# Patient Record
Sex: MALE | Race: WHITE | NOT HISPANIC OR LATINO | Employment: OTHER | URBAN - METROPOLITAN AREA
[De-identification: names, ages, dates, MRNs, and addresses within clinical notes are randomized per-mention and may not be internally consistent; named-entity substitution may affect disease eponyms.]

---

## 2017-08-01 ENCOUNTER — HOSPITAL ENCOUNTER (INPATIENT)
Facility: HOSPITAL | Age: 62
LOS: 5 days | Discharge: HOME | End: 2017-08-06
Attending: EMERGENCY MEDICINE | Admitting: HOSPITALIST

## 2017-08-01 ENCOUNTER — APPOINTMENT (OUTPATIENT)
Dept: GENERAL RADIOLOGY | Facility: HOSPITAL | Age: 62
End: 2017-08-01

## 2017-08-01 DIAGNOSIS — R53.1 GENERALIZED WEAKNESS: ICD-10-CM

## 2017-08-01 DIAGNOSIS — Z74.09 IMPAIRED FUNCTIONAL MOBILITY, BALANCE, GAIT, AND ENDURANCE: ICD-10-CM

## 2017-08-01 DIAGNOSIS — F10.20 ALCOHOLISM (HCC): ICD-10-CM

## 2017-08-01 DIAGNOSIS — R29.6 MULTIPLE FALLS: ICD-10-CM

## 2017-08-01 DIAGNOSIS — Z74.09 IMPAIRED MOBILITY AND ADLS: ICD-10-CM

## 2017-08-01 DIAGNOSIS — E87.1 ACUTE HYPONATREMIA: Primary | ICD-10-CM

## 2017-08-01 DIAGNOSIS — R41.0 CONFUSION: ICD-10-CM

## 2017-08-01 DIAGNOSIS — Z78.9 IMPAIRED MOBILITY AND ADLS: ICD-10-CM

## 2017-08-01 LAB
ALBUMIN SERPL-MCNC: 3.6 G/DL (ref 3.2–4.8)
ALBUMIN/GLOB SERPL: 0.9 G/DL (ref 1.5–2.5)
ALP SERPL-CCNC: 182 U/L (ref 25–100)
ALT SERPL W P-5'-P-CCNC: 94 U/L (ref 7–40)
AMPHET+METHAMPHET UR QL: NEGATIVE
AMPHETAMINES UR QL: NEGATIVE
ANION GAP SERPL CALCULATED.3IONS-SCNC: 7 MMOL/L (ref 3–11)
AST SERPL-CCNC: 134 U/L (ref 0–33)
BARBITURATES UR QL SCN: NEGATIVE
BASOPHILS # BLD AUTO: 0.02 10*3/MM3 (ref 0–0.2)
BASOPHILS NFR BLD AUTO: 0.2 % (ref 0–1)
BENZODIAZ UR QL SCN: NEGATIVE
BILIRUB SERPL-MCNC: 1.5 MG/DL (ref 0.3–1.2)
BILIRUB UR QL STRIP: NEGATIVE
BUN BLD-MCNC: <5 MG/DL (ref 9–23)
BUN/CREAT SERPL: ABNORMAL (ref 7–25)
BUPRENORPHINE SERPL-MCNC: NEGATIVE NG/ML
CALCIUM SPEC-SCNC: 9.6 MG/DL (ref 8.7–10.4)
CANNABINOIDS SERPL QL: NEGATIVE
CHLORIDE SERPL-SCNC: 92 MMOL/L (ref 99–109)
CLARITY UR: CLEAR
CO2 SERPL-SCNC: 23 MMOL/L (ref 20–31)
COCAINE UR QL: NEGATIVE
COLOR UR: ABNORMAL
CREAT BLD-MCNC: 0.6 MG/DL (ref 0.6–1.3)
DEPRECATED RDW RBC AUTO: 51 FL (ref 37–54)
EOSINOPHIL # BLD AUTO: 0.25 10*3/MM3 (ref 0–0.3)
EOSINOPHIL NFR BLD AUTO: 2.6 % (ref 0–3)
ERYTHROCYTE [DISTWIDTH] IN BLOOD BY AUTOMATED COUNT: 14.1 % (ref 11.3–14.5)
ETHANOL BLD-MCNC: <10 MG/DL (ref 0–10)
GFR SERPL CREATININE-BSD FRML MDRD: 136 ML/MIN/1.73
GLOBULIN UR ELPH-MCNC: 3.9 GM/DL
GLUCOSE BLD-MCNC: 126 MG/DL (ref 70–100)
GLUCOSE UR STRIP-MCNC: NEGATIVE MG/DL
HCT VFR BLD AUTO: 42 % (ref 38.9–50.9)
HGB BLD-MCNC: 15 G/DL (ref 13.1–17.5)
HGB UR QL STRIP.AUTO: NEGATIVE
HOLD SPECIMEN: NORMAL
HOLD SPECIMEN: NORMAL
IMM GRANULOCYTES # BLD: 0.01 10*3/MM3 (ref 0–0.03)
IMM GRANULOCYTES NFR BLD: 0.1 % (ref 0–0.6)
KETONES UR QL STRIP: NEGATIVE
LEUKOCYTE ESTERASE UR QL STRIP.AUTO: NEGATIVE
LYMPHOCYTES # BLD AUTO: 1.53 10*3/MM3 (ref 0.6–4.8)
LYMPHOCYTES NFR BLD AUTO: 16.2 % (ref 24–44)
MAGNESIUM SERPL-MCNC: 1.8 MG/DL (ref 1.3–2.7)
MCH RBC QN AUTO: 35.5 PG (ref 27–31)
MCHC RBC AUTO-ENTMCNC: 35.7 G/DL (ref 32–36)
MCV RBC AUTO: 99.5 FL (ref 80–99)
METHADONE UR QL SCN: NEGATIVE
MONOCYTES # BLD AUTO: 1.35 10*3/MM3 (ref 0–1)
MONOCYTES NFR BLD AUTO: 14.3 % (ref 0–12)
NEUTROPHILS # BLD AUTO: 6.3 10*3/MM3 (ref 1.5–8.3)
NEUTROPHILS NFR BLD AUTO: 66.6 % (ref 41–71)
NITRITE UR QL STRIP: NEGATIVE
OPIATES UR QL: NEGATIVE
OXYCODONE UR QL SCN: NEGATIVE
PCP UR QL SCN: NEGATIVE
PH UR STRIP.AUTO: 6.5 [PH] (ref 5–8)
PLATELET # BLD AUTO: 176 10*3/MM3 (ref 150–450)
PMV BLD AUTO: 9.7 FL (ref 6–12)
POTASSIUM BLD-SCNC: 4.7 MMOL/L (ref 3.5–5.5)
PROPOXYPH UR QL: NEGATIVE
PROT SERPL-MCNC: 7.5 G/DL (ref 5.7–8.2)
PROT UR QL STRIP: NEGATIVE
RBC # BLD AUTO: 4.22 10*6/MM3 (ref 4.2–5.76)
SODIUM BLD-SCNC: 122 MMOL/L (ref 132–146)
SP GR UR STRIP: 1.01 (ref 1–1.03)
TRICYCLICS UR QL SCN: NEGATIVE
UROBILINOGEN UR QL STRIP: ABNORMAL
VIT B12 BLD-MCNC: 652 PG/ML (ref 211–911)
WBC NRBC COR # BLD: 9.46 10*3/MM3 (ref 3.5–10.8)
WHOLE BLOOD HOLD SPECIMEN: NORMAL
WHOLE BLOOD HOLD SPECIMEN: NORMAL

## 2017-08-01 PROCEDURE — 80307 DRUG TEST PRSMV CHEM ANLYZR: CPT | Performed by: EMERGENCY MEDICINE

## 2017-08-01 PROCEDURE — 80053 COMPREHEN METABOLIC PANEL: CPT | Performed by: EMERGENCY MEDICINE

## 2017-08-01 PROCEDURE — 83935 ASSAY OF URINE OSMOLALITY: CPT | Performed by: PHYSICIAN ASSISTANT

## 2017-08-01 PROCEDURE — 82607 VITAMIN B-12: CPT | Performed by: EMERGENCY MEDICINE

## 2017-08-01 PROCEDURE — 83735 ASSAY OF MAGNESIUM: CPT | Performed by: EMERGENCY MEDICINE

## 2017-08-01 PROCEDURE — 84300 ASSAY OF URINE SODIUM: CPT | Performed by: PHYSICIAN ASSISTANT

## 2017-08-01 PROCEDURE — 80306 DRUG TEST PRSMV INSTRMNT: CPT | Performed by: EMERGENCY MEDICINE

## 2017-08-01 PROCEDURE — 71010 HC CHEST PA OR AP: CPT

## 2017-08-01 PROCEDURE — 93005 ELECTROCARDIOGRAM TRACING: CPT | Performed by: EMERGENCY MEDICINE

## 2017-08-01 PROCEDURE — 85025 COMPLETE CBC W/AUTO DIFF WBC: CPT | Performed by: EMERGENCY MEDICINE

## 2017-08-01 PROCEDURE — 81003 URINALYSIS AUTO W/O SCOPE: CPT | Performed by: EMERGENCY MEDICINE

## 2017-08-01 PROCEDURE — 99285 EMERGENCY DEPT VISIT HI MDM: CPT

## 2017-08-01 RX ORDER — PRIMIDONE 50 MG/1
50 TABLET ORAL NIGHTLY
COMMUNITY
End: 2018-07-11 | Stop reason: HOSPADM

## 2017-08-01 RX ORDER — SODIUM CHLORIDE 0.9 % (FLUSH) 0.9 %
10 SYRINGE (ML) INJECTION AS NEEDED
Status: DISCONTINUED | OUTPATIENT
Start: 2017-08-01 | End: 2017-08-06 | Stop reason: HOSPADM

## 2017-08-01 RX ORDER — PROPRANOLOL HYDROCHLORIDE 160 MG/1
CAPSULE, EXTENDED RELEASE ORAL
COMMUNITY
End: 2017-08-06 | Stop reason: HOSPADM

## 2017-08-02 ENCOUNTER — APPOINTMENT (OUTPATIENT)
Dept: CT IMAGING | Facility: HOSPITAL | Age: 62
End: 2017-08-02

## 2017-08-02 PROBLEM — E87.1 HYPONATREMIA: Status: ACTIVE | Noted: 2017-08-02

## 2017-08-02 PROBLEM — R73.9 HYPERGLYCEMIA: Status: ACTIVE | Noted: 2017-08-02

## 2017-08-02 PROBLEM — J44.9 COPD (CHRONIC OBSTRUCTIVE PULMONARY DISEASE) (HCC): Status: ACTIVE | Noted: 2017-08-02

## 2017-08-02 PROBLEM — E44.0 MODERATE MALNUTRITION: Status: ACTIVE | Noted: 2017-08-02

## 2017-08-02 PROBLEM — E87.1 ACUTE HYPONATREMIA: Status: ACTIVE | Noted: 2017-08-02

## 2017-08-02 PROBLEM — R74.8 ELEVATED LIVER ENZYMES: Status: ACTIVE | Noted: 2017-08-02

## 2017-08-02 PROBLEM — F10.10 ALCOHOL ABUSE: Status: ACTIVE | Noted: 2017-08-02

## 2017-08-02 PROBLEM — I10 HYPERTENSION: Status: ACTIVE | Noted: 2017-08-02

## 2017-08-02 PROBLEM — Z72.0 TOBACCO ABUSE: Status: ACTIVE | Noted: 2017-08-02

## 2017-08-02 LAB
ALBUMIN SERPL-MCNC: 3 G/DL (ref 3.2–4.8)
ALBUMIN/GLOB SERPL: 1 G/DL (ref 1.5–2.5)
ALP SERPL-CCNC: 148 U/L (ref 25–100)
ALT SERPL W P-5'-P-CCNC: 70 U/L (ref 7–40)
AMMONIA BLD-SCNC: 19 UMOL/L (ref 19–60)
ANION GAP SERPL CALCULATED.3IONS-SCNC: 5 MMOL/L (ref 3–11)
AST SERPL-CCNC: 92 U/L (ref 0–33)
BILIRUB SERPL-MCNC: 1.3 MG/DL (ref 0.3–1.2)
BUN BLD-MCNC: <5 MG/DL (ref 9–23)
BUN/CREAT SERPL: ABNORMAL (ref 7–25)
CALCIUM SPEC-SCNC: 9.6 MG/DL (ref 8.7–10.4)
CHLORIDE SERPL-SCNC: 98 MMOL/L (ref 99–109)
CO2 SERPL-SCNC: 28 MMOL/L (ref 20–31)
CREAT BLD-MCNC: 0.7 MG/DL (ref 0.6–1.3)
CREAT UR-MCNC: 83.3 MG/DL
DEPRECATED RDW RBC AUTO: 53.1 FL (ref 37–54)
ERYTHROCYTE [DISTWIDTH] IN BLOOD BY AUTOMATED COUNT: 14.3 % (ref 11.3–14.5)
FOLATE SERPL-MCNC: 10.46 NG/ML (ref 3.2–20)
GFR SERPL CREATININE-BSD FRML MDRD: 114 ML/MIN/1.73
GLOBULIN UR ELPH-MCNC: 3.1 GM/DL
GLUCOSE BLD-MCNC: 99 MG/DL (ref 70–100)
HAV IGM SERPL QL IA: NORMAL
HBA1C MFR BLD: 4.8 % (ref 4.8–5.6)
HBV CORE IGM SERPL QL IA: NORMAL
HBV SURFACE AG SERPL QL IA: NORMAL
HCT VFR BLD AUTO: 41.4 % (ref 38.9–50.9)
HCV AB SER DONR QL: NORMAL
HGB BLD-MCNC: 14.2 G/DL (ref 13.1–17.5)
INR PPP: 0.98
MAGNESIUM SERPL-MCNC: 1.9 MG/DL (ref 1.3–2.7)
MCH RBC QN AUTO: 34.8 PG (ref 27–31)
MCHC RBC AUTO-ENTMCNC: 34.3 G/DL (ref 32–36)
MCV RBC AUTO: 101.5 FL (ref 80–99)
OSMOLALITY SERPL: 267 MOSM/KG (ref 275–295)
OSMOLALITY UR: 39 MOSM/KG (ref 300–1100)
PLATELET # BLD AUTO: 178 10*3/MM3 (ref 150–450)
PMV BLD AUTO: 10.6 FL (ref 6–12)
POTASSIUM BLD-SCNC: 4.3 MMOL/L (ref 3.5–5.5)
PREALB SERPL-MCNC: 8.7 MG/DL (ref 10–40)
PROT SERPL-MCNC: 6.1 G/DL (ref 5.7–8.2)
PROTHROMBIN TIME: 10.7 SECONDS (ref 9.6–11.5)
RBC # BLD AUTO: 4.08 10*6/MM3 (ref 4.2–5.76)
SODIUM BLD-SCNC: 131 MMOL/L (ref 132–146)
SODIUM UR-SCNC: <10 MMOL/L (ref 30–90)
T4 FREE SERPL-MCNC: 0.92 NG/DL (ref 0.89–1.76)
TSH SERPL DL<=0.05 MIU/L-ACNC: 8.01 MIU/ML (ref 0.35–5.35)
WBC NRBC COR # BLD: 8.6 10*3/MM3 (ref 3.5–10.8)

## 2017-08-02 PROCEDURE — 83036 HEMOGLOBIN GLYCOSYLATED A1C: CPT | Performed by: PHYSICIAN ASSISTANT

## 2017-08-02 PROCEDURE — 84439 ASSAY OF FREE THYROXINE: CPT | Performed by: HOSPITALIST

## 2017-08-02 PROCEDURE — 83930 ASSAY OF BLOOD OSMOLALITY: CPT | Performed by: PHYSICIAN ASSISTANT

## 2017-08-02 PROCEDURE — 97165 OT EVAL LOW COMPLEX 30 MIN: CPT

## 2017-08-02 PROCEDURE — 83735 ASSAY OF MAGNESIUM: CPT | Performed by: FAMILY MEDICINE

## 2017-08-02 PROCEDURE — 82570 ASSAY OF URINE CREATININE: CPT | Performed by: HOSPITALIST

## 2017-08-02 PROCEDURE — 84443 ASSAY THYROID STIM HORMONE: CPT | Performed by: PHYSICIAN ASSISTANT

## 2017-08-02 PROCEDURE — 25010000002 THIAMINE PER 100 MG: Performed by: FAMILY MEDICINE

## 2017-08-02 PROCEDURE — 97161 PT EVAL LOW COMPLEX 20 MIN: CPT

## 2017-08-02 PROCEDURE — 71260 CT THORAX DX C+: CPT

## 2017-08-02 PROCEDURE — 85027 COMPLETE CBC AUTOMATED: CPT | Performed by: PHYSICIAN ASSISTANT

## 2017-08-02 PROCEDURE — 82140 ASSAY OF AMMONIA: CPT | Performed by: PHYSICIAN ASSISTANT

## 2017-08-02 PROCEDURE — 82746 ASSAY OF FOLIC ACID SERUM: CPT | Performed by: PHYSICIAN ASSISTANT

## 2017-08-02 PROCEDURE — 0 IOPAMIDOL 61 % SOLUTION: Performed by: HOSPITALIST

## 2017-08-02 PROCEDURE — 80053 COMPREHEN METABOLIC PANEL: CPT | Performed by: PHYSICIAN ASSISTANT

## 2017-08-02 PROCEDURE — 85610 PROTHROMBIN TIME: CPT | Performed by: PHYSICIAN ASSISTANT

## 2017-08-02 PROCEDURE — 99223 1ST HOSP IP/OBS HIGH 75: CPT | Performed by: FAMILY MEDICINE

## 2017-08-02 PROCEDURE — 84134 ASSAY OF PREALBUMIN: CPT | Performed by: PHYSICIAN ASSISTANT

## 2017-08-02 PROCEDURE — 97116 GAIT TRAINING THERAPY: CPT

## 2017-08-02 PROCEDURE — 25010000002 MAGNESIUM SULFATE PER 500 MG OF MAGNESIUM: Performed by: FAMILY MEDICINE

## 2017-08-02 PROCEDURE — 80074 ACUTE HEPATITIS PANEL: CPT | Performed by: PHYSICIAN ASSISTANT

## 2017-08-02 RX ORDER — PRIMIDONE 50 MG/1
50 TABLET ORAL NIGHTLY
Status: DISCONTINUED | OUTPATIENT
Start: 2017-08-02 | End: 2017-08-06 | Stop reason: HOSPADM

## 2017-08-02 RX ORDER — LORAZEPAM 2 MG/ML
1 INJECTION INTRAMUSCULAR
Status: DISCONTINUED | OUTPATIENT
Start: 2017-08-02 | End: 2017-08-02

## 2017-08-02 RX ORDER — LORAZEPAM 1 MG/1
1 TABLET ORAL
Status: DISCONTINUED | OUTPATIENT
Start: 2017-08-02 | End: 2017-08-02

## 2017-08-02 RX ORDER — NICOTINE 21 MG/24HR
1 PATCH, TRANSDERMAL 24 HOURS TRANSDERMAL DAILY PRN
Status: DISCONTINUED | OUTPATIENT
Start: 2017-08-02 | End: 2017-08-03

## 2017-08-02 RX ORDER — LORAZEPAM 2 MG/ML
4 INJECTION INTRAMUSCULAR
Status: DISCONTINUED | OUTPATIENT
Start: 2017-08-02 | End: 2017-08-02

## 2017-08-02 RX ORDER — LORAZEPAM 2 MG/ML
2 INJECTION INTRAMUSCULAR
Status: DISCONTINUED | OUTPATIENT
Start: 2017-08-02 | End: 2017-08-02

## 2017-08-02 RX ORDER — PROPRANOLOL HYDROCHLORIDE 80 MG/1
160 CAPSULE, EXTENDED RELEASE ORAL
Status: DISCONTINUED | OUTPATIENT
Start: 2017-08-02 | End: 2017-08-03

## 2017-08-02 RX ORDER — SODIUM CHLORIDE 0.9 % (FLUSH) 0.9 %
1-10 SYRINGE (ML) INJECTION AS NEEDED
Status: DISCONTINUED | OUTPATIENT
Start: 2017-08-02 | End: 2017-08-06 | Stop reason: HOSPADM

## 2017-08-02 RX ORDER — ONDANSETRON 2 MG/ML
4 INJECTION INTRAMUSCULAR; INTRAVENOUS EVERY 6 HOURS PRN
Status: DISCONTINUED | OUTPATIENT
Start: 2017-08-02 | End: 2017-08-06 | Stop reason: HOSPADM

## 2017-08-02 RX ORDER — ONDANSETRON 4 MG/1
4 TABLET, FILM COATED ORAL EVERY 6 HOURS PRN
Status: DISCONTINUED | OUTPATIENT
Start: 2017-08-02 | End: 2017-08-06 | Stop reason: HOSPADM

## 2017-08-02 RX ORDER — LORAZEPAM 1 MG/1
4 TABLET ORAL
Status: DISCONTINUED | OUTPATIENT
Start: 2017-08-02 | End: 2017-08-02

## 2017-08-02 RX ORDER — LORAZEPAM 2 MG/ML
0.5 INJECTION INTRAMUSCULAR
Status: DISCONTINUED | OUTPATIENT
Start: 2017-08-02 | End: 2017-08-06 | Stop reason: HOSPADM

## 2017-08-02 RX ORDER — SODIUM CHLORIDE 9 MG/ML
100 INJECTION, SOLUTION INTRAVENOUS CONTINUOUS
Status: DISCONTINUED | OUTPATIENT
Start: 2017-08-02 | End: 2017-08-02

## 2017-08-02 RX ORDER — LORAZEPAM 1 MG/1
2 TABLET ORAL
Status: DISCONTINUED | OUTPATIENT
Start: 2017-08-02 | End: 2017-08-02

## 2017-08-02 RX ORDER — CALCIUM CARBONATE 200(500)MG
2 TABLET,CHEWABLE ORAL 2 TIMES DAILY PRN
Status: DISCONTINUED | OUTPATIENT
Start: 2017-08-02 | End: 2017-08-06 | Stop reason: HOSPADM

## 2017-08-02 RX ADMIN — PRIMIDONE 50 MG: 50 TABLET ORAL at 20:45

## 2017-08-02 RX ADMIN — PROPRANOLOL HYDROCHLORIDE 160 MG: 80 CAPSULE, EXTENDED RELEASE ORAL at 09:48

## 2017-08-02 RX ADMIN — IOPAMIDOL 75 ML: 612 INJECTION, SOLUTION INTRAVENOUS at 09:25

## 2017-08-02 RX ADMIN — PRIMIDONE 50 MG: 50 TABLET ORAL at 02:25

## 2017-08-02 RX ADMIN — SODIUM CHLORIDE 100 ML/HR: 9 INJECTION, SOLUTION INTRAVENOUS at 02:24

## 2017-08-02 RX ADMIN — THIAMINE HYDROCHLORIDE 100 ML/HR: 100 INJECTION, SOLUTION INTRAMUSCULAR; INTRAVENOUS at 04:17

## 2017-08-02 RX ADMIN — NICOTINE 1 PATCH: 21 PATCH, EXTENDED RELEASE TRANSDERMAL at 22:10

## 2017-08-02 NOTE — H&P
"    [facility] Medicine Services  HISTORY AND PHYSICAL    Primary Care Physician: No Known Provider    Subjective     Chief Complaint:  Frequent falls    History of Present Illness:   This is a 62 year old male with a past medical history significant for chronic alcoholism, COPD not on supplemental oxygen, and tobacco abuse who presents with new onset confusion/disorientation and frequent falls. Patient states he has been feeling dizzy and light headed with some noted difficulty with ambulation and ADLs. States his lower extremities just \"give out\" without notice. Patient admits to chronic ongoing alcohol abuse and states he \"doesn't eat much\". No complaints of syncope, chest pain, cough, congestion, N/V/D, dysuria, or abdominal pain. Patient was admitted for similar symptoms in 2015, requiring eventual transfer to ICU. He was found to be hyponatremic and malnourished with acute alcoholic hepatitis. 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 Records reviewed indicate there was also consideration that his SIADH could be associated with hypoalbuminemia and third spacing from his liver disease. [facility] was consulted and sodium eventually normalized with Samsca. Patient was also administered nutritional supplements and eventually regained his strength to walk independently. He was further evaluated at the time for possible occult malignancy with CT abdomen and pelvis which was negative. Was instructed to follow up with nephrology as outpatient for hyponatremia and PCP concerning macrocytic anemia and elevated TSH. Now presenting with similar symptoms for further evaluation and treatment.     Emergency Department Evaluation: vitals stable. Blood glucose increased to 1236. Hyponatremic at 122.0. AST/ALT increased at 134 and 94 respectively. bilirubun increased to 1.5. Chemistry and hematology otherwise favorable. CXR negative, EKG " negative.      Review of Systems   Constitutional: Negative for chills and fever.   HENT: Negative for congestion and sore throat.    Eyes: Negative for photophobia and visual disturbance.   Respiratory: Negative for cough and shortness of breath.    Cardiovascular: Negative for chest pain and leg swelling.   Gastrointestinal: Negative for abdominal pain, diarrhea, nausea and vomiting.   Endocrine: Negative for cold intolerance and heat intolerance.   Genitourinary: Negative for dysuria and flank pain.   Musculoskeletal: Negative for arthralgias and joint swelling.   Skin: Negative for pallor and rash.   Allergic/Immunologic: Negative for immunocompromised state.   Neurological: Positive for dizziness, weakness and headaches. Negative for syncope.   Hematological: Negative for adenopathy.   Psychiatric/Behavioral: Positive for confusion. Negative for agitation.      Otherwise complete ROS performed and negative except as mentioned in the HPI.    Past Medical History:   Diagnosis Date   • Alcohol abuse    • Essential tremor    • Hypertension    • Occasional tremors        History reviewed. No pertinent surgical history.    Family History   Problem Relation Age of Onset   • Cancer Mother    • Alcohol abuse Father        Social History     Social History   • Marital status:      Spouse name: N/A   • Number of children: N/A   • Years of education: N/A     Occupational History   • Not on file.     Social History Main Topics   • Smoking status: Current Every Day Smoker   • Smokeless tobacco: Not on file   • Alcohol use 7.2 oz/week     12 Cans of beer per week   • Drug use: No   • Sexual activity: Defer     Other Topics Concern   • Not on file     Social History Narrative       Medications:  Prescriptions Prior to Admission   Medication Sig Dispense Refill Last Dose   • primidone (MYSOLINE) 50 MG tablet Take 50 mg by mouth Every Night.   Unknown at Unknown time   • propranolol LA (INDERAL LA) 160 MG 24 hr capsule  "Take  by mouth Daily.   8/1/2017 at Unknown time       Allergies:  No Known Allergies      Objective     Physical Exam:  Vital Signs: /93 (BP Location: Left arm, Patient Position: Lying)  Pulse 57  Temp 97.1 °F (36.2 °C) (Oral)   Resp 18  Ht 73\" (185.4 cm)  Wt 150 lb 4 oz (68.2 kg)  SpO2 96%  BMI 19.82 kg/m2  Physical Exam  Temp:  [97.1 °F (36.2 °C)-98.5 °F (36.9 °C)] 97.1 °F (36.2 °C)  Heart Rate:  [57-67] 57  Resp:  [16-18] 18  BP: (117-160)/(77-94) 136/93  Constitutional: no acute distress, awake, alert, chachectic  Eyes: PERRLA, sclerae anicteric, no conjunctival injection  Neck: supple, no thyromegaly, trachea midline  Respiratory: Clear to auscultation bilaterally, nonlabored respirations, breath sounds diminished  Cardiovascular: RRR, no murmurs, rubs, or gallops, palpable pedal pulses bilaterally  Gastrointestinal: Positive bowel sounds, soft, nontender, nondistended  Musculoskeletal: No bilateral ankle edema, no clubbing or cyanosis to bilateral lower extremities  Psychiatric: oriented x 3, appropriate affect, cooperative  Neurologic: Strength symmetric in all extremities, Cranial Nerves grossly intact to confrontation         Results Reviewed:    Results from last 7 days  Lab Units 08/01/17  2136   WBC 10*3/mm3 9.46   HEMOGLOBIN g/dL 15.0   PLATELETS 10*3/mm3 176       Results from last 7 days  Lab Units 08/01/17  2028   SODIUM mmol/L 122*   POTASSIUM mmol/L 4.7   CO2 mmol/L 23.0   CREATININE mg/dL 0.60   GLUCOSE mg/dL 126*   CALCIUM mg/dL 9.6         I have personally reviewed and interpreted available lab data, radiology studies and ECG obtained at time of admission.     Assessment / Plan     Problem List:   Hospital Problem List     * (Principal)Acute hyponatremia    Alcohol abuse    Hypertension    Elevated liver enzymes    COPD (chronic obstructive pulmonary disease)    Hyperglycemia    Tobacco abuse    Moderate malnutrition          Plan:  1. Acute hyponatremia:  -Secondary to chronic " alcoholism. Previously diagnosed with SIADH and followed by [facility].  - consider nephrology consult in am after repeat labs  - previously checked Ct abdomen for occult malignancy, was negative. In the setting of chronic tobacco abuse will check CT chest. CXR negative.  - check serum osmolality, urine osmolality, and urine sodium  - serum albumin favorable at 3.6  - check TSH  - will get saline 100 cc/hr with banana bag, WILL HOLD ADDITIONAL FLUIDS WHILE BANANA BAG IS BEING INFUSED  - Check am labs  - CT OF CHEST TO EVALUATE FOR UNDERLYING NEOPLASM.       2. Alcohol abuse, chronic:  - will initiate alcohol withdrawal protocol. (Monitor closely.  - CHECK b12, folate  - fall precautions with PT/OT  - consult to CASE MANAGEMENT   - ENCOURAGED PT TO SEEK OUT PT TREATMENT INCLUDING BUT NOT LIMITED TO AA MEETINGS. DUE TO SUCH AN EXTENSIVE H/O ETOH ABUSE, HE WILL LIKELY NEED ASSISTANCE AND SUPPORT TO HELP ACHIEVE ABSTINENCE     3. Elevated liver enzymes:  - history of acute alcoholic hepatitis.  - check acute hepatitis panel  - ammonia, PT/INR, in the setting of confusion  - avoid additional hepatotoxic agents    4. COPD:  - stable. Not on supplemental oxygen  - pulmonary toilet as needed with duo nebs, mucolytics, antitussives    5. Hyperglycemia:  - related to alcoholism, but will check A1c    6. Moderate malnutrition:  - History of protein malnutrition.   - check pre-albumin  - inpatient consult to Nutrition    Patient stable for admission to TELEMETRY. Labs and vitals routine per nursing.      DVT prophylaxis: SCD/TEDS  Code Status: full  Admission Status: Patient will be admitted to INPATIENT status due to the need for care which can only be reasonably provided in an hospital setting such as aggressive/expedited ancillary services and/or consultation services and the necessity for IV medications, close physician monitoring and/or the possible need for procedures.  In such, I feel patient’s  risk for adverse outcomes and need for care warrant INPATIENT evaluation and predict the patient’s care encounter to likely last beyond 2 midnights.       Luis Roland PA-C 08/02/17 1:37 AM      Brief Attending Note       I have seen and examined the patient, performing an independent face-to-face diagnostic evaluation with plan of care reviewed and developed with the advanced practice clinician (APC).      Brief Summary Statement/HPI:     Israel Bojorquez is a 62 y.o. male with extensive h/o etoh abuse. Works a a . He has 2 son's and 6 GC. States work has been much more stressful. Denies sx of depression, but state he does not want to eat and drinks 6-12 beers a day.   He works from home and reports worsening weakness and fatigue. He has has some confusion but denies any hallucinations. He was brought to ed by family when he was found to be at his home with 200 empty beer cans on the kitchen counter and had not been eating. In ED his sodium was noted to be 122. He was admitted for iv hydration and further work-up of hyponatremia.       The patient's allergies, problem list, current medications, family history, past medical history, past surgical history and social history have been reviewed.     Attending Physical Exam:  Vitals reviewed-See APC noted  Gen-no acute distress, THIN   HEENT-atraumatic, normocephalic, PERRLA, EOMI, moist mucous membranes present  CV-RRR, S1 S2 normal, No Murmur, No Gallop  Resp-CTAB, no wheezes or rales; normal respiratory effort  Abd-soft, NT, ND, +BS; no rebound or guarding  Ext-no edema or clubbing, pedal pulses intact  Skin-warm, dry, no rashes or lesions noted  Neuro-A&Ox3, equal strength in upper and lower extremities; no focal deficits, CN II-XII grossly intact. TREMOR NOTED. SOME AFFECT OF COORDINATION. NEGATIVE FOR ASTERIXIS.   Psych-FLAT AFFECT      Brief Assessment/Plan :  See above for further detailed assessment and plan developed with APC which I have  reviewed and/or edited.       Mary Carmen Nice DO  08/02/17  7:44 AM

## 2017-08-02 NOTE — PLAN OF CARE
Problem: Patient Care Overview (Adult)  Goal: Plan of Care Review  Outcome: Ongoing (interventions implemented as appropriate)    08/02/17 1432   Coping/Psychosocial Response Interventions   Plan Of Care Reviewed With patient   Outcome Evaluation   Outcome Summary/Follow up Plan Pt presents with deficits in balance, decreased ADL performance, decreased occupational endurance; ambulated to BR with CGA/RW, voided bladder in standing with supervision, ambulated in hallwat CGA/RW. will benefit from skilled OT services to address deficits, facilitate increased fxl I. Recommend home with  services.         Problem: Inpatient Occupational Therapy  Goal: Transfer Training Goal 1 LTG- OT  Outcome: Ongoing (interventions implemented as appropriate)    08/02/17 1432   Transfer Training OT LTG   Transfer Training OT LTG, Date Established 08/02/17   Transfer Training OT LTG, Time to Achieve 1 wk   Transfer Training OT LTG, Activity Type sit to stand/stand to sit;toilet   Transfer Training OT LTG, [place] Level conditional independence   Transfer Training OT LTG, Assist Device walker, rolling   Transfer Training OT LTG, Outcome goal ongoing       Goal: Patient Education Goal LTG- OT  Outcome: Ongoing (interventions implemented as appropriate)  Goal: Toileting Goal LTG- OT  Outcome: Ongoing (interventions implemented as appropriate)    08/02/17 1432   Toileting OT LTG   Toileting Goal OT LTG, Date Established 08/02/17   Toileting Goal OT LTG, Time to Achieve 1 wk   Toileting Goal OT LTG, [place] Level conditional independence   Toileting Goal OT LTG, Outcome goal ongoing       Goal: LB Dressing Goal LTG- OT  Outcome: Ongoing (interventions implemented as appropriate)    08/02/17 1432   LB Dressing OT LTG   LB Dressing Goal OT LTG, Date Established 08/02/17   LB Dressing Goal OT LTG, Time to Achieve 1 wk   LB Dressing Goal OT LTG, [place] Level conditional independence   LB Dressing Goal OT LTG, Outcome goal  ongoing

## 2017-08-02 NOTE — PLAN OF CARE
Problem: Confusion, Acute (Adult)  Goal: Identify Related Risk Factors and Signs and Symptoms  Outcome: Ongoing (interventions implemented as appropriate)    08/02/17 0419   Confusion, Acute   Related Risk Factors (Acute Confusion) (alcoholism/disoriented/hyponatremia)   Signs and Symptoms (Acute Confusion) acute onset of symptoms       Goal: Cognitive/Functional Impairments Minimized  Outcome: Ongoing (interventions implemented as appropriate)    08/02/17 0419   Confusion, Acute (Adult)   Cognitive/Functional Impairments Minimized making progress toward outcome       Goal: Safety  Outcome: Ongoing (interventions implemented as appropriate)    08/02/17 0419   Confusion, Acute (Adult)   Safety making progress toward outcome         Problem: Fall Risk (Adult)  Goal: Identify Related Risk Factors and Signs and Symptoms  Outcome: Outcome(s) achieved Date Met:  08/02/17 08/02/17 0419   Fall Risk   Fall Risk: Related Risk Factors history of falls   Fall Risk: Signs and Symptoms presence of risk factors       Goal: Absence of Falls  Outcome: Ongoing (interventions implemented as appropriate)    Problem: Acute Alcohol Withdrawal Syndrome, Risk For/Actual (Adult)  Goal: Signs and Symptoms of Listed Potential Problems Will be Absent or Manageable (Acute Alcohol Withdrawal Syndrome, Risk For/Actual)  Outcome: Ongoing (interventions implemented as appropriate)    08/02/17 0419   Acute Alcohol Withdrawal Syndrome, Risk For/Actual   Problems Assessed (Alcohol Withdrawal Syndrome) all   Problems Present (Alcohol Withdrawal Syndrome) none

## 2017-08-02 NOTE — PROGRESS NOTES
"IM update note:    Pt admitted after midnight by Dr. Nice and her H&P has been reviewed. Pt seen and examined, additional history obtained. Briefly pt is a 61 yo M with history of tobacco & ETOH abuse, who was previously admitted here with SIADH and followed by [facility]. He was discharged on Samscar, but is no longer on it now. Cause of SIADH was unclear. No malignancy or head trauma. He had been on psych med before, but no longer on them. He does report significant ETOH use and poor PO intake. He complains of being profoundly weak and has fallen \"a few times\" recently without significant injuries. Daughter works here as a nurse and comfirms the history. PE was unremarkable. Will cont on Rally pack and monitor for ETOH WD and treat as indicated. TSH up, so will check T4. PT/OT/CM to eval for DC planning, may need rehab. Na has improved. Nutritionist to see. Will re-evaluate in am.    Note: At least 10-15 min spent on counseling re: ETOH/Tobacco abuse.  "

## 2017-08-02 NOTE — PLAN OF CARE
Problem: Patient Care Overview (Adult)  Goal: Plan of Care Review  Outcome: Ongoing (interventions implemented as appropriate)    08/02/17 1518   Coping/Psychosocial Response Interventions   Plan Of Care Reviewed With patient   Outcome Evaluation   Outcome Summary/Follow up Plan Pt ambulated 350 feet with CGA and RW. Practiced ambulation without use of RW, experienced x1 LOB and recovered by stepping strategy and wall support. Recommend d/c home with HHPT.    Patient Care Overview   Progress progress toward functional goals as expected         Problem: Inpatient Physical Therapy  Goal: Bed Mobility Goal LTG- PT  Outcome: Ongoing (interventions implemented as appropriate)    08/02/17 1518   Bed Mobility PT LTG   Bed Mobility PT LTG, Date Established 08/02/17   Bed Mobility PT LTG, Time to Achieve 5 days   Bed Mobility PT LTG, Activity Type supine to sit/sit to supine   Bed Mobility PT LTG, [place] Level conditional independence       Goal: Transfer Training Goal 1 LTG- PT  Outcome: Ongoing (interventions implemented as appropriate)    08/02/17 1518   Transfer Training PT LTG   Transfer Training PT LTG, Date Established 08/02/17   Transfer Training PT LTG, Time to Achieve 5 days   Transfer Training PT LTG, Activity Type sit to stand/stand to sit   Transfer Training PT LTG, [place] Level conditional independence   Transfer Training PT LTG, Assist Device walker, rolling       Goal: Gait Training Goal LTG- PT  Outcome: Ongoing (interventions implemented as appropriate)    08/02/17 1518   Gait Training PT LTG   Gait Training Goal PT LTG, Date Established 08/02/17   Gait Training Goal PT LTG, Time to Achieve 5 days   Gait Training Goal PT LTG, [place] Level conditional independence   Gait Training Goal PT LTG, Assist Device walker, rolling   Gait Training Goal PT LTG, Distance to Achieve 750 feet       Goal: Stair Training Goal LTG- PT  Outcome: Ongoing (interventions implemented as appropriate)    08/02/17  1518   Stair Training PT LTG   Stair Training Goal PT LTG, Date Established 08/02/17   Stair Training Goal PT LTG, Time to Achieve 5 days   Stair Training Goal PT LTG, [place] Level conditional independence   Stair Training Goal PT LTG, Assist Device 1 handrail   Stair Training Goal PT LTG, Distance to Achieve 7

## 2017-08-02 NOTE — THERAPY EVALUATION
Acute Care - Physical Therapy Initial Evaluation  [facility]     Patient Name: Israel Bojorquez  : 1955  MRN: [id number]  Today's Date: 2017   Onset of Illness/Injury or Date of Surgery Date: 17  Date of Referral to PT: 17  Referring Physician: DIXIE Roland      Admit Date: 2017     Visit Dx:    ICD-10-CM ICD-9-CM   1. Acute hyponatremia E87.1 276.1   2. Confusion R41.0 298.9   3. Multiple falls R29.6 V15.88   4. Generalized weakness R53.1 780.79   5. Alcoholism F10.20 303.90   6. Impaired mobility and ADLs Z74.09 799.89   7. Impaired functional mobility, balance, gait, and endurance Z74.09 V49.89     Patient Active Problem List   Diagnosis   • Tobacco abuse   • Hypertension   • Alcohol abuse   • Elevated liver enzymes   • COPD (chronic obstructive pulmonary disease)   • Hyperglycemia   • Moderate malnutrition   • Acute hyponatremia     Past Medical History:   Diagnosis Date   • Alcohol abuse    • Essential tremor    • History of SIADH    • Hypertension    • Occasional tremors      History reviewed. No pertinent surgical history.       PT ASSESSMENT (last 72 hours)      PT Evaluation       17 1422 17 1310    Rehab Evaluation    Document Type  evaluation  -LM    Subjective Information  agree to therapy;no complaints  -LM    Patient Effort, Rehab Treatment  good  -LM    Symptoms Noted During/After Treatment  none  -LM    General Information    Patient Profile Review  yes  -LM    Onset of Illness/Injury or Date of Surgery Date  17  -LM    Referring Physician  DIXIE Roland  -LM    General Observations  Pt supine with IV.  -LM    Pertinent History Of Current Problem  Pt presents to ED with confusion, frequent falls, gait problems secondary to weakness and dizziness.  -LM    Precautions/Limitations  fall precautions  -LM    Prior Level of Function  independent:;all household mobility;community mobility;gait;transfer;bed mobility;using stairs  -LM    Equipment Currently Used at  Home none  -EE none  -LM    Plans/Goals Discussed With  patient;agreed upon  -LM    Risks Reviewed  patient:;LOB;nausea/vomiting;dizziness;increased discomfort  -LM    Benefits Reviewed  patient:;improve function;increase independence;increase strength;increase balance;decrease pain  -LM    Barriers to Rehab  none identified  -LM    Living Environment    Lives With alone  -EE alone  -LM    Living Arrangements apartment  -EE house  -LM    Home Accessibility  stairs within home;stairs to enter home;tub/shower is not walk in;bed and bath are not on the first floor  -LM    Number of Stairs to Enter Home  4  -LM    Number of Stairs Within Home  7  -LM    Stair Railings at Home  inside, present on left side  -LM    Transportation Available car;family or friend will provide  -EE     Living Environment Comment  Pt lives in split-level home, no one available to assist. Pt works from home.  -LM    Clinical Impression    Date of Referral to PT  08/01/17  -LM    PT Diagnosis  Pt with weakness and general deconditioning due to chronic alcohol consumption, h/o frequent falls and dizziness.  -LM    Prognosis  good  -LM    Patient/Family Goals Statement  return home  -LM    Criteria for Skilled Therapeutic Interventions Met  yes  -LM    Rehab Potential  good, to achieve stated therapy goals  -LM    Pain Assessment    Pain Assessment  No/denies pain  -LM    Cognitive Assessment/Intervention    Current Cognitive/Communication Assessment  functional  -LM    Orientation Status  oriented x 4  -LM    Follows Commands/Answers Questions  100% of the time;able to follow single-step instructions;needs cueing  -LM    Personal Safety  mild impairment;decreased awareness, need for assist;decreased awareness, need for safety  -LM    ROM (Range of Motion)    General ROM  no range of motion deficits identified  -LM    MMT (Manual Muscle Testing)    General MMT Assessment  lower extremity strength deficits identified  -LM    General MMT Assessment  Detail  BLE grossly 4/5  -LM    Bed Mobility, Assessment/Treatment    Bed Mob, Supine to Sit, [place]  independent  -LM    Bed Mob, Sit to Supine, [place]  not tested   Pt left UIC  -LM    Transfer Assessment/Treatment    Transfers, Sit-Stand [place]  stand by assist;verbal cues required  -LM    Transfers, Stand-Sit [place]  stand by assist;verbal cues required  -LM    Transfers, Sit-Stand-Sit, Assist Device  rolling walker  -LM    Transfer, Safety Issues  sequencing ability decreased;step length decreased;weight-shifting ability decreased  -LM    Transfer, Impairments  impaired balance;strength decreased  -LM    Transfer, Comment  Verbal cues to push from bed when standing, reach for chair armrests when sitting. Pt experienced mild unsteadiness during static standing at side of bed in which the RW was then used for support.  -LM    Gait Assessment/Treatment    Gait, [place] Level  contact guard assist;verbal cues required  -LM    Gait, Assistive Device  rolling walker  -LM    Gait, Distance (Feet)  350  -LM    Gait, Gait Pattern Analysis  swing-through gait  -LM    Gait, Gait Deviations  bilateral:;sherif decreased;decreased heel strike;forward flexed posture;step length decreased;toe-to-floor clearance decreased;weight-shifting ability decreased  -LM    Gait, Safety Issues  sequencing ability decreased;step length decreased;weight-shifting ability decreased  -LM    Gait, Impairments  strength decreased;impaired balance  -LM    Gait, Comment  Pt ambulated 350 feet with step-through gait pattern. Pt utilized RW during majority of gait. Verbal cues for upright posture, remain within middle of RW, and heel strike. Practiced ambulation without RW and pt was able to maintain balance without UE support, however experienced mild unsteadiness and one LOB in which pt recovered with use of stepping strategy and use of wall support in room. Pt reported increased stability with use of RW.   -LM     Sensory Assessment/Intervention    Sensory Impairment  --   Reports n/t in BLEs after sitting for long periods of time  -LM    Positioning and Restraints    Pre-Treatment Position  in bed  -LM    Post Treatment Position  chair  -LM    In Chair  reclined;sitting;call light within reach;encouraged to call for assist;exit alarm on  -LM      08/02/17 1300 08/02/17 0908    Rehab Evaluation    Document Type evaluation  -TA     Subjective Information agree to therapy;no complaints  -TA     Evaluation Not Performed  patient unavailable for evaluation   out of room - CT; will check back as time permits  -LM    Patient Effort, Rehab Treatment good  -TA     Symptoms Noted During/After Treatment none  -TA     General Information    Patient Profile Review yes  -TA     Onset of Illness/Injury or Date of Surgery Date 08/01/17  -TA     Referring Physician STEFFEN Roland PA-C  -TA     General Observations Pt supine, RA, IV intact LUE  -TA     Pertinent History Of Current Problem To ED with confusion, frequent falls; PMHx of ETOH, COPD, being followed for hyponatremia.  -TA     Precautions/Limitations fall precautions  -TA     Prior Level of Function independent:;all household mobility;community mobility;gait;transfer;bed mobility;ADL's;cooking;using stairs;work  -TA     Equipment Currently Used at Home none  -TA     Plans/Goals Discussed With patient;agreed upon  -TA     Risks Reviewed patient:;LOB;dizziness;increased discomfort;lines disloged  -TA     Benefits Reviewed patient:;improve function;increase independence;increase strength;increase balance;increase knowledge  -TA     Barriers to Rehab none identified  -TA     Living Environment    Lives With alone  -TA     Living Arrangements house  -TA     Home Accessibility stairs within home  -TA     Number of Stairs Within Home 7  -TA     Stair Railings at Home inside, present on left side  -TA     Living Environment Comment Pt works from home  -TA     Vital Signs    Pre Systolic BP Rehab  --   Nurse cleared pt for tx, VSS  -TA     Pre Patient Position Supine  -TA     Intra Patient Position Standing  -TA     Post Patient Position Sitting  -TA     Pain Assessment    Pain Assessment No/denies pain  -TA     Vision Assessment/Intervention    Visual Impairment WFL  -TA     Cognitive Assessment/Intervention    Current Cognitive/Communication Assessment functional  -TA     Orientation Status oriented x 4  -TA     Follows Commands/Answers Questions 100% of the time;able to follow single-step instructions;needs cueing  -TA     Personal Safety mild impairment;decreased awareness, need for assist;decreased awareness, need for safety  -TA     Personal Safety Interventions fall prevention program maintained;gait belt;nonskid shoes/slippers when out of bed;supervised activity  -TA     ROM (Range of Motion)    General ROM no range of motion deficits identified  -TA     General ROM Detail BUE WFL  -TA     MMT (Manual Muscle Testing)    General MMT Assessment no strength deficits identified  -TA     General MMT Assessment Detail BUE 4+/5  -TA     Bed Mobility, Assessment/Treatment    Bed Mob, Supine to Sit, [place] independent  -TA     Bed Mob, Sit to Supine, [place] not tested  -TA     Bed Mobility, Impairments impaired balance  -TA     Transfer Assessment/Treatment    Transfers, Sit-Stand [place] stand by assist;verbal cues required  -TA     Transfers, Stand-Sit [place] stand by assist;verbal cues required  -TA     Transfers, Sit-Stand-Sit, Assist Device rolling walker  -TA     Transfer, Safety Issues weight-shifting ability decreased  -TA     Transfer, Impairments impaired balance  -TA     Transfer, Comment decreased safety awareness  -TA     Motor Skills/Interventions    Additional Documentation Balance Skills Training (Group)  -TA     Balance Skills Training    Training Strategies (Balance Skills) LBD  -TA     Sitting-Level of Assistance Distant supervision  -TA     Sitting-Balance Support  Feet supported;Feet unsupported  -TA     Sitting-Balance Activities Lateral lean;Forward lean;Reaching for objects;Reaching across midline  -TA     Standing-Level of Assistance Close supervision  -TA     Static Standing Balance Support assistive device  -TA     Standing-Balance Activities Weight Shift A-P;Weight Shift R-L  -TA     Sensory Assessment/Intervention    Light Touch LUE;RUE  -TA     LUE Light Touch WNL  -TA     RUE Light Touch WNL  -TA     Positioning and Restraints    Pre-Treatment Position in bed  -TA     Post Treatment Position chair  -TA     In Chair reclined;call light within reach;encouraged to call for assist;exit alarm on;legs elevated  -TA       08/02/17 0000 08/01/17 7209    General Information    Equipment Currently Used at Home  none  -LMA    Living Environment    Lives With alone  -LMA     Living Arrangements house  -LMA     Home Accessibility stairs within home  -LMA     Number of Stairs Within Home 2  -LMA     Type of Financial/Environmental Concern none  -LMA       User Key  (r) = Recorded By, (t) = Taken By, (c) = Cosigned By    Initials Name Provider Type    TA Freddy Thomas OT Occupational Therapist    FELIX Fernandez, MSW     LMA Djea Atkins, RN Registered Nurse    RAINER Mcdaniel, PT Physical Therapist          Physical Therapy Education     Title: PT OT SLP Therapies (Active)     Topic: Physical Therapy (Active)     Point: Mobility training (Active)    Learning Progress Summary    Learner Readiness Method Response Comment Documented by Status   Patient Acceptance E,D NR Reviewed benefits of activity, gait mechanics, importance of calling for assistance before getting out of bed/chair, walking in [place] with nursing.  08/02/17 1517 Active               Point: Body mechanics (Active)    Learning Progress Summary    Learner Readiness Method Response Comment Documented by Status   Patient Acceptance E,D NR Reviewed benefits of activity, gait mechanics,  importance of calling for assistance before getting out of bed/chair, walking in [place] with nursing.  08/02/17 1517 Active               Point: Precautions (Active)    Learning Progress Summary    Learner Readiness Method Response Comment Documented by Status   Patient Acceptance E,D NR Reviewed benefits of activity, gait mechanics, importance of calling for assistance before getting out of bed/chair, walking in [place] with nursing.  08/02/17 1517 Active                      User Key     Initials Effective Dates Name Provider Type Discipline     06/09/17 -  Joselyn Mcdaniel, PT Physical Therapist PT                PT Recommendation and Plan  Anticipated Equipment Needs At Discharge: front wheeled walker (possible cane pending mobility progress)  Anticipated Discharge Disposition: home, home with home health  Demonstrates Need for Referral to Another Service: home health care  Planned Therapy Interventions: balance training, bed mobility training, gait training, home exercise program, patient/family education, strengthening, transfer training  PT Frequency: daily  Plan of Care Review  Plan Of Care Reviewed With: patient  Progress: progress toward functional goals as expected  Outcome Summary/Follow up Plan: Pt ambulated 350 feet with CGA and RW. Practiced ambulation without use of RW, experienced x1 LOB and recovered by stepping strategy and wall support. Recommend d/c home with HHPT.           IP PT Goals       08/02/17 1518          Bed Mobility PT LTG    Bed Mobility PT LTG, Date Established 08/02/17  -LM      Bed Mobility PT LTG, Time to Achieve 5 days  -LM      Bed Mobility PT LTG, Activity Type supine to sit/sit to supine  -LM      Bed Mobility PT LTG, [place] Level conditional independence  -LM      Transfer Training PT LTG    Transfer Training PT LTG, Date Established 08/02/17  -LM      Transfer Training PT LTG, Time to Achieve 5 days  -LM      Transfer Training PT LTG, Activity Type sit to stand/stand  to sit  -LM      Transfer Training PT LTG, [place] Level conditional independence  -LM      Transfer Training PT LTG, Assist Device walker, rolling  -LM      Gait Training PT LTG    Gait Training Goal PT LTG, Date Established 08/02/17  -LM      Gait Training Goal PT LTG, Time to Achieve 5 days  -LM      Gait Training Goal PT LTG, [place] Level conditional independence  -LM      Gait Training Goal PT LTG, Assist Device walker, rolling  -LM      Gait Training Goal PT LTG, Distance to Achieve 750 feet  -LM      Stair Training PT LTG    Stair Training Goal PT LTG, Date Established 08/02/17  -LM      Stair Training Goal PT LTG, Time to Achieve 5 days  -LM      Stair Training Goal PT LTG, [place] Level conditional independence  -LM      Stair Training Goal PT LTG, Assist Device 1 handrail  -LM      Stair Training Goal PT LTG, Distance to Achieve 7  -LM        User Key  (r) = Recorded By, (t) = Taken By, (c) = Cosigned By    Initials Name Provider Type    RAINER Mcdaniel, PT Physical Therapist                Outcome Measures       08/02/17 1310 08/02/17 1300       How much help from another person do you currently need...    Turning from your back to your side while in flat bed without using bedrails? 4  -LM      Moving from lying on back to sitting on the side of a flat bed without bedrails? 4  -LM      Moving to and from a bed to a chair (including a wheelchair)? 3  -LM      Standing up from a chair using your arms (e.g., wheelchair, bedside chair)? 3  -LM      Climbing 3-5 steps with a railing? 2  -LM      To walk in hospital room? 3  -LM      AM-PAC 6 Clicks Score 19  -LM      How much help from another is currently needed...    Putting on and taking off regular lower body clothing?  3  -TA     Bathing (including washing, rinsing, and drying)  3  -TA     Toileting (which includes using toilet bed pan or urinal)  3  -TA     Putting on and taking off regular upper body clothing  4  -TA     Taking care  of personal grooming (such as brushing teeth)  3  -TA     Eating meals  4  -TA     Score  20  -TA     Functional Assessment    Outcome Measure Options AM-PAC 6 Clicks Basic Mobility (PT)  -LM AM-PAC 6 Clicks Daily Activity (OT)  -TA       User Key  (r) = Recorded By, (t) = Taken By, (c) = Cosigned By    Initials Name Provider Type    TA Freddy Thomas, OT Occupational Therapist    LM Joselyn Mcdaniel PT Physical Therapist           Time Calculation:         PT Charges       08/02/17 1524          Time Calculation    Start Time 1310  -LM      PT Received On 08/02/17  -LM      PT Goal Re-Cert Due Date 08/12/17  -LM      Time Calculation- PT    Total Timed Code Minutes- PT 10 minute(s)  -LM        User Key  (r) = Recorded By, (t) = Taken By, (c) = Cosigned By    Initials Name Provider Type    RAINER Mcdaniel PT Physical Therapist          Therapy Charges for Today     Code Description Service Date Service Provider Modifiers Qty    00148834227 HC PT EVAL LOW COMPLEXITY 4 8/2/2017 Joselyn Mcdaniel, PT GP 1    93656156876 HC GAIT TRAINING EA 15 MIN 8/2/2017 Joselyn Mcdaniel, PT GP 1          PT G-Codes  Outcome Measure Options: AM-PAC 6 Clicks Basic Mobility (PT)      Joselyn Mcdaniel PT  8/2/2017

## 2017-08-02 NOTE — PROGRESS NOTES
Discharge Planning Assessment  [facility]     Patient Name: Israel Bojorquez  MRN: [id number]  Today's Date: 8/2/2017    Admit Date: 8/1/2017          Discharge Needs Assessment       08/02/17 1424    Living Environment    Lives With alone    Living Arrangements apartment    Transportation Available car;family or friend will provide    Living Environment    Provides Primary Care For no one    Quality Of Family Relationships unable to assess    Able to Return to Prior Living Arrangements yes    Living Arrangement Comments Pt. plans to return home to his apartment in [facility] at discharge.    Discharge Needs Assessment    Concerns To Be Addressed substance/tobacco abuse/use concerns    Concerns Comments Alcohol abuse concerns    Readmission Within The Last 30 Days no previous admission in last 30 days    Equipment Currently Used at Home none    Equipment Needed After Discharge none    Current Discharge Risk substance abuse    Discharge Disposition home or self-care    Discharge Contact Information if Applicable [phone]    Discharge Planning Comments Pt. plans to return home at discharge.  Pt. stated he did not use home health services and does not have any DME prior to this admisison. Pt. stated he will have transportation home.             Discharge Plan       08/02/17 1428    Case Management/Social Work Plan    Plan Initial discharge planning    Patient/Family In Agreement With Plan yes    Additional Comments NIA met with pt. at bedside.  Pt. and SW discussed pt.'s alcohol abuse.  Pt. stated that on average he drinks 6-8 beers daily, but some days will drink 12-14 beers.  Pt. has not received prior treatment.  He is agreeable to SW bringing him resource information for him to review.  Pt. did not committ to going to alcohol rehabilitation programming, but at this time will review related material and resource/treatment options.  NIA will meet with pt. again to further discuss discharge planning.         Discharge Placement     No information found        Expected Discharge Date and Time     Expected Discharge Date Expected Discharge Time    Aug 5, 2017               Demographic Summary       08/02/17 1412    Referral Information    Admission Type inpatient    Referral Source admission list    Reason For Consult substance use concerns    Referral Information Comments Alcohol abuse concerns    Primary Care Physician Information    Name Pt. said he has seen someone, but was uncertain of a name.    Phone --            Functional Status       08/02/17 1422    Employment/Financial    Employment/Finance Comments Pt. has insurance and precription coverage through [employer].            Psychosocial     None            Abuse/Neglect     None            Legal     None            Substance Abuse     None            Patient Forms     None          BLANCA Brasher

## 2017-08-02 NOTE — PAYOR COMM NOTE
"Carmine Bojorquez (62 y.o. Male)     Date of Birth Social Security Number Address Home Phone MRN    [id number]  [id number] [facility] [id number] [phone] [id number]    Restoration Marital Status          None        Admission Date Admission Type Admitting Provider Attending Provider Department, Room/Bed    8/1/17 Emergency Silvio Echevarria MD [place], Phonekeo, MD [facility] 5G, S556/1    Discharge Date Discharge Disposition Discharge Destination                      Attending Provider: Silvio Echevarria MD     Allergies:  No Known Allergies    Isolation:  None   Infection:  None   Code Status:  FULL    Ht:  73\" (185.4 cm)   Wt:  150 lb 4 oz (68.2 kg)    Admission Cmt:  None   Principal Problem:  Acute hyponatremia [E87.1]                 Active Insurance as of 8/1/2017     Primary Coverage     Payor Plan Insurance Group Employer/Plan Group    [facility] [id number]     Payor Plan Address Payor Plan Phone Number Effective From Effective To    [id number]  1/1/2017     [place] [address]       Subscriber Name Subscriber Birth Date Member ID       CARMINE BOJORQUEZ 1955 [id number]                 Emergency Contacts      (Rel.) Home Phone Work Phone Mobile Phone    Stefan Bojorquez (Son) [phone] -- --               History & Physical      Mary Carmen Ncie DO at 8/2/2017  1:36 AM              [facility] Medicine Services  HISTORY AND PHYSICAL    Primary Care Physician: No Known Provider    Subjective     Chief Complaint:  Frequent falls    History of Present Illness:   This is a 62 year old male with a past medical history significant for chronic alcoholism, COPD not on supplemental oxygen, and tobacco abuse who presents with new onset confusion/disorientation and frequent falls. Patient states he has been feeling dizzy and light headed with some noted difficulty with ambulation and ADLs. States his lower " "extremities just \"give out\" without notice. Patient admits to chronic ongoing alcohol abuse and states he \"doesn't eat much\". No complaints of syncope, chest pain, cough, congestion, N/V/D, dysuria, or abdominal pain. Patient was admitted for similar symptoms in 2015, requiring eventual transfer to ICU. He was found to be hyponatremic and malnourished with acute alcoholic hepatitis. 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 Records reviewed indicate there was also consideration that his SIADH could be associated with hypoalbuminemia and third spacing from his liver disease. [facility] was consulted and sodium eventually normalized with Samsca. Patient was also administered nutritional supplements and eventually regained his strength to walk independently. He was further evaluated at the time for possible occult malignancy with CT abdomen and pelvis which was negative. Was instructed to follow up with nephrology as outpatient for hyponatremia and PCP concerning macrocytic anemia and elevated TSH. Now presenting with similar symptoms for further evaluation and treatment.     Emergency Department Evaluation: vitals stable. Blood glucose increased to 1236. Hyponatremic at 122.0. AST/ALT increased at 134 and 94 respectively. bilirubun increased to 1.5. Chemistry and hematology otherwise favorable. CXR negative, EKG negative.      Review of Systems   Constitutional: Negative for chills and fever.   HENT: Negative for congestion and sore throat.    Eyes: Negative for photophobia and visual disturbance.   Respiratory: Negative for cough and shortness of breath.    Cardiovascular: Negative for chest pain and leg swelling.   Gastrointestinal: Negative for abdominal pain, diarrhea, nausea and vomiting.   Endocrine: Negative for cold intolerance and heat intolerance.   Genitourinary: Negative for dysuria and flank pain.   Musculoskeletal: Negative for arthralgias and " "joint swelling.   Skin: Negative for pallor and rash.   Allergic/Immunologic: Negative for immunocompromised state.   Neurological: Positive for dizziness, weakness and headaches. Negative for syncope.   Hematological: Negative for adenopathy.   Psychiatric/Behavioral: Positive for confusion. Negative for agitation.      Otherwise complete ROS performed and negative except as mentioned in the HPI.    Past Medical History:   Diagnosis Date   • Alcohol abuse    • Essential tremor    • Hypertension    • Occasional tremors        History reviewed. No pertinent surgical history.    Family History   Problem Relation Age of Onset   • Cancer Mother    • Alcohol abuse Father        Social History     Social History   • Marital status:      Spouse name: N/A   • Number of children: N/A   • Years of education: N/A     Occupational History   • Not on file.     Social History Main Topics   • Smoking status: Current Every Day Smoker   • Smokeless tobacco: Not on file   • Alcohol use 7.2 oz/week     12 Cans of beer per week   • Drug use: No   • Sexual activity: Defer     Other Topics Concern   • Not on file     Social History Narrative       Medications:  Prescriptions Prior to Admission   Medication Sig Dispense Refill Last Dose   • primidone (MYSOLINE) 50 MG tablet Take 50 mg by mouth Every Night.   Unknown at Unknown time   • propranolol LA (INDERAL LA) 160 MG 24 hr capsule Take  by mouth Daily.   8/1/2017 at Unknown time       Allergies:  No Known Allergies      Objective     Physical Exam:  Vital Signs: /93 (BP Location: Left arm, Patient Position: Lying)  Pulse 57  Temp 97.1 °F (36.2 °C) (Oral)   Resp 18  Ht 73\" (185.4 cm)  Wt 150 lb 4 oz (68.2 kg)  SpO2 96%  BMI 19.82 kg/m2  Physical Exam  Temp:  [97.1 °F (36.2 °C)-98.5 °F (36.9 °C)] 97.1 °F (36.2 °C)  Heart Rate:  [57-67] 57  Resp:  [16-18] 18  BP: (117-160)/(77-94) 136/93  Constitutional: no acute distress, awake, alert, chachectic  Eyes: PERRLA, " sclerae anicteric, no conjunctival injection  Neck: supple, no thyromegaly, trachea midline  Respiratory: Clear to auscultation bilaterally, nonlabored respirations, breath sounds diminished  Cardiovascular: RRR, no murmurs, rubs, or gallops, palpable pedal pulses bilaterally  Gastrointestinal: Positive bowel sounds, soft, nontender, nondistended  Musculoskeletal: No bilateral ankle edema, no clubbing or cyanosis to bilateral lower extremities  Psychiatric: oriented x 3, appropriate affect, cooperative  Neurologic: Strength symmetric in all extremities, Cranial Nerves grossly intact to confrontation         Results Reviewed:    Results from last 7 days  Lab Units 08/01/17  2136   WBC 10*3/mm3 9.46   HEMOGLOBIN g/dL 15.0   PLATELETS 10*3/mm3 176       Results from last 7 days  Lab Units 08/01/17  2028   SODIUM mmol/L 122*   POTASSIUM mmol/L 4.7   CO2 mmol/L 23.0   CREATININE mg/dL 0.60   GLUCOSE mg/dL 126*   CALCIUM mg/dL 9.6         I have personally reviewed and interpreted available lab data, radiology studies and ECG obtained at time of admission.     Assessment / Plan     Problem List:   Hospital Problem List     * (Principal)Acute hyponatremia    Alcohol abuse    Hypertension    Elevated liver enzymes    COPD (chronic obstructive pulmonary disease)    Hyperglycemia    Tobacco abuse    Moderate malnutrition          Plan:  1. Acute hyponatremia:  -Secondary to chronic alcoholism. Previously diagnosed with SIADH and followed by [facility].  - consider nephrology consult in am after repeat labs  - previously checked Ct abdomen for occult malignancy, was negative. In the setting of chronic tobacco abuse will check CT chest. CXR negative.  - check serum osmolality, urine osmolality, and urine sodium  - serum albumin favorable at 3.6  - check TSH  - will get saline 100 cc/hr with banana bag, WILL HOLD ADDITIONAL FLUIDS WHILE BANANA BAG IS BEING INFUSED  - Check am labs  - CT OF CHEST TO  EVALUATE FOR UNDERLYING NEOPLASM.       2. Alcohol abuse, chronic:  - will initiate alcohol withdrawal protocol. (Monitor closely.  - CHECK b12, folate  - fall precautions with PT/OT  - consult to CASE MANAGEMENT   - ENCOURAGED PT TO SEEK OUT PT TREATMENT INCLUDING BUT NOT LIMITED TO AA MEETINGS. DUE TO SUCH AN EXTENSIVE H/O ETOH ABUSE, HE WILL LIKELY NEED ASSISTANCE AND SUPPORT TO HELP ACHIEVE ABSTINENCE     3. Elevated liver enzymes:  - history of acute alcoholic hepatitis.  - check acute hepatitis panel  - ammonia, PT/INR, in the setting of confusion  - avoid additional hepatotoxic agents    4. COPD:  - stable. Not on supplemental oxygen  - pulmonary toilet as needed with duo nebs, mucolytics, antitussives    5. Hyperglycemia:  - related to alcoholism, but will check A1c    6. Moderate malnutrition:  - History of protein malnutrition.   - check pre-albumin  - inpatient consult to Nutrition    Patient stable for admission to TELEMETRY. Labs and vitals routine per nursing.      DVT prophylaxis: SCD/TEDS  Code Status: full  Admission Status: Patient will be admitted to INPATIENT status due to the need for care which can only be reasonably provided in an hospital setting such as aggressive/expedited ancillary services and/or consultation services and the necessity for IV medications, close physician monitoring and/or the possible need for procedures.  In such, I feel patient’s risk for adverse outcomes and need for care warrant INPATIENT evaluation and predict the patient’s care encounter to likely last beyond 2 midnights.       Luis Roland PA-C 08/02/17 1:37 AM      Brief Attending Note       I have seen and examined the patient, performing an independent face-to-face diagnostic evaluation with plan of care reviewed and developed with the advanced practice clinician (APC).      Brief Summary Statement/HPI:     Israel Bojorquez is a 62 y.o. male with extensive h/o etoh abuse. Works a a . He has 2  son's and 6 GC. States work has been much more stressful. Denies sx of depression, but state he does not want to eat and drinks 6-12 beers a day.   He works from home and reports worsening weakness and fatigue. He has has some confusion but denies any hallucinations. He was brought to ed by family when he was found to be at his home with 200 empty beer cans on the kitchen counter and had not been eating. In ED his sodium was noted to be 122. He was admitted for iv hydration and further work-up of hyponatremia.       The patient's allergies, problem list, current medications, family history, past medical history, past surgical history and social history have been reviewed.     Attending Physical Exam:  Vitals reviewed-See APC noted  Gen-no acute distress, THIN   HEENT-atraumatic, normocephalic, PERRLA, EOMI, moist mucous membranes present  CV-RRR, S1 S2 normal, No Murmur, No Gallop  Resp-CTAB, no wheezes or rales; normal respiratory effort  Abd-soft, NT, ND, +BS; no rebound or guarding  Ext-no edema or clubbing, pedal pulses intact  Skin-warm, dry, no rashes or lesions noted  Neuro-A&Ox3, equal strength in upper and lower extremities; no focal deficits, CN II-XII grossly intact. TREMOR NOTED. SOME AFFECT OF COORDINATION. NEGATIVE FOR ASTERIXIS.   Psych-FLAT AFFECT      Brief Assessment/Plan :  See above for further detailed assessment and plan developed with APC which I have reviewed and/or edited.       Mary Carmen Nice DO  08/02/17  7:44 AM              Electronically signed by Mary Carmen Nice DO at 8/2/2017  8:52 AM           Emergency Department Notes      William Noel at 8/1/2017  8:35 PM          XR Chest 1 View [[id number]] (Order [id number])     XR BEDSIDE     William Noel  08/01/17 2039       Electronically signed by William Noel at 8/1/2017  8:39 PM      Saad Wilson MD at 8/1/2017  8:38 PM          Subjective   HPI Comments: Israel Bojorquez is a 62 y.o.male who presents to the ED with complications  secondary to chronic alcohol abuse. He reports that he has been feeling intermittently dizzy and lightheaded. He has also experienced some confusion and difficulty ambulating secondary to weakness that is most prominent in his lower extremities. His son reports that he has fallen several times at home, however, he denies any recent fall prior to arrival. In the ED, Mr. Bojorquez admits to chronic alcohol consumption and poor diet. He denies numbess, tingling, or any other acute complaints at this time.    Patient is a 62 y.o. male presenting with general illness.   History provided by:  Patient and relative  Illness   Location:  General  Quality:  Complications secondary to alcohol abuse  Severity:  Moderate  Timing:  Constant  Progression:  Unchanged  Chronicity:  Chronic  Context:  Israel Bojorquez is a 62 y.o.male who presents to the ED with complications secondary to chronic alcohol abuse. He reports that he has been feeling intermittently dizzy and lightheaded. He has also experienced some confusion and difficulty ambulating secondary to weakness that is most prominent in his lower extremities. His son reports that he has fallen several times at home, however, he denies any recent fall prior to arrival.  Relieved by:  Nothing  Worsened by:  Alcohol consumption  Ineffective treatments:  None tried      Review of Systems   Musculoskeletal: Positive for gait problem (secondary to weakness, dizziness).   Neurological: Positive for dizziness, weakness (legs bilaterally) and light-headedness. Negative for numbness.   Psychiatric/Behavioral: Positive for confusion (intermittent).   All other systems reviewed and are negative.      Past Medical History:   Diagnosis Date   • Alcohol abuse    • Hypertension    • Occasional tremors        No Known Allergies    History reviewed. No pertinent surgical history.    History reviewed. No pertinent family history.    Social History     Social History   • Marital status:       Spouse name: N/A   • Number of children: N/A   • Years of education: N/A     Social History Main Topics   • Smoking status: Current Every Day Smoker   • Smokeless tobacco: None   • Alcohol use 7.2 oz/week     12 Cans of beer per week   • Drug use: No   • Sexual activity: Defer     Other Topics Concern   • None     Social History Narrative         Objective   Physical Exam   Constitutional: He is oriented to person, place, and time. He appears well-developed and well-nourished. No distress.   HENT:   Head: Normocephalic and atraumatic.   Nose: Nose normal.   Mouth/Throat: Oropharynx is clear and moist.   Eyes: Conjunctivae are normal. No scleral icterus.   Neck: Normal range of motion. Neck supple.   Cardiovascular: Normal rate, regular rhythm and normal heart sounds.  Exam reveals no gallop and no friction rub.    No murmur heard.  Pulmonary/Chest: Effort normal and breath sounds normal. No respiratory distress. He has no wheezes. He has no rales.   Abdominal: Soft. Bowel sounds are normal. He exhibits no distension and no mass. There is no tenderness. There is no rebound and no guarding.   Musculoskeletal: Normal range of motion. He exhibits no edema.   Neurological: He is alert and oriented to person, place, and time.   Skin: Skin is warm and dry. No erythema.   Psychiatric: He has a normal mood and affect. His behavior is normal.   Nursing note and vitals reviewed.      Procedures        ED Course  ED Course   EKG SB.  Reviewed old records.  Pt admitted about 2 yrs ago for sodium of 113.  Not that bad this time but he is getting there.  Pt's son said there were hundreds of empty beer cans in pt's house.    Patient stable on serial rechecks.  Discussed exam findings, test results so far and concerns in detail at the bedside.  Discussed need for admission for further evaluation and treatment.  I am worried about alcohol withdrawal setting in as well as hyponatremia.                  MDM  Number of Diagnoses or  Management Options  Acute hyponatremia:   Alcoholism:   Confusion:   Generalized weakness:   Multiple falls:      Amount and/or Complexity of Data Reviewed  Clinical lab tests: ordered and reviewed  Decide to obtain previous medical records or to obtain history from someone other than the patient: yes  Obtain history from someone other than the patient: yes  Discuss the patient with other providers: yes  Independent visualization of images, tracings, or specimens: yes        Final diagnoses:   Acute hyponatremia   Confusion   Multiple falls   Generalized weakness   Alcoholism       Documentation assistance provided by marva Phillips.  Information recorded by the scribe was done at my direction and has been verified and validated by me.     Candy Phillips  08/01/17 2059       Saad Wilson MD  08/02/17 0108       Electronically signed by Saad Wilson MD at 8/2/2017  1:08 AM        Vital Signs (last 24 hours)       08/01 0700  -  08/02 0659 08/02 0700  -  08/02 1423   Most Recent    Temp (°F) 97.1 -  98.5    97.4 -  97.5     97.5 (36.4)    Heart Rate 57 -  70    65 -  101     67    Resp 16 -  18      18     18    /80 -  160/89    108/77 -  115/79     115/79    SpO2 (%) 96 -  100      99     99          Hospital Medications (all)       Dose Frequency Start End    calcium carbonate (TUMS) chewable tablet 500 mg (200 mg elemental) 2 tablet 2 Times Daily PRN 8/2/2017     Sig - Route: Chew 1,000 mg 2 (Two) Times a Day As Needed for Heartburn. - Oral    iopamidol (ISOVUE-300) 61 % injection 100 mL 100 mL Once in Imaging 8/2/2017 8/2/2017    Sig - Route: Infuse 100 mL into a venous catheter Once. - Intravenous    LORazepam (ATIVAN) injection 0.5 mg 0.5 mg Every 2 Hours PRN 8/2/2017 8/12/2017    Sig - Route: Infuse 0.25 mL into a venous catheter Every 2 (Two) Hours As Needed for Anxiety. - Intravenous    melatonin sublingual tablet 5 mg 5 mg Nightly PRN 8/2/2017     Sig - Route: Place 1 tablet  "under the tongue At Night As Needed (sleep). - Sublingual    multiple vitamin (M.V.I. Adult) 10 mL, thiamine (B-1) 100 mg, folic acid 1 mg, magnesium sulfate 1 g in sodium chloride 0.9 % 1,000 mL infusion 100 mL/hr Daily 8/3/2017 8/5/2017    Sig - Route: Infuse 100 mL/hr into a venous catheter Daily. - Intravenous    nicotine (NICODERM CQ) 21 MG/24HR patch 1 patch 1 patch Daily PRN 8/2/2017     Sig - Route: Place 1 patch on the skin Daily As Needed (nicotine withdraw). - Transdermal    ondansetron (ZOFRAN) injection 4 mg 4 mg Every 6 Hours PRN 8/2/2017     Sig - Route: Infuse 2 mL into a venous catheter Every 6 (Six) Hours As Needed for Nausea or Vomiting. - Intravenous    Linked Group 1:  \"Or\" Linked Group Details        ondansetron (ZOFRAN) tablet 4 mg 4 mg Every 6 Hours PRN 8/2/2017     Sig - Route: Take 1 tablet by mouth Every 6 (Six) Hours As Needed for Nausea or Vomiting. - Oral    Linked Group 1:  \"Or\" Linked Group Details        primidone (MYSOLINE) tablet 50 mg 50 mg Nightly 8/2/2017     Sig - Route: Take 1 tablet by mouth Every Night. - Oral    propranolol LA (INDERAL LA) 24 hr capsule 160 mg 160 mg Every 24 Hours Scheduled 8/2/2017     Sig - Route: Take 2 capsules by mouth Daily. - Oral    sodium chloride 0.9 % flush 1-10 mL 1-10 mL As Needed 8/2/2017     Sig - Route: Infuse 1-10 mL into a venous catheter As Needed for Line Care. - Intravenous    sodium chloride 0.9 % flush 10 mL 10 mL As Needed 8/1/2017     Sig - Route: Infuse 10 mL into a venous catheter As Needed for Line Care. - Intravenous    Cosign for Ordering: Accepted by Saad Wilson MD on 8/2/2017  1:39 AM    LORazepam (ATIVAN) injection 1 mg (Discontinued) 1 mg Every 2 Hours PRN 8/2/2017 8/2/2017    Sig - Route: Infuse 0.5 mL into a venous catheter Every 2 (Two) Hours As Needed for Withdrawal (for CIWA-Ar 8-10). - Intravenous    Linked Group 2:  \"Or\" Linked Group Details        LORazepam (ATIVAN) injection 2 mg (Discontinued) 2 mg Every " "1 Hour PRN 8/2/2017 8/2/2017    Sig - Route: Infuse 1 mL into a venous catheter Every 1 (One) Hour As Needed for Withdrawal (for CIWA-Ar 11-15). - Intravenous    Linked Group 2:  \"Or\" Linked Group Details        LORazepam (ATIVAN) injection 2 mg (Discontinued) 2 mg Every 15 Minutes PRN 8/2/2017 8/2/2017    Sig - Route: Infuse 1 mL into a venous catheter Every 15 (Fifteen) Minutes As Needed for Withdrawal (for CIWA-Ar greater than 15). - Intravenous    Linked Group 2:  \"Or\" Linked Group Details        LORazepam (ATIVAN) injection 2 mg (Discontinued) 2 mg Every 15 Minutes PRN 8/2/2017 8/2/2017    Sig - Route: Inject 1 mL into the shoulder, thigh, or buttocks Every 15 (Fifteen) Minutes As Needed for Withdrawal (for CIWA-Ar greater than 15). - Intramuscular    Linked Group 2:  \"Or\" Linked Group Details        LORazepam (ATIVAN) injection 4 mg (Discontinued) 4 mg Every 1 Hour PRN 8/2/2017 8/2/2017    Sig - Route: Infuse 2 mL into a venous catheter Every 1 (One) Hour As Needed for Withdrawal (for CIWA-Ar greater than 15). - Intravenous    Linked Group 2:  \"Or\" Linked Group Details        LORazepam (ATIVAN) tablet 1 mg (Discontinued) 1 mg Every 2 Hours PRN 8/2/2017 8/2/2017    Sig - Route: Take 1 tablet by mouth Every 2 (Two) Hours As Needed for Withdrawal (for CIWA-Ar 8-10). - Oral    Linked Group 2:  \"Or\" Linked Group Details        LORazepam (ATIVAN) tablet 2 mg (Discontinued) 2 mg Every 1 Hour PRN 8/2/2017 8/2/2017    Sig - Route: Take 2 tablets by mouth Every 1 (One) Hour As Needed for Withdrawal (for CIWA-Ar 11-15). - Oral    Linked Group 2:  \"Or\" Linked Group Details        LORazepam (ATIVAN) tablet 4 mg (Discontinued) 4 mg Every 1 Hour PRN 8/2/2017 8/2/2017    Sig - Route: Take 4 tablets by mouth Every 1 (One) Hour As Needed for Withdrawal (for CIWA-Ar greater than 15). - Oral    Linked Group 2:  \"Or\" Linked Group Details        multiple vitamin (M.V.I. Adult) 10 mL, thiamine (B-1) 100 mg, folic acid 1 mg, " magnesium sulfate 2 g in sodium chloride 0.9 % 1,000 mL infusion (Discontinued) 100 mL/hr Daily 8/2/2017 8/2/2017    Sig - Route: Infuse 100 mL/hr into a venous catheter Daily. - Intravenous    sodium chloride 0.9 % infusion (Discontinued) 100 mL/hr Continuous 8/2/2017 8/2/2017    Sig - Route: Infuse 100 mL/hr into a venous catheter Continuous. - Intravenous            Lab Results (last 24 hours)     Procedure Component Value Units Date/Time    CBC & Differential [[id number]] Collected:  08/01/17 2136    Specimen:  Blood Updated:  08/01/17 2145    Narrative:       The following orders were created for panel order CBC & Differential.  Procedure                               Abnormality         Status                     ---------                               -----------         ------                     CBC Auto Differential[[id number]]        Abnormal            Final result                 Please view results for these tests on the individual orders.    CBC Auto Differential [[id number]]  (Abnormal) Collected:  08/01/17 2136    Specimen:  Blood Updated:  08/01/17 2145     WBC 9.46 10*3/mm3      RBC 4.22 10*6/mm3      Hemoglobin 15.0 g/dL      Hematocrit 42.0 %      MCV 99.5 (H) fL      MCH 35.5 (H) pg      MCHC 35.7 g/dL      RDW 14.1 %      RDW-SD 51.0 fl      MPV 9.7 fL      Platelets 176 10*3/mm3      Neutrophil % 66.6 %      Lymphocyte % 16.2 (L) %      Monocyte % 14.3 (H) %      Eosinophil % 2.6 %      Basophil % 0.2 %      Immature Grans % 0.1 %      Neutrophils, Absolute 6.30 10*3/mm3      Lymphocytes, Absolute 1.53 10*3/mm3      Monocytes, Absolute 1.35 (H) 10*3/mm3      Eosinophils, Absolute 0.25 10*3/mm3      Basophils, Absolute 0.02 10*3/mm3      Immature Grans, Absolute 0.01 10*3/mm3     Vitamin B12 [[id number]]  (Normal) Collected:  08/01/17 2028    Specimen:  Blood Updated:  08/01/17 2148     Vitamin B-12 652 pg/mL     Magnesium [[id number]]  (Normal) Collected:  08/01/17 2028    Specimen:  Blood  Updated:  08/01/17 2148     Magnesium 1.8 mg/dL     Urinalysis With / Culture If Indicated [[id number]]  (Abnormal) Collected:  08/01/17 2123    Specimen:  Urine from Urine, Clean Catch Updated:  08/01/17 2149     Color, UA Dark Yellow (A)     Appearance, UA Clear     pH, UA 6.5     Specific Gravity, UA 1.012     Glucose, UA Negative     Ketones, UA Negative     Bilirubin, UA Negative     Blood, UA Negative     Protein, UA Negative     Leuk Esterase, UA Negative     Nitrite, UA Negative     Urobilinogen, UA 1.0 E.U./dL    Narrative:       Urine microscopic not indicated.    Comprehensive Metabolic Panel [[id number]]  (Abnormal) Collected:  08/01/17 2028    Specimen:  Blood Updated:  08/01/17 2152     Glucose 126 (H) mg/dL      BUN <5 (L) mg/dL      Creatinine 0.60 mg/dL      Sodium 122 (L) mmol/L      Potassium 4.7 mmol/L      Chloride 92 (L) mmol/L      CO2 23.0 mmol/L      Calcium 9.6 mg/dL      Total Protein 7.5 g/dL      Albumin 3.60 g/dL      ALT (SGPT) 94 (H) U/L      AST (SGOT) 134 (H) U/L      Alkaline Phosphatase 182 (H) U/L      Total Bilirubin 1.5 (H) mg/dL      eGFR Non African Amer 136 mL/min/1.73      Globulin 3.9 gm/dL      A/G Ratio 0.9 (L) g/dL      BUN/Creatinine Ratio --      Unable to calculate Bun/Crea Ratio.        Anion Gap 7.0 mmol/L     Narrative:       National [employer] Guidelines    Stage     Description        GFR  1         Normal or High     90+  2         Mild decrease      60-89  3         Moderate decrease  30-59  4         Severe decrease    15-29  5         Kidney failure     <15    Ethanol [[id number]]  (Normal) Collected:  08/01/17 2028    Specimen:  Blood Updated:  08/01/17 2152     Ethanol <10 mg/dL     Light Blue Top [[id number]] Collected:  08/01/17 2028    Specimen:  Blood Updated:  08/01/17 2201     Extra Tube hold for add-on      Auto resulted       Green Top (Gel) [[id number]] Collected:  08/01/17 2028    Specimen:  Blood Updated:  08/01/17 2201     Extra Tube Hold  for add-ons.      Auto resulted.       Lavender Top [[id number]] Collected:  08/01/17 2028    Specimen:  Blood Updated:  08/01/17 2201     Extra Tube hold for add-on      Auto resulted       Gold Top - SST [[id number]] Collected:  08/01/17 2028    Specimen:  Blood Updated:  08/01/17 2201     Extra Tube Hold for add-ons.      Auto resulted.       Urine Drug Screen [[id number]]  (Normal) Collected:  08/01/17 2123    Specimen:  Urine from Urine, Clean Catch Updated:  08/01/17 2207     THC, Screen, Urine Negative     Phencyclidine (PCP), Urine Negative     Cocaine Screen, Urine Negative     Methamphetamine, Urine Negative     Opiate Screen Negative     Amphetamine Screen, Urine Negative     Benzodiazepine Screen, Urine Negative     Tricyclic Antidepressants Screen Negative     Methadone Screen, Urine Negative     Barbiturates Screen, Urine Negative     Oxycodone Screen, Urine Negative     Propoxyphene Screen Negative     Buprenorphine, Screen, Urine Negative    Narrative:       Cutoff For Drugs Screened:    Amphetamines               500 ng/ml  Barbiturates               200 ng/ml  Benzodiazepines            150 ng/ml  Cocaine                    150 ng/ml  Methadone                  200 ng/ml  Opiates                    100 ng/ml  Phencyclidine               25 ng/ml  THC                            50 ng/ml  Methamphetamine            500 ng/ml  Tricyclic Antidepressants  300 ng/ml  Oxycodone                  100 ng/ml  Propoxyphene               300 ng/ml  Buprenorphine               10 ng/ml    The normal value for all drugs tested is negative. This report includes unconfirmed screening results, with the cutoff values listed, to be used for medical treatment purposes only.  Unconfirmed results must not be used for non-medical purposes such as employment or legal testing.  Clinical consideration should be applied to any drug of abuse test, particularly when unconfirmed results are used.      [facility] [[id number]]  Collected:  08/01/17 2028    Specimen:  Blood Updated:  08/01/17 2330    Narrative:       The following orders were created for panel order [place] [facility].  Procedure                               Abnormality         Status                     ---------                               -----------         ------                     Light Blue Top[[id number]]                                   Final result               Green Top (Gel)[[id number]]                                  Final result               Lavender Top[[id number]]                                     Final result               Gold Top - SST[[id number]]                                   Final result               Green Top (No Gel)[[id number]]                                                            Please view results for these tests on the individual orders.    Ammonia [[id number]]  (Normal) Collected:  08/02/17 0113    Specimen:  Blood Updated:  08/02/17 0137     Ammonia 19 umol/L     Protime-INR [[id number]] Collected:  08/02/17 0113    Specimen:  Blood Updated:  08/02/17 0140     Protime 10.7 Seconds      INR 0.98    Narrative:       Therapeutic Ranges for INR: 2.0-3.0 (PT 20-30)                              2.5-3.5 (PT 25-34)    Prealbumin [[id number]]  (Abnormal) Collected:  08/02/17 0140    Specimen:  Blood Updated:  08/02/17 0253     Prealbumin 8.7 (L) mg/dL     Folate [[id number]]  (Normal) Collected:  08/02/17 0140    Specimen:  Blood Updated:  08/02/17 0253     Folate 10.46 ng/mL     Narrative:         Folate Reference Ranges:    Deficient:            Less than 1.2 ng/mL  Indeterminant:        1.2-3.1 ng/mL  Normal:               3.2-20.0 ng/mL    Magnesium [[id number]]  (Normal) Collected:  08/02/17 0246    Specimen:  Blood Updated:  08/02/17 0322     Magnesium 1.9 mg/dL     Osmolality, Serum [[id number]]  (Abnormal) Collected:  08/02/17 0140    Specimen:  Blood Updated:  08/02/17 0326     Osmolality 267 (L) mOsm/kg     Osmolality, Urine [[id number]]   (Abnormal) Collected:  08/01/17 2123    Specimen:  Urine from Urine, Clean Catch Updated:  08/02/17 0326     Osmolality, Urine 39 (L) mOsm/kg     Hepatitis Panel, Acute [[id number]]  (Normal) Collected:  08/02/17 0140    Specimen:  Blood Updated:  08/02/17 0332     Hepatitis B Surface Ag Non-Reactive     Hep A IgM Non-Reactive     Hep B C IgM Non-Reactive     Hepatitis C Ab Non-Reactive    Sodium, Urine, Random [[id number]]  (Abnormal) Collected:  08/01/17 2123    Specimen:  Urine from Urine, Clean Catch Updated:  08/02/17 0433     Sodium, Urine <10 (L) mmol/L     Hemoglobin A1c [[id number]]  (Normal) Collected:  08/02/17 0246    Specimen:  Blood Updated:  08/02/17 0448     Hemoglobin A1C 4.80 %     Narrative:       The American Diabetes Association recommends maintenance of Hemoglobin A1C at 7.0% or lower. Goals for Hemoglobin A1C reduction may need to be modified if hypoglycemia is a problem.    CBC (No Diff) [[id number]]  (Abnormal) Collected:  08/02/17 0616    Specimen:  Blood Updated:  08/02/17 0702     WBC 8.60 10*3/mm3      RBC 4.08 (L) 10*6/mm3      Hemoglobin 14.2 g/dL      Hematocrit 41.4 %      .5 (H) fL      MCH 34.8 (H) pg      MCHC 34.3 g/dL      RDW 14.3 %      RDW-SD 53.1 fl      MPV 10.6 fL      Platelets 178 10*3/mm3     TSH [[id number]]  (Abnormal) Collected:  08/02/17 0616    Specimen:  Blood Updated:  08/02/17 0739     TSH 8.013 (H) mIU/mL     Narrative:       Due to abnormal TSH results, suggest ordering Free T4.    Comprehensive Metabolic Panel [[id number]]  (Abnormal) Collected:  08/02/17 0616    Specimen:  Blood Updated:  08/02/17 0747     Glucose 99 mg/dL      BUN <5 (L) mg/dL      Creatinine 0.70 mg/dL      Sodium 131 (L) mmol/L      Potassium 4.3 mmol/L      Chloride 98 (L) mmol/L      CO2 28.0 mmol/L      Calcium 9.6 mg/dL      Total Protein 6.1 g/dL      Albumin 3.00 (L) g/dL      ALT (SGPT) 70 (H) U/L      AST (SGOT) 92 (H) U/L      Alkaline Phosphatase 148 (H) U/L      Total  Bilirubin 1.3 (H) mg/dL      eGFR Non African Amer 114 mL/min/1.73      Globulin 3.1 gm/dL      A/G Ratio 1.0 (L) g/dL      BUN/Creatinine Ratio --      Unable to calculate Bun/Crea Ratio.        Anion Gap 5.0 mmol/L     Narrative:       National Kidney Foundation Guidelines    Stage     Description        GFR  1         Normal or High     90+  2         Mild decrease      60-89  3         Moderate decrease  30-59  4         Severe decrease    15-29  5         Kidney failure     <15    T4, Free [[id number]]  (Normal) Collected:  08/02/17 0616    Specimen:  Blood Updated:  08/02/17 1422     Free T4 0.92 ng/dL         Imaging Results (last 24 hours)     Procedure Component Value Units Date/Time    XR Chest 1 View [[id number]]  (Abnormal) Collected:  08/01/17 2020     Updated:  08/01/17 2119    Narrative:       EXAM:    XR Chest, 1 View    CLINICAL HISTORY:    62 years old, male; Signs and symptoms; Wheezing and other: Weak/dizzy/ams;   Additional info: Weak/dizzy/ams triage protocol    TECHNIQUE:    Frontal view of the chest.    COMPARISON:    [facility] - XR CHEST PA AND LATERAL 11/25/2015 9:52:40 PM    FINDINGS:    Lungs:  Hyperlucent, hyperinflated lungs.  No consolidation.    Pleural space:  Unremarkable.  No pneumothorax.    Heart:  Unremarkable.  No cardiomegaly.    Mediastinum:  Unremarkable.    Bones/joints:  Unremarkable.      Impression:       1.  No acute abnormality.  2.  COPD.    THIS DOCUMENT HAS BEEN ELECTRONICALLY SIGNED BY KENYA ANDERSON MD    CT Chest With Contrast [[id number]] Updated:  08/02/17 0929           Physician Progress Notes (last 24 hours) (Notes from 8/1/2017  2:23 PM through 8/2/2017  2:23 PM)      Silvio Echevarria MD at 8/2/2017  1:44 PM  Version 1 of 1         IM update note:    Pt admitted after midnight by Dr. Nice and her H&P has been reviewed. Pt seen and examined, additional history obtained. Briefly pt is a 61 yo M with history of tobacco & ETOH abuse, who was previously admitted here  "with SIADH and followed by [facility]. He was discharged on Samscar, but is no longer on it now. Cause of SIADH was unclear. No malignancy or head trauma. He had been on psych med before, but no longer on them. He does report significant ETOH use and poor PO intake. He complains of being profoundly weak and has fallen \"a few times\" recently without significant injuries. Daughter works here as a nurse and comfirms the history. PE was unremarkable. Will cont on Rally pack and monitor for ETOH WD and treat as indicated. TSH up, so will check T4. PT/OT/CM to eval for DC planning, may need rehab. Na has improved. Nutritionist to see. Will re-evaluate in am.     Electronically signed by Silvio Echevarria MD at 8/2/2017  1:54 PM        Consult Notes (last 24 hours) (Notes from 8/1/2017  2:23 PM through 8/2/2017  2:23 PM)     No notes of this type exist for this encounter.        "

## 2017-08-02 NOTE — ED PROVIDER NOTES
Subjective   HPI Comments: Israel Bojorquez is a 62 y.o.male who presents to the ED with complications secondary to chronic alcohol abuse. He reports that he has been feeling intermittently dizzy and lightheaded. He has also experienced some confusion and difficulty ambulating secondary to weakness that is most prominent in his lower extremities. His son reports that he has fallen several times at home, however, he denies any recent fall prior to arrival. In the ED, Mr. Bojorquez admits to chronic alcohol consumption and poor diet. He denies numbess, tingling, or any other acute complaints at this time.    Patient is a 62 y.o. male presenting with general illness.   History provided by:  Patient and relative  Illness   Location:  General  Quality:  Complications secondary to alcohol abuse  Severity:  Moderate  Timing:  Constant  Progression:  Unchanged  Chronicity:  Chronic  Context:  Israel Bojorquez is a 62 y.o.male who presents to the ED with complications secondary to chronic alcohol abuse. He reports that he has been feeling intermittently dizzy and lightheaded. He has also experienced some confusion and difficulty ambulating secondary to weakness that is most prominent in his lower extremities. His son reports that he has fallen several times at home, however, he denies any recent fall prior to arrival.  Relieved by:  Nothing  Worsened by:  Alcohol consumption  Ineffective treatments:  None tried      Review of Systems   Musculoskeletal: Positive for gait problem (secondary to weakness, dizziness).   Neurological: Positive for dizziness, weakness (legs bilaterally) and light-headedness. Negative for numbness.   Psychiatric/Behavioral: Positive for confusion (intermittent).   All other systems reviewed and are negative.      Past Medical History:   Diagnosis Date   • Alcohol abuse    • Hypertension    • Occasional tremors        No Known Allergies    History reviewed. No pertinent surgical history.    History reviewed.  No pertinent family history.    Social History     Social History   • Marital status:      Spouse name: N/A   • Number of children: N/A   • Years of education: N/A     Social History Main Topics   • Smoking status: Current Every Day Smoker   • Smokeless tobacco: None   • Alcohol use 7.2 oz/week     12 Cans of beer per week   • Drug use: No   • Sexual activity: Defer     Other Topics Concern   • None     Social History Narrative         Objective   Physical Exam   Constitutional: He is oriented to person, place, and time. He appears well-developed and well-nourished. No distress.   HENT:   Head: Normocephalic and atraumatic.   Nose: Nose normal.   Mouth/Throat: Oropharynx is clear and moist.   Eyes: Conjunctivae are normal. No scleral icterus.   Neck: Normal range of motion. Neck supple.   Cardiovascular: Normal rate, regular rhythm and normal heart sounds.  Exam reveals no gallop and no friction rub.    No murmur heard.  Pulmonary/Chest: Effort normal and breath sounds normal. No respiratory distress. He has no wheezes. He has no rales.   Abdominal: Soft. Bowel sounds are normal. He exhibits no distension and no mass. There is no tenderness. There is no rebound and no guarding.   Musculoskeletal: Normal range of motion. He exhibits no edema.   Neurological: He is alert and oriented to person, place, and time.   Skin: Skin is warm and dry. No erythema.   Psychiatric: He has a normal mood and affect. His behavior is normal.   Nursing note and vitals reviewed.      Procedures         ED Course  ED Course   EKG SB.  Reviewed old records.  Pt admitted about 2 yrs ago for sodium of 113.  Not that bad this time but he is getting there.  Pt's son said there were hundreds of empty beer cans in pt's house.    Patient stable on serial rechecks.  Discussed exam findings, test results so far and concerns in detail at the bedside.  Discussed need for admission for further evaluation and treatment.  I am worried about  alcohol withdrawal setting in as well as hyponatremia.                  MDM  Number of Diagnoses or Management Options  Acute hyponatremia:   Alcoholism:   Confusion:   Generalized weakness:   Multiple falls:      Amount and/or Complexity of Data Reviewed  Clinical lab tests: ordered and reviewed  Decide to obtain previous medical records or to obtain history from someone other than the patient: yes  Obtain history from someone other than the patient: yes  Discuss the patient with other providers: yes  Independent visualization of images, tracings, or specimens: yes        Final diagnoses:   Acute hyponatremia   Confusion   Multiple falls   Generalized weakness   Alcoholism       Documentation assistance provided by marva Phillips.  Information recorded by the scrdeborae was done at my direction and has been verified and validated by me.     Candy Phillips  08/01/17 9500       Saad Wilson MD  08/02/17 6490

## 2017-08-02 NOTE — CONSULTS
"Adult Nutrition  Assessment/PES    Patient Name:  Israel Bojorquez  YOB: 1955  MRN: [id number]  Admit Date:  8/1/2017    Assessment Date:  8/2/2017        Reason for Assessment       08/02/17 1516    Reason for Assessment    Reason For Assessment/Visit physician consult   MSA    Time Spent (min) 15    Metabolic/ICU Hyponatremia    Pulmonary/Critical Care COPD    Substance Use ETOH    Other diagnosis Moderate malnutrition                Anthropometrics       08/02/17 1517    Anthropometrics    Height 185.4 cm (73\")    Weight 68.2 kg (150 lb 4 oz)    Ideal Body Weight (IBW)    Ideal Body Weight (IBW), Male (kg) 84.86    % Ideal Body Weight 80.48    Usual Body Weight (UBW)    Usual Body Weight 71.4 kg (157 lb 8 oz)    % Usual Body Weight 95.4    Weight Loss 3.402 kg (7 lb 8 oz)    % Weight Loss  5 %    Body Mass Index (BMI)    BMI (kg/m2) 19.86            Labs/Tests/Procedures/Meds       08/02/17 1519    Labs/Tests/Procedures/Meds    Labs/Tests Review Reviewed                Nutrition Prescription Ordered       08/02/17 1519    Nutrition Prescription PO    Current PO Diet Regular            Evaluation of Received Nutrient/Fluid Intake       08/02/17 1519    PO Evaluation    Number of Days PO Intake Evaluated Insufficient Data              Problem/Interventions:        Problem 1       08/02/17 1519    Nutrition Diagnoses Problem 1    Problem 1 No Nutrition Diagnosis at this Time                    Intervention Goal       08/02/17 1519    Intervention Goal    General Nutrition support treatment    PO Establish PO;Tolerate PO            Nutrition Intervention       08/02/17 1519    Nutrition Intervention    RD/Tech Action Recommend/ordered;Follow Tx progress   Pt. was going to get a bath. Talked with him briefly via curtain. RD continue to follow.    Recommended/Ordered Supplement            Nutrition Prescription       08/02/17 1521    Nutrition Prescription PO    PO Prescription Begin/change supplement    " Supplement Boost Plus   chocolate only    Supplement Frequency 3 times a day    New PO Prescription Ordered? Yes            Education/Evaluation       08/02/17 1523    Monitor/Evaluation    Monitor Per protocol            Electronically signed by:  Day Spivey RD  08/02/17 3:24 PM

## 2017-08-02 NOTE — THERAPY EVALUATION
Acute Care - Occupational Therapy Initial Evaluation  [facility]     Patient Name: Israel Bojorquez  : 1955  MRN: [id number]  Today's Date: 2017  Onset of Illness/Injury or Date of Surgery Date: 17  Date of Referral to OT: 17  Referring Physician: STEFFEN Roland PA-C    Admit Date: 2017       ICD-10-CM ICD-9-CM   1. Acute hyponatremia E87.1 276.1   2. Confusion R41.0 298.9   3. Multiple falls R29.6 V15.88   4. Generalized weakness R53.1 780.79   5. Alcoholism F10.20 303.90   6. Impaired mobility and ADLs Z74.09 799.89     Patient Active Problem List   Diagnosis   • Tobacco abuse   • Hypertension   • Alcohol abuse   • Elevated liver enzymes   • COPD (chronic obstructive pulmonary disease)   • Hyperglycemia   • Moderate malnutrition   • Acute hyponatremia     Past Medical History:   Diagnosis Date   • Alcohol abuse    • Essential tremor    • History of SIADH    • Hypertension    • Occasional tremors      History reviewed. No pertinent surgical history.       OT ASSESSMENT FLOWSHEET (last 72 hours)      OT Evaluation       17 1422 17 1300 17 0908 17 0000 17 2357    Rehab Evaluation    Document Type  evaluation  -TA       Subjective Information  agree to therapy;no complaints  -TA       Evaluation Not Performed   patient unavailable for evaluation   out of room - CT; will check back as time permits  -LM      Patient Effort, Rehab Treatment  good  -TA       Symptoms Noted During/After Treatment  none  -TA       General Information    Patient Profile Review  yes  -TA       Onset of Illness/Injury or Date of Surgery Date  17  -TA       Referring Physician  STEFFEN Roland PA-C  -TA       General Observations  Pt supine, RA, IV intact LUE  -TA       Pertinent History Of Current Problem  To ED with confusion, frequent falls; PMHx of ETOH, COPD, being followed for hyponatremia.  -TA       Precautions/Limitations  fall precautions  -TA       Prior Level of Function   independent:;all household mobility;community mobility;gait;transfer;bed mobility;ADL's;cooking;using stairs;work  -TA       Equipment Currently Used at Home none  -EE none  -TA   none  -LMA    Plans/Goals Discussed With  patient;agreed upon  -TA       Risks Reviewed  patient:;LOB;dizziness;increased discomfort;lines disloged  -TA       Benefits Reviewed  patient:;improve function;increase independence;increase strength;increase balance;increase knowledge  -TA       Barriers to Rehab  none identified  -TA       Living Environment    Lives With alone  -EE alone  -TA  alone  -LMA     Living Arrangements apartment  -EE house  -TA  house  -LMA     Home Accessibility  stairs within home  -TA  stairs within home  -LMA     Number of Stairs Within Home  7  -TA  2  -LMA     Stair Railings at Home  inside, present on left side  -TA       Type of Financial/Environmental Concern    none  -LMA     Transportation Available car;family or friend will provide  -EE        Living Environment Comment  Pt works from home  -TA       Clinical Impression    Date of Referral to OT  08/01/17  -TA       OT Diagnosis  Impaired mobility and ADLs  -TA       Impairments Found (describe specific impairments)  aerobic capacity/endurance;muscle performance  -TA       Patient/Family Goals Statement  Return home  -TA       Criteria for Skilled Therapeutic Interventions Met  yes;treatment indicated  -TA       Rehab Potential  good, to achieve stated therapy goals  -TA       Therapy Frequency  daily   Per priority policy  -TA       Anticipated Equipment Needs At Discharge  front wheeled walker  -TA       Anticipated Discharge Disposition  home;home with home health  -TA       Functional Level Prior    Ambulation    0-->independent  -LMA     Transferring    0-->independent  -LMA     Toileting    0-->independent  -LMA     Bathing    0-->independent  -LMA     Dressing    0-->independent  -LMA     Eating    0-->independent  -LMA     Communication     0-->understands/communicates without difficulty  -LMA     Swallowing    0-->swallows foods/liquids without difficulty  -LMA     Prior Functional Level Comment    none  -LMA     Vital Signs    Pre Systolic BP Rehab  --   Nurse cleared pt for tx, VSS  -TA       Pre Patient Position  Supine  -TA       Intra Patient Position  Standing  -TA       Post Patient Position  Sitting  -TA       Pain Assessment    Pain Assessment  No/denies pain  -TA       Vision Assessment/Intervention    Visual Impairment  WFL  -TA       Cognitive Assessment/Intervention    Current Cognitive/Communication Assessment  functional  -TA       Orientation Status  oriented x 4  -TA       Follows Commands/Answers Questions  100% of the time;able to follow single-step instructions;needs cueing  -TA       Personal Safety  mild impairment;decreased awareness, need for assist;decreased awareness, need for safety  -TA       Personal Safety Interventions  fall prevention program maintained;gait belt;nonskid shoes/slippers when out of bed;supervised activity  -TA       ROM (Range of Motion)    General ROM  no range of motion deficits identified  -TA       General ROM Detail  BUE WFL  -TA       MMT (Manual Muscle Testing)    General MMT Assessment  no strength deficits identified  -TA       General MMT Assessment Detail  BUE 4+/5  -TA       Bed Mobility, Assessment/Treatment    Bed Mob, Supine to Sit, [place]  independent  -TA       Bed Mob, Sit to Supine, [place]  not tested  -TA       Bed Mobility, Impairments  impaired balance  -TA       Transfer Assessment/Treatment    Transfers, Sit-Stand [place]  stand by assist;verbal cues required  -TA       Transfers, Stand-Sit [place]  stand by assist;verbal cues required  -TA       Transfers, Sit-Stand-Sit, Assist Device  rolling walker  -TA       Transfer, Safety Issues  weight-shifting ability decreased  -TA       Transfer, Impairments  impaired balance  -TA       Transfer, Comment  decreased  safety awareness  -TA       Functional Mobility    Functional Mobility- Ind. Level  contact guard assist;verbal cues required  -TA       Functional Mobility- Device  rolling walker  -TA       Functional Mobility-Distance (Feet)  --   In hallway, see PT note for distance  -TA       Functional Mobility- Safety Issues  balance decreased during turns;weight-shifting ability decreased  -TA       Lower Body Dressing Assessment/Training    LB Dressing Assess/Train, Clothing Type  donning:;pants  -TA       LB Dressing Assess/Train, Position  sitting;standing  -TA       LB Dressing Assess/Train, [place]  supervision required;verbal cues required  -TA       LB Dressing Assess/Train, Impairments  impaired balance  -TA       LB Dressing Assess/Train, Comment  VCs for safety  -TA       Toileting Assessment/Training    Toileting Assess/Train, Position  standing  -TA       Toileting Assess/Train, Indepen Level  supervision required  -TA       Toileting Assess/Train, Impairments  impaired balance  -TA       Toileting Assess/Train, Comment  Pt held grab bar during bladder void in standing  -TA       Grooming Assessment/Training    Grooming Assess/Train, Position  standing  -TA       Grooming Assess/Train, Indepen Level  supervision required  -TA       Grooming Assess/Train, Comment  Pt used hand  in standing  -TA       Motor Skills/Interventions    Additional Documentation  Balance Skills Training (Group)  -TA       Balance Skills Training    Training Strategies (Balance Skills)  LBD  -TA       Sitting-Level of Assistance  Distant supervision  -TA       Sitting-Balance Support  Feet supported;Feet unsupported  -TA       Sitting-Balance Activities  Lateral lean;Forward lean;Reaching for objects;Reaching across midline  -TA       Standing-Level of Assistance  Close supervision  -TA       Static Standing Balance Support  assistive device  -TA       Standing-Balance Activities  Weight Shift A-P;Weight Shift R-L  -TA        Sensory Assessment/Intervention    Light Touch  LUE;RUE  -TA       LUE Light Touch  WNL  -TA       RUE Light Touch  WNL  -TA       General Therapy Interventions    Planned Therapy Interventions  activity intolerance;ADL retraining;balance training;home exercise program;strengthening;transfer training  -TA       Positioning and Restraints    Pre-Treatment Position  in bed  -TA       Post Treatment Position  chair  -TA       In Chair  reclined;call light within reach;encouraged to call for assist;exit alarm on;legs elevated  -TA         User Key  (r) = Recorded By, (t) = Taken By, (c) = Cosigned By    Initials Name Effective Dates    TA Freddy Thomas, OT 03/14/16 -     EE Davina Fernandez, MSW 03/14/16 -     LMA Deja Atkins, RN 05/12/17 -     LM Joselyn Mcdaniel, PT 06/09/17 -            Occupational Therapy Education     Title: PT OT SLP Therapies (Active)     Topic: Occupational Therapy (Active)     Point: ADL training (Done)    Description: Instruct learner(s) on proper safety adaptation and remediation techniques during self care or transfers.   Instruct in proper use of assistive devices.    Learning Progress Summary    Learner Readiness Method Response Comment Documented by Status   Patient Acceptance SUMA LUTZ DU,NR Safety with LBD, threading pants in sitting, pulling pants past heels prior to stand to pull up over hips; reinforced need for call for assist with OOB activities. TA 08/02/17 1430 Done               Point: Precautions (Done)    Description: Instruct learner(s) on prescribed precautions during self-care and functional transfers.    Learning Progress Summary    Learner Readiness Method Response Comment Documented by Status   Patient Acceptance SUMA LUTZ DU,NR Safety with LBD, threading pants in sitting, pulling pants past heels prior to stand to pull up over hips; reinforced need for call for assist with OOB activities. TA 08/02/17 1430 Done                      User Key     Initials Effective  Dates Name Provider Type Discipline    TA 03/14/16 -  Freddy Thomas, OT Occupational Therapist OT                  OT Recommendation and Plan  Anticipated Equipment Needs At Discharge: front wheeled walker  Anticipated Discharge Disposition: home, home with home health  Planned Therapy Interventions: activity intolerance, ADL retraining, balance training, home exercise program, strengthening, transfer training  Therapy Frequency: daily (Per priority policy)  Plan of Care Review  Plan Of Care Reviewed With: patient  Outcome Summary/Follow up Plan: Pt presents with deficits in balance, decreased ADL performance, decreased occupational endurance; ambulated to BR with CGA/RW, voided bladder in standing with supervision, ambulated in hallwat CGA/RW. will benefit from skilled OT services to address deficits, facilitate increased fxl I. Recommend home with  services.          OT Goals       08/02/17 1432          Transfer Training OT LTG    Transfer Training OT LTG, Date Established 08/02/17  -TA      Transfer Training OT LTG, Time to Achieve 1 wk  -TA      Transfer Training OT LTG, Activity Type sit to stand/stand to sit;toilet  -TA      Transfer Training OT LTG, [place] Level conditional independence  -TA      Transfer Training OT LTG, Assist Device walker, rolling  -TA      Transfer Training OT LTG, Outcome goal ongoing  -TA      Patient Education OT LTG    Patient Education OT LTG, Date Established 08/02/17  -TA      Patient Education OT LTG, Time to Achieve 1 wk  -TA      Patient Education OT LTG, Education Type HEP;home safety  -TA      Patient Education OT LTG, Education Understanding verbalizes understanding  -TA      Patient Education OT LTG Outcome goal ongoing  -TA      Toileting OT LTG    Toileting Goal OT LTG, Date Established 08/02/17  -TA      Toileting Goal OT LTG, Time to Achieve 1 wk  -TA      Toileting Goal OT LTG, [place] Level conditional independence  -TA      Toileting Goal OT LTG,  Outcome goal ongoing  -TA      LB Dressing OT LTG    LB Dressing Goal OT LTG, Date Established 08/02/17  -TA      LB Dressing Goal OT LTG, Time to Achieve 1 wk  -TA      LB Dressing Goal OT LTG, [place] Level conditional independence  -TA      LB Dressing Goal OT LTG, Outcome goal ongoing  -TA        User Key  (r) = Recorded By, (t) = Taken By, (c) = Cosigned By    Initials Name Provider Type    BEN Thomas OT Occupational Therapist                Outcome Measures       08/02/17 1300          How much help from another is currently needed...    Putting on and taking off regular lower body clothing? 3  -TA      Bathing (including washing, rinsing, and drying) 3  -TA      Toileting (which includes using toilet bed pan or urinal) 3  -TA      Putting on and taking off regular upper body clothing 4  -TA      Taking care of personal grooming (such as brushing teeth) 3  -TA      Eating meals 4  -TA      Score 20  -TA      Functional Assessment    Outcome Measure Options AM-PAC 6 Clicks Daily Activity (OT)  -TA        User Key  (r) = Recorded By, (t) = Taken By, (c) = Cosigned By    Initials Name Provider Type    BEN Thomas OT Occupational Therapist          Time Calculation:   OT Start Time: 1300 (ttc 0 minutes)    Therapy Charges for Today     Code Description Service Date Service Provider Modifiers Qty    [id number]  OT EVAL LOW COMPLEXITY 4 8/2/2017 Freddy Thomas OT GO 1               Freddy Thomas OT  8/2/2017

## 2017-08-02 NOTE — PLAN OF CARE
Problem: Confusion, Acute (Adult)  Goal: Identify Related Risk Factors and Signs and Symptoms  Outcome: Ongoing (interventions implemented as appropriate)    08/02/17 1609   Confusion, Acute   Related Risk Factors (Acute Confusion) malnutrition;metabolic abnormalities   Signs and Symptoms (Acute Confusion) disorientation;acute onset of symptoms       Goal: Cognitive/Functional Impairments Minimized  Outcome: Ongoing (interventions implemented as appropriate)    08/02/17 1609   Confusion, Acute (Adult)   Cognitive/Functional Impairments Minimized making progress toward outcome       Goal: Safety  Outcome: Ongoing (interventions implemented as appropriate)    08/02/17 1609   Confusion, Acute (Adult)   Safety making progress toward outcome         Problem: Fall Risk (Adult)  Goal: Absence of Falls  Outcome: Ongoing (interventions implemented as appropriate)    08/02/17 1609   Fall Risk (Adult)   Absence of Falls making progress toward outcome         Problem: Acute Alcohol Withdrawal Syndrome, Risk For/Actual (Adult)  Goal: Signs and Symptoms of Listed Potential Problems Will be Absent or Manageable (Acute Alcohol Withdrawal Syndrome, Risk For/Actual)  Outcome: Ongoing (interventions implemented as appropriate)    08/02/17 1609   Acute Alcohol Withdrawal Syndrome, Risk For/Actual   Problems Assessed (Alcohol Withdrawal Syndrome) all   Problems Present (Alcohol Withdrawal Syndrome) none         Problem: Patient Care Overview (Adult)  Goal: Plan of Care Review  Outcome: Ongoing (interventions implemented as appropriate)    08/02/17 1609   Coping/Psychosocial Response Interventions   Plan Of Care Reviewed With patient   Outcome Evaluation   Outcome Summary/Follow up Plan VSS. No c/o pain. CIWA score < 3. Ambulated with physical therapy. Needs reminding to use urinal. Seizure and fall precautions. 2 bed alarms   Patient Care Overview   Progress progress toward functional goals is gradual       Goal: Adult Individualization  and Mutuality  Outcome: Ongoing (interventions implemented as appropriate)    08/02/17 1609   Individualization   Patient Specific Preferences lights off   Patient Specific Goals get home   Patient Specific Interventions seizure precautions, padded rails       Goal: Discharge Needs Assessment  Outcome: Ongoing (interventions implemented as appropriate)    08/02/17 1422   Discharge Needs Assessment   Concerns To Be Addressed substance/tobacco abuse/use concerns   Concerns Comments Alcohol abuse concerns   Readmission Within The Last 30 Days no previous admission in last 30 days   Equipment Needed After Discharge none   Current Discharge Risk substance abuse   Discharge Disposition home or self-care   Discharge Planning Comments Pt. plans to return home at discharge. Pt. stated he did not use home health services and does not have any DME prior to this admisison. Pt. stated he will have transportation home.    Living Environment   Transportation Available car;family or friend will provide   Self-Care   Equipment Currently Used at Home none

## 2017-08-03 PROBLEM — R79.89 ELEVATED TSH: Status: ACTIVE | Noted: 2017-08-03

## 2017-08-03 PROBLEM — D64.9 ANEMIA: Status: ACTIVE | Noted: 2017-08-03

## 2017-08-03 LAB
ABO GROUP BLD: NORMAL
ABO GROUP BLD: NORMAL
ANION GAP SERPL CALCULATED.3IONS-SCNC: -1 MMOL/L (ref 3–11)
ANION GAP SERPL CALCULATED.3IONS-SCNC: 4 MMOL/L (ref 3–11)
BASOPHILS # BLD AUTO: 0.02 10*3/MM3 (ref 0–0.2)
BASOPHILS NFR BLD AUTO: 0.3 % (ref 0–1)
BLD GP AB SCN SERPL QL: NEGATIVE
BUN BLD-MCNC: 5 MG/DL (ref 9–23)
BUN BLD-MCNC: 6 MG/DL (ref 9–23)
BUN/CREAT SERPL: 12 (ref 7–25)
BUN/CREAT SERPL: 8.3 (ref 7–25)
CALCIUM SPEC-SCNC: 8 MG/DL (ref 8.7–10.4)
CALCIUM SPEC-SCNC: 8.1 MG/DL (ref 8.7–10.4)
CHLORIDE SERPL-SCNC: 102 MMOL/L (ref 99–109)
CHLORIDE SERPL-SCNC: 102 MMOL/L (ref 99–109)
CO2 SERPL-SCNC: 26 MMOL/L (ref 20–31)
CO2 SERPL-SCNC: 29 MMOL/L (ref 20–31)
CREAT BLD-MCNC: 0.5 MG/DL (ref 0.6–1.3)
CREAT BLD-MCNC: 0.6 MG/DL (ref 0.6–1.3)
DEPRECATED RDW RBC AUTO: 54.2 FL (ref 37–54)
EOSINOPHIL # BLD AUTO: 0.22 10*3/MM3 (ref 0–0.3)
EOSINOPHIL NFR BLD AUTO: 3.1 % (ref 0–3)
ERYTHROCYTE [DISTWIDTH] IN BLOOD BY AUTOMATED COUNT: 14.3 % (ref 11.3–14.5)
GFR SERPL CREATININE-BSD FRML MDRD: 136 ML/MIN/1.73
GFR SERPL CREATININE-BSD FRML MDRD: >150 ML/MIN/1.73
GLUCOSE BLD-MCNC: 101 MG/DL (ref 70–100)
GLUCOSE BLD-MCNC: 86 MG/DL (ref 70–100)
HCT VFR BLD AUTO: 34.1 % (ref 38.9–50.9)
HEMOCCULT STL QL: NEGATIVE
HGB BLD-MCNC: 11.7 G/DL (ref 13.1–17.5)
IMM GRANULOCYTES # BLD: 0.01 10*3/MM3 (ref 0–0.03)
IMM GRANULOCYTES NFR BLD: 0.1 % (ref 0–0.6)
LYMPHOCYTES # BLD AUTO: 1.65 10*3/MM3 (ref 0.6–4.8)
LYMPHOCYTES NFR BLD AUTO: 23.3 % (ref 24–44)
MCH RBC QN AUTO: 35 PG (ref 27–31)
MCHC RBC AUTO-ENTMCNC: 34.3 G/DL (ref 32–36)
MCV RBC AUTO: 102.1 FL (ref 80–99)
MONOCYTES # BLD AUTO: 1.15 10*3/MM3 (ref 0–1)
MONOCYTES NFR BLD AUTO: 16.2 % (ref 0–12)
NEUTROPHILS # BLD AUTO: 4.03 10*3/MM3 (ref 1.5–8.3)
NEUTROPHILS NFR BLD AUTO: 57 % (ref 41–71)
PLATELET # BLD AUTO: 141 10*3/MM3 (ref 150–450)
PMV BLD AUTO: 9.7 FL (ref 6–12)
POTASSIUM BLD-SCNC: 3.3 MMOL/L (ref 3.5–5.5)
POTASSIUM BLD-SCNC: 3.8 MMOL/L (ref 3.5–5.5)
RBC # BLD AUTO: 3.34 10*6/MM3 (ref 4.2–5.76)
RH BLD: POSITIVE
RH BLD: POSITIVE
SODIUM BLD-SCNC: 130 MMOL/L (ref 132–146)
SODIUM BLD-SCNC: 132 MMOL/L (ref 132–146)
WBC NRBC COR # BLD: 7.08 10*3/MM3 (ref 3.5–10.8)

## 2017-08-03 PROCEDURE — 86901 BLOOD TYPING SEROLOGIC RH(D): CPT

## 2017-08-03 PROCEDURE — 25010000002 THIAMINE PER 100 MG: Performed by: HOSPITALIST

## 2017-08-03 PROCEDURE — 86900 BLOOD TYPING SEROLOGIC ABO: CPT

## 2017-08-03 PROCEDURE — 86900 BLOOD TYPING SEROLOGIC ABO: CPT | Performed by: HOSPITALIST

## 2017-08-03 PROCEDURE — 80048 BASIC METABOLIC PNL TOTAL CA: CPT | Performed by: HOSPITALIST

## 2017-08-03 PROCEDURE — 82272 OCCULT BLD FECES 1-3 TESTS: CPT | Performed by: HOSPITALIST

## 2017-08-03 PROCEDURE — 25010000002 HEPARIN (PORCINE) PER 1000 UNITS: Performed by: HOSPITALIST

## 2017-08-03 PROCEDURE — 86850 RBC ANTIBODY SCREEN: CPT | Performed by: HOSPITALIST

## 2017-08-03 PROCEDURE — 99232 SBSQ HOSP IP/OBS MODERATE 35: CPT | Performed by: HOSPITALIST

## 2017-08-03 PROCEDURE — 25010000002 MAGNESIUM SULFATE PER 500 MG OF MAGNESIUM: Performed by: HOSPITALIST

## 2017-08-03 PROCEDURE — 86901 BLOOD TYPING SEROLOGIC RH(D): CPT | Performed by: HOSPITALIST

## 2017-08-03 PROCEDURE — 85025 COMPLETE CBC W/AUTO DIFF WBC: CPT | Performed by: HOSPITALIST

## 2017-08-03 RX ORDER — POTASSIUM CHLORIDE 1.5 G/1.77G
40 POWDER, FOR SOLUTION ORAL AS NEEDED
Status: DISCONTINUED | OUTPATIENT
Start: 2017-08-03 | End: 2017-08-06 | Stop reason: HOSPADM

## 2017-08-03 RX ORDER — HEPARIN SODIUM 5000 [USP'U]/ML
5000 INJECTION, SOLUTION INTRAVENOUS; SUBCUTANEOUS EVERY 8 HOURS SCHEDULED
Status: DISCONTINUED | OUTPATIENT
Start: 2017-08-03 | End: 2017-08-06 | Stop reason: HOSPADM

## 2017-08-03 RX ORDER — LEVOTHYROXINE SODIUM 0.05 MG/1
50 TABLET ORAL
Status: DISCONTINUED | OUTPATIENT
Start: 2017-08-04 | End: 2017-08-06 | Stop reason: HOSPADM

## 2017-08-03 RX ORDER — NICOTINE 21 MG/24HR
1 PATCH, TRANSDERMAL 24 HOURS TRANSDERMAL EVERY 24 HOURS
Status: DISCONTINUED | OUTPATIENT
Start: 2017-08-03 | End: 2017-08-06 | Stop reason: HOSPADM

## 2017-08-03 RX ORDER — SODIUM CHLORIDE 450 MG/100ML
250 INJECTION, SOLUTION INTRAVENOUS ONCE
Status: COMPLETED | OUTPATIENT
Start: 2017-08-03 | End: 2017-08-03

## 2017-08-03 RX ORDER — PANTOPRAZOLE SODIUM 40 MG/1
40 TABLET, DELAYED RELEASE ORAL
Status: DISCONTINUED | OUTPATIENT
Start: 2017-08-04 | End: 2017-08-06 | Stop reason: HOSPADM

## 2017-08-03 RX ORDER — SODIUM CHLORIDE 450 MG/100ML
500 INJECTION, SOLUTION INTRAVENOUS ONCE
Status: COMPLETED | OUTPATIENT
Start: 2017-08-03 | End: 2017-08-03

## 2017-08-03 RX ORDER — POTASSIUM CHLORIDE 750 MG/1
40 CAPSULE, EXTENDED RELEASE ORAL AS NEEDED
Status: DISCONTINUED | OUTPATIENT
Start: 2017-08-03 | End: 2017-08-06 | Stop reason: HOSPADM

## 2017-08-03 RX ADMIN — HEPARIN SODIUM 5000 UNITS: 5000 INJECTION, SOLUTION INTRAVENOUS; SUBCUTANEOUS at 20:33

## 2017-08-03 RX ADMIN — POTASSIUM CHLORIDE 40 MEQ: 750 CAPSULE, EXTENDED RELEASE ORAL at 08:10

## 2017-08-03 RX ADMIN — HEPARIN SODIUM 5000 UNITS: 5000 INJECTION, SOLUTION INTRAVENOUS; SUBCUTANEOUS at 14:41

## 2017-08-03 RX ADMIN — THIAMINE HYDROCHLORIDE 100 ML/HR: 100 INJECTION, SOLUTION INTRAMUSCULAR; INTRAVENOUS at 08:10

## 2017-08-03 RX ADMIN — SODIUM CHLORIDE 250 ML: 4.5 INJECTION, SOLUTION INTRAVENOUS at 00:31

## 2017-08-03 RX ADMIN — NICOTINE 1 PATCH: 21 PATCH, EXTENDED RELEASE TRANSDERMAL at 20:33

## 2017-08-03 RX ADMIN — HEPARIN SODIUM 5000 UNITS: 5000 INJECTION, SOLUTION INTRAVENOUS; SUBCUTANEOUS at 22:00

## 2017-08-03 RX ADMIN — PRIMIDONE 50 MG: 50 TABLET ORAL at 20:33

## 2017-08-03 RX ADMIN — SODIUM CHLORIDE 500 ML: 4.5 INJECTION, SOLUTION INTRAVENOUS at 04:39

## 2017-08-03 NOTE — PROGRESS NOTES
Continued Stay Note   [facility]     Patient Name: Israel Bojorquez  MRN: [id number]  Today's Date: 8/3/2017    Admit Date: 8/1/2017          Discharge Plan       08/03/17 1358    Case Management/Social Work Plan    Plan Update    Additional Comments NIA provided pt with ETOH substance abuse resources. Pt stated if he has any questions he will let SW know.              Discharge Codes     None        Expected Discharge Date and Time     Expected Discharge Date Expected Discharge Time    Aug 5, 2017             BLANCA Martinez

## 2017-08-03 NOTE — PROGRESS NOTES
Adult Nutrition  Assessment/PES    Patient Name:  Israel Bojorquez  YOB: 1955  MRN: [id number]  Admit Date:  8/1/2017    Assessment Date:  8/3/2017        Reason for Assessment       08/03/17 0842    Reason for Assessment    Reason For Assessment/Visit follow up protocol    Time Spent (min) 15    Diagnosis --   per notes this admission              Nutrition/Diet History       08/03/17 0842    Nutrition/Diet History    Typical Food/Fluid Intake Pt. stated he lives by himself and sometimes cooks at home or eats out. Could not determine from pt. how well he ate at home. Pt. stated his weight flucuates but not sure of actual weight changes as does not have a scale at home.                    Nutrition Prescription Ordered       08/03/17 0844    Nutrition Prescription PO    Current PO Diet Regular    Supplement Boost Plus    Supplement Frequency 3 times a day            Evaluation of Received Nutrient/Fluid Intake       08/03/17 0844    PO Evaluation    Number of Days PO Intake Evaluated Insufficient Data              Problem/Interventions:        Problem 1       08/03/17 0844    Nutrition Diagnoses Problem 1    Problem 1 No Nutrition Diagnosis at this Time                    Intervention Goal       08/03/17 0844    Intervention Goal    General Nutrition support treatment    PO Tolerate PO;Establish PO            Nutrition Intervention       08/03/17 0844    Nutrition Intervention    RD/Tech Action Follow Tx progress;Supplement provided   Pt. does not meet criteria for MSA related to wt. hx reported per pt. Advised him catering assoc. will assist pt. with menu selections.              Education/Evaluation       08/03/17 0846    Education    Education --   MNT discussed with pt.    Monitor/Evaluation    Monitor Per protocol            Electronically signed by:  Day Spivey RD  08/03/17 8:47 AM

## 2017-08-03 NOTE — PLAN OF CARE
Problem: Confusion, Acute (Adult)  Goal: Identify Related Risk Factors and Signs and Symptoms  Outcome: Ongoing (interventions implemented as appropriate)    08/02/17 1609   Confusion, Acute   Related Risk Factors (Acute Confusion) malnutrition;metabolic abnormalities   Signs and Symptoms (Acute Confusion) disorientation;acute onset of symptoms       Goal: Cognitive/Functional Impairments Minimized  Outcome: Ongoing (interventions implemented as appropriate)    08/03/17 0427   Confusion, Acute (Adult)   Cognitive/Functional Impairments Minimized making progress toward outcome       Goal: Safety  Outcome: Ongoing (interventions implemented as appropriate)    08/03/17 0427   Confusion, Acute (Adult)   Safety making progress toward outcome         Problem: Fall Risk (Adult)  Goal: Absence of Falls  Outcome: Ongoing (interventions implemented as appropriate)    08/03/17 0427   Fall Risk (Adult)   Absence of Falls making progress toward outcome         Problem: Acute Alcohol Withdrawal Syndrome, Risk For/Actual (Adult)  Goal: Signs and Symptoms of Listed Potential Problems Will be Absent or Manageable (Acute Alcohol Withdrawal Syndrome, Risk For/Actual)  Outcome: Ongoing (interventions implemented as appropriate)    08/02/17 1609   Acute Alcohol Withdrawal Syndrome, Risk For/Actual   Problems Assessed (Alcohol Withdrawal Syndrome) all   Problems Present (Alcohol Withdrawal Syndrome) none         Problem: Patient Care Overview (Adult)  Goal: Plan of Care Review  Outcome: Ongoing (interventions implemented as appropriate)    08/02/17 1609 08/02/17 2045   Coping/Psychosocial Response Interventions   Plan Of Care Reviewed With --  patient   Outcome Evaluation   Outcome Summary/Follow up Plan VSS. No c/o pain. CIWA score < 3. Ambulated with physical therapy. Needs reminding to use urinal. Seizure and fall precautions. 2 bed alarms --    Patient Care Overview   Progress progress toward functional goals is gradual --         Goal: Adult Individualization and Mutuality  Outcome: Ongoing (interventions implemented as appropriate)    08/02/17 1609   Individualization   Patient Specific Preferences lights off   Patient Specific Goals get home   Patient Specific Interventions seizure precautions, padded rails       Goal: Discharge Needs Assessment  Outcome: Ongoing (interventions implemented as appropriate)    08/02/17 1422   Discharge Needs Assessment   Concerns To Be Addressed substance/tobacco abuse/use concerns   Concerns Comments Alcohol abuse concerns   Readmission Within The Last 30 Days no previous admission in last 30 days   Equipment Needed After Discharge none   Current Discharge Risk substance abuse   Discharge Disposition home or self-care   Discharge Planning Comments Pt. plans to return home at discharge. Pt. stated he did not use home health services and does not have any DME prior to this admisison. Pt. stated he will have transportation home.    Living Environment   Transportation Available car;family or friend will provide   Self-Care   Equipment Currently Used at Home none

## 2017-08-03 NOTE — PROGRESS NOTES
[facility] Medicine Services  INPATIENT PROGRESS NOTE    Date of Admission: 8/1/2017  Length of Stay: 2  Primary Care Physician: No Known Provider    Subjective-- HPI/Events overnight/ROS/CC- Hospital follow visit.  Detailed ROS not detailed as performed below      Pt had an episode of hypotension after dose of Propranolol  (home med) was given, required IVF resuscitation. No other acute events. BP still on low end, but improved.    Objective      Temp:  [96.3 °F (35.7 °C)-98.3 °F (36.8 °C)] 96.3 °F (35.7 °C)  Heart Rate:  [60-78] 78  Resp:  [16-18] 18  BP: ()/(47-76) 101/67    Physical Exam:  Physical Exam    General Assessment: No acute cardiopulmonary distress.     CVS: RRR, S1S2 normal, no murmurs    Resp: CTAB, no adventitious sound    Abd: soft, NT, ND, normal BS, no guarding or peritoneal signs    Ext: No edema, both calves are symmetric and NTTP    Neuro: Nonfocal, + chronic essential tremors (stable)    Skin: W/D/I. No rash.    Psych: Affect is appropriate      Results Review:    I have reviewed the labs, radiology results and diagnostic studies.      Results from last 7 days  Lab Units 08/03/17  0204   WBC 10*3/mm3 7.08   HEMOGLOBIN g/dL 11.7*   PLATELETS 10*3/mm3 141*       Results from last 7 days  Lab Units 08/03/17  0623   SODIUM mmol/L 130*   POTASSIUM mmol/L 3.3*   CO2 mmol/L 29.0   CREATININE mg/dL 0.60   GLUCOSE mg/dL 86       Culture Data:  Radiology Data:     I have reviewed the medications.        Assessment/Plan     Assessment/Problem List  63 yo M with history of tobacco & ETOH abuse, who was remotely here with SIADH and followed by [facility], now presented with severe weakness and frequent falls recently.    Principal Problem:    Acute hyponatremia  Active Problems:    Tobacco abuse    Hypertension    Alcohol abuse    Elevated liver enzymes    COPD (chronic obstructive pulmonary disease)    Hyperglycemia    Moderate malnutrition    Elevated TSH, T4 borderline  "low    Plan  - Medication dosage clarified with patient again. He stated that he's been on Inderal LA for \"a long time\" foe essential tremors. He reported that he had no problems with it before.   - Cont to hold BB for now and may consider restarting at 80mg daily if BP improved and monitor to make sure BP does not drop significantly again.  - Ongoing PT/OT  - Monitor for ETOH WD symptoms and use Ativan PRN  - replete electrolytes per protocol  - TSH elevate and T4 at the low end of normal, I will start treatment and pt will need repeat TSH in about 6 weeks with PCP  - H/H dropped significantly, poss hemodilution. Will initiate basic anemia blood work, may need GI consult   - GI/DVT ppx    Dispo: Planning for rehab at SNF once stable. CM following.      Silvio Echevarria MD   08/03/17   2:14 PM    Please note that portions of this note may have been completed with a voice recognition program. Efforts were made to edit the dictations, but occasionally words are mistranscribed.      "

## 2017-08-03 NOTE — PLAN OF CARE
Problem: Confusion, Acute (Adult)  Goal: Identify Related Risk Factors and Signs and Symptoms  Outcome: Ongoing (interventions implemented as appropriate)    08/02/17 1609   Confusion, Acute   Related Risk Factors (Acute Confusion) malnutrition;metabolic abnormalities   Signs and Symptoms (Acute Confusion) disorientation;acute onset of symptoms       Goal: Cognitive/Functional Impairments Minimized  Outcome: Ongoing (interventions implemented as appropriate)    08/03/17 1839   Confusion, Acute (Adult)   Cognitive/Functional Impairments Minimized making progress toward outcome       Goal: Safety  Outcome: Ongoing (interventions implemented as appropriate)    08/03/17 1839   Confusion, Acute (Adult)   Safety making progress toward outcome         Problem: Fall Risk (Adult)  Goal: Absence of Falls  Outcome: Ongoing (interventions implemented as appropriate)    08/03/17 1839   Fall Risk (Adult)   Absence of Falls making progress toward outcome         Problem: Acute Alcohol Withdrawal Syndrome, Risk For/Actual (Adult)  Goal: Signs and Symptoms of Listed Potential Problems Will be Absent or Manageable (Acute Alcohol Withdrawal Syndrome, Risk For/Actual)  Outcome: Ongoing (interventions implemented as appropriate)    08/03/17 1839   Acute Alcohol Withdrawal Syndrome, Risk For/Actual   Problems Assessed (Alcohol Withdrawal Syndrome) all   Problems Present (Alcohol Withdrawal Syndrome) none         Problem: Patient Care Overview (Adult)  Goal: Plan of Care Review  Outcome: Ongoing (interventions implemented as appropriate)    08/02/17 1609 08/03/17 0810 08/03/17 1839   Coping/Psychosocial Response Interventions   Plan Of Care Reviewed With --  patient --    Outcome Evaluation   Outcome Summary/Follow up Plan --  --  VSS. C/o of back aches, Kpad ordered. CIWA < 2. Ambulated in hallway with family member. Seizure and fall precautions. Occult stool was sent to lab, negative. 2 bed alarms   Patient Care Overview   Progress  progress toward functional goals is gradual --  --        Goal: Adult Individualization and Mutuality  Outcome: Ongoing (interventions implemented as appropriate)    08/02/17 1609 08/03/17 1839   Individualization   Patient Specific Preferences lights off --    Patient Specific Goals get home --    Patient Specific Interventions --  seizure precautions, padded rails, kpad for back aches       Goal: Discharge Needs Assessment  Outcome: Ongoing (interventions implemented as appropriate)    08/02/17 1422   Discharge Needs Assessment   Concerns To Be Addressed substance/tobacco abuse/use concerns   Concerns Comments Alcohol abuse concerns   Readmission Within The Last 30 Days no previous admission in last 30 days   Equipment Needed After Discharge none   Current Discharge Risk substance abuse   Discharge Disposition home or self-care   Discharge Planning Comments Pt. plans to return home at discharge. Pt. stated he did not use home health services and does not have any DME prior to this admisison. Pt. stated he will have transportation home.    Living Environment   Transportation Available car;family or friend will provide   Self-Care   Equipment Currently Used at Home none

## 2017-08-04 LAB
BASOPHILS # BLD AUTO: 0.02 10*3/MM3 (ref 0–0.2)
BASOPHILS NFR BLD AUTO: 0.3 % (ref 0–1)
DEPRECATED RDW RBC AUTO: 57.5 FL (ref 37–54)
EOSINOPHIL # BLD AUTO: 0.31 10*3/MM3 (ref 0–0.3)
EOSINOPHIL NFR BLD AUTO: 4.4 % (ref 0–3)
ERYTHROCYTE [DISTWIDTH] IN BLOOD BY AUTOMATED COUNT: 14.7 % (ref 11.3–14.5)
FERRITIN SERPL-MCNC: 175 NG/ML (ref 22–322)
FOLATE SERPL-MCNC: 11.71 NG/ML (ref 3.2–20)
HCT VFR BLD AUTO: 35.3 % (ref 38.9–50.9)
HGB BLD-MCNC: 11.8 G/DL (ref 13.1–17.5)
IMM GRANULOCYTES # BLD: 0.02 10*3/MM3 (ref 0–0.03)
IMM GRANULOCYTES NFR BLD: 0.3 % (ref 0–0.6)
IRON 24H UR-MRATE: 31 MCG/DL (ref 50–175)
IRON SATN MFR SERPL: 14 % (ref 20–50)
LYMPHOCYTES # BLD AUTO: 1.78 10*3/MM3 (ref 0.6–4.8)
LYMPHOCYTES NFR BLD AUTO: 25.1 % (ref 24–44)
MCH RBC QN AUTO: 35.8 PG (ref 27–31)
MCHC RBC AUTO-ENTMCNC: 33.4 G/DL (ref 32–36)
MCV RBC AUTO: 107 FL (ref 80–99)
MONOCYTES # BLD AUTO: 1 10*3/MM3 (ref 0–1)
MONOCYTES NFR BLD AUTO: 14.1 % (ref 0–12)
NEUTROPHILS # BLD AUTO: 3.96 10*3/MM3 (ref 1.5–8.3)
NEUTROPHILS NFR BLD AUTO: 55.8 % (ref 41–71)
PLATELET # BLD AUTO: 142 10*3/MM3 (ref 150–450)
PMV BLD AUTO: 10 FL (ref 6–12)
RBC # BLD AUTO: 3.3 10*6/MM3 (ref 4.2–5.76)
RETICS/RBC NFR AUTO: 2.74 % (ref 0.5–1.5)
TIBC SERPL-MCNC: 220 MCG/DL (ref 250–450)
VIT B12 BLD-MCNC: 416 PG/ML (ref 211–911)
WBC NRBC COR # BLD: 7.09 10*3/MM3 (ref 3.5–10.8)

## 2017-08-04 PROCEDURE — 83550 IRON BINDING TEST: CPT | Performed by: HOSPITALIST

## 2017-08-04 PROCEDURE — 83540 ASSAY OF IRON: CPT | Performed by: HOSPITALIST

## 2017-08-04 PROCEDURE — 97116 GAIT TRAINING THERAPY: CPT

## 2017-08-04 PROCEDURE — 82746 ASSAY OF FOLIC ACID SERUM: CPT | Performed by: HOSPITALIST

## 2017-08-04 PROCEDURE — 25010000002 MAGNESIUM SULFATE PER 500 MG OF MAGNESIUM: Performed by: HOSPITALIST

## 2017-08-04 PROCEDURE — 82728 ASSAY OF FERRITIN: CPT | Performed by: HOSPITALIST

## 2017-08-04 PROCEDURE — 85045 AUTOMATED RETICULOCYTE COUNT: CPT | Performed by: HOSPITALIST

## 2017-08-04 PROCEDURE — 25010000002 HEPARIN (PORCINE) PER 1000 UNITS: Performed by: HOSPITALIST

## 2017-08-04 PROCEDURE — 82607 VITAMIN B-12: CPT | Performed by: HOSPITALIST

## 2017-08-04 PROCEDURE — 85025 COMPLETE CBC W/AUTO DIFF WBC: CPT | Performed by: HOSPITALIST

## 2017-08-04 PROCEDURE — 99232 SBSQ HOSP IP/OBS MODERATE 35: CPT | Performed by: HOSPITALIST

## 2017-08-04 PROCEDURE — 97110 THERAPEUTIC EXERCISES: CPT

## 2017-08-04 PROCEDURE — 25010000002 THIAMINE PER 100 MG: Performed by: HOSPITALIST

## 2017-08-04 RX ORDER — FOLIC ACID 1 MG/1
1 TABLET ORAL DAILY
Status: DISCONTINUED | OUTPATIENT
Start: 2017-08-06 | End: 2017-08-06 | Stop reason: HOSPADM

## 2017-08-04 RX ORDER — THIAMINE MONONITRATE (VIT B1) 100 MG
100 TABLET ORAL DAILY
Status: DISCONTINUED | OUTPATIENT
Start: 2017-08-06 | End: 2017-08-06 | Stop reason: HOSPADM

## 2017-08-04 RX ORDER — PROPRANOLOL HYDROCHLORIDE 80 MG/1
80 CAPSULE, EXTENDED RELEASE ORAL DAILY
Status: DISCONTINUED | OUTPATIENT
Start: 2017-08-04 | End: 2017-08-06 | Stop reason: HOSPADM

## 2017-08-04 RX ADMIN — NICOTINE 1 PATCH: 21 PATCH, EXTENDED RELEASE TRANSDERMAL at 19:04

## 2017-08-04 RX ADMIN — THIAMINE HYDROCHLORIDE 100 ML/HR: 100 INJECTION, SOLUTION INTRAMUSCULAR; INTRAVENOUS at 10:01

## 2017-08-04 RX ADMIN — LEVOTHYROXINE SODIUM 50 MCG: 50 TABLET ORAL at 06:03

## 2017-08-04 RX ADMIN — Medication 5 MG: at 21:56

## 2017-08-04 RX ADMIN — HEPARIN SODIUM 5000 UNITS: 5000 INJECTION, SOLUTION INTRAVENOUS; SUBCUTANEOUS at 06:03

## 2017-08-04 RX ADMIN — HEPARIN SODIUM 5000 UNITS: 5000 INJECTION, SOLUTION INTRAVENOUS; SUBCUTANEOUS at 12:40

## 2017-08-04 RX ADMIN — HEPARIN SODIUM 5000 UNITS: 5000 INJECTION, SOLUTION INTRAVENOUS; SUBCUTANEOUS at 21:56

## 2017-08-04 RX ADMIN — PRIMIDONE 50 MG: 50 TABLET ORAL at 21:56

## 2017-08-04 RX ADMIN — PANTOPRAZOLE SODIUM 40 MG: 40 TABLET, DELAYED RELEASE ORAL at 06:03

## 2017-08-04 RX ADMIN — PROPRANOLOL HYDROCHLORIDE 80 MG: 80 CAPSULE, EXTENDED RELEASE ORAL at 12:40

## 2017-08-04 NOTE — THERAPY TREATMENT NOTE
Acute Care - Physical Therapy Treatment Note  [facility]     Patient Name: Israel Bojorquez  : 1955  MRN: [id number]  Today's Date: 2017  Onset of Illness/Injury or Date of Surgery Date: 17  Date of Referral to PT: 17  Referring Physician: DIXIE Roland    Admit Date: 2017    Visit Dx:    ICD-10-CM ICD-9-CM   1. Acute hyponatremia E87.1 276.1   2. Confusion R41.0 298.9   3. Multiple falls R29.6 V15.88   4. Generalized weakness R53.1 780.79   5. Alcoholism F10.20 303.90   6. Impaired mobility and ADLs Z74.09 799.89   7. Impaired functional mobility, balance, gait, and endurance Z74.09 V49.89     Patient Active Problem List   Diagnosis   • Tobacco abuse   • Hypertension   • Alcohol abuse   • Elevated liver enzymes   • COPD (chronic obstructive pulmonary disease)   • Hyperglycemia   • Moderate malnutrition   • Acute hyponatremia   • Elevated TSH, T4 borderline low   • Anemia               Adult Rehabilitation Note       17 1123          Rehab Assessment/Intervention    Discipline physical therapist  -LM      Document Type therapy note (daily note)  -LM      Subjective Information agree to therapy;no complaints  -LM      Patient Effort, Rehab Treatment good  -LM      Symptoms Noted During/After Treatment none  -LM      Precautions/Limitations fall precautions  -LM      Recorded by [LM] Joselyn Mcdaniel, PT      Pain Assessment    Pain Assessment No/denies pain  -LM      Recorded by [LM] Joselyn Mcdaniel PT      Cognitive Assessment/Intervention    Current Cognitive/Communication Assessment functional  -LM      Orientation Status oriented x 4  -LM      Follows Commands/Answers Questions 100% of the time;able to follow single-step instructions;needs cueing  -LM      Personal Safety mild impairment;decreased awareness, need for safety  -LM      Recorded by [LM] Joselyn Mcdaniel PT      Bed Mobility, Assessment/Treatment    Bed Mob, Supine to Sit, [place] independent  -LM      Bed Mob, Sit  to Supine, [place] independent  -LM      Recorded by [LM] Joselyn Mcdaniel, PT      Transfer Assessment/Treatment    Transfers, Sit-Stand [place] stand by assist;verbal cues required  -LM      Transfers, Stand-Sit [place] stand by assist;verbal cues required  -LM      Transfers, Sit-Stand-Sit, Assist Device rolling walker  -LM      Transfer, Safety Issues sequencing ability decreased;step length decreased  -LM      Transfer, Impairments impaired balance;strength decreased  -LM      Transfer, Comment Verbal cues to push from bed when standing, reach for bed when sitting.   -LM      Recorded by [LM] Joselyn Mcdaniel, PT      Gait Assessment/Treatment    Gait, [place] Level contact guard assist;verbal cues required  -LM      Gait, Assistive Device rolling walker  -LM      Gait, Distance (Feet) 750  -LM      Gait, Gait Pattern Analysis swing-through gait  -LM      Gait, Gait Deviations bilateral:;sherif decreased;forward flexed posture;decreased heel strike;step length decreased  -LM      Gait, Safety Issues sequencing ability decreased;step length decreased;weight-shifting ability decreased  -LM      Gait, Impairments strength decreased;impaired balance  -LM      Gait, Comment Pt ambulated with step-through gait pattern at decreased pace. Verbal cues for heel strike, upright posture, increase in step length.  -LM      Recorded by [LM] Joselyn Mcdaniel, PT      Stairs Assessment/Treatment    Number of Stairs 4  -LM      Stairs, Handrail Location left side (ascending)  -LM      Stairs, [place] Level contact guard assist;verbal cues required  -LM      Stairs, Technique Used step over step (descending);step over step (ascending)  -LM      Stairs, Safety Issues balance decreased during turns;sequencing ability decreased;weight-shifting ability decreased  -LM      Stairs, Impairments strength decreased;impaired balance  -LM      Stairs, Comment Verbal cues for sequencing.  -LM      Recorded by [LM]  Joselyn Mcdaniel PT      Therapy Exercises    Bilateral Lower Extremities AROM:;10 reps;standing;heel raises;hip abduction/adduction;hip flexion;supine;heel slides;SLR;glut sets  -LM      Recorded by [LM] Joselyn Mcdaniel PT      Positioning and Restraints    Pre-Treatment Position in bed  -LM      Post Treatment Position bed  -LM      In Bed notified nsg;supine;call light within reach;encouraged to call for assist;exit alarm on;with family/caregiver  -LM      Recorded by [LM] Joselyn Mcdaniel PT        User Key  (r) = Recorded By, (t) = Taken By, (c) = Cosigned By    Initials Name Effective Dates    LM Joselyn Mcdaniel, PT 06/09/17 -                 IP PT Goals       08/04/17 1154 08/02/17 1518       Bed Mobility PT LTG    Bed Mobility PT LTG, Date Established  08/02/17  -LM     Bed Mobility PT LTG, Time to Achieve  5 days  -LM     Bed Mobility PT LTG, Activity Type  supine to sit/sit to supine  -LM     Bed Mobility PT LTG, [place] Level  conditional independence  -LM     Bed Mobility PT LTG, Date Goal Reviewed 08/04/17  -LM      Bed Mobility PT LTG, Outcome goal met  -LM      Transfer Training PT LTG    Transfer Training PT LTG, Date Established  08/02/17  -LM     Transfer Training PT LTG, Time to Achieve  5 days  -LM     Transfer Training PT LTG, Activity Type  sit to stand/stand to sit  -LM     Transfer Training PT LTG, [place] Level  conditional independence  -LM     Transfer Training PT LTG, Assist Device  walker, rolling  -LM     Transfer Training PT  LTG, Date Goal Reviewed 08/04/17  -LM      Transfer Training PT LTG, Outcome goal ongoing  -LM      Gait Training PT LTG    Gait Training Goal PT LTG, Date Established  08/02/17  -LM     Gait Training Goal PT LTG, Time to Achieve  5 days  -LM     Gait Training Goal PT LTG, [place] Level  conditional independence  -LM     Gait Training Goal PT LTG, Assist Device  walker, rolling  -LM     Gait Training Goal PT LTG, Distance to Achieve  750 feet  -LM      Gait Training Goal PT LTG, Date Goal Reviewed 08/04/17  -LM      Gait Training Goal PT LTG, Outcome goal ongoing  -LM      Stair Training PT LTG    Stair Training Goal PT LTG, Date Established  08/02/17  -LM     Stair Training Goal PT LTG, Time to Achieve  5 days  -LM     Stair Training Goal PT LTG, [place] Level  conditional independence  -LM     Stair Training Goal PT LTG, Assist Device  1 handrail  -LM     Stair Training Goal PT LTG, Distance to Achieve  7  -LM     Stair Training Goal PT LTG, Date Goal Reviewed 08/04/17  -LM      Stair Training Goal PT LTG, Outcome goal ongoing  -LM        User Key  (r) = Recorded By, (t) = Taken By, (c) = Cosigned By    Initials Name Provider Type    RAINER Mcdaniel PT Physical Therapist          Physical Therapy Education     Title: PT OT SLP Therapies (Active)     Topic: Physical Therapy (Done)     Point: Mobility training (Done)    Learning Progress Summary    Learner Readiness Method Response Comment Documented by Status   Patient Acceptance SUMA LUTZ NR Reviewed HEP, benefits of activity, gait mechanics, stair training.  08/04/17 1154 Done    Acceptance SUMA LUTZ Reviewed benefits of activity, gait mechanics, importance of calling for assistance before getting out of bed/chair, walking in [place] with nursing.  08/02/17 1517 Active   Family Acceptance SUMA LUTZ NR Reviewed HEP, benefits of activity, gait mechanics, stair training.  08/04/17 1154 Done               Point: Home exercise program (Done)    Learning Progress Summary    Learner Readiness Method Response Comment Documented by Status   Patient Acceptance SUMA LUTZ NR Reviewed HEP, benefits of activity, gait mechanics, stair training.  08/04/17 1154 Done   Family Acceptance SUMA LUTZ NR Reviewed HEP, benefits of activity, gait mechanics, stair training.  08/04/17 1154 Done               Point: Body mechanics (Done)    Learning Progress Summary    Learner Readiness Method Response Comment Documented by Status    Patient Acceptance SUMA LUTZ NR Reviewed HEP, benefits of activity, gait mechanics, stair training.  08/04/17 1154 Done    Acceptance SUMA LUTZ Reviewed benefits of activity, gait mechanics, importance of calling for assistance before getting out of bed/chair, walking in [place] with nursing.  08/02/17 1517 Active   Family Acceptance SUMA LUTZ NR Reviewed HEP, benefits of activity, gait mechanics, stair training.  08/04/17 1154 Done               Point: Precautions (Done)    Learning Progress Summary    Learner Readiness Method Response Comment Documented by Status   Patient Acceptance SUMA LUTZ NR Reviewed HEP, benefits of activity, gait mechanics, stair training.  08/04/17 1154 Done    Acceptance SUMA LUTZ Reviewed benefits of activity, gait mechanics, importance of calling for assistance before getting out of bed/chair, walking in [place] with nursing.  08/02/17 1517 Active   Family Acceptance SUMA LUTZ NR Reviewed HEP, benefits of activity, gait mechanics, stair training.  08/04/17 1154 Done                      User Key     Initials Effective Dates Name Provider Type Discipline     06/09/17 -  Joselyn Mcdaniel, PT Physical Therapist PT                    PT Recommendation and Plan  Anticipated Equipment Needs At Discharge: front wheeled walker (possible cane pending mobility progress)  Anticipated Discharge Disposition: home, home with home health  Demonstrates Need for Referral to Another Service: home health care  Planned Therapy Interventions: balance training, bed mobility training, gait training, home exercise program, patient/family education, strengthening, transfer training  PT Frequency: daily  Plan of Care Review  Plan Of Care Reviewed With: patient  Progress: progress toward functional goals as expected  Outcome Summary/Follow up Plan: Pt ambulated 750 feet with CGA and RW, limited by impaired balance. Stair training x4 steps. Will progress with mobility as able.           Outcome Measures       08/04/17 1123  08/02/17 1310 08/02/17 1300    How much help from another person do you currently need...    Turning from your back to your side while in flat bed without using bedrails? 4  -LM 4  -LM     Moving from lying on back to sitting on the side of a flat bed without bedrails? 4  -LM 4  -LM     Moving to and from a bed to a chair (including a wheelchair)? 3  -LM 3  -LM     Standing up from a chair using your arms (e.g., wheelchair, bedside chair)? 3  -LM 3  -LM     Climbing 3-5 steps with a railing? 3  -LM 2  -LM     To walk in hospital room? 3  -LM 3  -LM     AM-PAC 6 Clicks Score 20  -LM 19  -LM     How much help from another is currently needed...    Putting on and taking off regular lower body clothing?   3  -TA    Bathing (including washing, rinsing, and drying)   3  -TA    Toileting (which includes using toilet bed pan or urinal)   3  -TA    Putting on and taking off regular upper body clothing   4  -TA    Taking care of personal grooming (such as brushing teeth)   3  -TA    Eating meals   4  -TA    Score   20  -TA    Functional Assessment    Outcome Measure Options AM-PAC 6 Clicks Basic Mobility (PT)  -LM AM-PAC 6 Clicks Basic Mobility (PT)  -LM AM-PAC 6 Clicks Daily Activity (OT)  -TA      User Key  (r) = Recorded By, (t) = Taken By, (c) = Cosigned By    Initials Name Provider Type    BEN Thomas OT Occupational Therapist    RAINER Mcdaniel PT Physical Therapist           Time Calculation:         PT Charges       08/04/17 1156          Time Calculation    Start Time 1123  -LM      PT Received On 08/04/17  -LM      PT Goal Re-Cert Due Date 08/12/17  -LM      Time Calculation- PT    Total Timed Code Minutes- PT 24 minute(s)  -LM        User Key  (r) = Recorded By, (t) = Taken By, (c) = Cosigned By    Initials Name Provider Type    RAINER Mcdaniel PT Physical Therapist          Therapy Charges for Today     Code Description Service Date Service Provider Modifiers Qty    72156634515 HC GAIT TRAINING  EA 15 MIN 8/4/2017 Joselyn Mcdaniel, PT GP 1    76733958646 HC PT THER PROC EA 15 MIN 8/4/2017 Joselyn Mcdaniel, PT GP 1          PT G-Codes  Outcome Measure Options: AM-PAC 6 Clicks Basic Mobility (PT)    Joselyn Mcdaniel, PT  8/4/2017

## 2017-08-04 NOTE — PROGRESS NOTES
[facility] Medicine Services  INPATIENT PROGRESS NOTE    Date of Admission: 8/1/2017  Length of Stay: 3  Primary Care Physician: No Known Provider    Subjective-- HPI/Events overnight/ROS/CC- Hospital follow visit.  Detailed ROS not detailed as performed below    Slept poorly. Some loose stool. Some memory lapses. Otherwise, some low back pain from his hospital bed. All other ROS negative.    Objective      Temp:  [96.3 °F (35.7 °C)-98.7 °F (37.1 °C)] 97 °F (36.1 °C)  Heart Rate:  [70-98] 98  Resp:  [18-20] 18  BP: ()/(65-87) 125/87      Physical Exam    General Assessment: No acute cardiopulmonary distress.     CVS: RRR, S1S2 normal, no murmurs    Resp: CTAB, no adventitious sound    Abd: soft, NT, ND, normal BS, no guarding or peritoneal signs    Ext: No edema, both calves are symmetric and NTTP    Neuro: Nonfocal, + chronic essential tremors (stable)    Skin: W/D/I. No rash.    Psych: Affect is appropriate      Results Review:    I have reviewed the labs, radiology results and diagnostic studies.      Results from last 7 days  Lab Units 08/04/17  0450   WBC 10*3/mm3 7.09   HEMOGLOBIN g/dL 11.8*   PLATELETS 10*3/mm3 142*       Results from last 7 days  Lab Units 08/03/17  0623   SODIUM mmol/L 130*   POTASSIUM mmol/L 3.3*   CO2 mmol/L 29.0   CREATININE mg/dL 0.60   GLUCOSE mg/dL 86       Culture Data:  Radiology Data:     I have reviewed the medications.        Assessment/Plan     Assessment/Problem List  61 yo M with history of tobacco & ETOH abuse, who was remotely here with SIADH and followed by [facility], now presented with severe weakness and frequent falls recently.    Principal Problem:    Acute hyponatremia  Active Problems:    Tobacco abuse    Hypertension    Alcohol abuse    Elevated liver enzymes    COPD (chronic obstructive pulmonary disease)    Hyperglycemia    Moderate malnutrition    Elevated TSH, T4 borderline low    Anemia    Plan  - Resumed Inderal  - Monitor sodium  -  needs outpatient anemia follow up  - Lung nodule on CT, small, not amenable to biospy, needs surveillance imaging in 6 months    Dispo: Planning for rehab at SNF once stable. CM following.      Festus Quinones MD   08/04/17   10:19 AM    Please note that portions of this note may have been completed with a voice recognition program. Efforts were made to edit the dictations, but occasionally words are mistranscribed.

## 2017-08-04 NOTE — PLAN OF CARE
Problem: Patient Care Overview (Adult)  Goal: Plan of Care Review  Outcome: Ongoing (interventions implemented as appropriate)    08/04/17 1154   Coping/Psychosocial Response Interventions   Plan Of Care Reviewed With patient   Outcome Evaluation   Outcome Summary/Follow up Plan Pt ambulated 750 feet with CGA and RW, limited by impaired balance. Stair training x4 steps. Will progress with mobility as able.    Patient Care Overview   Progress progress toward functional goals as expected         Problem: Inpatient Physical Therapy  Goal: Bed Mobility Goal LTG- PT  Outcome: Outcome(s) achieved Date Met:  08/04/17 08/02/17 1518 08/04/17 1154   Bed Mobility PT LTG   Bed Mobility PT LTG, Date Established 08/02/17 --    Bed Mobility PT LTG, Time to Achieve 5 days --    Bed Mobility PT LTG, Activity Type supine to sit/sit to supine --    Bed Mobility PT LTG, [place] Level conditional independence --    Bed Mobility PT LTG, Date Goal Reviewed --  08/04/17   Bed Mobility PT LTG, Outcome --  goal met       Goal: Transfer Training Goal 1 LTG- PT  Outcome: Ongoing (interventions implemented as appropriate)    08/02/17 1518 08/04/17 1154   Transfer Training PT LTG   Transfer Training PT LTG, Date Established 08/02/17 --    Transfer Training PT LTG, Time to Achieve 5 days --    Transfer Training PT LTG, Activity Type sit to stand/stand to sit --    Transfer Training PT LTG, [place] Level conditional independence --    Transfer Training PT LTG, Assist Device walker, rolling --    Transfer Training PT LTG, Date Goal Reviewed --  08/04/17   Transfer Training PT LTG, Outcome --  goal ongoing       Goal: Gait Training Goal LTG- PT  Outcome: Ongoing (interventions implemented as appropriate)    08/02/17 1518 08/04/17 1154   Gait Training PT LTG   Gait Training Goal PT LTG, Date Established 08/02/17 --    Gait Training Goal PT LTG, Time to Achieve 5 days --    Gait Training Goal PT LTG, [place] Level conditional  independence --    Gait Training Goal PT LTG, Assist Device walker, rolling --    Gait Training Goal PT LTG, Distance to Achieve 750 feet --    Gait Training Goal PT LTG, Date Goal Reviewed --  08/04/17   Gait Training Goal PT LTG, Outcome --  goal ongoing       Goal: Stair Training Goal LTG- PT  Outcome: Ongoing (interventions implemented as appropriate)    08/02/17 1518 08/04/17 1154   Stair Training PT LTG   Stair Training Goal PT LTG, Date Established 08/02/17 --    Stair Training Goal PT LTG, Time to Achieve 5 days --    Stair Training Goal PT LTG, [place] Level conditional independence --    Stair Training Goal PT LTG, Assist Device 1 handrail --    Stair Training Goal PT LTG, Distance to Achieve 7 --    Stair Training Goal PT LTG, Date Goal Reviewed --  08/04/17   Stair Training Goal PT LTG, Outcome --  goal ongoing

## 2017-08-04 NOTE — PLAN OF CARE
Problem: Confusion, Acute (Adult)  Goal: Identify Related Risk Factors and Signs and Symptoms  Outcome: Ongoing (interventions implemented as appropriate)    08/02/17 1609   Confusion, Acute   Related Risk Factors (Acute Confusion) malnutrition;metabolic abnormalities   Signs and Symptoms (Acute Confusion) disorientation;acute onset of symptoms       Goal: Cognitive/Functional Impairments Minimized  Outcome: Ongoing (interventions implemented as appropriate)    08/03/17 1839   Confusion, Acute (Adult)   Cognitive/Functional Impairments Minimized making progress toward outcome       Goal: Safety  Outcome: Ongoing (interventions implemented as appropriate)    08/03/17 1839   Confusion, Acute (Adult)   Safety making progress toward outcome         Problem: Fall Risk (Adult)  Goal: Absence of Falls  Outcome: Ongoing (interventions implemented as appropriate)    08/04/17 0358   Fall Risk (Adult)   Absence of Falls making progress toward outcome         Problem: Acute Alcohol Withdrawal Syndrome, Risk For/Actual (Adult)  Goal: Signs and Symptoms of Listed Potential Problems Will be Absent or Manageable (Acute Alcohol Withdrawal Syndrome, Risk For/Actual)  Outcome: Ongoing (interventions implemented as appropriate)    08/03/17 1839   Acute Alcohol Withdrawal Syndrome, Risk For/Actual   Problems Assessed (Alcohol Withdrawal Syndrome) all   Problems Present (Alcohol Withdrawal Syndrome) none         Problem: Patient Care Overview (Adult)  Goal: Plan of Care Review  Outcome: Ongoing (interventions implemented as appropriate)    08/02/17 1609 08/03/17 1839 08/03/17 2033   Coping/Psychosocial Response Interventions   Plan Of Care Reviewed With --  --  patient   Outcome Evaluation   Outcome Summary/Follow up Plan --  VSS. C/o of back aches, Kpad ordered. CIWA < 2. Ambulated in hallway with family member. Seizure and fall precautions. Occult stool was sent to lab, negative. 2 bed alarms --    Patient Care Overview   Progress  progress toward functional goals is gradual --  --        Goal: Adult Individualization and Mutuality  Outcome: Ongoing (interventions implemented as appropriate)    08/02/17 1609 08/03/17 1839   Individualization   Patient Specific Preferences lights off --    Patient Specific Goals get home --    Patient Specific Interventions --  seizure precautions, padded rails, kpad for back aches       Goal: Discharge Needs Assessment  Outcome: Ongoing (interventions implemented as appropriate)    08/02/17 1422   Discharge Needs Assessment   Concerns To Be Addressed substance/tobacco abuse/use concerns   Concerns Comments Alcohol abuse concerns   Readmission Within The Last 30 Days no previous admission in last 30 days   Equipment Needed After Discharge none   Current Discharge Risk substance abuse   Discharge Disposition home or self-care   Discharge Planning Comments Pt. plans to return home at discharge. Pt. stated he did not use home health services and does not have any DME prior to this admisison. Pt. stated he will have transportation home.    Living Environment   Transportation Available car;family or friend will provide   Self-Care   Equipment Currently Used at Home none

## 2017-08-04 NOTE — PROGRESS NOTES
Continued Stay Note  [facility]     Patient Name: Israel Bojorquez  MRN: [id number]  Today's Date: 8/4/2017    Admit Date: 8/1/2017          Discharge Plan       08/04/17 1552    Case Management/Social Work Plan    Plan Update    Patient/Family In Agreement With Plan yes    Additional Comments Pt will be going home with either his son or daughter that lives in [place] or [place] and does not want any equipment as he stated he has the financial means to pay for this out of pocket and would rather not go through his insurance.  Please provide pt with a prescription for outpatient PT/OT as he does not want HH but would rather go outpatient for PT/OT upon d/c.       08/04/17 1704    Case Management/Social Work Plan    Plan Home with [facility] at d/c.    Patient/Family In Agreement With Plan yes    Additional Comments SW met with pt at bedside to discuss d/c planning.  SW explained pt has made great progress with functional mobility and is now able to ambulate 750 feet and the liklihood of a SNF or rehab accepting him is slim due to his progress.  Pt stated he has thought about it and wants to go home anyway because he knows he has made progress.  SW discussed HH agencies and pt stated he would like to go home with [facility] for PT/OT upon d/c from the hospital.  Pt also stated he is looking into getting into alcohol rehab but is unsure if this is something he wants to do right now. SW will continue to follow for any other d/c needs.              Discharge Codes     None        Expected Discharge Date and Time     Expected Discharge Date Expected Discharge Time    Aug 5, 2017             BLANCA Martinez

## 2017-08-04 NOTE — PROGRESS NOTES
Continued Stay Note  [facility]     Patient Name: Israel Bojorquez  MRN: [id number]  Today's Date: 8/4/2017    Admit Date: 8/1/2017          Discharge Plan       08/04/17 7235    Case Management/Social Work Plan    Plan Home     Patient/Family In Agreement With Plan yes    Additional Comments SW met with pt at bedside to discuss d/c planning.  SW explained pt has made great progress with functional mobility and is now able to ambulate 750 feet and the liklihood of a SNF or rehab accepting him is slim due to his progress.  Pt stated he has thought about it and wants to go home anyway because he knows he has made progress.   Pt also stated he is looking into getting into alcohol rehab but is unsure if this is something he wants to do right now. Pt does not want any equipment and he does not want HH because he does not want to be homebound. SW will continue to follow for any other d/c needs.              Discharge Codes     None        Expected Discharge Date and Time     Expected Discharge Date Expected Discharge Time    Aug 5, 2017             BLANCA Martinez

## 2017-08-05 LAB
ANION GAP SERPL CALCULATED.3IONS-SCNC: 3 MMOL/L (ref 3–11)
BUN BLD-MCNC: 5 MG/DL (ref 9–23)
BUN/CREAT SERPL: 12.5 (ref 7–25)
CALCIUM SPEC-SCNC: 8.3 MG/DL (ref 8.7–10.4)
CHLORIDE SERPL-SCNC: 101 MMOL/L (ref 99–109)
CO2 SERPL-SCNC: 29 MMOL/L (ref 20–31)
CREAT BLD-MCNC: 0.4 MG/DL (ref 0.6–1.3)
GFR SERPL CREATININE-BSD FRML MDRD: >150 ML/MIN/1.73
GLUCOSE BLD-MCNC: 94 MG/DL (ref 70–100)
POTASSIUM BLD-SCNC: 3.9 MMOL/L (ref 3.5–5.5)
SODIUM BLD-SCNC: 133 MMOL/L (ref 132–146)

## 2017-08-05 PROCEDURE — 25010000002 MAGNESIUM SULFATE PER 500 MG OF MAGNESIUM: Performed by: HOSPITALIST

## 2017-08-05 PROCEDURE — 25010000002 THIAMINE PER 100 MG: Performed by: HOSPITALIST

## 2017-08-05 PROCEDURE — 80048 BASIC METABOLIC PNL TOTAL CA: CPT | Performed by: HOSPITALIST

## 2017-08-05 PROCEDURE — 25010000002 HEPARIN (PORCINE) PER 1000 UNITS: Performed by: HOSPITALIST

## 2017-08-05 PROCEDURE — 99232 SBSQ HOSP IP/OBS MODERATE 35: CPT | Performed by: HOSPITALIST

## 2017-08-05 RX ORDER — ACETAMINOPHEN 325 MG/1
650 TABLET ORAL EVERY 6 HOURS PRN
Status: DISCONTINUED | OUTPATIENT
Start: 2017-08-05 | End: 2017-08-06 | Stop reason: HOSPADM

## 2017-08-05 RX ADMIN — HEPARIN SODIUM 5000 UNITS: 5000 INJECTION, SOLUTION INTRAVENOUS; SUBCUTANEOUS at 14:00

## 2017-08-05 RX ADMIN — NICOTINE 1 PATCH: 21 PATCH, EXTENDED RELEASE TRANSDERMAL at 19:34

## 2017-08-05 RX ADMIN — PRIMIDONE 50 MG: 50 TABLET ORAL at 20:41

## 2017-08-05 RX ADMIN — HEPARIN SODIUM 5000 UNITS: 5000 INJECTION, SOLUTION INTRAVENOUS; SUBCUTANEOUS at 05:10

## 2017-08-05 RX ADMIN — LEVOTHYROXINE SODIUM 50 MCG: 50 TABLET ORAL at 05:11

## 2017-08-05 RX ADMIN — Medication 5 MG: at 20:41

## 2017-08-05 RX ADMIN — THIAMINE HYDROCHLORIDE 100 ML/HR: 100 INJECTION, SOLUTION INTRAMUSCULAR; INTRAVENOUS at 08:29

## 2017-08-05 RX ADMIN — HEPARIN SODIUM 5000 UNITS: 5000 INJECTION, SOLUTION INTRAVENOUS; SUBCUTANEOUS at 20:41

## 2017-08-05 RX ADMIN — PANTOPRAZOLE SODIUM 40 MG: 40 TABLET, DELAYED RELEASE ORAL at 05:11

## 2017-08-05 RX ADMIN — ACETAMINOPHEN 650 MG: 325 TABLET, FILM COATED ORAL at 08:29

## 2017-08-05 RX ADMIN — ACETAMINOPHEN 650 MG: 325 TABLET, FILM COATED ORAL at 20:41

## 2017-08-05 NOTE — PROGRESS NOTES
[facility] Medicine Services  INPATIENT PROGRESS NOTE    Date of Admission: 8/1/2017  Length of Stay: 4  Primary Care Physician: No Known Provider    Subjective-- HPI/Events overnight/ROS/CC- Hospital follow visit.  Detailed ROS not detailed as performed below    Slept better. Some back pain. Still weak. Apprehensive about returning home. No f/c. No n/v.    Objective      Temp:  [97.7 °F (36.5 °C)-97.8 °F (36.6 °C)] 97.7 °F (36.5 °C)  Heart Rate:  [64-94] 77  Resp:  [16-18] 16  BP: (102-114)/(70-77) 103/77      Physical Exam    General Assessment: No acute cardiopulmonary distress.     CVS: RRR, S1S2 normal, no murmurs    Resp: CTAB, no adventitious sound    Abd: soft, NT, ND, normal BS, no guarding or peritoneal signs    Ext: No edema, both calves are symmetric and NTTP    Neuro: Nonfocal, + chronic essential tremors (stable)    Skin: W/D/I. No rash.    Psych: Affect is appropriate  No change from 8/4      Results Review:    I have reviewed the labs, radiology results and diagnostic studies.      Results from last 7 days  Lab Units 08/04/17  0450   WBC 10*3/mm3 7.09   HEMOGLOBIN g/dL 11.8*   PLATELETS 10*3/mm3 142*       Results from last 7 days  Lab Units 08/05/17  0617   SODIUM mmol/L 133   POTASSIUM mmol/L 3.9   CO2 mmol/L 29.0   CREATININE mg/dL 0.40*   GLUCOSE mg/dL 94       Culture Data:  Radiology Data:     I have reviewed the medications.        Assessment/Plan     Assessment/Problem List  61 yo M with history of tobacco & ETOH abuse, who was remotely here with [facility] and followed by [facility], now presented with severe weakness and frequent falls recently.    Principal Problem:    Acute hyponatremia  Active Problems:    Tobacco abuse    Hypertension    Alcohol abuse    Elevated liver enzymes    COPD (chronic obstructive pulmonary disease)    Hyperglycemia    Moderate malnutrition    Elevated TSH, T4 borderline low    Anemia    Plan  - Resumed Inderal, seems to be tolerating well  -  Monitor sodium, resolved, stop IVF  - needs outpatient anemia follow up  - Lung nodule on CT, small, not amenable to biospy, needs surveillance imaging in 6 months, d/w patient and daughter    Dispo: Home with outpatient PT/OT, and will need alcohol abuse rehab      Festus Quinones MD   08/05/17   9:27 AM    Please note that portions of this note may have been completed with a voice recognition program. Efforts were made to edit the dictations, but occasionally words are mistranscribed.

## 2017-08-05 NOTE — PLAN OF CARE
Problem: Fall Risk (Adult)  Goal: Absence of Falls  Outcome: Ongoing (interventions implemented as appropriate)    08/05/17 1447   Fall Risk (Adult)   Absence of Falls making progress toward outcome         Problem: Acute Alcohol Withdrawal Syndrome, Risk For/Actual (Adult)  Goal: Signs and Symptoms of Listed Potential Problems Will be Absent or Manageable (Acute Alcohol Withdrawal Syndrome, Risk For/Actual)  Outcome: Ongoing (interventions implemented as appropriate)

## 2017-08-06 VITALS
HEART RATE: 100 BPM | DIASTOLIC BLOOD PRESSURE: 88 MMHG | HEIGHT: 73 IN | OXYGEN SATURATION: 98 % | SYSTOLIC BLOOD PRESSURE: 122 MMHG | RESPIRATION RATE: 18 BRPM | TEMPERATURE: 97.4 F | BODY MASS INDEX: 19.91 KG/M2 | WEIGHT: 150.25 LBS

## 2017-08-06 LAB
ANION GAP SERPL CALCULATED.3IONS-SCNC: 1 MMOL/L (ref 3–11)
BUN BLD-MCNC: 7 MG/DL (ref 9–23)
BUN/CREAT SERPL: 14 (ref 7–25)
CALCIUM SPEC-SCNC: 8.3 MG/DL (ref 8.7–10.4)
CHLORIDE SERPL-SCNC: 102 MMOL/L (ref 99–109)
CO2 SERPL-SCNC: 29 MMOL/L (ref 20–31)
CREAT BLD-MCNC: 0.5 MG/DL (ref 0.6–1.3)
GFR SERPL CREATININE-BSD FRML MDRD: >150 ML/MIN/1.73
GLUCOSE BLD-MCNC: 93 MG/DL (ref 70–100)
POTASSIUM BLD-SCNC: 3.6 MMOL/L (ref 3.5–5.5)
SODIUM BLD-SCNC: 132 MMOL/L (ref 132–146)

## 2017-08-06 PROCEDURE — 25010000002 HEPARIN (PORCINE) PER 1000 UNITS: Performed by: HOSPITALIST

## 2017-08-06 PROCEDURE — 99239 HOSP IP/OBS DSCHRG MGMT >30: CPT | Performed by: HOSPITALIST

## 2017-08-06 PROCEDURE — 80048 BASIC METABOLIC PNL TOTAL CA: CPT | Performed by: HOSPITALIST

## 2017-08-06 RX ORDER — LEVOTHYROXINE SODIUM 0.05 MG/1
50 TABLET ORAL DAILY
Qty: 30 TABLET | Refills: 2 | Status: SHIPPED | OUTPATIENT
Start: 2017-08-06 | End: 2017-08-14

## 2017-08-06 RX ORDER — FOLIC ACID 1 MG/1
1 TABLET ORAL DAILY
Qty: 30 TABLET | Refills: 2 | Status: SHIPPED | OUTPATIENT
Start: 2017-08-06 | End: 2017-08-14

## 2017-08-06 RX ORDER — LANOLIN ALCOHOL/MO/W.PET/CERES
100 CREAM (GRAM) TOPICAL DAILY
Qty: 30 TABLET | Refills: 2 | Status: SHIPPED | OUTPATIENT
Start: 2017-08-06 | End: 2018-02-13

## 2017-08-06 RX ORDER — PANTOPRAZOLE SODIUM 40 MG/1
40 TABLET, DELAYED RELEASE ORAL DAILY
Qty: 30 TABLET | Refills: 2 | Status: SHIPPED | OUTPATIENT
Start: 2017-08-06 | End: 2018-02-13

## 2017-08-06 RX ORDER — PROPRANOLOL HYDROCHLORIDE 80 MG/1
80 CAPSULE, EXTENDED RELEASE ORAL DAILY
Qty: 30 CAPSULE | Refills: 2 | Status: SHIPPED | OUTPATIENT
Start: 2017-08-06 | End: 2018-07-11 | Stop reason: HOSPADM

## 2017-08-06 RX ADMIN — PROPRANOLOL HYDROCHLORIDE 80 MG: 80 CAPSULE, EXTENDED RELEASE ORAL at 09:10

## 2017-08-06 RX ADMIN — LEVOTHYROXINE SODIUM 50 MCG: 50 TABLET ORAL at 05:28

## 2017-08-06 RX ADMIN — FOLIC ACID 1 MG: 1 TABLET ORAL at 09:10

## 2017-08-06 RX ADMIN — Medication 100 MG: at 09:10

## 2017-08-06 RX ADMIN — HEPARIN SODIUM 5000 UNITS: 5000 INJECTION, SOLUTION INTRAVENOUS; SUBCUTANEOUS at 05:28

## 2017-08-06 RX ADMIN — PANTOPRAZOLE SODIUM 40 MG: 40 TABLET, DELAYED RELEASE ORAL at 05:28

## 2017-08-06 RX ADMIN — POTASSIUM CHLORIDE 40 MEQ: 750 CAPSULE, EXTENDED RELEASE ORAL at 09:10

## 2017-08-06 NOTE — PLAN OF CARE
Problem: Confusion, Acute (Adult)  Goal: Identify Related Risk Factors and Signs and Symptoms  Outcome: Ongoing (interventions implemented as appropriate)    08/06/17 0947   Confusion, Acute   Related Risk Factors (Acute Confusion) substance use/abuse   Signs and Symptoms (Acute Confusion) understanding disturbed;memory disturbed       Goal: Cognitive/Functional Impairments Minimized  Outcome: Ongoing (interventions implemented as appropriate)  Goal: Safety  Outcome: Ongoing (interventions implemented as appropriate)

## 2017-08-06 NOTE — DISCHARGE SUMMARY
[facility] Medicine Services  DISCHARGE SUMMARY       Date of Admission: 8/1/2017  Date of Discharge:  8/6/2017  Primary Care Physician: No Known Provider  Consulting Physician(s)          None           Discharge Diagnoses:  Active Hospital Problems (** Indicates Principal Problem)    Diagnosis Date Noted   • **Acute hyponatremia [E87.1] 08/02/2017   • Elevated TSH, T4 borderline low [R94.6] 08/03/2017   • Anemia [D64.9] 08/03/2017   • Tobacco abuse [Z72.0] 08/02/2017   • Hypertension [I10] 08/02/2017   • Alcohol abuse [F10.10] 08/02/2017   • Elevated liver enzymes [R74.8] 08/02/2017   • COPD (chronic obstructive pulmonary disease) [J44.9] 08/02/2017   • Hyperglycemia [R73.9] 08/02/2017   • Moderate malnutrition [E44.0] 08/02/2017      Resolved Hospital Problems    Diagnosis Date Noted Date Resolved   No resolved problems to display.       Presenting Problem/History of Present Illness  Acute hyponatremia [E87.1]     Chief Complaint on Day of Discharge:     Memory loss    History of Present Illness on Day of Discharge:     Aggravated by his short term memory loss. Still with some constipation. Tolerating po. No dyspnea. No new neurologic complaints otherwise. No n/v. Tolerating po.    Hospital Course  Patient is a 62 y.o. male presented with:    Hyponatremia, resolved  Alcohol abuse  COPD  Hypothyroidism, new, treating  Anemia  Mild cognitive impairment  Hypertension  --stable  RUL lung nodule requiring surveillance imaging in 6 months    The patient presented with weakness and falls and was found to have significant hyponatremia. It correct with intravenous fluids. It is stable now.    He has hypertension and takes Inderal. He is tolerating a reduced dose.    He was found to have a RUL lung nodule. It was small and not amenable to CT guided biopsy. It needs surveillance imaging in 6 months.    He has alcohol abuse and will seek outpatient treatment with his family's assistance.    He was  found to have mild hypothyroidism and started on Synthroid.    He will receive supplemental thiamine and folate given his history of alcohol abuse.         Procedures Performed         Consults:   Consults     No orders found from 7/3/2017 to 8/2/2017.          Pertinent Test Results:      Results        Procedure Component Value Units Date/Time       Basic Metabolic Panel [[id number]] (Abnormal) Collected: 08/06/17 0545       Lab Status: Final result Specimen: Blood Updated: 08/06/17 0636        Glucose 93 mg/dL         BUN 7 (L) mg/dL         Creatinine 0.50 (L) mg/dL         Sodium 132 mmol/L         Potassium 3.6 mmol/L         Chloride 102 mmol/L         CO2 29.0 mmol/L         Calcium 8.3 (L) mg/dL         eGFR Non African Amer >150 mL/min/1.73         BUN/Creatinine Ratio 14.0        Anion Gap 1.0 (L) mmol/L        Narrative:         National Kidney Foundation Guidelines    Stage     Description        GFR  1         Normal or High     90+  2         Mild decrease      60-89  3         Moderate decrease  30-59  4         Severe decrease    15-29  5         Kidney failure     <15       Basic Metabolic Panel [[id number]] (Abnormal) Collected: 08/05/17 0617       Lab Status: Final result Specimen: Blood Updated: 08/05/17 0740        Glucose 94 mg/dL         BUN 5 (L) mg/dL         Creatinine 0.40 (L) mg/dL         Sodium 133 mmol/L         Potassium 3.9 mmol/L         Chloride 101 mmol/L         CO2 29.0 mmol/L         Calcium 8.3 (L) mg/dL         eGFR Non African Amer >150 mL/min/1.73         BUN/Creatinine Ratio 12.5        Anion Gap 3.0 mmol/L        Narrative:         National Kidney Foundation Guidelines    Stage     Description        GFR  1         Normal or High     90+  2         Mild decrease      60-89  3         Moderate decrease  30-59  4         Severe decrease    15-29  5         Kidney failure     <15       Vitamin B12 [[id number]] (Normal) Collected: 08/04/17 0450       Lab Status: Final result  Specimen: Blood Updated: 08/04/17 0804        Vitamin B-12 416 pg/mL        Folate [[id number]] (Normal) Collected: 08/04/17 0450       Lab Status: Final result Specimen: Blood Updated: 08/04/17 0804        Folate 11.71 ng/mL        Narrative:           Folate Reference Ranges:    Deficient:            Less than 1.2 ng/mL  Indeterminant:        1.2-3.1 ng/mL  Normal:               3.2-20.0 ng/mL       Ferritin [[id number]] (Normal) Collected: 08/04/17 0450       Lab Status: Final result Specimen: Blood Updated: 08/04/17 0804        Ferritin 175.00 ng/mL        Iron Profile [[id number]] (Abnormal) Collected: 08/04/17 0450       Lab Status: Final result Specimen: Blood Updated: 08/04/17 0751        Iron 31 (L) mcg/dL         TIBC 220 (L) mcg/dL         Iron Saturation 14 (L) %        Reticulocytes [[id number]] (Abnormal) Collected: 08/04/17 0450       Lab Status: Final result Specimen: Blood Updated: 08/04/17 0518        Reticulocyte % 2.74 (H) %        CBC Auto Differential [[id number]] (Abnormal) Collected: 08/04/17 0450       Lab Status: Final result Specimen: Blood Updated: 08/04/17 0548        WBC 7.09 10*3/mm3         RBC 3.30 (L) 10*6/mm3         Hemoglobin 11.8 (L) g/dL         Hematocrit 35.3 (L) %         .0 (H) fL         MCH 35.8 (H) pg         MCHC 33.4 g/dL         RDW 14.7 (H) %         RDW-SD 57.5 (H) fl         MPV 10.0 fL         Platelets 142 (L) 10*3/mm3         Neutrophil % 55.8 %         Lymphocyte % 25.1 %         Monocyte % 14.1 (H) %         Eosinophil % 4.4 (H) %         Basophil % 0.3 %         Immature Grans % 0.3 %         Neutrophils, Absolute 3.96 10*3/mm3         Lymphocytes, Absolute 1.78 10*3/mm3         Monocytes, Absolute 1.00 10*3/mm3         Eosinophils, Absolute 0.31 (H) 10*3/mm3         Basophils, Absolute 0.02 10*3/mm3         Immature Grans, Absolute 0.02 10*3/mm3        Occult Blood X 1, Stool [[id number]] (Normal) Collected: 08/03/17 1633       Lab Status: Final result  Specimen: Stool from Per Rectum Updated: 08/03/17 1703        Fecal Occult Blood Negative       ABO RH Specimen Verification [[id number]] Collected: 08/03/17 1624       Lab Status: Final result Specimen: Blood Updated: 08/03/17 1652        ABO Type A        RH type Positive       Type & Screen [[id number]] Collected: 08/03/17 1430       Lab Status: Edited Result - FINAL Specimen: Blood Updated: 08/03/17 1537        ABO Type A        RH type Positive        Antibody Screen Negative       Basic Metabolic Panel [[id number]] (Abnormal) Collected: 08/03/17 0623       Lab Status: Final result Specimen: Blood Updated: 08/03/17 0723        Glucose 86 mg/dL         BUN 5 (L) mg/dL         Creatinine 0.60 mg/dL         Sodium 130 (L) mmol/L         Potassium 3.3 (L) mmol/L         Chloride 102 mmol/L         CO2 29.0 mmol/L         Calcium 8.0 (L) mg/dL         eGFR Non African Amer 136 mL/min/1.73         BUN/Creatinine Ratio 8.3        Anion Gap -1.0 (L) mmol/L        Narrative:         National Kidney Foundation Guidelines    Stage     Description        GFR  1         Normal or High     90+  2         Mild decrease      60-89  3         Moderate decrease  30-59  4         Severe decrease    15-29  5         Kidney failure     <15       CBC Auto Differential [[id number]] (Abnormal) Collected: 08/03/17 0204       Lab Status: Final result Specimen: Blood Updated: 08/03/17 0222        WBC 7.08 10*3/mm3         RBC 3.34 (L) 10*6/mm3         Hemoglobin 11.7 (L) g/dL         Hematocrit 34.1 (L) %         .1 (H) fL         MCH 35.0 (H) pg         MCHC 34.3 g/dL         RDW 14.3 %         RDW-SD 54.2 (H) fl         MPV 9.7 fL         Platelets 141 (L) 10*3/mm3         Neutrophil % 57.0 %         Lymphocyte % 23.3 (L) %         Monocyte % 16.2 (H) %         Eosinophil % 3.1 (H) %         Basophil % 0.3 %         Immature Grans % 0.1 %         Neutrophils, Absolute 4.03 10*3/mm3         Lymphocytes, Absolute 1.65 10*3/mm3          Monocytes, Absolute 1.15 (H) 10*3/mm3         Eosinophils, Absolute 0.22 10*3/mm3         Basophils, Absolute 0.02 10*3/mm3         Immature Grans, Absolute 0.01 10*3/mm3        Basic Metabolic Panel [[id number]] (Abnormal) Collected: 08/03/17 0204       Lab Status: Final result Specimen: Blood Updated: 08/03/17 0232        Glucose 101 (H) mg/dL         BUN 6 (L) mg/dL         Creatinine 0.50 (L) mg/dL         Sodium 132 mmol/L         Potassium 3.8 mmol/L         Chloride 102 mmol/L         CO2 26.0 mmol/L         Calcium 8.1 (L) mg/dL         eGFR Non African Amer >150 mL/min/1.73         BUN/Creatinine Ratio 12.0        Anion Gap 4.0 mmol/L        Narrative:         National Kidney Foundation Guidelines    Stage     Description        GFR  1         Normal or High     90+  2         Mild decrease      60-89  3         Moderate decrease  30-59  4         Severe decrease    15-29  5         Kidney failure     <15       Creatinine, Urine, Random [[id number]] Collected: 08/02/17 1748       Lab Status: Final result Specimen: Urine from Urine, Clean Catch Updated: 08/02/17 1818        Creatinine, Urine 83.3 mg/dL        Comprehensive Metabolic Panel [[id number]] (Abnormal) Collected: 08/02/17 0616       Lab Status: Final result Specimen: Blood Updated: 08/02/17 0747        Glucose 99 mg/dL         BUN <5 (L) mg/dL         Creatinine 0.70 mg/dL         Sodium 131 (L) mmol/L         Potassium 4.3 mmol/L         Chloride 98 (L) mmol/L         CO2 28.0 mmol/L         Calcium 9.6 mg/dL         Total Protein 6.1 g/dL         Albumin 3.00 (L) g/dL         ALT (SGPT) 70 (H) U/L         AST (SGOT) 92 (H) U/L         Alkaline Phosphatase 148 (H) U/L         Total Bilirubin 1.3 (H) mg/dL         eGFR Non African Amer 114 mL/min/1.73         Globulin 3.1 gm/dL         A/G Ratio 1.0 (L) g/dL         BUN/Creatinine Ratio --        Anion Gap 5.0 mmol/L        Narrative:         National Kidney Foundation Guidelines    Stage      Description        GFR  1         Normal or High     90+  2         Mild decrease      60-89  3         Moderate decrease  30-59  4         Severe decrease    15-29  5         Kidney failure     <15       CBC (No Diff) [[id number]] (Abnormal) Collected: 08/02/17 0616       Lab Status: Final result Specimen: Blood Updated: 08/02/17 0702        WBC 8.60 10*3/mm3         RBC 4.08 (L) 10*6/mm3         Hemoglobin 14.2 g/dL         Hematocrit 41.4 %         .5 (H) fL         MCH 34.8 (H) pg         MCHC 34.3 g/dL         RDW 14.3 %         RDW-SD 53.1 fl         MPV 10.6 fL         Platelets 178 10*3/mm3        TSH [[id number]] (Abnormal) Collected: 08/02/17 0616       Lab Status: Final result Specimen: Blood Updated: 08/02/17 0739        TSH 8.013 (H) mIU/mL        Narrative:         Due to abnormal TSH results, suggest ordering Free T4.       T4, Free [[id number]] (Normal) Collected: 08/02/17 0616       Lab Status: Final result Specimen: Blood Updated: 08/02/17 1422        Free T4 0.92 ng/dL        Magnesium [[id number]] (Normal) Collected: 08/02/17 0246       Lab Status: Final result Specimen: Blood Updated: 08/02/17 0322        Magnesium 1.9 mg/dL        Hemoglobin A1c [[id number]] (Normal) Collected: 08/02/17 0246       Lab Status: Final result Specimen: Blood Updated: 08/02/17 0448        Hemoglobin A1C 4.80 %        Narrative:         The American Diabetes Association recommends maintenance of Hemoglobin A1C at 7.0% or lower. Goals for Hemoglobin A1C reduction may need to be modified if hypoglycemia is a problem.       Osmolality, Serum [[id number]] (Abnormal) Collected: 08/02/17 0140       Lab Status: Final result Specimen: Blood Updated: 08/02/17 0326        Osmolality 267 (L) mOsm/kg        Hepatitis Panel, Acute [[id number]] (Normal) Collected: 08/02/17 0140       Lab Status: Final result Specimen: Blood Updated: 08/02/17 0332        Hepatitis B Surface Ag Non-Reactive        Hep A IgM Non-Reactive        Hep B  C IgM Non-Reactive        Hepatitis C Ab Non-Reactive       Prealbumin [[id number]] (Abnormal) Collected: 08/02/17 0140       Lab Status: Final result Specimen: Blood Updated: 08/02/17 0253        Prealbumin 8.7 (L) mg/dL        Folate [[id number]] (Normal) Collected: 08/02/17 0140       Lab Status: Final result Specimen: Blood Updated: 08/02/17 0253        Folate 10.46 ng/mL        Narrative:           Folate Reference Ranges:    Deficient:            Less than 1.2 ng/mL  Indeterminant:        1.2-3.1 ng/mL  Normal:               3.2-20.0 ng/mL       Ammonia [[id number]] (Normal) Collected: 08/02/17 0113       Lab Status: Final result Specimen: Blood Updated: 08/02/17 0137        Ammonia 19 umol/L        Protime-INR [[id number]] Collected: 08/02/17 0113       Lab Status: Final result Specimen: Blood Updated: 08/02/17 0140        Protime 10.7 Seconds         INR 0.98       Narrative:         Therapeutic Ranges for INR: 2.0-3.0 (PT 20-30)                              2.5-3.5 (PT 25-34)       CBC Auto Differential [[id number]] (Abnormal) Collected: 08/01/17 2136       Lab Status: Final result Specimen: Blood Updated: 08/01/17 2145        WBC 9.46 10*3/mm3         RBC 4.22 10*6/mm3         Hemoglobin 15.0 g/dL         Hematocrit 42.0 %         MCV 99.5 (H) fL         MCH 35.5 (H) pg         MCHC 35.7 g/dL         RDW 14.1 %         RDW-SD 51.0 fl         MPV 9.7 fL         Platelets 176 10*3/mm3         Neutrophil % 66.6 %         Lymphocyte % 16.2 (L) %         Monocyte % 14.3 (H) %         Eosinophil % 2.6 %         Basophil % 0.2 %         Immature Grans % 0.1 %         Neutrophils, Absolute 6.30 10*3/mm3         Lymphocytes, Absolute 1.53 10*3/mm3         Monocytes, Absolute 1.35 (H) 10*3/mm3         Eosinophils, Absolute 0.25 10*3/mm3         Basophils, Absolute 0.02 10*3/mm3         Immature Grans, Absolute 0.01 10*3/mm3        Urinalysis With / Culture If Indicated [[id number]] (Abnormal) Collected: 08/01/17 6623        Lab Status: Final result Specimen: Urine from Urine, Clean Catch Updated: 08/01/17 2149        Color, UA Dark Yellow (A)        Appearance, UA Clear        pH, UA 6.5        Specific [place], UA 1.012        Glucose, UA Negative        Ketones, UA Negative        Bilirubin, UA Negative        Blood, UA Negative        Protein, UA Negative        Leuk Esterase, UA Negative        Nitrite, UA Negative        Urobilinogen, UA 1.0 E.U./dL       Narrative:         Urine microscopic not indicated.       Urine Drug Screen [[id number]] (Normal) Collected: 08/01/17 2123       Lab Status: Final result Specimen: Urine from Urine, Clean Catch Updated: 08/01/17 2207        THC, Screen, Urine Negative        Phencyclidine (PCP), Urine Negative        Cocaine Screen, Urine Negative        Methamphetamine, Urine Negative        Opiate Screen Negative        Amphetamine Screen, Urine Negative        Benzodiazepine Screen, Urine Negative        Tricyclic Antidepressants Screen Negative        Methadone Screen, Urine Negative        Barbiturates Screen, Urine Negative        Oxycodone Screen, Urine Negative        Propoxyphene Screen Negative        Buprenorphine, Screen, Urine Negative       Narrative:         Cutoff For Drugs Screened:    Amphetamines               500 ng/ml  Barbiturates               200 ng/ml  Benzodiazepines            150 ng/ml  Cocaine                    150 ng/ml  Methadone                  200 ng/ml  Opiates                    100 ng/ml  Phencyclidine               25 ng/ml  THC                         50 ng/ml  Methamphetamine            500 ng/ml  Tricyclic Antidepressants  300 ng/ml  Oxycodone                  100 ng/ml  Propoxyphene               300 ng/ml  Buprenorphine               10 ng/ml    The normal value for all drugs tested is negative. This report includes unconfirmed screening results, with the cutoff values listed, to be used for medical treatment purposes only.  Unconfirmed results must  not be used for non-medical purposes such as employment or legal testing.  Clinical consideration should be applied to any drug of abuse test, particularly when unconfirmed results are used.         Osmolality, Urine [[id number]] (Abnormal) Collected: 08/01/17 2123       Lab Status: Final result Specimen: Urine from Urine, Clean Catch Updated: 08/02/17 0326        Osmolality, Urine 39 (L) mOsm/kg        Sodium, Urine, Random [[id number]] (Abnormal) Collected: 08/01/17 2123       Lab Status: Final result Specimen: Urine from Urine, Clean Catch Updated: 08/02/17 0433        Sodium, Urine <10 (L) mmol/L        [facility] Draw [[id number]] Collected: 08/01/17 2028       Lab Status: Final result Specimen: Blood Updated: 08/01/17 2330       Narrative:         The following orders were created for panel order [facility] Draw.  Procedure                               Abnormality         Status                     ---------                               -----------         ------                     Light Blue Top[[id number]]                                   Final result               Green Top (Gel)[[id number]]                                  Final result               Lavender Top[[id number]]                                     Final result               Gold Top - SST[[id number]]                                   Final result               Green Top (No Gel)[[id number]]                                                            Please view results for these tests on the individual orders.       Comprehensive Metabolic Panel [[id number]] (Abnormal) Collected: 08/01/17 2028       Lab Status: Final result Specimen: Blood Updated: 08/01/17 2152        Glucose 126 (H) mg/dL         BUN <5 (L) mg/dL         Creatinine 0.60 mg/dL         Sodium 122 (L) mmol/L         Potassium 4.7 mmol/L         Chloride 92 (L) mmol/L         CO2 23.0 mmol/L         Calcium 9.6 mg/dL         Total Protein 7.5 g/dL         Albumin 3.60 g/dL         ALT (SGPT)  94 (H) U/L         AST (SGOT) 134 (H) U/L         Alkaline Phosphatase 182 (H) U/L         Total Bilirubin 1.5 (H) mg/dL         eGFR Non African Amer 136 mL/min/1.73         Globulin 3.9 gm/dL         A/G Ratio 0.9 (L) g/dL         BUN/Creatinine Ratio --        Anion Gap 7.0 mmol/L        Narrative:          Radiology Results (last 21 days)        Review All     Procedure Component Value Units Date/Time       CT Chest With Contrast [[id number]] Not Reviewed       Order Status: Completed Collected: 08/02/17 2008        Updated: 08/03/17 1402       Narrative:         EXAMINATION: CT CHEST W/CONTRAST - 08/02/2017     INDICATION:  E87.6-Ycqr-cvtpawxcex and hyponatremia;  R41.0-Disorientation, unspecified; R29.6-Repeated falls; R53.1-Weakness;  F10.20-Alcohol dependence, uncomplicated.     TECHNIQUE: CT scan of chest was performed with intravenous contrast The  radiation dose reduction device was turned on for each scan per the  ALARA (As Low as Reasonably Achievable) protocol.     COMPARISON: 8/01/2017, routine normal chest x-ray.     FINDINGS: There is no axillary lymphadenopathy. There is no mediastinal  mass. There is no hilar lymphadenopathy. There is no pericardial or  pleural effusion. Images of the upper abdomen demonstrate diffuse fatty  infiltration of the liver. Images displayed at lung window settings  demonstrate a single 8.5 mm nodule abutting the pericardium in the  inferomedial aspect of the right upper lobe. Otherwise there are chronic  pulmonary changes. There is no consolidation, other mass, or effusion.          Impression:         There is a single 8.5 mm nodule in the right upper lobe  medially abutting the pericardium. This has fairly smooth margins  without spiculation. This would not be amenable to biopsy due to  location. There is no prior exam for comparison. A six-month follow-up  examination is recommended.     DICTATED:     08/02/2017  EDITED:         08/02/2017     This report was  "finalized on 8/3/2017 2:00 PM by Dr. Freddy Fraser MD.          XR Chest 1 View [[id number]] (Abnormal) Not Reviewed       Order Status: Completed Collected: 08/01/17 2020        Updated: 08/01/17 2119       Narrative:         EXAM:    XR Chest, 1 View    CLINICAL HISTORY:    62 years old, male; Signs and symptoms; Wheezing and other: Weak/dizzy/ams;   Additional info: Weak/dizzy/ams triage protocol    TECHNIQUE:    Frontal view of the chest.    COMPARISON:    CR - XR CHEST PA AND LATERAL 11/25/2015 9:52:40 PM    FINDINGS:    Lungs:  Hyperlucent, hyperinflated lungs.  No consolidation.    Pleural space:  Unremarkable.  No pneumothorax.    Heart:  Unremarkable.  No cardiomegaly.    Mediastinum:  Unremarkable.    Bones/joints:  Unremarkable.         Impression:            Condition on Discharge:  Stable/improved    Physical Exam on Discharge:/82 (BP Location: Right arm, Patient Position: Sitting)  Pulse 78  Temp 97.4 °F (36.3 °C) (Oral)   Resp 18  Ht 73\" (185.4 cm)  Wt 150 lb 4 oz (68.2 kg)  SpO2 98%  BMI 19.82 kg/m2  Physical Exam  NAD  Op clear, MMM  Neck supple  RRR  CTAB  +BS, ND, NT  MILLER  No edema  Anxious    Discharge Disposition  Home or Self Care    Discharge Medications   Israel Bojorquez   Home Medication Instructions UMBERTO:[id number]    Printed on:08/06/17 0813   Medication Information                      folic acid (FOLVITE) 1 MG tablet  Take 1 tablet by mouth Daily.             levothyroxine (SYNTHROID, LEVOTHROID) 50 MCG tablet  Take 1 tablet by mouth Daily.             pantoprazole (PROTONIX) 40 MG EC tablet  Take 1 tablet by mouth Daily.             primidone (MYSOLINE) 50 MG tablet  Take 50 mg by mouth Every Night.             propranolol LA (INDERAL LA) 80 MG 24 hr capsule  Take 1 capsule by mouth Daily.             thiamine (VITAMIN B1) 100 MG tablet  Take 1 tablet by mouth Daily.                 Discharge Diet:   Diet Instructions     Advance Diet As Tolerated                     Discharge " Care Plan / Instructions:    Activity at Discharge:   Activity Instructions     Activity as Tolerated                     Follow-up Appointments  No future appointments.  Additional Instructions for the Follow-ups that You Need to Schedule     Discharge Follow-up with PCP    As directed    Follow Up Details:  Alireza Carvajal 1 week                 Test Results Pending at Discharge       Festus Quinones MD 08/06/17 8:13 AM    Time: 40 minutes    Please note that portions of this note may have been completed with a voice recognition program. Efforts were made to edit the dictations, but occasionally words are mistranscribed.

## 2017-08-10 NOTE — THERAPY DISCHARGE NOTE
Acute Care - Physical Therapy Discharge Summary  [facility]       Patient Name: Israel Bojorquez  : 1955  MRN: [id number]    Today's Date: 8/10/2017  Onset of Illness/Injury or Date of Surgery Date: 17    Date of Referral to PT: 17  Referring Physician: DIXIE Roland      Admit Date: 2017      PT Recommendation and Plan    Visit Dx:    ICD-10-CM ICD-9-CM   1. Acute hyponatremia E87.1 276.1   2. Confusion R41.0 298.9   3. Multiple falls R29.6 V15.88   4. Generalized weakness R53.1 780.79   5. Alcoholism F10.20 303.90   6. Impaired mobility and ADLs Z74.09 799.89   7. Impaired functional mobility, balance, gait, and endurance Z74.09 V49.89                       IP PT Goals       17 1154 17 1518       Bed Mobility PT LTG    Bed Mobility PT LTG, Date Established  17  -LM     Bed Mobility PT LTG, Time to Achieve  5 days  -LM     Bed Mobility PT LTG, Activity Type  supine to sit/sit to supine  -LM     Bed Mobility PT LTG, [place] Level  conditional independence  -LM     Bed Mobility PT LTG, Date Goal Reviewed 17  -LM      Bed Mobility PT LTG, Outcome goal met  -LM      Transfer Training PT LTG    Transfer Training PT LTG, Date Established  17  -LM     Transfer Training PT LTG, Time to Achieve  5 days  -LM     Transfer Training PT LTG, Activity Type  sit to stand/stand to sit  -LM     Transfer Training PT LTG, [place] Level  conditional independence  -LM     Transfer Training PT LTG, Assist Device  walker, rolling  -LM     Transfer Training PT  LTG, Date Goal Reviewed 17  -LM      Transfer Training PT LTG, Outcome goal ongoing  -LM      Gait Training PT LTG    Gait Training Goal PT LTG, Date Established  17  -LM     Gait Training Goal PT LTG, Time to Achieve  5 days  -LM     Gait Training Goal PT LTG, [place] Level  conditional independence  -LM     Gait Training Goal PT LTG, Assist Device  walker, rolling  -LM     Gait Training Goal PT LTG,  Distance to Achieve  750 feet  -LM     Gait Training Goal PT LTG, Date Goal Reviewed 08/04/17  -LM      Gait Training Goal PT LTG, Outcome goal ongoing  -LM      Stair Training PT LTG    Stair Training Goal PT LTG, Date Established  08/02/17  -LM     Stair Training Goal PT LTG, Time to Achieve  5 days  -LM     Stair Training Goal PT LTG, [place] Level  conditional independence  -LM     Stair Training Goal PT LTG, Assist Device  1 handrail  -LM     Stair Training Goal PT LTG, Distance to Achieve  7  -LM     Stair Training Goal PT LTG, Date Goal Reviewed 08/04/17  -LM      Stair Training Goal PT LTG, Outcome goal ongoing  -LM        User Key  (r) = Recorded By, (t) = Taken By, (c) = Cosigned By    Initials Name Provider Type    RAINER Mcdaniel, PT Physical Therapist            Goals Status: Treatment plan discontinued secondary to discharge from acute facility.    PT Discharge Summary  Reason for Discharge: Discharge from facility  Outcomes Achieved: Refer to plan of care for updates on goals achieved  Discharge Destination: Home with home health      Lydia Singh, PT   8/10/2017

## 2017-08-14 ENCOUNTER — OFFICE VISIT (OUTPATIENT)
Dept: FAMILY MEDICINE CLINIC | Facility: CLINIC | Age: 62
End: 2017-08-14

## 2017-08-14 ENCOUNTER — LAB (OUTPATIENT)
Dept: LAB | Facility: HOSPITAL | Age: 62
End: 2017-08-14

## 2017-08-14 VITALS
HEIGHT: 73 IN | DIASTOLIC BLOOD PRESSURE: 80 MMHG | WEIGHT: 149 LBS | SYSTOLIC BLOOD PRESSURE: 132 MMHG | BODY MASS INDEX: 19.75 KG/M2 | OXYGEN SATURATION: 99 % | HEART RATE: 86 BPM

## 2017-08-14 DIAGNOSIS — E87.1 HYPONATREMIA: ICD-10-CM

## 2017-08-14 DIAGNOSIS — K70.10 ACUTE ALCOHOLIC HEPATITIS: ICD-10-CM

## 2017-08-14 DIAGNOSIS — E03.9 HYPOTHYROIDISM, UNSPECIFIED TYPE: ICD-10-CM

## 2017-08-14 DIAGNOSIS — F10.10 ALCOHOL ABUSE: Primary | ICD-10-CM

## 2017-08-14 DIAGNOSIS — D50.9 IRON DEFICIENCY ANEMIA, UNSPECIFIED IRON DEFICIENCY ANEMIA TYPE: ICD-10-CM

## 2017-08-14 DIAGNOSIS — F10.10 ALCOHOL ABUSE: ICD-10-CM

## 2017-08-14 LAB
DEPRECATED RDW RBC AUTO: 58.8 FL (ref 37–54)
ERYTHROCYTE [DISTWIDTH] IN BLOOD BY AUTOMATED COUNT: 15 % (ref 11.3–14.5)
HCT VFR BLD AUTO: 43.2 % (ref 38.9–50.9)
HGB BLD-MCNC: 14.6 G/DL (ref 13.1–17.5)
MCH RBC QN AUTO: 35.9 PG (ref 27–31)
MCHC RBC AUTO-ENTMCNC: 33.8 G/DL (ref 32–36)
MCV RBC AUTO: 106.1 FL (ref 80–99)
PLATELET # BLD AUTO: 330 10*3/MM3 (ref 150–450)
PMV BLD AUTO: 9.6 FL (ref 6–12)
RBC # BLD AUTO: 4.07 10*6/MM3 (ref 4.2–5.76)
WBC NRBC COR # BLD: 8.89 10*3/MM3 (ref 3.5–10.8)

## 2017-08-14 PROCEDURE — 36415 COLL VENOUS BLD VENIPUNCTURE: CPT

## 2017-08-14 PROCEDURE — 99214 OFFICE O/P EST MOD 30 MIN: CPT | Performed by: INTERNAL MEDICINE

## 2017-08-14 PROCEDURE — 85027 COMPLETE CBC AUTOMATED: CPT | Performed by: INTERNAL MEDICINE

## 2017-08-14 RX ORDER — FOLIC ACID 1 MG/1
1 TABLET ORAL DAILY
COMMUNITY
End: 2018-02-13

## 2017-08-14 RX ORDER — LEVOTHYROXINE SODIUM 0.05 MG/1
50 TABLET ORAL DAILY
COMMUNITY
End: 2018-02-13

## 2017-08-14 NOTE — PROGRESS NOTES
Chief Complaint   Patient presents with   • [facility]     WAS ADMITTED LAST WEEK FOR WEAKNESS      Subjective   Israel Bojorquez is a 62 y.o. male    History of Present Illness     The patient was admitted for 3 days to the hospital.  He had hyponatremia and was at the point of requiring some support for alcohol withdrawal.  He drinks 12 or more beers per day and since she's been asked he has continued to drink at that rate.  He understands that he is now alcoholic and that he is beginning to suffer some of the most severe consequences of alcoholism.  His sodium was low during this last visit and he had an iron deficiency anemia.  He has had prior abdominal ultrasound showing ascites however his CAT scan on this visit did not suggest cirrhosis or ascites.  He has been given an appointment with a psychiatrist and is intent on joining .    The following portions of the patient's history were reviewed and updated as appropriate: allergies, current medications, past social history and problem list    No Known Allergies   Past Medical History:   Diagnosis Date   • Alcohol abuse    • Acosta's esophagus    • COPD (chronic obstructive pulmonary disease)    • Depression    • Essential tremor    • Essential tremor    • Hiatal hernia    • History of SIADH    • Hyperlipidemia    • Hypertension    • Insomnia    • Occasional tremors    • Tendinitis       No past surgical history on file.   Social History     Social History   • Marital status:      Spouse name: N/A   • Number of children: N/A   • Years of education: N/A     Occupational History   • Not on file.     Social History Main Topics   • Smoking status: Current Every Day Smoker     Packs/day: 1.50     Years: 40.00   • Smokeless tobacco: Never Used   • Alcohol use 30.0 oz/week     50 Cans of beer per week   • Drug use: Yes     Special: Amphetamines, Barbiturates, LSD, Marijuana      Comment: remote h/o drug use    • Sexual activity: Defer     Other  Topics Concern   • Not on file     Social History Narrative      Current Outpatient Prescriptions on File Prior to Visit   Medication Sig Dispense Refill   • primidone (MYSOLINE) 50 MG tablet Take 50 mg by mouth Every Night.     • propranolol LA (INDERAL LA) 80 MG 24 hr capsule Take 1 capsule by mouth Daily. 30 capsule 2   • thiamine (VITAMIN B1) 100 MG tablet Take 1 tablet by mouth Daily. 30 tablet 2   • [DISCONTINUED] levothyroxine (SYNTHROID, LEVOTHROID) 50 MCG tablet Take 1 tablet by mouth Daily. 30 tablet 2   • pantoprazole (PROTONIX) 40 MG EC tablet Take 1 tablet by mouth Daily. 30 tablet 2   • [DISCONTINUED] folic acid (FOLVITE) 1 MG tablet Take 1 tablet by mouth Daily. 30 tablet 2     No current facility-administered medications on file prior to visit.         Review of Systems   Constitutional: Negative for appetite change and fatigue.   HENT: Negative for ear pain and sore throat.    Eyes: Negative for itching and visual disturbance.   Respiratory: Negative for cough and shortness of breath.    Cardiovascular: Negative for chest pain and palpitations.   Gastrointestinal: Negative for abdominal pain and nausea.   Endocrine: Negative for cold intolerance and heat intolerance.   Genitourinary: Negative for dysuria and hematuria.   Musculoskeletal: Negative for arthralgias and back pain.   Skin: Negative for rash and wound.   Allergic/Immunologic: Negative for environmental allergies and food allergies.   Neurological: Negative for dizziness and headaches.   Hematological: Negative for adenopathy. Does not bruise/bleed easily.   Psychiatric/Behavioral: Negative for sleep disturbance. The patient is not nervous/anxious.        Objective     Vitals:    08/14/17 1050   BP: 132/80   Pulse: 86   SpO2: 99%       Physical Exam   Constitutional: He appears well-developed and well-nourished. No distress.   HENT:   Head: Normocephalic and atraumatic.   Eyes: Conjunctivae are normal.   Neck: No JVD present.    Cardiovascular: Normal rate, regular rhythm, normal heart sounds and intact distal pulses.    No murmur heard.  Pulses:       Dorsalis pedis pulses are 2+ on the right side, and 2+ on the left side.        Posterior tibial pulses are 2+ on the right side, and 2+ on the left side.   Pulmonary/Chest: Effort normal and breath sounds normal. No respiratory distress.   Abdominal: Soft. He exhibits no distension. There is no tenderness.   There is no fluid wave or abdominal distention to suggest ascites   Musculoskeletal: He exhibits no edema.   Neurological: Coordination normal.   Skin: Skin is warm and dry. No rash noted. He is not diaphoretic. No erythema. No pallor.   Psychiatric: He has a normal mood and affect.   Nursing note and vitals reviewed.      Assessment/Plan   Problem List Items Addressed This Visit        Hematopoietic and Hemostatic    Anemia    Relevant Medications    folic acid (FOLVITE) 1 MG tablet    Other Relevant Orders    CBC No Differential (Completed)       Other    Alcohol abuse - Primary    Relevant Orders    Comprehensive metabolic panel      Other Visit Diagnoses     Acute alcoholic hepatitis        Relevant Orders    Comprehensive metabolic panel    Hyponatremia        Relevant Orders    Comprehensive metabolic panel    Hypothyroidism, unspecified type            For now the patient requires follow-up with regard to his abnormal labs.  He has been cautioned in terms of his continued drinking and leave his treatment to the psychiatrist.  I have suggested that he begin tapering down on the number of beers drinks every night as a way of tapering off the alcohol itself.  He is taking involuntarily of absence from work.  I will see him back in one month to see if he is following through on any recommendations.  Her ears lab work is pending.

## 2017-08-17 ENCOUNTER — TRANSCRIBE ORDERS (OUTPATIENT)
Dept: LAB | Facility: HOSPITAL | Age: 62
End: 2017-08-17

## 2017-08-17 ENCOUNTER — LAB (OUTPATIENT)
Dept: LAB | Facility: HOSPITAL | Age: 62
End: 2017-08-17

## 2017-08-17 DIAGNOSIS — Z00.00 ROUTINE GENERAL MEDICAL EXAMINATION AT A HEALTH CARE FACILITY: ICD-10-CM

## 2017-08-17 DIAGNOSIS — Z00.00 ROUTINE GENERAL MEDICAL EXAMINATION AT A HEALTH CARE FACILITY: Primary | ICD-10-CM

## 2017-08-17 LAB
ALBUMIN SERPL-MCNC: 4.1 G/DL (ref 3.2–4.8)
ALBUMIN/GLOB SERPL: 1.1 G/DL (ref 1.5–2.5)
ALP SERPL-CCNC: 111 U/L (ref 25–100)
ALT SERPL W P-5'-P-CCNC: 35 U/L (ref 7–40)
ANION GAP SERPL CALCULATED.3IONS-SCNC: 5 MMOL/L (ref 3–11)
AST SERPL-CCNC: 55 U/L (ref 0–33)
BILIRUB SERPL-MCNC: 0.5 MG/DL (ref 0.3–1.2)
BUN BLD-MCNC: <5 MG/DL (ref 9–23)
BUN/CREAT SERPL: ABNORMAL (ref 7–25)
CALCIUM SPEC-SCNC: 9.8 MG/DL (ref 8.7–10.4)
CHLORIDE SERPL-SCNC: 105 MMOL/L (ref 99–109)
CO2 SERPL-SCNC: 26 MMOL/L (ref 20–31)
CREAT BLD-MCNC: 0.8 MG/DL (ref 0.6–1.3)
GFR SERPL CREATININE-BSD FRML MDRD: 98 ML/MIN/1.73
GLOBULIN UR ELPH-MCNC: 3.9 GM/DL
GLUCOSE BLD-MCNC: 103 MG/DL (ref 70–100)
POTASSIUM BLD-SCNC: 4.6 MMOL/L (ref 3.5–5.5)
PROT SERPL-MCNC: 8 G/DL (ref 5.7–8.2)
SODIUM BLD-SCNC: 136 MMOL/L (ref 132–146)

## 2017-08-17 PROCEDURE — 80053 COMPREHEN METABOLIC PANEL: CPT | Performed by: INTERNAL MEDICINE

## 2017-08-17 PROCEDURE — 36415 COLL VENOUS BLD VENIPUNCTURE: CPT

## 2018-02-13 ENCOUNTER — HOSPITAL ENCOUNTER (INPATIENT)
Facility: HOSPITAL | Age: 63
LOS: 3 days | Discharge: NURSING, REHAB OR LONG-TERM CARE | End: 2018-02-16
Attending: EMERGENCY MEDICINE | Admitting: INTERNAL MEDICINE

## 2018-02-13 ENCOUNTER — APPOINTMENT (OUTPATIENT)
Dept: GENERAL RADIOLOGY | Facility: HOSPITAL | Age: 63
End: 2018-02-13

## 2018-02-13 DIAGNOSIS — R19.7 DIARRHEA, UNSPECIFIED TYPE: ICD-10-CM

## 2018-02-13 DIAGNOSIS — Z74.09 IMPAIRED FUNCTIONAL MOBILITY, BALANCE, GAIT, AND ENDURANCE: ICD-10-CM

## 2018-02-13 DIAGNOSIS — Z78.9 IMPAIRED MOBILITY AND ADLS: ICD-10-CM

## 2018-02-13 DIAGNOSIS — E87.1 HYPONATREMIA: Primary | ICD-10-CM

## 2018-02-13 DIAGNOSIS — F10.20 ALCOHOLISM (HCC): ICD-10-CM

## 2018-02-13 DIAGNOSIS — R53.1 WEAKNESS: ICD-10-CM

## 2018-02-13 DIAGNOSIS — Z74.09 IMPAIRED MOBILITY AND ADLS: ICD-10-CM
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
## 2018-02-13 LAB
ALBUMIN SERPL-MCNC: 3.8 G/DL (ref 3.2–4.8)
ALBUMIN/GLOB SERPL: 1 G/DL (ref 1.5–2.5)
ALP SERPL-CCNC: 260 U/L (ref 25–100)
ALT SERPL W P-5'-P-CCNC: 109 U/L (ref 7–40)
ANION GAP SERPL CALCULATED.3IONS-SCNC: 11 MMOL/L (ref 3–11)
AST SERPL-CCNC: 168 U/L (ref 0–33)
BASOPHILS # BLD AUTO: 0.03 10*3/MM3 (ref 0–0.2)
BASOPHILS NFR BLD AUTO: 0.3 % (ref 0–1)
BILIRUB SERPL-MCNC: 1.1 MG/DL (ref 0.3–1.2)
BILIRUB UR QL STRIP: NEGATIVE
BNP SERPL-MCNC: 143 PG/ML (ref 0–100)
BUN BLD-MCNC: <5 MG/DL (ref 9–23)
BUN/CREAT SERPL: ABNORMAL (ref 7–25)
C DIFF TOX GENS STL QL NAA+PROBE: NOT DETECTED
CALCIUM SPEC-SCNC: 8.9 MG/DL (ref 8.7–10.4)
CHLORIDE SERPL-SCNC: 89 MMOL/L (ref 99–109)
CK SERPL-CCNC: 15 U/L (ref 26–174)
CLARITY UR: CLEAR
CO2 SERPL-SCNC: 22 MMOL/L (ref 20–31)
COLOR UR: YELLOW
CREAT BLD-MCNC: 0.7 MG/DL (ref 0.6–1.3)
DEPRECATED RDW RBC AUTO: 47.9 FL (ref 37–54)
EOSINOPHIL # BLD AUTO: 0.36 10*3/MM3 (ref 0–0.3)
EOSINOPHIL NFR BLD AUTO: 3.6 % (ref 0–3)
ERYTHROCYTE [DISTWIDTH] IN BLOOD BY AUTOMATED COUNT: 13.1 % (ref 11.3–14.5)
ETHANOL BLD-MCNC: 73 MG/DL (ref 0–10)
GFR SERPL CREATININE-BSD FRML MDRD: 114 ML/MIN/1.73
GLOBULIN UR ELPH-MCNC: 3.7 GM/DL
GLUCOSE BLD-MCNC: 105 MG/DL (ref 70–100)
GLUCOSE UR STRIP-MCNC: NEGATIVE MG/DL
HCT VFR BLD AUTO: 39.4 % (ref 38.9–50.9)
HGB BLD-MCNC: 13.7 G/DL (ref 13.1–17.5)
HGB UR QL STRIP.AUTO: NEGATIVE
HOLD SPECIMEN: NORMAL
HOLD SPECIMEN: NORMAL
IMM GRANULOCYTES # BLD: 0.02 10*3/MM3 (ref 0–0.03)
IMM GRANULOCYTES NFR BLD: 0.2 % (ref 0–0.6)
KETONES UR QL STRIP: NEGATIVE
LEUKOCYTE ESTERASE UR QL STRIP.AUTO: NEGATIVE
LYMPHOCYTES # BLD AUTO: 1.54 10*3/MM3 (ref 0.6–4.8)
LYMPHOCYTES NFR BLD AUTO: 15.4 % (ref 24–44)
MAGNESIUM SERPL-MCNC: 1.8 MG/DL (ref 1.3–2.7)
MCH RBC QN AUTO: 35.1 PG (ref 27–31)
MCHC RBC AUTO-ENTMCNC: 34.8 G/DL (ref 32–36)
MCV RBC AUTO: 101 FL (ref 80–99)
MONOCYTES # BLD AUTO: 1.17 10*3/MM3 (ref 0–1)
MONOCYTES NFR BLD AUTO: 11.7 % (ref 0–12)
NEUTROPHILS # BLD AUTO: 6.85 10*3/MM3 (ref 1.5–8.3)
NEUTROPHILS NFR BLD AUTO: 68.8 % (ref 41–71)
NITRITE UR QL STRIP: NEGATIVE
OSMOLALITY SERPL: 277 MOSM/KG (ref 275–295)
OSMOLALITY UR: 155 MOSM/KG (ref 300–1100)
PH UR STRIP.AUTO: 6 [PH] (ref 5–8)
PLATELET # BLD AUTO: 225 10*3/MM3 (ref 150–450)
PMV BLD AUTO: 9.3 FL (ref 6–12)
POTASSIUM BLD-SCNC: 3.5 MMOL/L (ref 3.5–5.5)
PROT SERPL-MCNC: 7.5 G/DL (ref 5.7–8.2)
PROT UR QL STRIP: NEGATIVE
RBC # BLD AUTO: 3.9 10*6/MM3 (ref 4.2–5.76)
SODIUM BLD-SCNC: 122 MMOL/L (ref 132–146)
SODIUM UR-SCNC: <10 MMOL/L (ref 30–90)
SP GR UR STRIP: 1.01 (ref 1–1.03)
TROPONIN I SERPL-MCNC: 0 NG/ML (ref 0–0.07)
UROBILINOGEN UR QL STRIP: NORMAL
WBC NRBC COR # BLD: 9.97 10*3/MM3 (ref 3.5–10.8)
WBC STL QL MICRO: NORMAL
WHOLE BLOOD HOLD SPECIMEN: NORMAL
WHOLE BLOOD HOLD SPECIMEN: NORMAL

## 2018-02-13 PROCEDURE — 81003 URINALYSIS AUTO W/O SCOPE: CPT | Performed by: EMERGENCY MEDICINE

## 2018-02-13 PROCEDURE — 25010000002 THIAMINE PER 100 MG: Performed by: INTERNAL MEDICINE

## 2018-02-13 PROCEDURE — 83935 ASSAY OF URINE OSMOLALITY: CPT | Performed by: INTERNAL MEDICINE

## 2018-02-13 PROCEDURE — 80053 COMPREHEN METABOLIC PANEL: CPT | Performed by: EMERGENCY MEDICINE

## 2018-02-13 PROCEDURE — 93005 ELECTROCARDIOGRAM TRACING: CPT

## 2018-02-13 PROCEDURE — 87177 OVA AND PARASITES SMEARS: CPT | Performed by: NURSE PRACTITIONER

## 2018-02-13 PROCEDURE — 36415 COLL VENOUS BLD VENIPUNCTURE: CPT

## 2018-02-13 PROCEDURE — 87046 STOOL CULTR AEROBIC BACT EA: CPT | Performed by: NURSE PRACTITIONER

## 2018-02-13 PROCEDURE — 83735 ASSAY OF MAGNESIUM: CPT | Performed by: EMERGENCY MEDICINE

## 2018-02-13 PROCEDURE — 80307 DRUG TEST PRSMV CHEM ANLYZR: CPT | Performed by: NURSE PRACTITIONER

## 2018-02-13 PROCEDURE — 87209 SMEAR COMPLEX STAIN: CPT | Performed by: NURSE PRACTITIONER

## 2018-02-13 PROCEDURE — 82550 ASSAY OF CK (CPK): CPT | Performed by: NURSE PRACTITIONER

## 2018-02-13 PROCEDURE — 80048 BASIC METABOLIC PNL TOTAL CA: CPT | Performed by: INTERNAL MEDICINE

## 2018-02-13 PROCEDURE — 87045 FECES CULTURE AEROBIC BACT: CPT | Performed by: NURSE PRACTITIONER

## 2018-02-13 PROCEDURE — 99284 EMERGENCY DEPT VISIT MOD MDM: CPT

## 2018-02-13 PROCEDURE — 85610 PROTHROMBIN TIME: CPT | Performed by: NURSE PRACTITIONER

## 2018-02-13 PROCEDURE — 84484 ASSAY OF TROPONIN QUANT: CPT

## 2018-02-13 PROCEDURE — 83880 ASSAY OF NATRIURETIC PEPTIDE: CPT | Performed by: INTERNAL MEDICINE

## 2018-02-13 PROCEDURE — 87205 SMEAR GRAM STAIN: CPT | Performed by: NURSE PRACTITIONER

## 2018-02-13 PROCEDURE — 99223 1ST HOSP IP/OBS HIGH 75: CPT | Performed by: INTERNAL MEDICINE

## 2018-02-13 PROCEDURE — 71045 X-RAY EXAM CHEST 1 VIEW: CPT

## 2018-02-13 PROCEDURE — 84300 ASSAY OF URINE SODIUM: CPT | Performed by: INTERNAL MEDICINE

## 2018-02-13 PROCEDURE — 85025 COMPLETE CBC W/AUTO DIFF WBC: CPT | Performed by: EMERGENCY MEDICINE

## 2018-02-13 PROCEDURE — 83930 ASSAY OF BLOOD OSMOLALITY: CPT | Performed by: INTERNAL MEDICINE

## 2018-02-13 PROCEDURE — 87493 C DIFF AMPLIFIED PROBE: CPT | Performed by: NURSE PRACTITIONER

## 2018-02-13 RX ORDER — POTASSIUM CHLORIDE 1.5 G/1.77G
40 POWDER, FOR SOLUTION ORAL AS NEEDED
Status: DISCONTINUED | OUTPATIENT
Start: 2018-02-13 | End: 2018-02-16 | Stop reason: HOSPADM

## 2018-02-13 RX ORDER — THIAMINE MONONITRATE (VIT B1) 100 MG
100 TABLET ORAL DAILY
Status: DISCONTINUED | OUTPATIENT
Start: 2018-02-14 | End: 2018-02-16 | Stop reason: HOSPADM

## 2018-02-13 RX ORDER — PROPRANOLOL HYDROCHLORIDE 80 MG/1
80 CAPSULE, EXTENDED RELEASE ORAL DAILY
Status: DISCONTINUED | OUTPATIENT
Start: 2018-02-14 | End: 2018-02-16 | Stop reason: HOSPADM

## 2018-02-13 RX ORDER — PHENOL 1.4 %
1 AEROSOL, SPRAY (ML) MUCOUS MEMBRANE DAILY
COMMUNITY

## 2018-02-13 RX ORDER — MULTIPLE VITAMINS W/ MINERALS TAB 9MG-400MCG
1 TAB ORAL DAILY
Status: DISCONTINUED | OUTPATIENT
Start: 2018-02-13 | End: 2018-02-16 | Stop reason: HOSPADM

## 2018-02-13 RX ORDER — LORAZEPAM 2 MG/ML
2 INJECTION INTRAMUSCULAR
Status: DISCONTINUED | OUTPATIENT
Start: 2018-02-13 | End: 2018-02-16 | Stop reason: HOSPADM

## 2018-02-13 RX ORDER — ONDANSETRON 2 MG/ML
4 INJECTION INTRAMUSCULAR; INTRAVENOUS EVERY 6 HOURS PRN
Status: DISCONTINUED | OUTPATIENT
Start: 2018-02-13 | End: 2018-02-16 | Stop reason: HOSPADM

## 2018-02-13 RX ORDER — MAGNESIUM SULFATE HEPTAHYDRATE 40 MG/ML
2 INJECTION, SOLUTION INTRAVENOUS AS NEEDED
Status: DISCONTINUED | OUTPATIENT
Start: 2018-02-13 | End: 2018-02-16 | Stop reason: HOSPADM

## 2018-02-13 RX ORDER — ACETAMINOPHEN 325 MG/1
650 TABLET ORAL EVERY 6 HOURS PRN
Status: DISCONTINUED | OUTPATIENT
Start: 2018-02-13 | End: 2018-02-16 | Stop reason: HOSPADM

## 2018-02-13 RX ORDER — POTASSIUM CHLORIDE 750 MG/1
40 CAPSULE, EXTENDED RELEASE ORAL AS NEEDED
Status: DISCONTINUED | OUTPATIENT
Start: 2018-02-13 | End: 2018-02-16 | Stop reason: HOSPADM

## 2018-02-13 RX ORDER — SODIUM CHLORIDE 0.9 % (FLUSH) 0.9 %
10 SYRINGE (ML) INJECTION AS NEEDED
Status: DISCONTINUED | OUTPATIENT
Start: 2018-02-13 | End: 2018-02-16 | Stop reason: HOSPADM

## 2018-02-13 RX ORDER — NICOTINE 21 MG/24HR
1 PATCH, TRANSDERMAL 24 HOURS TRANSDERMAL
Status: DISCONTINUED | OUTPATIENT
Start: 2018-02-13 | End: 2018-02-16 | Stop reason: HOSPADM

## 2018-02-13 RX ORDER — LORAZEPAM 2 MG/ML
1 INJECTION INTRAMUSCULAR
Status: DISCONTINUED | OUTPATIENT
Start: 2018-02-13 | End: 2018-02-16 | Stop reason: HOSPADM

## 2018-02-13 RX ORDER — LORAZEPAM 1 MG/1
1 TABLET ORAL
Status: DISCONTINUED | OUTPATIENT
Start: 2018-02-13 | End: 2018-02-16 | Stop reason: HOSPADM

## 2018-02-13 RX ORDER — MAGNESIUM SULFATE HEPTAHYDRATE 40 MG/ML
4 INJECTION, SOLUTION INTRAVENOUS AS NEEDED
Status: DISCONTINUED | OUTPATIENT
Start: 2018-02-13 | End: 2018-02-16 | Stop reason: HOSPADM

## 2018-02-13 RX ORDER — LORAZEPAM 1 MG/1
2 TABLET ORAL
Status: DISCONTINUED | OUTPATIENT
Start: 2018-02-13 | End: 2018-02-16 | Stop reason: HOSPADM

## 2018-02-13 RX ORDER — SODIUM CHLORIDE 0.9 % (FLUSH) 0.9 %
1-10 SYRINGE (ML) INJECTION AS NEEDED
Status: DISCONTINUED | OUTPATIENT
Start: 2018-02-13 | End: 2018-02-16 | Stop reason: HOSPADM

## 2018-02-13 RX ADMIN — POTASSIUM CHLORIDE 40 MEQ: 750 CAPSULE, EXTENDED RELEASE ORAL at 20:15

## 2018-02-13 RX ADMIN — NICOTINE 1 PATCH: 21 PATCH, EXTENDED RELEASE TRANSDERMAL at 20:15

## 2018-02-13 RX ADMIN — SODIUM CHLORIDE 1000 ML: 9 INJECTION, SOLUTION INTRAVENOUS at 17:49

## 2018-02-13 RX ADMIN — MULTIPLE VITAMINS W/ MINERALS TAB 1 TABLET: TAB ORAL at 20:15

## 2018-02-13 RX ADMIN — MAGNESIUM SULFATE HEPTAHYDRATE 4 G: 40 INJECTION, SOLUTION INTRAVENOUS at 20:15

## 2018-02-13 RX ADMIN — LORAZEPAM 1 MG: 1 TABLET ORAL at 23:29

## 2018-02-13 RX ADMIN — POTASSIUM CHLORIDE 40 MEQ: 750 CAPSULE, EXTENDED RELEASE ORAL at 23:34

## 2018-02-13 RX ADMIN — THIAMINE HYDROCHLORIDE 100 MG: 100 INJECTION, SOLUTION INTRAMUSCULAR; INTRAVENOUS at 23:23

## 2018-02-13 NOTE — ED PROVIDER NOTES
Subjective   HPI Comments: HX OF ETOH ABUSE    ADMITTED TO [facility] BEFORE FOR SODIUM  . THIS IS SIMILAR.    LAST DRINK THIS AM.    ALSO BEEN HAVING DIARRHEA.     VERY WEAK. ESSENTIALLY STAYS IN BED. UNABLE TO GET AROUND THE HOUSE WITH FREQUENT DIARRHEA.     SITTING ON A CHUX DURING THE EXAM.        Patient is a 62 y.o. male presenting with weakness.   Weakness - Generalized   Severity:  Moderate  Onset quality:  Gradual  Timing:  Constant  Progression:  Worsening  Chronicity:  New  Context: alcohol use    Relieved by:  Nothing  Worsened by:  Standing  Ineffective treatments:  None tried  Associated symptoms: difficulty walking and dizziness    Associated symptoms: no abdominal pain and no fever        Review of Systems   Constitutional: Negative for fever.   Gastrointestinal: Negative for abdominal pain.   Neurological: Positive for dizziness.   All other systems reviewed and are negative.      Past Medical History:   Diagnosis Date   • Alcohol abuse    • Acosta's esophagus    • COPD (chronic obstructive pulmonary disease)    • Depression    • Disease of thyroid gland    • Essential tremor    • Essential tremor    • Hiatal hernia    • History of SIADH    • Hyperlipidemia    • Hypertension    • Insomnia    • Occasional tremors    • Tendinitis        No Known Allergies    History reviewed. No pertinent surgical history.    Family History   Problem Relation Age of Onset   • Cancer Mother    • Alcohol abuse Father        Social History     Social History   • Marital status:      Spouse name: N/A   • Number of children: N/A   • Years of education: N/A     Social History Main Topics   • Smoking status: Current Every Day Smoker     Packs/day: 1.50     Years: 40.00   • Smokeless tobacco: Never Used   • Alcohol use 30.0 oz/week     50 Cans of beer per week      Comment: 10-12 BEERS   • Drug use: Yes     Special: Amphetamines, Barbiturates, LSD, Marijuana      Comment: remote h/o drug use    • Sexual activity:  Defer     Other Topics Concern   • None     Social History Narrative    LIVES AT HOME ALONE            Objective   Physical Exam   Constitutional: He is oriented to person, place, and time. He appears well-developed and well-nourished.   HENT:   Head: Normocephalic and atraumatic.   Right Ear: External ear normal.   Left Ear: External ear normal.   Nose: Nose normal.   Mouth/Throat: Oropharynx is clear and moist.   Eyes: Conjunctivae and EOM are normal. Pupils are equal, round, and reactive to light.   Neck: Normal range of motion. Neck supple.   Cardiovascular: Normal rate, regular rhythm, normal heart sounds and intact distal pulses.    Pulmonary/Chest: Effort normal and breath sounds normal.   Abdominal: Soft. Bowel sounds are normal.   Musculoskeletal: Normal range of motion.   Neurological: He is alert and oriented to person, place, and time.   Skin: Skin is warm and dry.   Psychiatric: He has a normal mood and affect. His behavior is normal. Judgment normal.       Procedures         ED Course  ED Course   Comment By Time   I SPOKE WITH DR. CAMARGO AND HE WILL ADMIT. IGNACIO Roy 02/13 1702                  [facility]    Final diagnoses:   Hyponatremia   Diarrhea, unspecified type   Weakness   Alcoholism            IGNACIO Roy  02/13/18 6280

## 2018-02-13 NOTE — H&P
[facility] Medicine Services  HISTORY AND PHYSICAL    Patient Name: Israel Bojorquez  : 1955  MRN: [id number]  Primary Care Physician: No Known Provider    Subjective   Subjective     Chief Complaint:  Weakness, diarrhea, general decline     HPI:  Israel Bojorquez is a 62 y.o. male with PMH Of alcohol abuse, tobacco abuse, COPD, essential tremor, SIADH, HTN, HLD and wilson's esophagus. Patient was brought to  [facility] ED by his family for increasing weakness, diarrhea and general decline.  Patient not a very good  Historian. Per daughter and patient he has had diarrhea for months. He has poor intake but over last month it has worsened and complains of nausea. He has had increased weakness with frequent falls at home. He has some short term memory loss but not acute confusion noted. Short term memory loss has been present since last year.  He was discharged from [facility] in august  For similar issues and refused rehab and did not go to any AA meeting or outpatient rehab. Has not had any routine follow up since he was discharged. Was started on synthroid last admit and has not been taking medicine at home.  Patient states he drinks 10-12 beers a day and had two this am before coming to ED.     In ED: mag 1.8, , CXR neg, ethanol 73, , . Patient will be admitted to hospital medicine for further treatment.     Review of Systems   Constitutional: Positive for activity change and appetite change. Negative for fever.   Respiratory: Negative for shortness of breath and wheezing.    Cardiovascular: Negative for chest pain and palpitations.   Gastrointestinal: Positive for diarrhea and nausea. Negative for abdominal pain and vomiting.   Genitourinary: Negative for dysuria.   Neurological: Positive for weakness.   Psychiatric/Behavioral: Positive for confusion.        Per family short term memory issues not a new problem        Otherwise 10-system ROS reviewed and is negative except as  mentioned in the HPI.    Personal History     Past Medical History:   Diagnosis Date   • Alcohol abuse    • Acosta's esophagus    • COPD (chronic obstructive pulmonary disease)    • Depression    • Essential tremor    • Essential tremor    • Hiatal hernia    • History of SIADH    • Hyperlipidemia    • Hypertension    • Insomnia    • Occasional tremors    • Tendinitis        History reviewed. No pertinent surgical history.    Family History: family history includes Alcohol abuse in his father; Cancer in his mother.     Social History:  reports that he has been smoking.  He has a 60.00 pack-year smoking history. He has never used smokeless tobacco. He reports that he drinks about 30.0 oz of alcohol per week  He reports that he uses illicit drugs, including Amphetamines, Barbiturates, LSD, and Marijuana.  Social History     Social History Narrative   • No narrative on file       Medications:    (Not in a hospital admission)    No Known Allergies    Objective   Objective     Vital Signs:   Temp:  [97.5 °F (36.4 °C)] 97.5 °F (36.4 °C)  Heart Rate:  [] 98  Resp:  [18] 18  BP: (123-140)/() 130/102        Physical Exam   Constitutional: No acute distress, awake, alert, frail chronically ill male   Eyes: PERRLA, sclerae anicteric, no conjunctival injection  HENT: NCAT, mucous membranes moist  Neck: Supple, trachea midline  Respiratory: Clear to auscultation bilaterally, nonlabored respirations   Cardiovascular: RRR, no murmurs, rubs, or gallops, palpable pedal pulses bilaterally  Gastrointestinal: Positive bowel sounds, soft, nontender, nondistended  Musculoskeletal: No bilateral ankle edema, no clubbing or cyanosis to extremities  Psychiatric: Appropriate affect, cooperative  Neurologic: Oriented x 3, generalized weakness  in all extremities, Cranial Nerves grossly intact to confrontation, speech clear  Skin: No rashes, pale     Results Reviewed:  I have personally reviewed current lab, radiology, and data and  agree.      Results from last 7 days  Lab Units 02/13/18  1425   WBC 10*3/mm3 9.97   HEMOGLOBIN g/dL 13.7   HEMATOCRIT % 39.4   PLATELETS 10*3/mm3 225       Results from last 7 days  Lab Units 02/13/18  1425   SODIUM mmol/L 122*   POTASSIUM mmol/L 3.5   CHLORIDE mmol/L 89*   CO2 mmol/L 22.0   BUN mg/dL <5*   CREATININE mg/dL 0.70   GLUCOSE mg/dL 105*   CALCIUM mg/dL 8.9   ALT (SGPT) U/L 109*   AST (SGOT) U/L 168*     Estimated Creatinine Clearance: 103.9 mL/min (by C-G formula based on Cr of 0.7).  Brief Urine Lab Results  (Last result in the past 365 days)      Color   Clarity   Blood   Leuk Est   Nitrite   Protein   CREAT   Urine HCG        08/02/17 1748             83.3           No results found for: BNP  No results found for: PHART  Imaging Results (last 24 hours)     Procedure Component Value Units Date/Time    XR Chest 1 View [[id number]] Collected:  02/13/18 1632     Updated:  02/13/18 1638    Narrative:       EXAMINATION: XR CHEST 1 VW- 02/13/2018     INDICATION: Weak/Dizzy/AMS triage protocol      COMPARISON: 08/01/2017     FINDINGS: There is a normal cardiac silhouette. There is no pulmonary  inflammatory process, mass or effusion.           Impression:       No active disease.     D:  02/13/2018  E:  02/13/2018     This report was finalized on 2/13/2018 4:36 PM by Dr. Freddy Fraser MD.                Assessment/Plan   Assessment / Plan     Hospital Problem List     Alcohol abuse    Elevated liver enzymes    Acute hyponatremia    Diarrhea    Weakness            Assessment & Plan:   Acute hyponatremia:  -Secondary to chronic alcoholism. Previously diagnosed with SIADH. In 2015 admitted to ICU for hyponatremia and nephrology assoc Followed and sodium normalized with samsca. Last admit not on samsca per family non compliance with medication   - serum albumin favorable at 3.8  -- rally pack 3 days then transition to oral vitamin replacement   - Check am labs     Alcohol abuse, chronic:  - will initiate alcohol  withdrawal protocol. (Monitor closely.  - CHECK b12, folate  - fall precautions with PT/OT  - consult to CASE MANAGEMENT   - social work consult for treatment programs  -- replace electrolytes as needed   -- seizure precautions      Elevated liver enzymes:  - history of acute alcoholic hepatitis.  - acute hepatitis panel in 2017 were neg   - avoid additional hepatotoxic agents  -- check PT/inr     Diarrhea  -- related to alcohol use and poor intake   -- will check stool for cdiff, leuks, o+p  -- per family has been having diarrhea since august     Moderate malnutrition:  - History of protein malnutrition.   - inpatient consult to Nutrition  -- weakness related to poor nutrition     Weakness/Falls  -- PT/OT consult  -- fall precautions.     Hypothyroidism  -- discharged in 8/2017 on synthroid  -- pt has not been taking synthroid at home   -- check tsh in am     Essential tremor  -- cont home med propranolol   -- he rarely takes primidone       DVT prophylaxis: nivia avery     CODE STATUS:  Prior    Admission Status:  I believe this patient meets INPATIENT status due to the need for care which can only be reasonably provided in an hospital setting such as aggressive/expedited ancillary services and/or consultation services, the necessity for IV medications, close physician monitoring and/or the possible need for procedures.  In such, I feel patient’s risk for adverse outcomes and need for care warrant INPATIENT evaluation and predict the patient’s care encounter to likely last beyond 2 midnights.      Electronically signed by IGNACIO Avila, 02/13/18, 5:24 PM.        Brief Attending Admission Attestation     I have seen and examined the patient, performing an independent face-to-face diagnostic evaluation with plan of care reviewed and developed with the advanced practice clinician (APC).      Brief Summary Statement/HPI:   Israel Bojorquez is a 62 y.o. male with history of ongoing alcohol abuse, tobacco abuse  and hyponatremia presents with progressive weakness, weight loss and a serum sodium of 122. Patient says he drinks 10-12 beers per day, and had not been getting out of bed much lately. Frequent falls when he is ambulatory. Loose stool noted for several months.      Attending Physical Exam:  Constitutional -no acute distress, non toxic, in bed, thin gentleman  HEENT-NCAT, mucous membranes moist  CV-RRR, S1 S2 normal, no m/r/g  Resp-CTAB, no wheezes, rhonchi or rales  Abd-soft, non-tender, non-distended, normo active bowel sounds  Ext-No lower extremity cyanosis, clubbing or edema bilaterally  Neuro-alert and oriented, speech clear, moves all extremities, no tremor noted   Psych-normal affect   Skin- No rash on exposed UE or LE bilaterally        Brief Assessment/Plan :    EtOH abuse  - thiamine, CIWA withdrawal protocol  - last drink this morning  - patient somewhat equivocal regarding whether or not he wishes rehab  - social work consult    Hyponatremia  - suspect beer potomania, although patient felt to have SIADH back in 2015  - check serum OSM, urine OSM, urine electrolytes, bnp  - follow serial BMPs (received liter of NS in ED)    Possible malnutrition  - dietitian consult    Elevated LFTs    Diarrhea  - stool studies    Hypothyroid  - check TSH    Generalized weakness  - PT/OT and fall precautions.            See above for further detailed assessment and plan developed with APC which I have reviewed and/or edited.      Electronically signed by Lance Dillard MD, 02/13/18, 10:00 PM.

## 2018-02-13 NOTE — PLAN OF CARE
Problem: Acute Alcohol Withdrawal Syndrome, Risk For/Actual (Adult)  Goal: Signs and Symptoms of Listed Potential Problems Will be Absent or Manageable (Acute Alcohol Withdrawal Syndrome, Risk For/Actual)  Outcome: Ongoing (interventions implemented as appropriate)      Problem: Fall Risk (Adult)  Goal: Identify Related Risk Factors and Signs and Symptoms  Outcome: Ongoing (interventions implemented as appropriate)    Goal: Absence of Falls  Outcome: Ongoing (interventions implemented as appropriate)      Problem: Patient Care Overview (Adult)  Goal: Plan of Care Review  Outcome: Ongoing (interventions implemented as appropriate)   02/13/18 7229   Coping/Psychosocial Response Interventions   Plan Of Care Reviewed With patient   Patient Care Overview   Progress no change   Outcome Evaluation   Outcome Summary/Follow up Plan pt arrived to floor with no comoplaints. Sinus tach on the monitor with all VSS. Will continue to monitor.

## 2018-02-14 LAB
ALBUMIN SERPL-MCNC: 3 G/DL (ref 3.2–4.8)
ALBUMIN/GLOB SERPL: 1.2 G/DL (ref 1.5–2.5)
ALP SERPL-CCNC: 216 U/L (ref 25–100)
ALT SERPL W P-5'-P-CCNC: 82 U/L (ref 7–40)
ANION GAP SERPL CALCULATED.3IONS-SCNC: 4 MMOL/L (ref 3–11)
ANION GAP SERPL CALCULATED.3IONS-SCNC: 6 MMOL/L (ref 3–11)
ANION GAP SERPL CALCULATED.3IONS-SCNC: 6 MMOL/L (ref 3–11)
AST SERPL-CCNC: 135 U/L (ref 0–33)
BASOPHILS # BLD AUTO: 0.03 10*3/MM3 (ref 0–0.2)
BASOPHILS NFR BLD AUTO: 0.3 % (ref 0–1)
BILIRUB SERPL-MCNC: 1.2 MG/DL (ref 0.3–1.2)
BUN BLD-MCNC: 5 MG/DL (ref 9–23)
BUN BLD-MCNC: 5 MG/DL (ref 9–23)
BUN BLD-MCNC: <5 MG/DL (ref 9–23)
BUN/CREAT SERPL: 10 (ref 7–25)
BUN/CREAT SERPL: 8.3 (ref 7–25)
BUN/CREAT SERPL: ABNORMAL (ref 7–25)
CALCIUM SPEC-SCNC: 7.9 MG/DL (ref 8.7–10.4)
CALCIUM SPEC-SCNC: 8.4 MG/DL (ref 8.7–10.4)
CALCIUM SPEC-SCNC: 8.5 MG/DL (ref 8.7–10.4)
CHLORIDE SERPL-SCNC: 100 MMOL/L (ref 99–109)
CHLORIDE SERPL-SCNC: 97 MMOL/L (ref 99–109)
CHLORIDE SERPL-SCNC: 99 MMOL/L (ref 99–109)
CO2 SERPL-SCNC: 23 MMOL/L (ref 20–31)
CO2 SERPL-SCNC: 25 MMOL/L (ref 20–31)
CO2 SERPL-SCNC: 27 MMOL/L (ref 20–31)
CREAT BLD-MCNC: 0.5 MG/DL (ref 0.6–1.3)
CREAT BLD-MCNC: 0.5 MG/DL (ref 0.6–1.3)
CREAT BLD-MCNC: 0.6 MG/DL (ref 0.6–1.3)
DEPRECATED RDW RBC AUTO: 50 FL (ref 37–54)
EOSINOPHIL # BLD AUTO: 0.22 10*3/MM3 (ref 0–0.3)
EOSINOPHIL NFR BLD AUTO: 2.5 % (ref 0–3)
ERYTHROCYTE [DISTWIDTH] IN BLOOD BY AUTOMATED COUNT: 13.5 % (ref 11.3–14.5)
FOLATE SERPL-MCNC: 7.63 NG/ML (ref 3.2–20)
GFR SERPL CREATININE-BSD FRML MDRD: 136 ML/MIN/1.73
GFR SERPL CREATININE-BSD FRML MDRD: >150 ML/MIN/1.73
GFR SERPL CREATININE-BSD FRML MDRD: >150 ML/MIN/1.73
GLOBULIN UR ELPH-MCNC: 2.6 GM/DL
GLUCOSE BLD-MCNC: 114 MG/DL (ref 70–100)
GLUCOSE BLD-MCNC: 81 MG/DL (ref 70–100)
GLUCOSE BLD-MCNC: 97 MG/DL (ref 70–100)
HCT VFR BLD AUTO: 34 % (ref 38.9–50.9)
HGB BLD-MCNC: 11.9 G/DL (ref 13.1–17.5)
IMM GRANULOCYTES # BLD: 0.03 10*3/MM3 (ref 0–0.03)
IMM GRANULOCYTES NFR BLD: 0.3 % (ref 0–0.6)
INR PPP: 0.95 (ref 0.91–1.09)
LYMPHOCYTES # BLD AUTO: 1.18 10*3/MM3 (ref 0.6–4.8)
LYMPHOCYTES NFR BLD AUTO: 13.3 % (ref 24–44)
MAGNESIUM SERPL-MCNC: 2.5 MG/DL (ref 1.3–2.7)
MCH RBC QN AUTO: 35.6 PG (ref 27–31)
MCHC RBC AUTO-ENTMCNC: 35 G/DL (ref 32–36)
MCV RBC AUTO: 101.8 FL (ref 80–99)
MONOCYTES # BLD AUTO: 1.09 10*3/MM3 (ref 0–1)
MONOCYTES NFR BLD AUTO: 12.3 % (ref 0–12)
NEUTROPHILS # BLD AUTO: 6.3 10*3/MM3 (ref 1.5–8.3)
NEUTROPHILS NFR BLD AUTO: 71.3 % (ref 41–71)
PLATELET # BLD AUTO: 193 10*3/MM3 (ref 150–450)
PMV BLD AUTO: 10.3 FL (ref 6–12)
POTASSIUM BLD-SCNC: 4.3 MMOL/L (ref 3.5–5.5)
POTASSIUM BLD-SCNC: 4.8 MMOL/L (ref 3.5–5.5)
POTASSIUM BLD-SCNC: 5.1 MMOL/L (ref 3.5–5.5)
PROT SERPL-MCNC: 5.6 G/DL (ref 5.7–8.2)
PROTHROMBIN TIME: 10 SECONDS (ref 9.6–11.5)
RBC # BLD AUTO: 3.34 10*6/MM3 (ref 4.2–5.76)
SODIUM BLD-SCNC: 128 MMOL/L (ref 132–146)
SODIUM BLD-SCNC: 129 MMOL/L (ref 132–146)
SODIUM BLD-SCNC: 130 MMOL/L (ref 132–146)
TSH SERPL DL<=0.05 MIU/L-ACNC: 6.88 MIU/ML (ref 0.35–5.35)
VIT B12 BLD-MCNC: 963 PG/ML (ref 211–911)
WBC NRBC COR # BLD: 8.85 10*3/MM3 (ref 3.5–10.8)

## 2018-02-14 PROCEDURE — 80053 COMPREHEN METABOLIC PANEL: CPT | Performed by: NURSE PRACTITIONER

## 2018-02-14 PROCEDURE — 84443 ASSAY THYROID STIM HORMONE: CPT | Performed by: NURSE PRACTITIONER

## 2018-02-14 PROCEDURE — 93010 ELECTROCARDIOGRAM REPORT: CPT | Performed by: INTERNAL MEDICINE

## 2018-02-14 PROCEDURE — 93005 ELECTROCARDIOGRAM TRACING: CPT | Performed by: INTERNAL MEDICINE

## 2018-02-14 PROCEDURE — 85025 COMPLETE CBC W/AUTO DIFF WBC: CPT | Performed by: NURSE PRACTITIONER

## 2018-02-14 PROCEDURE — 82746 ASSAY OF FOLIC ACID SERUM: CPT | Performed by: NURSE PRACTITIONER

## 2018-02-14 PROCEDURE — 92610 EVALUATE SWALLOWING FUNCTION: CPT

## 2018-02-14 PROCEDURE — 83735 ASSAY OF MAGNESIUM: CPT | Performed by: INTERNAL MEDICINE

## 2018-02-14 PROCEDURE — 97161 PT EVAL LOW COMPLEX 20 MIN: CPT

## 2018-02-14 PROCEDURE — 92523 SPEECH SOUND LANG COMPREHEN: CPT

## 2018-02-14 PROCEDURE — 82607 VITAMIN B-12: CPT | Performed by: NURSE PRACTITIONER

## 2018-02-14 PROCEDURE — 80048 BASIC METABOLIC PNL TOTAL CA: CPT | Performed by: INTERNAL MEDICINE

## 2018-02-14 PROCEDURE — 99233 SBSQ HOSP IP/OBS HIGH 50: CPT | Performed by: INTERNAL MEDICINE

## 2018-02-14 RX ORDER — PANTOPRAZOLE SODIUM 40 MG/1
40 TABLET, DELAYED RELEASE ORAL DAILY
COMMUNITY

## 2018-02-14 RX ORDER — THIAMINE MONONITRATE (VIT B1) 100 MG
100 TABLET ORAL DAILY
COMMUNITY
End: 2019-11-18

## 2018-02-14 RX ORDER — FOLIC ACID 1 MG/1
1 TABLET ORAL DAILY
COMMUNITY
End: 2019-11-18

## 2018-02-14 RX ORDER — LEVOTHYROXINE SODIUM 0.05 MG/1
50 TABLET ORAL DAILY
COMMUNITY
End: 2018-07-11 | Stop reason: HOSPADM

## 2018-02-14 RX ADMIN — MULTIPLE VITAMINS W/ MINERALS TAB 1 TABLET: TAB ORAL at 08:02

## 2018-02-14 RX ADMIN — Medication 100 MG: at 08:02

## 2018-02-14 RX ADMIN — PROPRANOLOL HYDROCHLORIDE 80 MG: 80 CAPSULE, EXTENDED RELEASE ORAL at 08:02

## 2018-02-14 RX ADMIN — LORAZEPAM 1 MG: 1 TABLET ORAL at 22:37

## 2018-02-14 RX ADMIN — NICOTINE 1 PATCH: 21 PATCH, EXTENDED RELEASE TRANSDERMAL at 08:02

## 2018-02-14 NOTE — PROGRESS NOTES
Malnutrition Severity Assessment    Patient Name:  Israel Bojorquez  YOB: 1955  MRN: [id number]  Admit Date:  2/13/2018    Patient meets criteria for : Severe malnutrition    Comments:  MD, PT MEETS CRITERIA FOR SEVERE, CHRONIC MALNUTRITION. PLEASE ATTEST & INCLUDE IN DX AS APPROPRIATE    Malnutrition Type: Chronic Illness Malnutrition     Malnutrition Type (last 8 hours)      Malnutrition Severity Assessment       02/14/18 1401    Malnutrition Severity Assessment    Malnutrition Type Chronic Illness Malnutrition      02/14/18 1401    Physical Signs of Malnutrition (Chronic)    Muscle Wasting --   MODERATE    Fat Loss --   MODERATE      02/14/18 1401    Weight Status (Chronic)    Weight Loss Severe (>10% / 6 mo)      02/14/18 1401    Energy Intake Status (Chronic)    Energy Intake Severe (< or equal to 50% / > or equal to 1 mo)      02/14/18 1401    Criteria Met (Must meet criteria for severity in at least 2 of these categories: M Wasting, Fat Loss, Fluid, Secondary Signs, Wt. Status, Intake)    Patient meets criteria for  Severe malnutrition          Electronically signed by:  Geri Joseph RD  02/14/18 2:19 PM

## 2018-02-14 NOTE — PLAN OF CARE
Problem: Acute Alcohol Withdrawal Syndrome, Risk For/Actual (Adult)  Intervention: Support/Optimize Psychosocial Response to Withdrawal Process   02/14/18 1748   Coping Strategies   Supportive Measures active listening utilized   Coping/Psychosocial Interventions   Environmental Support calm environment promoted   Pain/Comfort/Sleep Interventions   Sleep/Rest Enhancement relaxation techniques promoted;regular sleep/rest pattern promoted     Intervention: Minimize/Manage Withdrawal Symptoms   02/14/18 1600 02/14/18 1748   Safety Interventions   Safety/Security Measures --  bed alarm set   Environmental Safety Modification assistive device/personal items within reach --      Intervention: Monitor/Manage Fluid Electrolyte Balance   02/14/18 1748   Positioning   Positioning semi-Fowlers       Goal: Signs and Symptoms of Listed Potential Problems Will be Absent or Manageable (Acute Alcohol Withdrawal Syndrome, Risk For/Actual)  Outcome: Ongoing (interventions implemented as appropriate)   02/14/18 1748   Acute Alcohol Withdrawal Syndrome, Risk For/Actual   Problems Assessed (Alcohol Withdrawal Syndrome) all   Problems Present (Alcohol Withdrawal Syndrome) situational response       Problem: Fall Risk (Adult)  Intervention: Monitor/Assist with Self Care   02/14/18 1748   Activity   Activity Type activity adjusted per tolerance   Activity Level of Assistance assistance, 1 person     Intervention: Reduce Risk/Promote Restraint Free Environment   02/14/18 1600 02/14/18 1748   Safety Interventions   Safety/Security Measures --  bed alarm set   Environmental Safety Modification assistive device/personal items within reach --    Restraint Interventions   Safety Promotion/Fall Prevention safety round/check completed;fall prevention program maintained;nonskid shoes/slippers when out of bed;toileting scheduled --      Intervention: Review Medications/Identify Contributors to Fall Risk   02/14/18 1748   Safety Interventions    Medication Review/Management medications reviewed       Goal: Identify Related Risk Factors and Signs and Symptoms   02/13/18 1850 02/14/18 1748   Fall Risk   Fall Risk: Related Risk Factors fatigue/slow reaction;environment unfamiliar --    Fall Risk: Signs and Symptoms --  presence of risk factors     Goal: Absence of Falls  Outcome: Ongoing (interventions implemented as appropriate)   02/14/18 1748   Fall Risk (Adult)   Absence of Falls making progress toward outcome       Problem: Patient Care Overview (Adult)  Goal: Plan of Care Review   02/14/18 1748   Coping/Psychosocial Response Interventions   Plan Of Care Reviewed With patient   Patient Care Overview   Progress progress toward functional goals as expected     Goal: Adult Individualization and Mutuality  Outcome: Ongoing (interventions implemented as appropriate)    Goal: Discharge Needs Assessment  Outcome: Ongoing (interventions implemented as appropriate)   02/14/18 1748   Discharge Needs Assessment   Concerns To Be Addressed no discharge needs identified

## 2018-02-14 NOTE — PLAN OF CARE
Problem: Patient Care Overview (Adult)  Goal: Plan of Care Review  Outcome: Ongoing (interventions implemented as appropriate)   02/14/18 1433   Coping/Psychosocial Response Interventions   Plan Of Care Reviewed With patient   Patient Care Overview   Progress (initial evaluation)   Outcome Evaluation   Outcome Summary/Follow up Plan Clinical dysphagia evaluation completed this pm: Pt observed w/ lunch tray at bedside. No overt s/sxs aspiration observed w/ any trial (solid, thins, puree). No speech services warranted for dysphagia at this time. Cognitive-communication evaluation completed this pm: Pt presents w/ cognitive and speech/language skills that are WFL. Pt 100% intelligible and oriented x4. No speech services warranted at this time.

## 2018-02-14 NOTE — PLAN OF CARE
Problem: Patient Care Overview (Adult)  Goal: Plan of Care Review  Outcome: Ongoing (interventions implemented as appropriate)   02/14/18 0615   Coping/Psychosocial Response Interventions   Plan Of Care Reviewed With patient   Patient Care Overview   Progress no change   Outcome Evaluation   Outcome Summary/Follow up Plan pt has done well this shift. Ativan was given early in the shift. He has scored less than 4 each CIWA assessment after the ativan. Will continue to monitor.

## 2018-02-14 NOTE — PLAN OF CARE
Problem: Patient Care Overview (Adult)  Goal: Plan of Care Review  Outcome: Ongoing (interventions implemented as appropriate)   02/14/18 1016   Coping/Psychosocial Response Interventions   Plan Of Care Reviewed With patient   Outcome Evaluation   Outcome Summary/Follow up Plan PT mobility completed. Pt. presents with decreased dynamic standing balance and decreased functional strength with stable signs and symptoms warrenting need for skilled IPPT. Pt. completed bed mobility with Supervision, sit<>stand and ambulation 400' with rwx and CGA x2. Recommending home with HHPT at discharge.       Problem: Inpatient Physical Therapy  Goal: Transfer Training Goal 1 LTG- PT  Outcome: Ongoing (interventions implemented as appropriate)   02/14/18 1016   Transfer Training PT LTG   Transfer Training PT LTG, Date Established 02/14/18   Transfer Training PT LTG, Time to Achieve 2 wks   Transfer Training PT LTG, Activity Type bed to chair /chair to bed   Transfer Training PT LTG, [place] Level independent     Goal: Gait Training Goal LTG- PT  Outcome: Ongoing (interventions implemented as appropriate)   02/14/18 1016   Gait Training PT LTG   Gait Training Goal PT LTG, Date Established 02/14/18   Gait Training Goal PT LTG, Time to Achieve 2 wks   Gait Training Goal PT LTG, [place] Level independent   Gait Training Goal PT LTG, Distance to Achieve 400'

## 2018-02-14 NOTE — PROGRESS NOTES
"Adult Nutrition  Assessment/PES    Patient Name:  Israel Bojorquez  YOB: 1955  MRN: [id number]  Admit Date:  2/13/2018    Assessment Date:  2/14/2018    Comments:            Reason for Assessment       02/14/18 1402    Reason for Assessment    Reason For Assessment/Visit identified at risk by screening criteria    Identified At Risk By Screening Criteria MST SCORE 2+    Time Spent (min) 45    Gastrointestinal Diarrhea;Acosta's Esophagus;Hiatal hernia    Hematological Other (comment)   HX OF ANEMIA    Hepatic Other (comment)   ELEVATED LFTS    Metabolic/ICU Hyponatremia    Psychosocial Other (comment)   HX OF DEPRESSION & INSOMNIA    Pulmonary/Critical Care COPD    Substance Use ETOH;Tobacco    Other diagnosis WEAKNESS, S/P FALL, SIADH, HX OF ESSENTIAL TREMORS              Nutrition/Diet History       02/14/18 1407    Nutrition/Diet History    Functional Status/Food Prep Mobility --   PT REPORTS HIS APPETITE & INTAKE OF FOOD HAS BEEN < 50% FOR SEVERAL MONTHS. HE REPORTS HE'S BEEN DRINKING ALCOHOL IN PLACE OF REGULAR MEALS. HE DENIES FOOD DISLIKES OR INTOLERANCES. HE REPORTS HIS UBW IS AROUND 150# BUT DOESN'T KNOW WHEN HE LAST WEIGHED     Food Allergies/Intolerances --   THIS.    Factors Affecting Nutritional Intake Factors    Reported/Observed By Patient            Anthropometrics       02/14/18 1410    Anthropometrics    Height Method Stated    Height 185.4 cm (73\")    Weight Method Standing scale    RD Documented Current Weight  60.3 kg (133 lb)    Ideal Body Weight (IBW)    Ideal Body Weight (IBW), Male (kg) 84.86    Usual Body Weight (UBW)    Usual Body Weight 68.2 kg (150 lb 4.8 oz)   LAST WEIGHT FROM MD OFFICE    Weight Loss 7.847 kg (17 lb 4.8 oz)    % Weight Loss  12 %    Weight Loss Time Frame 6 MONTHS            Labs/Tests/Procedures/Meds       02/14/18 1411    Labs/Tests/Procedures/Meds    Labs/Tests Review Reviewed    Medication Review Reviewed, pertinent            Physical Findings       " 02/14/18 1411    Physical Findings/Assessment    Additional Documentation Physical Appearance (Group)    Physical Appearance    Gastrointestinal other (see comments)   WDL  PER NURSING DOCUMENTATION    Skin other (see comments)   WDL PER NURSING DOCUMENTATION              Nutrition Prescription Ordered       02/14/18 1416    Nutrition Prescription PO    Current PO Diet Regular            Evaluation of Received Nutrient/Fluid Intake       02/14/18 1416    PO Evaluation    Number of Days PO Intake Evaluated Insufficient Data   NO MEALS RECORDED YET              Malnutrition Severity Assessment       02/14/18 1401    Malnutrition Severity Assessment    Malnutrition Type Chronic Illness Malnutrition    Physical Signs of Malnutrition (Chronic)    Muscle Wasting --   MODERATE    Fat Loss --   MODERATE    Weight Status (Chronic)    Weight Loss Severe (>10% / 6 mo)    Energy Intake Status (Chronic)    Energy Intake Severe (< or equal to 50% / > or equal to 1 mo)    Criteria Met (Must meet criteria for severity in at least 2 of these categories: M Wasting, Fat Loss, Fluid, Secondary Signs, Wt. Status, Intake)    Patient meets criteria for  Severe malnutrition        Problem/Interventions:        Problem 1       02/14/18 1416    Nutrition Diagnoses Problem 1    Problem 1 Malnutrition   SEVERE, CHRONIC    Etiology (related to) Other (comment)   CLINICAL CONDITION    Signs/Symptoms (evidenced by) Report of Mnimal PO Intake;Unintended Weight Change   MODERATE SUBCUTANEOUS FAT LOSS & MODERATE MUSCLE WASTING    Unintended Weight Change Loss    Number of Pounds Lost 17.3#    Weight loss time period 6 MONTHS                    Intervention Goal       02/14/18 1417    Intervention Goal    General Nutrition support treatment    PO Establish PO            Nutrition Intervention       02/14/18 1417    Nutrition Intervention    RD/Tech Action Advise alternate selection;Advise available snack;Interview for preference;Encourage  intake;Follow Tx progress;Care plan reviewd              Education/Evaluation       02/14/18 9310    Monitor/Evaluation    Monitor Per protocol;I&O;PO intake;Pertinent labs;Weight;Skin status;Symptoms        Electronically signed by:  Geri Joseph RD  02/14/18 2:18 PM

## 2018-02-14 NOTE — PROGRESS NOTES
Discharge Planning Assessment  [facility]     Patient Name: Israel Bojorquez  MRN: [id number]  Today's Date: 2/14/2018    Admit Date: 2/13/2018          Discharge Needs Assessment       02/14/18 1306    Living Environment    Lives With alone    Living Arrangements house    Home Accessibility no concerns    Type of Financial/Environmental Concern none    Transportation Available car;family or friend will provide    Living Environment    Provides Primary Care For no one    Quality Of Family Relationships supportive    Able to Return to Prior Living Arrangements yes    Discharge Needs Assessment    Anticipated Changes Related to Illness none    Equipment Currently Used at Home none    Equipment Needed After Discharge walker, rolling            Discharge Plan       02/14/18 1306    Case Management/Social Work Plan    Plan Home    Patient/Family In Agreement With Plan yes    Additional Comments Spoke with pt at bedside. Pt is mostly independent in ADL's and IADL's however reports he could use some ambulation assistance at times. Pt reports he has tried to use a cane but it does not work too well for hime and he may be interested in a walker at discharge. PT did recommend home health PT and pt reports he is unsure if he will want home health at this time but will think about it. Pt reports he does drink several beers a day and is interested in resources. SW provided and explained resources available for pt both inpatient and outpatient. Pt explained that he is thinking about doing outpatient at the [facility]. SW explained the process and how pt will need to call to set up. Pt verbalized understanding. Pt had no further ss needs or quesitons at this time. Please contact SW if can be of further assist.     Final Note    Final Note CM is following        Discharge Placement     No information found        Expected Discharge Date and Time     Expected Discharge Date Expected Discharge Time    Feb 16, 2018                Demographic Summary       02/14/18 1305    Referral Information    Arrived From home or self-care    Reason For Consult substance use concerns;discharge planning    Primary Care Physician Information    Name Alireza Carvajal            Functional Status       02/14/18 1306    Functional Status Prior    Ambulation 1-->assistive equipment    Transferring 0-->independent    Toileting 0-->independent    Bathing 0-->independent    Dressing 0-->independent    Eating 0-->independent    Communication 0-->understands/communicates without difficulty    IADL    Medications independent    Meal Preparation independent    Housekeeping independent    Laundry independent    Shopping independent    Oral Care independent            Psychosocial     None            Abuse/Neglect     None            Legal     None            Substance Abuse     None            Patient Forms     None          BLANCA Em

## 2018-02-14 NOTE — THERAPY EVALUATION
Acute Care - Speech Language Pathology Initial Evaluation  [facility]   Clinical Swallow Evaluation  Cognitive-Communication Evaluation       Patient Name: Israel Bojorquez  : 1955  MRN: [id number]  Today's Date: 2018  Onset of Illness/Injury or Date of Surgery Date: 18            Admit Date: 2018     Visit Dx:    ICD-10-CM ICD-9-CM   1. Hyponatremia E87.1 276.1   2. Diarrhea, unspecified type R19.7 787.91   3. Weakness R53.1 780.79   4. Alcoholism F10.20 303.90   5. Impaired functional mobility, balance, gait, and endurance Z74.09 V49.89     Patient Active Problem List   Diagnosis   • Tobacco abuse   • Hypertension   • Alcohol abuse   • Elevated liver enzymes   • COPD (chronic obstructive pulmonary disease)   • Hyperglycemia   • Moderate malnutrition   • Acute hyponatremia   • Elevated TSH, T4 borderline low   • Anemia   • Diarrhea   • Weakness   • Fall   • Hyponatremia     Past Medical History:   Diagnosis Date   • Alcohol abuse    • Acosta's esophagus    • COPD (chronic obstructive pulmonary disease)    • Depression    • Disease of thyroid gland    • Essential tremor    • Essential tremor    • Hiatal hernia    • History of SIADH    • Hyperlipidemia    • Hypertension    • Insomnia    • Occasional tremors    • Tendinitis      History reviewed. No pertinent surgical history.       SLP EVALUATION (last 72 hours)      SLP Evaluation       18 1230                Rehab Evaluation    Document Type evaluation  -VW        Subjective Information no complaints;agree to therapy  -VW        General Information    Patient Profile Review yes  -VW        Onset of Illness/Injury 18  -VW        Subjective Patient Observations alert, cooperative w/ lunch tray  -VW        Pertinent History Of Current Problem Pt adm for ETOH withdrawal. PMH includes: HTN, COPD, hiatal hernia, and Acosta's Esophagus. Per chart pt w/ short term memory deficits (only w/ alcohol).  -VW        Current Diet Limitations  regular solid;thin liquids  -VW        Precautions/Limitations, Vision WFL with corrective lenses;other (see comments)   for purposes of eval  -VW        Precautions/Limitations, Hearing WFL;other (see comments)   for purposes of eval  -VW        Prior Level of Function- Communication functional in all spheres  -VW        Prior Level of Function- Swallowing no diet consistency restrictions  -VW        Plans/Goals Discussed With patient;agreed upon  -VW        Barriers to Rehab none identified  -VW        Living Environment    Lives With alone  -VW        Clinical Impression    SLP Diagnosis Pt presents w/ functional cognitive-linguistic, speech/language, and motor speech skills. No speech services warranted at this time.  -VW        Patient's Goals For Discharge return home  -VW        Criteria for Skilled Therapeutic Interventions Met no problems identified which require skilled intervention  -VW        Anticipated Discharge Disposition inpatient rehabilitation facility  -VW        Pain Assessment    Pain Assessment No/denies pain  -VW        Cognitive Assessment/Intervention    Current Cognitive/Communication Assessment functional  -VW        Orientation Status oriented x 4  -VW        Follows Commands/Answers Questions 100% of the time  -VW        Short/Long Term Memory intact short term memory;other (see comments)   STM noted in chart, pt reports only happens w/ alcohol  -VW        Cognitive Assessment Intervention    Behavior/Mood Observations behavior appropriate to situation, WNL/WFL  -VW        Attention WNL/WFL  -VW        Pragmatics WNL/WFL  -VW        Problem Solving WNL/WFL;other (see comments)   able to answer simple ADL problems  -VW        Executive Function Skills WNL/WFL  -VW        Reasoning WNL/WFL;other (see comments)   no difficulty w/ concrete reasoning tasks  -VW        Auditory Comprehen Assess/Intervention    Auditory Comprehension WNL/WFL  -VW        Auditory Comprehen Assess/Intervention     Able to Identify Objects WNL/WFL  -VW        Answers Yes/No Questions WNL/WFL;successful, complex questions;successful, concrete questions;successful, personal questions  -VW        Able to Follow Commands WNL/WFL  -VW        Verbal Expression Assess/Intervention    Automatic Speech WNL/WFL;successful, spontaneous greeting  -VW        Speech Fluency fluent speech  -VW        Conversational Speech WNL/WFL;other (see comments)   100% intelligible  -VW        Motor Speech Assess/Intervention    Motor Speech-Apraxia Observations no concerns  -VW        Motor Speech-Dysarthria Observations no concerns  -VW        Motor Speech-Dysarthria Observations Comment Pt w/ 100% intelligible speech   -VW          User Key  (r) = Recorded By, (t) = Taken By, (c) = Cosigned By    Initials Name Effective Dates    VW Candelaria Dorsey MA, CFY-SLP 07/13/17 -            EDUCATION  The patient has been educated in the following areas:   Cognitive Impairment Communication Impairment.    SLP Recommendation and Plan  SLP Diagnosis: Pt presents w/ functional cognitive-linguistic, speech/language, and motor speech skills. No speech services warranted at this time.        Criteria for Skilled Therapeutic Interventions Met: no problems identified which require skilled intervention  Anticipated Discharge Disposition: inpatient rehabilitation facility (per patient)                Plan of Care Review  Plan Of Care Reviewed With: patient  Progress:  (initial evaluation)  Outcome Summary/Follow up Plan: Clinical dysphagia evaluation completed this pm: Pt observed w/ lunch tray at bedside. No overt s/sxs aspiration observed w/ any trial (solid, thins, puree). No speech services warranted for dysphagia at this time.  Cognitive-communication evaluation completed this pm: Pt presents w/ cognitive and speech/language skills that are WFL. Pt 100% intelligible and oriented x4. No speech services warranted at this time.            Time Calculation:          Time Calculation- SLP       18 1435          Time Calculation- SLP    SLP Start Time 1230  -VW      SLP Received On 18  -        User Key  (r) = Recorded By, (t) = Taken By, (c) = Cosigned By    Initials Name Provider Type     Candelaria Dorsey MA, CFY-SLP Speech and Language Pathologist          Therapy Charges for Today     Code Description Service Date Service Provider Modifiers Qty    36709029840 HC ST EVAL ORAL PHARYNG SWALLOW 2 2018 Candelaria Dorsey MA, CFY-SLP GN 1    25284423697 HC ST EVAL SPEECH AND PROD W LANG  2 2018 Candelaria Dorsey MA, CFY-SLP GN 1                     Candelaria Dorsey MA, CFY-SLP  2018 and Acute Care - Speech Language Pathology   Swallow Initial Evaluation [facility]     Patient Name: Israel Bojorquez  : 1955  MRN: [id number]  Today's Date: 2018  Onset of Illness/Injury or Date of Surgery Date: 18            Admit Date: 2018    Visit Dx:     ICD-10-CM ICD-9-CM   1. Hyponatremia E87.1 276.1   2. Diarrhea, unspecified type R19.7 787.91   3. Weakness R53.1 780.79   4. Alcoholism F10.20 303.90   5. Impaired functional mobility, balance, gait, and endurance Z74.09 V49.89     Patient Active Problem List   Diagnosis   • Tobacco abuse   • Hypertension   • Alcohol abuse   • Elevated liver enzymes   • COPD (chronic obstructive pulmonary disease)   • Hyperglycemia   • Moderate malnutrition   • Acute hyponatremia   • Elevated TSH, T4 borderline low   • Anemia   • Diarrhea   • Weakness   • Fall   • Hyponatremia     Past Medical History:   Diagnosis Date   • Alcohol abuse    • Acosta's esophagus    • COPD (chronic obstructive pulmonary disease)    • Depression    • Disease of thyroid gland    • Essential tremor    • Essential tremor    • Hiatal hernia    • History of SIADH    • Hyperlipidemia    • Hypertension    • Insomnia    • Occasional tremors    • Tendinitis      History reviewed. No pertinent surgical history.       SWALLOW  EVALUATION (last 72 hours)      Swallow Evaluation       02/14/18 1300                Rehab Evaluation    Document Type evaluation  -VW        Subjective Information no complaints;agree to therapy  -VW        General Information    Subjective Patient Observations alert, eating lunch tray  -VW        Current Diet Limitations regular solid;thin liquids  -VW        Precautions/Limitations, Vision WFL with corrective lenses  -VW        Precautions/Limitations, Hearing WFL;other (see comments)   for purpose of eval  -VW        Prior Level of Function- Communication functional in all spheres  -VW        Prior Level of Function- Swallowing no diet consistency restrictions  -VW        Plans/Goals Discussed With patient;agreed upon  -VW        Barriers to Rehab none identified  -VW        Clinical Impression    Patient's Goals For Discharge return home  -VW        SLP Swallowing Diagnosis other (see comments)   intact oral phase, no overt s/sxs pharyngeal dysphagia  -VW        Criteria for Skilled Therapeutic Interventions Met no problems identified which require skilled intervention  -VW        SLP Diet Recommendation regular textures;thin liquids  -VW        SLP Rec. for Method of Medication Administration meds whole with thin liquid  -VW        Anticipated Discharge Disposition inpatient rehabilitation facility   per patient  -VW        Pain Assessment    Pain Assessment No/denies pain  -VW        Oral Motor Structure and Function    Oral Motor Anatomy and Physiology patient demonstrates anatomy and physiology that is WNL  -VW        Dentition Assessment present and adequate  -VW        Secretion Management WNL/WFL  -VW        Mucosal Quality moist, healthy  -VW        Oral Musculature General Assessment WFL (within functional limits)  -VW        General Feeding/Swallowing Observations    Current Feeding Method oral feeding methods  -        Respiratory Support Currently in Use nasal cannula in use  -VW         Observations of Self Feeding Skills appropriate self feeding skills observed  -        Observations of Posture During Feeding upright at 90 degrees  -        Clinical Swallow Exam    Mode of Presentation self fed;spoon;straw  -VW        Oral Phase Results intact oral phase without signs of dysfunction  -VW        Pharyngeal Phase Results no signs/symptoms of pharyngeal impairment  -        Summary of Clinical Exam Pt observed w/ lunch tray at bedside. No overt s/sxs aspiration observed w/ any trial (solid, thins, puree). No speech services warranted at this time.   -VW        Swallow Recommendations    Eating Assistance no assistance needed with self eating  -VW        Oral Care oral care with toothbrush and dentifrice BID and PRN  -        Modified Eating Strategies upright positioning 90 degrees  -        Other Recommendations meds whole;thin  -VW        Recommended Diet regular textures;thin liquids  -          User Key  (r) = Recorded By, (t) = Taken By, (c) = Cosigned By    Initials Name Effective Dates     Candelaria Dorsey MA, CFY-SLP 07/13/17 -         EDUCATION  The patient has been educated in the following areas:   Dysphagia (Swallowing Impairment) Oral Care/Hydration.    SLP Recommendation and Plan  SLP Swallowing Diagnosis: other (see comments) (intact oral phase, no overt s/sxs pharyngeal dysphagia)  SLP Diet Recommendation: regular textures, thin liquids     SLP Rec. for Method of Medication Administration: meds whole with thin liquid        Criteria for Skilled Therapeutic Interventions Met: no problems identified which require skilled intervention  Anticipated Discharge Disposition: inpatient rehabilitation facility (per patient)                   Plan of Care Review  Plan Of Care Reviewed With: patient  Progress:  (initial evaluation)  Outcome Summary/Follow up Plan: Clinical dysphagia evaluation completed this pm: Pt observed w/ lunch tray at bedside. No overt s/sxs aspiration  observed w/ any trial (solid, thins, puree). No speech services warranted for dysphagia at this time.  Cognitive-communication evaluation completed this pm: Pt presents w/ cognitive and speech/language skills that are WFL. Pt 100% intelligible and oriented x4. No speech services warranted at this time.             Time Calculation:         Time Calculation- SLP       02/14/18 1435          Time Calculation- SLP    SLP Start Time 1230  -VW      SLP Received On 02/14/18  -        User Key  (r) = Recorded By, (t) = Taken By, (c) = Cosigned By    Initials Name Provider Type     Candelaria Dorsey MA, CFY-SLP Speech and Language Pathologist          Therapy Charges for Today     Code Description Service Date Service Provider Modifiers Qty    66138728778 HC ST EVAL ORAL PHARYNG SWALLOW 2 2/14/2018 Candelaria Dorsey MA, CFY-SLP GN 1    72384328977 HC ST EVAL SPEECH AND PROD W LANG  2 2/14/2018 Candelaria Dorsey MA, CFY-SLP GN 1               Candelaria Dorsey MA, CFY-SLP  2/14/2018

## 2018-02-14 NOTE — PAYOR COMM NOTE
"Carmine Bojorquez (62 y.o. Male)   Ref # [id number]  Rand Portillo RN, BSN  Phone # [phone]  Fax # [phone]    Date of Birth Social Security Number Address Home Phone MRN    1955  [id number]  [place] [address] [phone] [id number]    Tenriism Marital Status          None        Admission Date Admission Type Admitting Provider Attending Provider Department, Room/Bed    18 Emergency Sameer Medina MD Dossett, Lee M, MD [facility] 6A, N608/1    Discharge Date Discharge Disposition Discharge Destination                      Attending Provider: Sameer Medina MD     Allergies:  No Known Allergies    Isolation:  None   Infection:  None   Code Status:  Conditional    Ht:  185.4 cm (73\")   Wt:  60.3 kg (133 lb)    Admission Cmt:  None   Principal Problem:  None                Active Insurance as of 2018     Primary Coverage     Payor Plan Insurance Group Employer/Plan Group    [employer] BLUE [employer] [facility] [employer] [facility] [facility] [id number]     Payor Plan Address Payor Plan Phone Number Effective From Effective To    [address] [phone] 2018     [place] [address]       Subscriber Name Subscriber Birth Date Member ID       CARMINE BOJORQUEZ 1955 [id number]                 Emergency Contacts      (Rel.) Home Phone Work Phone Mobile Phone    Stefan Bojorquez (Son) [phone] -- --    Kaity Mahajan (Daughter) [phone] -- --               History & Physical      Lance Dillard MD at 2018  5:24 PM              [facility]  HISTORY AND PHYSICAL    Patient Name: Carmine Bojorquez  : 1955  MRN: [id number]  Primary Care Physician: No Known Provider    Subjective   Subjective     Chief Complaint:  Weakness, diarrhea, general decline     HPI:  Carmine Bojorquez is a 62 y.o. male with PMH Of alcohol abuse, tobacco abuse, COPD, essential tremor, SIADH, HTN, HLD and wilson's " esophagus. Patient was brought to  [facility] ED by his family for increasing weakness, diarrhea and general decline.  Patient not a very good  Historian. Per daughter and patient he has had diarrhea for months. He has poor intake but over last month it has worsened and complains of nausea. He has had increased weakness with frequent falls at home. He has some short term memory loss but not acute confusion noted. Short term memory loss has been present since last year.  He was discharged from [facility] in august  For similar issues and refused rehab and did not go to any AA meeting or outpatient rehab. Has not had any routine follow up since he was discharged. Was started on synthroid last admit and has not been taking medicine at home.  Patient states he drinks 10-12 beers a day and had two this am before coming to ED.     In ED: mag 1.8, , CXR neg, ethanol 73, , . Patient will be admitted to hospital medicine for further treatment.     Review of Systems   Constitutional: Positive for activity change and appetite change. Negative for fever.   Respiratory: Negative for shortness of breath and wheezing.    Cardiovascular: Negative for chest pain and palpitations.   Gastrointestinal: Positive for diarrhea and nausea. Negative for abdominal pain and vomiting.   Genitourinary: Negative for dysuria.   Neurological: Positive for weakness.   Psychiatric/Behavioral: Positive for confusion.        Per family short term memory issues not a new problem        Otherwise 10-system ROS reviewed and is negative except as mentioned in the HPI.    Personal History     Past Medical History:   Diagnosis Date   • Alcohol abuse    • Acosta's esophagus    • COPD (chronic obstructive pulmonary disease)    • Depression    • Essential tremor    • Essential tremor    • Hiatal hernia    • History of SIADH    • Hyperlipidemia    • Hypertension    • Insomnia    • Occasional tremors    • Tendinitis        History reviewed. No pertinent  surgical history.    Family History: family history includes Alcohol abuse in his father; Cancer in his mother.     Social History:  reports that he has been smoking.  He has a 60.00 pack-year smoking history. He has never used smokeless tobacco. He reports that he drinks about 30.0 oz of alcohol per week  He reports that he uses illicit drugs, including Amphetamines, Barbiturates, LSD, and Marijuana.  Social History     Social History Narrative   • No narrative on file       Medications:    (Not in a hospital admission)    No Known Allergies    Objective   Objective     Vital Signs:   Temp:  [97.5 °F (36.4 °C)] 97.5 °F (36.4 °C)  Heart Rate:  [] 98  Resp:  [18] 18  BP: (123-140)/() 130/102        Physical Exam   Constitutional: No acute distress, awake, alert, frail chronically ill male   Eyes: PERRLA, sclerae anicteric, no conjunctival injection  HENT: NCAT, mucous membranes moist  Neck: Supple, trachea midline  Respiratory: Clear to auscultation bilaterally, nonlabored respirations   Cardiovascular: RRR, no murmurs, rubs, or gallops, palpable pedal pulses bilaterally  Gastrointestinal: Positive bowel sounds, soft, nontender, nondistended  Musculoskeletal: No bilateral ankle edema, no clubbing or cyanosis to extremities  Psychiatric: Appropriate affect, cooperative  Neurologic: Oriented x 3, generalized weakness  in all extremities, Cranial Nerves grossly intact to confrontation, speech clear  Skin: No rashes, pale     Results Reviewed:  I have personally reviewed current lab, radiology, and data and agree.      Results from last 7 days  Lab Units 02/13/18  1425   WBC 10*3/mm3 9.97   HEMOGLOBIN g/dL 13.7   HEMATOCRIT % 39.4   PLATELETS 10*3/mm3 225       Results from last 7 days  Lab Units 02/13/18  1425   SODIUM mmol/L 122*   POTASSIUM mmol/L 3.5   CHLORIDE mmol/L 89*   CO2 mmol/L 22.0   BUN mg/dL <5*   CREATININE mg/dL 0.70   GLUCOSE mg/dL 105*   CALCIUM mg/dL 8.9   ALT (SGPT) U/L 109*   AST (SGOT)  U/L 168*     Estimated Creatinine Clearance: 103.9 mL/min (by C-G formula based on Cr of 0.7).  Brief Urine Lab Results  (Last result in the past 365 days)      Color   Clarity   Blood   Leuk Est   Nitrite   Protein   CREAT   Urine HCG        08/02/17 1748             83.3           No results found for: BNP  No results found for: PHART  Imaging Results (last 24 hours)     Procedure Component Value Units Date/Time    XR Chest 1 View [[id number]] Collected:  02/13/18 1632     Updated:  02/13/18 1638    Narrative:       EXAMINATION: XR CHEST 1 VW- 02/13/2018     INDICATION: Weak/Dizzy/AMS triage protocol      COMPARISON: 08/01/2017     FINDINGS: There is a normal cardiac silhouette. There is no pulmonary  inflammatory process, mass or effusion.           Impression:       No active disease.     D:  02/13/2018  E:  02/13/2018     This report was finalized on 2/13/2018 4:36 PM by Dr. Freddy Fraser MD.                Assessment/Plan   Assessment / Plan     Hospital Problem List     Alcohol abuse    Elevated liver enzymes    Acute hyponatremia    Diarrhea    Weakness            Assessment & Plan:   Acute hyponatremia:  -Secondary to chronic alcoholism. Previously diagnosed with SIADH. In 2015 admitted to ICU for hyponatremia and nephrology assoc Followed and sodium normalized with samsca. Last admit not on samsca per family non compliance with medication   - serum albumin favorable at 3.8  -- rally pack 3 days then transition to oral vitamin replacement   - Check am labs     Alcohol abuse, chronic:  - will initiate alcohol withdrawal protocol. (Monitor closely.  - CHECK b12, folate  - fall precautions with PT/OT  - consult to CASE MANAGEMENT   - social work consult for treatment programs  -- replace electrolytes as needed   -- seizure precautions      Elevated liver enzymes:  - history of acute alcoholic hepatitis.  - acute hepatitis panel in 2017 were neg   - avoid additional hepatotoxic agents  -- check PT/inr      Diarrhea  -- related to alcohol use and poor intake   -- will check stool for cdiff, leuks, o+p  -- per family has been having diarrhea since august     Moderate malnutrition:  - History of protein malnutrition.   - inpatient consult to Nutrition  -- weakness related to poor nutrition     Weakness/Falls  -- PT/OT consult  -- fall precautions.     Hypothyroidism  -- discharged in 8/2017 on synthroid  -- pt has not been taking synthroid at home   -- check tsh in am     Essential tremor  -- cont home med propranolol   -- he rarely takes primidone       DVT prophylaxis: nivia avery     CODE STATUS:  Prior    Admission Status:  I believe this patient meets INPATIENT status due to the need for care which can only be reasonably provided in an hospital setting such as aggressive/expedited ancillary services and/or consultation services, the necessity for IV medications, close physician monitoring and/or the possible need for procedures.  In such, I feel patient’s risk for adverse outcomes and need for care warrant INPATIENT evaluation and predict the patient’s care encounter to likely last beyond 2 midnights.      Electronically signed by IGNACIO Avila, 02/13/18, 5:24 PM.        Brief Attending Admission Attestation     I have seen and examined the patient, performing an independent face-to-face diagnostic evaluation with plan of care reviewed and developed with the advanced practice clinician (APC).      Brief Summary Statement/HPI:   Israel Bojorquez is a 62 y.o. male with history of ongoing alcohol abuse, tobacco abuse and hyponatremia presents with progressive weakness, weight loss and a serum sodium of 122. Patient says he drinks 10-12 beers per day, and had not been getting out of bed much lately. Frequent falls when he is ambulatory. Loose stool noted for several months.      Attending Physical Exam:  Constitutional -no acute distress, non toxic, in bed, thin gentleman  HEENT-NCAT, mucous membranes  moist  CV-RRR, S1 S2 normal, no m/r/g  Resp-CTAB, no wheezes, rhonchi or rales  Abd-soft, non-tender, non-distended, normo active bowel sounds  Ext-No lower extremity cyanosis, clubbing or edema bilaterally  Neuro-alert and oriented, speech clear, moves all extremities, no tremor noted   Psych-normal affect   Skin- No rash on exposed UE or LE bilaterally        Brief Assessment/Plan :    EtOH abuse  - thiamine, CIWA withdrawal protocol  - last drink this morning  - patient somewhat equivocal regarding whether or not he wishes rehab  - social work consult    Hyponatremia  - suspect beer potomania, although patient felt to have SIADH back in 2015  - check serum OSM, urine OSM, urine electrolytes, bnp  - follow serial BMPs (received liter of NS in ED)    Possible malnutrition  - dietitian consult    Elevated LFTs    Diarrhea  - stool studies    Hypothyroid  - check TSH    Generalized weakness  - PT/OT and fall precautions.            See above for further detailed assessment and plan developed with APC which I have reviewed and/or edited.      Electronically signed by Lance Dillard MD, 02/13/18, 10:00 PM.          Electronically signed by Lance Dillard MD at 2/13/2018 10:08 PM           Emergency Department Notes      IGNACIO Roy at 2/13/2018  4:16 PM     Attestation signed by Prabhjot Solorzano MD at 2/13/2018 11:34 PM              For this patient encounter, I reviewed the NP or PA documentation, treatment plan, and medical decision making. Prabhjot Solorzano MD 2/13/2018 11:33 PM                               Subjective   HPI Comments: HX OF ETOH ABUSE    ADMITTED TO CB BEFORE FOR SODIUM  . THIS IS SIMILAR.    LAST DRINK THIS AM.    ALSO BEEN HAVING DIARRHEA.     VERY WEAK. ESSENTIALLY STAYS IN BED. UNABLE TO GET AROUND THE HOUSE WITH FREQUENT DIARRHEA.     SITTING ON A CHUX DURING THE EXAM.        Patient is a 62 y.o. male presenting with weakness.   Weakness - Generalized   Severity:   Moderate  Onset quality:  Gradual  Timing:  Constant  Progression:  Worsening  Chronicity:  New  Context: alcohol use    Relieved by:  Nothing  Worsened by:  Standing  Ineffective treatments:  None tried  Associated symptoms: difficulty walking and dizziness    Associated symptoms: no abdominal pain and no fever        Review of Systems   Constitutional: Negative for fever.   Gastrointestinal: Negative for abdominal pain.   Neurological: Positive for dizziness.   All other systems reviewed and are negative.      Past Medical History:   Diagnosis Date   • Alcohol abuse    • Acosta's esophagus    • COPD (chronic obstructive pulmonary disease)    • Depression    • Disease of thyroid gland    • Essential tremor    • Essential tremor    • Hiatal hernia    • History of SIADH    • Hyperlipidemia    • Hypertension    • Insomnia    • Occasional tremors    • Tendinitis        No Known Allergies    History reviewed. No pertinent surgical history.    Family History   Problem Relation Age of Onset   • Cancer Mother    • Alcohol abuse Father        Social History     Social History   • Marital status:      Spouse name: N/A   • Number of children: N/A   • Years of education: N/A     Social History Main Topics   • Smoking status: Current Every Day Smoker     Packs/day: 1.50     Years: 40.00   • Smokeless tobacco: Never Used   • Alcohol use 30.0 oz/week     50 Cans of beer per week      Comment: 10-12 BEERS   • Drug use: Yes     Special: Amphetamines, Barbiturates, LSD, Marijuana      Comment: remote h/o drug use    • Sexual activity: Defer     Other Topics Concern   • None     Social History Narrative    LIVES AT HOME ALONE            Objective   Physical Exam   Constitutional: He is oriented to person, place, and time. He appears well-developed and well-nourished.   HENT:   Head: Normocephalic and atraumatic.   Right Ear: External ear normal.   Left Ear: External ear normal.   Nose: Nose normal.   Mouth/Throat: Oropharynx  is clear and moist.   Eyes: Conjunctivae and EOM are normal. Pupils are equal, round, and reactive to light.   Neck: Normal range of motion. Neck supple.   Cardiovascular: Normal rate, regular rhythm, normal heart sounds and intact distal pulses.    Pulmonary/Chest: Effort normal and breath sounds normal.   Abdominal: Soft. Bowel sounds are normal.   Musculoskeletal: Normal range of motion.   Neurological: He is alert and oriented to person, place, and time.   Skin: Skin is warm and dry.   Psychiatric: He has a normal mood and affect. His behavior is normal. Judgment normal.       Procedures        ED Course  ED Course   Comment By Time   I SPOKE WITH DR. CAMARGO AND HE WILL ADMIT. William Carbone, IGNACIO 02/13 1702                  [facility]    Final diagnoses:   Hyponatremia   Diarrhea, unspecified type   Weakness   Alcoholism            IGNACIO Roy  02/13/18 2234       Electronically signed by Prabhjot Solorzano MD at 2/13/2018 11:34 PM          Study Result   EXAMINATION: XR CHEST 1 VW- 02/13/2018      INDICATION: Weak/Dizzy/AMS triage protocol       COMPARISON: 08/01/2017      FINDINGS: There is a normal cardiac silhouette. There is no pulmonary  inflammatory process, mass or effusion.          IMPRESSION:  No active disease.      D:  02/13/2018  E:  02/13/2018

## 2018-02-14 NOTE — THERAPY EVALUATION
Acute Care - Physical Therapy Initial Evaluation  [facility]     Patient Name: Israel Bojorquez  : 1955  MRN: [id number]  Today's Date: 2018   Onset of Illness/Injury or Date of Surgery Date: 18  Date of Referral to PT: 18  Referring Physician: IGNACIO Welsh      Admit Date: 2018     Visit Dx:    ICD-10-CM ICD-9-CM   1. Hyponatremia E87.1 276.1   2. Diarrhea, unspecified type R19.7 787.91   3. Weakness R53.1 780.79   4. Alcoholism F10.20 303.90   5. Impaired functional mobility, balance, gait, and endurance Z74.09 V49.89     Patient Active Problem List   Diagnosis   • Tobacco abuse   • Hypertension   • Alcohol abuse   • Elevated liver enzymes   • COPD (chronic obstructive pulmonary disease)   • Hyperglycemia   • Moderate malnutrition   • Acute hyponatremia   • Elevated TSH, T4 borderline low   • Anemia   • Diarrhea   • Weakness   • Fall   • Hyponatremia     Past Medical History:   Diagnosis Date   • Alcohol abuse    • Acosta's esophagus    • COPD (chronic obstructive pulmonary disease)    • Depression    • Disease of thyroid gland    • Essential tremor    • Essential tremor    • Hiatal hernia    • History of SIADH    • Hyperlipidemia    • Hypertension    • Insomnia    • Occasional tremors    • Tendinitis      History reviewed. No pertinent surgical history.       PT ASSESSMENT (last 72 hours)      PT Evaluation       18 0847 18 1833    Rehab Evaluation    Document Type evaluation  -MJ     Subjective Information agree to therapy;no complaints  -MJ     Patient Effort, Rehab Treatment good  -MJ     Symptoms Noted During/After Treatment none  -MJ     General Information    Patient Profile Review yes  -MJ     Onset of Illness/Injury or Date of Surgery Date 18  -MJ     Referring Physician IGNACIO Welsh  -MJ     General Observations pt. sitting up in bed eating breakfast upon Pt arrival.  IV intact, on RA, and tele intact.  -MJ     Pertinent History Of Current Problem pt.  presented to ED with weakness and general decline.  Recent falls at home.  PMH: ETOH abuse, COPD, CAD, HTN, HLD, Acosta Esophagus  -MJ     Precautions/Limitations fall precautions  -MJ     Prior Level of Function independent:;all household mobility;community mobility;transfer;gait;ADL's;shopping;driving;home management;bathing;dressing  -MJ     Equipment Currently Used at Home none  -MJ bath bench  -CB    Plans/Goals Discussed With patient;agreed upon  -MJ     Risks Reviewed patient:;LOB;dizziness;change in vital signs;lines disloged  -MJ     Benefits Reviewed patient:;improve function;increase independence;increase strength;increase balance;increase knowledge  -MJ     Living Environment    Lives With alone  -MJ alone  -CB    Living Arrangements apartment  -MJ apartment  -CB    Home Accessibility tub/shower is not walk in  -MJ no concerns  -CB    Stair Railings at Home  none  -CB    Type of Financial/Environmental Concern  none  -CB    Transportation Available  family or friend will provide  -CB    Clinical Impression    Date of Referral to PT 02/13/18  -MJ     PT Diagnosis impaired functional mobility  -MJ     Criteria for Skilled Therapeutic Interventions Met yes;treatment indicated  -MJ     Rehab Potential good, to achieve stated therapy goals  -MJ     Vital Signs    Pre Systolic BP Rehab 105  -MJ     Pre Treatment Diastolic BP 64  -MJ     Pretreatment Heart Rate (beats/min) 88  -MJ     Pre Patient Position Supine  -MJ     Intra Patient Position Standing  -MJ     Post Patient Position Sitting  -MJ     Pain Assessment    Pain Assessment 0-10  -MJ     Pain Score 2  -MJ     Pain Location Back  -MJ     Pain Intervention(s) Repositioned;Ambulation/increased activity  -MJ     Response to Interventions tolerated  -MJ     Cognitive Assessment/Intervention    Current Cognitive/Communication Assessment functional  -MJ     Orientation Status oriented x 4  -MJ     Follows Commands/Answers Questions able to follow  single-step instructions;100% of the time  -     Personal Safety mild impairment;decreased awareness, need for assist;decreased awareness, need for safety;decreased insight to deficits  -     Personal Safety Interventions fall prevention program maintained;gait belt;muscle strengthening facilitated;nonskid shoes/slippers when out of bed  -     ROM (Range of Motion)    General ROM no range of motion deficits identified  -     General ROM Detail BUE/BLE WFL  -     MMT (Manual Muscle Testing)    General MMT Assessment lower extremity strength deficits identified  -     General MMT Assessment Detail BLE strength 4+/5 grossly; defer BUE to OT evaluation  -     Bed Mobility, Assessment/Treatment    Bed Mobility, Assistive Device bed rails;head of bed elevated  -     Bed Mob, Supine to Sit, [place] supervision required  -     Bed Mob, Sit to Supine, [place] not tested  -     Transfer Assessment/Treatment    Transfers, Sit-Stand [place] contact guard assist;2 person assist required  -     Transfers, Stand-Sit [place] contact guard assist;2 person assist required  -     Transfers, Sit-Stand-Sit, Assist Device rolling walker  -     Transfer, Safety Issues step length decreased  -     Gait Assessment/Treatment    Gait, [place] Level contact guard assist;2 person assist required  -     Gait, Assistive Device rolling walker  -     Gait, Distance (Feet) 400  -     Gait, Gait Pattern Analysis swing-through gait  -     Gait, Gait Deviations bilateral:;step length decreased  -     Gait, Comment demonstrates steady cadance and rwx management throughout ambulation   -     Sensory Assessment/Intervention    Light Touch LUE;RUE;LLE;RLE  -MJ     LUE Light Touch WNL  -MJ     RUE Light Touch WNL  -MJ     LLE Light Touch WNL  -     RLE Light Touch WNL  -     Positioning and Restraints    Pre-Treatment Position in bed  -     Post Treatment Position chair  -     In Chair  notified nsg;reclined;call light within reach;encouraged to call for assist;exit alarm on;waffle cushion;legs elevated  -       User Key  (r) = Recorded By, (t) = Taken By, (c) = Cosigned By    Initials Name Provider Type    CB Kayy Canseco, RN Registered Nurse     Piedad Coronado, PT Physical Therapist          Physical Therapy Education     Title: PT OT SLP Therapies (Done)     Topic: Physical Therapy (Done)     Point: Mobility training (Done)    Learning Progress Summary    Learner Readiness Method Response Comment Documented by Status   Patient Acceptance E,D [facility] 02/14/18 1016 Done               Point: Home exercise program (Done)    Learning Progress Summary    Learner Readiness Method Response Comment Documented by Status   Patient Acceptance E,D [facility] 02/14/18 1016 Done               Point: Body mechanics (Done)    Learning Progress Summary    Learner Readiness Method Response Comment Documented by Status   Patient Acceptance E,D [facility] 02/14/18 1016 Done               Point: Precautions (Done)    Learning Progress Summary    Learner Readiness Method Response Comment Documented by Status   Patient Acceptance E,D [facility] 02/14/18 1016 Done                      User Key     Initials Effective Dates Name Provider Type Discipline     10/30/17 -  Piedad Coronado, PT Physical Therapist PT                PT Recommendation and Plan  PT Frequency: daily  Plan of Care Review  Plan Of Care Reviewed With: patient  Outcome Summary/Follow up Plan: PT mobility completed.  Pt. presents with decreased dynamic standing balance and decreased functional strength  with stable signs and symptoms warrenting need for skilled IPPT.  Pt. completed bed mobility with Supervision, sit<>stand and ambulation 400' with rwx and CGA x2.  Recommending home with HHPT at discharge.          IP PT Goals       02/14/18 1016          Transfer Training PT LTG    Transfer Training PT LTG, Date Established 02/14/18  -      Transfer  Training PT LTG, Time to Achieve 2 wks  -MJ      Transfer Training PT LTG, Activity Type bed to chair /chair to bed  -MJ      Transfer Training PT LTG, [place] Level independent  -MJ      Gait Training PT LTG    Gait Training Goal PT LTG, Date Established 02/14/18  -MJ      Gait Training Goal PT LTG, Time to Achieve 2 wks  -MJ      Gait Training Goal PT LTG, [place] Level independent  -MJ      Gait Training Goal PT LTG, Distance to Achieve 400'  -MJ        User Key  (r) = Recorded By, (t) = Taken By, (c) = Cosigned By    Initials Name Provider Type    LAZARO Coronado PT Physical Therapist                Outcome Measures       02/14/18 0847          How much help from another person do you currently need...    Turning from your back to your side while in flat bed without using bedrails? 4  -MJ      Moving from lying on back to sitting on the side of a flat bed without bedrails? 4  -MJ      Moving to and from a bed to a chair (including a wheelchair)? 3  -MJ      Standing up from a chair using your arms (e.g., wheelchair, bedside chair)? 3  -MJ      Climbing 3-5 steps with a railing? 3  -MJ      To walk in hospital room? 3  -MJ      AM-PAC 6 Clicks Score 20  -MJ      Functional Assessment    Outcome Measure Options AM-PAC 6 Clicks Basic Mobility (PT)  -MJ        User Key  (r) = Recorded By, (t) = Taken By, (c) = Cosigned By    Initials Name Provider Type    LAZARO Coronado PT Physical Therapist           Time Calculation:         PT Charges       02/14/18 1020          Time Calculation    Start Time 0847  -MJ      PT Received On 02/14/18  -MJ      PT Goal Re-Cert Due Date 02/24/18  -MJ        User Key  (r) = Recorded By, (t) = Taken By, (c) = Cosigned By    Initials Name Provider Type    LAZARO Coronado PT Physical Therapist          Therapy Charges for Today     Code Description Service Date Service Provider Modifiers Qty    02882087896 HC PT EVAL LOW COMPLEXITY 4 2/14/2018 Piedad Coronado PT  GP 1    [id number]  PT THER SUPP EA 15 MIN 2/14/2018 Piedad Coronado, PT GP 2          PT G-Codes  Outcome Measure Options: AM-PAC 6 Clicks Basic Mobility (PT)      Piedad Coronado, PT  2/14/2018

## 2018-02-15 LAB
ANION GAP SERPL CALCULATED.3IONS-SCNC: 0 MMOL/L (ref 3–11)
BACTERIA SPEC AEROBE CULT: NORMAL
BILIRUB UR QL STRIP: NEGATIVE
BUN BLD-MCNC: 7 MG/DL (ref 9–23)
BUN/CREAT SERPL: 14 (ref 7–25)
CALCIUM SPEC-SCNC: 8.1 MG/DL (ref 8.7–10.4)
CHLORIDE SERPL-SCNC: 103 MMOL/L (ref 99–109)
CLARITY UR: CLEAR
CO2 SERPL-SCNC: 28 MMOL/L (ref 20–31)
COLOR UR: ABNORMAL
CREAT BLD-MCNC: 0.5 MG/DL (ref 0.6–1.3)
GFR SERPL CREATININE-BSD FRML MDRD: >150 ML/MIN/1.73
GLUCOSE BLD-MCNC: 80 MG/DL (ref 70–100)
GLUCOSE UR STRIP-MCNC: NEGATIVE MG/DL
HGB UR QL STRIP.AUTO: NEGATIVE
KETONES UR QL STRIP: NEGATIVE
LEUKOCYTE ESTERASE UR QL STRIP.AUTO: NEGATIVE
NITRITE UR QL STRIP: NEGATIVE
O+P SPEC MICRO: NORMAL
O+P STL TRI STN: NORMAL
PH UR STRIP.AUTO: 5.5 [PH] (ref 5–8)
POTASSIUM BLD-SCNC: 4.4 MMOL/L (ref 3.5–5.5)
PROT UR QL STRIP: NEGATIVE
SODIUM BLD-SCNC: 131 MMOL/L (ref 132–146)
SP GR UR STRIP: 1.02 (ref 1–1.03)
UROBILINOGEN UR QL STRIP: ABNORMAL

## 2018-02-15 PROCEDURE — 97166 OT EVAL MOD COMPLEX 45 MIN: CPT

## 2018-02-15 PROCEDURE — 80048 BASIC METABOLIC PNL TOTAL CA: CPT | Performed by: INTERNAL MEDICINE

## 2018-02-15 PROCEDURE — 97110 THERAPEUTIC EXERCISES: CPT

## 2018-02-15 PROCEDURE — 81003 URINALYSIS AUTO W/O SCOPE: CPT | Performed by: NURSE PRACTITIONER

## 2018-02-15 PROCEDURE — 99232 SBSQ HOSP IP/OBS MODERATE 35: CPT | Performed by: INTERNAL MEDICINE

## 2018-02-15 RX ORDER — LEVOTHYROXINE SODIUM 0.05 MG/1
50 TABLET ORAL
Status: DISCONTINUED | OUTPATIENT
Start: 2018-02-16 | End: 2018-02-16 | Stop reason: HOSPADM

## 2018-02-15 RX ADMIN — NICOTINE 1 PATCH: 21 PATCH, EXTENDED RELEASE TRANSDERMAL at 09:30

## 2018-02-15 RX ADMIN — MULTIPLE VITAMINS W/ MINERALS TAB 1 TABLET: TAB ORAL at 09:30

## 2018-02-15 RX ADMIN — PROPRANOLOL HYDROCHLORIDE 80 MG: 80 CAPSULE, EXTENDED RELEASE ORAL at 09:30

## 2018-02-15 RX ADMIN — Medication 100 MG: at 09:30

## 2018-02-15 NOTE — THERAPY EVALUATION
Acute Care - Occupational Therapy Initial Evaluation  [facility]     Patient Name: Israel Bojorquez  : 1955  MRN: [id number]  Today's Date: 2/15/2018  Onset of Illness/Injury or Date of Surgery Date: 18  Date of Referral to OT: 18  Referring Physician: Carol    Admit Date: 2018       ICD-10-CM ICD-9-CM   1. Hyponatremia E87.1 276.1   2. Diarrhea, unspecified type R19.7 787.91   3. Weakness R53.1 780.79   4. Alcoholism F10.20 303.90   5. Impaired functional mobility, balance, gait, and endurance Z74.09 V49.89   6. Impaired mobility and ADLs Z74.09 799.89     Patient Active Problem List   Diagnosis   • Tobacco abuse   • Hypertension   • Alcohol abuse   • Elevated liver enzymes   • COPD (chronic obstructive pulmonary disease)   • Hyperglycemia   • Moderate malnutrition   • Acute hyponatremia   • Elevated TSH, T4 borderline low   • Anemia   • Diarrhea   • Weakness   • Fall     Past Medical History:   Diagnosis Date   • Alcohol abuse    • Acosta's esophagus    • COPD (chronic obstructive pulmonary disease)    • Depression    • Disease of thyroid gland    • Essential tremor    • Essential tremor    • Hiatal hernia    • History of SIADH    • Hyperlipidemia    • Hypertension    • Insomnia    • Occasional tremors    • Tendinitis      History reviewed. No pertinent surgical history.       OT ASSESSMENT FLOWSHEET (last 72 hours)      OT Evaluation       02/15/18 0850 18 1444 18 1306 18 1300 18 1230    Rehab Evaluation    Document Type evaluation  -TB   evaluation  -VW evaluation  -VW    Subjective Information agree to therapy;complains of;weakness  -TB   no complaints;agree to therapy  -VW no complaints;agree to therapy  -VW    Evaluation Not Performed  patient/family decline, not feeling well   Pt declined, requesting to sleep this PM. Will check back tomorrow.   -CL       Patient Effort, Rehab Treatment good  -TB        Symptoms Noted During/After Treatment fatigue  -TB         Symptoms Noted Comment Pt requesting rehab at d/c  -TB        General Information    Patient Profile Review yes  -TB        Onset of Illness/Injury or Date of Surgery Date 02/13/18  -TB        Referring Physician [place]  -TB        General Observations Pt rec'vd supine in bed, room air, IV access, tele; no family present  -TB        Pertinent History Of Current Problem Pt to ED with c/o generalized weakness and difficulty with ADLs; falls  -TB        Precautions/Limitations fall precautions;seizure precautions  -TB        Prior Level of Function independent:;all household mobility;transfer;bed mobility;ADL's;home management   with effort  -TB        Equipment Currently Used at Home grab bar;shower chair;raised toilet  -TB  none  -BG      Plans/Goals Discussed With patient;agreed upon  -TB        Risks Reviewed patient:;LOB;increased discomfort;lines disloged  -TB        Benefits Reviewed patient:;improve function;increase independence;increase strength;increase knowledge  -TB        Barriers to Rehab ineffective coping  -TB        Living Environment    Lives With alone  -TB  alone  -BG  alone  -VW    Living Arrangements apartment  -TB  house  -BG      Home Accessibility tub/shower is not walk in;grab bars present (bathtub);grab bars present (toilet)  -TB  no concerns  -BG      Type of Financial/Environmental Concern   none  -BG      Transportation Available   car;family or friend will provide  -BG      Living Environment Comment Pt lives alone in a ground level apt  -TB        Clinical Impression    Date of Referral to OT 02/13/18  -TB        OT Diagnosis Impaired mobility and ADL  -TB        Patient/Family Goals Statement Pt requesting rehab at d/c  -TB        Criteria for Skilled Therapeutic Interventions Met yes;treatment indicated  -TB        Rehab Potential fair, will monitor progress closely  -TB        Therapy Frequency daily  -TB        Anticipated Equipment Needs At Discharge tub bench  -TB         Anticipated Discharge Disposition inpatient rehabilitation facility  -TB        Functional Level Prior    Ambulation   1-->assistive equipment  -BG      Transferring   0-->independent  -BG      Toileting   0-->independent  -BG      Bathing   0-->independent  -BG      Dressing   0-->independent  -BG      Eating   0-->independent  -BG      Communication   0-->understands/communicates without difficulty  -BG      Prior Functional Level Comment Independent with effort and falls  -TB        Vital Signs    Pre Systolic BP Rehab --   RN cleared OT; pt hypotensive without dizziness; See RN doc  -TB        O2 Delivery Post Treatment room air  -TB        Pre Patient Position Supine  -TB        Intra Patient Position Standing  -TB        Post Patient Position Supine  -TB        Pain Assessment    Pain Assessment No/denies pain  -TB   No/denies pain  -VW No/denies pain  -VW    Vision Assessment/Intervention    Visual Impairment WFL with corrective lenses  -TB        Cognitive Assessment/Intervention    Current Cognitive/Communication Assessment functional  -TB    functional  -VW    Orientation Status oriented x 4;required verbal cueing (specifiy in comments)   cues for month  -TB    oriented x 4  -VW    Follows Commands/Answers Questions able to follow single-step instructions;needs cueing;100% of the time  -TB    100% of the time  -VW    Personal Safety mild impairment  -TB        Personal Safety Interventions fall prevention program maintained;gait belt;nonskid shoes/slippers when out of bed   exit alarms  -TB        Short/Long Term Memory mild impairment, short term memory;intact long term memory  -TB    intact short term memory;other (see comments)   STM noted in chart, pt reports only happens w/ alcohol  -VW    Cognitive Assessment Intervention    Behavior/Mood Observations alert;cooperative  -TB    behavior appropriate to situation, WNL/WFL  -VW    Attention     WNL/WFL  -VW    Pragmatics     WNL/WFL  -VW    Problem  Solving     WNL/WFL;other (see comments)   able to answer simple ADL problems  -VW    Executive Function Skills     WNL/WFL  -VW    Reasoning     WNL/WFL;other (see comments)   no difficulty w/ concrete reasoning tasks  -VW    ROM (Range of Motion)    General ROM no range of motion deficits identified  -TB        General ROM Detail B UE WFL  -TB        MMT (Manual Muscle Testing)    General MMT Assessment upper extremity strength deficits identified  -TB        General MMT Assessment Detail B UE functionally 4/5  -TB        Bed Mobility, Assessment/Treatment    Bed Mobility, Assistive Device bed rails  -TB        Bed Mob, Supine to Sit, [place] supervision required  -TB        Bed Mob, Sit to Supine, [place] supervision required  -TB        Transfer Assessment/Treatment    Transfers, Sit-Stand [place] contact guard assist  -TB        Transfers, Stand-Sit [place] contact guard assist  -TB        Transfer, Safety Issues balance decreased during turns  -TB        Transfer, Impairments impaired balance;strength decreased  -TB        Transfer, Comment LOB x1 returning to bed  -TB        Functional Mobility    Functional Mobility- Ind. Level contact guard assist  -TB        Functional Mobility- Device --   gait belt and L UE support  -TB        Functional Mobility- Safety Issues balance decreased during turns  -TB        Upper Body Dressing Assessment/Training    UB Dressing Assess/Train, Clothing Type donning:;hospital gown  -TB        UB Dressing Assess/Train, Position sitting  -TB        UB Dressing Assess/Train, [place] minimum assist (75% patient effort)  -TB        UB Dressing Assess/Train, Comment assist for lines only  -TB        Lower Body Dressing Assessment/Training    LB Dressing Assess/Train, Clothing Type doffing:;donning:;slipper socks  -TB        LB Dressing Assess/Train, Position sitting  -TB        LB Dressing Assess/Train, [place] supervision required  -TB        Toileting  Assessment/Training    Toileting Assess/Train, Assistive Device urinal  -TB        Toileting Assess/Train, Indepen Level independent  -TB        Balance Skills Training    Sitting-Level of Assistance Close supervision  -TB        Sitting-Balance Support Feet supported  -TB        Sitting-Balance Activities Reaching for objects;Reaching across midline;Forward lean   ADL tasks  -TB        Standing-Level of Assistance Contact guard  -TB        Static Standing Balance Support Left upper extremity supported   gait belt  -TB        Standing-Balance Activities Weight Shift R-L  -TB        Therapy Exercises    Bilateral Upper Extremity AROM:;sitting;shoulder extension/flexion;shoulder abduction/adduction;shoulder horizontal abd/add;shoulder ER/IR;elbow flexion/extension;pronation/supination;hand pumps  -TB        Fine Motor Coordination Training    Opposition Right:;Left:;intact  -TB        Sensory Assessment/Intervention    Light Touch LUE;RUE  -TB        LUE Light Touch WNL  -TB        RUE Light Touch WNL  -TB        Positioning and Restraints    Pre-Treatment Position in bed  -TB        Post Treatment Position bed  -TB        In Bed supine;call light within reach;encouraged to call for assist;exit alarm on  -TB          02/14/18 0847 02/13/18 5107             Rehab Evaluation    Document Type evaluation  -MJ        Subjective Information agree to therapy;no complaints  -MJ        Patient Effort, Rehab Treatment good  -MJ        Symptoms Noted During/After Treatment none  -MJ        General Information    Patient Profile Review yes  -MJ        Onset of Illness/Injury or Date of Surgery Date 02/13/18  -MJ        Referring Physician IGNACIO Welsh  -LAZARO        General Observations pt. sitting up in bed eating breakfast upon Pt arrival.  IV intact, on RA, and tele intact.  -MJ        Pertinent History Of Current Problem pt. presented to ED with weakness and general decline.  Recent falls at home.  PMH: ETOH abuse, COPD, CAD,  HTN, HLD, Acosta Esophagus  -MJ        Precautions/Limitations fall precautions  -MJ        Prior Level of Function independent:;all household mobility;community mobility;transfer;gait;ADL's;shopping;driving;home management;bathing;dressing  -MJ        Equipment Currently Used at Home none  -MJ bath bench  -CB       Plans/Goals Discussed With patient;agreed upon  -MJ        Risks Reviewed patient:;LOB;dizziness;change in vital signs;lines disloged  -MJ        Benefits Reviewed patient:;improve function;increase independence;increase strength;increase balance;increase knowledge  -MJ        Living Environment    Lives With alone  -MJ alone  -CB       Living Arrangements apartment  -MJ apartment  -CB       Home Accessibility tub/shower is not walk in  -MJ no concerns  -CB       Stair Railings at Home  none  -CB       Type of Financial/Environmental Concern  none  -CB       Transportation Available  family or friend will provide  -CB       Functional Level Prior    Ambulation  0-->independent  -CB       Transferring  0-->independent  -CB       Toileting  0-->independent  -CB       Bathing  1-->assistive equipment  -CB       Dressing  0-->independent  -CB       Eating  0-->independent  -CB       Communication  0-->understands/communicates without difficulty  -CB       Swallowing  0-->swallows foods/liquids without difficulty  -CB       Prior Functional Level Comment  see above  -CB       Vital Signs    Pre Systolic BP Rehab 105  -MJ        Pre Treatment Diastolic BP 64  -MJ        Pretreatment Heart Rate (beats/min) 88  -MJ        Pre Patient Position Supine  -MJ        Intra Patient Position Standing  -MJ        Post Patient Position Sitting  -MJ        Pain Assessment    Pain Assessment 0-10  -MJ        Pain Score 2  -MJ        Pain Location Back  -MJ        Pain Intervention(s) Repositioned;Ambulation/increased activity  -MJ        Response to Interventions tolerated  -MJ        Cognitive Assessment/Intervention     Current Cognitive/Communication Assessment functional  -        Orientation Status oriented x 4  -MJ        Follows Commands/Answers Questions able to follow single-step instructions;100% of the time  -        Personal Safety mild impairment;decreased awareness, need for assist;decreased awareness, need for safety;decreased insight to deficits  -        Personal Safety Interventions fall prevention program maintained;gait belt;muscle strengthening facilitated;nonskid shoes/slippers when out of bed  -        ROM (Range of Motion)    General ROM no range of motion deficits identified  -        General ROM Detail BUE/BLE WFL  -        MMT (Manual Muscle Testing)    General MMT Assessment lower extremity strength deficits identified  -        General MMT Assessment Detail BLE strength 4+/5 grossly; defer BUE to OT evaluation  -MJ        Bed Mobility, Assessment/Treatment    Bed Mobility, Assistive Device bed rails;head of bed elevated  -        Bed Mob, Supine to Sit, [place] supervision required  -        Bed Mob, Sit to Supine, [place] not tested  -        Transfer Assessment/Treatment    Transfers, Sit-Stand [place] contact guard assist;2 person assist required  -MJ        Transfers, Stand-Sit [place] contact guard assist;2 person assist required  -MJ        Transfers, Sit-Stand-Sit, Assist Device rolling walker  -        Transfer, Safety Issues step length decreased  -        Sensory Assessment/Intervention    Light Touch LUE;RUE;LLE;RLE  -MJ        LUE Light Touch WNL  -MJ        RUE Light Touch WNL  -MJ        LLE Light Touch WNL  -MJ        RLE Light Touch WNL  -MJ        Positioning and Restraints    Pre-Treatment Position in bed  -MJ        Post Treatment Position chair  -        In Chair notified nsg;reclined;call light within reach;encouraged to call for assist;exit alarm on;waffle cushion;legs elevated  -          User Key  (r) = Recorded By, (t) = Taken By,  (c) = Cosigned By    Initials Name Effective Dates    TB Queenie Willa Yang, OT 06/22/15 -     BG Bell Potter, MSW 07/18/16 -     CB Kayy Canseco RN 06/16/16 -     CL Gini Estrella, OT 06/08/16 -     VW Candelaria Dorsey MA, CFY-SLP 07/13/17 -     MJ Piedad Coronado, PT 10/30/17 -            Occupational Therapy Education     Title: PT OT SLP Therapies (Active)     Topic: Occupational Therapy (Done)     Point: ADL training (Done)    Description: Instruct learner(s) on proper safety adaptation and remediation techniques during self care or transfers.   Instruct in proper use of assistive devices.    Learning Progress Summary    Learner Readiness Method Response Comment Documented by Status   Patient Acceptance E VU,NR Education initiated for benefits of OOB activity/therapy, role of OT and recommendation for TTB for home safety/fall prevention; recommendation for rehab at d/c TB 02/15/18 1013 Done                      User Key     Initials Effective Dates Name Provider Type Discipline     06/22/15 -  Queenie Yang, OT Occupational Therapist OT                  OT Recommendation and Plan  Anticipated Equipment Needs At Discharge: tub bench  Anticipated Discharge Disposition: inpatient rehabilitation facility  Therapy Frequency: daily  Plan of Care Review  Plan Of Care Reviewed With: patient  Outcome Summary/Follow up Plan: OT IE completed. Pt presents with generalized weakness and balance deficits limiting mobility and ADL participation. CGA for all mobility/transfers with LOB x1 this session. OT to follow. Recommend TTB for home safety. Recommend IRF at d/c for best outcome and to support safe return to home alone.          OT Goals       02/15/18 1015          Transfer Training OT LTG    Transfer Training OT LTG, Time to Achieve by discharge  -TB      Transfer Training OT LTG, Activity Type toilet;tub  -TB      Transfer Training OT LTG, [place] Level contact guard assist;verbal cues required   -TB      Transfer Training OT LTG, Additional Goal pt needs handout for TTB  -TB      Patient Education OT LTG    Patient Education OT LTG, Time to Achieve by discharge  -TB      Patient Education OT LTG, Education Type written program;HEP;home safety  -TB      Patient Education OT LTG, Education Understanding demonstrates adequately;verbalizes understanding  -TB      Toileting OT LTG    Toileting Goal OT LTG, Time to Achieve by discharge  -TB      Toileting Goal OT LTG, [place] Level independent  -TB      Toileting Goal OT LTG, Additional Goal pt able to manage clothing and hygiene  -TB        User Key  (r) = Recorded By, (t) = Taken By, (c) = Cosigned By    Initials Name Provider Type    TB Queenie Yang, OT Occupational Therapist                Outcome Measures       02/15/18 0850 02/14/18 0847       How much help from another person do you currently need...    Turning from your back to your side while in flat bed without using bedrails?  4  -MJ     Moving from lying on back to sitting on the side of a flat bed without bedrails?  4  -MJ     Moving to and from a bed to a chair (including a wheelchair)?  3  -MJ     Standing up from a chair using your arms (e.g., wheelchair, bedside chair)?  3  -MJ     Climbing 3-5 steps with a railing?  3  -MJ     To walk in hospital room?  3  -MJ     AM-PAC 6 Clicks Score  20  -MJ     How much help from another is currently needed...    Putting on and taking off regular lower body clothing? 3  -TB      Bathing (including washing, rinsing, and drying) 3  -TB      Toileting (which includes using toilet bed pan or urinal) 3  -TB      Putting on and taking off regular upper body clothing 4  -TB      Taking care of personal grooming (such as brushing teeth) 4  -TB      Eating meals 4  -TB      Score 21  -TB      Functional Assessment    Outcome Measure Options AM-PAC 6 Clicks Daily Activity (OT)  -TB AM-PAC 6 Clicks Basic Mobility (PT)  -MJ       User Key  (r) = Recorded  By, (t) = Taken By, (c) = Cosigned By    Initials Name Provider Type    TB Queenie Yang, OT Occupational Therapist    LAZARO Coronado PT Physical Therapist          Time Calculation:   OT Start Time: 0850    Therapy Charges for Today     Code Description Service Date Service Provider Modifiers Qty    07704250347 HC OT EVAL MOD COMPLEXITY 4 2/15/2018 Queenie Yang OT GO 1               Queenie Yang OT  2/15/2018

## 2018-02-15 NOTE — PLAN OF CARE
Problem: Patient Care Overview (Adult)  Goal: Plan of Care Review  Outcome: Ongoing (interventions implemented as appropriate)   02/15/18 1315   Coping/Psychosocial Response Interventions   Plan Of Care Reviewed With patient   Patient Care Overview   Progress improving   Outcome Evaluation   Outcome Summary/Follow up Plan pt. continues to demonstrate decreased BLE strength and dynamic standing balance warrenting need for continued IPPT. Pt. completed ambulation 550' with rwx and SBA with fatigue upon completion. Completed bed mobility modified independent.        Problem: Inpatient Physical Therapy  Goal: Transfer Training Goal 1 LTG- PT  Outcome: Ongoing (interventions implemented as appropriate)   02/14/18 1016 02/15/18 1315   Transfer Training PT LTG   Transfer Training PT LTG, Date Established 02/14/18 --    Transfer Training PT LTG, Time to Achieve 2 wks --    Transfer Training PT LTG, Activity Type bed to chair /chair to bed --    Transfer Training PT LTG, [place] Level independent --    Transfer Training PT LTG, Outcome --  goal ongoing     Goal: Gait Training Goal LTG- PT  Outcome: Ongoing (interventions implemented as appropriate)   02/14/18 1016 02/15/18 1315   Gait Training PT LTG   Gait Training Goal PT LTG, Date Established 02/14/18 --    Gait Training Goal PT LTG, Time to Achieve 2 wks --    Gait Training Goal PT LTG, [place] Level independent --    Gait Training Goal PT LTG, Distance to Achieve 400' --    Gait Training Goal PT LTG, Outcome --  goal ongoing

## 2018-02-15 NOTE — PLAN OF CARE
Problem: Patient Care Overview (Adult)  Goal: Plan of Care Review  Outcome: Ongoing (interventions implemented as appropriate)   02/15/18 1015   Coping/Psychosocial Response Interventions   Plan Of Care Reviewed With patient   Outcome Evaluation   Outcome Summary/Follow up Plan OT IE completed. Pt presents with generalized weakness and balance deficits limiting mobility and ADL participation. CGA for all mobility/transfers with LOB x1 this session. OT to follow. Recommend TTB for home safety. Recommend IRF at d/c for best outcome and to support safe return to home alone.       Problem: Inpatient Occupational Therapy  Goal: Transfer Training Goal 1 LTG- OT  Outcome: Ongoing (interventions implemented as appropriate)   02/15/18 1015   Transfer Training OT LTG   Transfer Training OT LTG, Time to Achieve by discharge   Transfer Training OT LTG, Activity Type toilet;tub   Transfer Training OT LTG, [place] Level contact guard assist;verbal cues required   Transfer Training OT LTG, Additional Goal pt needs handout for TTB     Goal: Patient Education Goal LTG- OT  Outcome: Ongoing (interventions implemented as appropriate)   02/15/18 1015   Patient Education OT LTG   Patient Education OT LTG, Time to Achieve by discharge   Patient Education OT LTG, Education Type written program;HEP;home safety   Patient Education OT LTG, Education Understanding demonstrates adequately;verbalizes understanding     Goal: Toileting Goal LTG- OT  Outcome: Ongoing (interventions implemented as appropriate)   02/15/18 1015   Toileting OT LTG   Toileting Goal OT LTG, Time to Achieve by discharge   Toileting Goal OT LTG, [place] Level independent   Toileting Goal OT LTG, Additional Goal pt able to manage clothing and hygiene

## 2018-02-15 NOTE — PROGRESS NOTES
Continued Stay Note  [facility]     Patient Name: Israel Bojorquez  MRN: [id number]  Today's Date: 2/15/2018    Admit Date: 2/13/2018          Discharge Plan       02/15/18 1303    Case Management/Social Work Plan    Additional Comments SW spoke with pt at bedside as SW was notified pt may be interested in inpatient alcohol rehab. Pt reports he has heard good things about [facility] and would probably like to try them for inpatient. Pt was not yet agreeable for SW to contact the [facility] yet however. Pt reports he would like to speak with his children first and will decide for sure what to do. SW will check back with pt about decision for alcohol rehab and assist.      02/15/18 1153    Case Management/Social Work Plan    Plan DME    Patient/Family In Agreement With Plan yes    Additional Comments Pt states he could benefit from a walker. Does not have a preference of [employer]. Called referral to Shalini at [facility] and she will bring to pt's room this afternoon. CM will cont to follow.               Discharge Codes     None        Expected Discharge Date and Time     Expected Discharge Date Expected Discharge Time    Feb 16, 2018             BLANCA Em

## 2018-02-15 NOTE — PROGRESS NOTES
[facility] Medicine Services  PROGRESS NOTE    Patient Name: Israel Bojorquez  : 1955  MRN: [id number]    Date of Admission: 2018  Length of Stay: 1  Primary Care Physician: No Known Provider    Subjective   Subjective     CC:  Hyponatremia, weakness    HPI:  Doing ok today.  Remains weak overall, but walked 400' with RW.  Not much withdrawal symptoms, only got 1mg ativan last night.  No other issues.    Review of Systems  Gen- No fevers, chills  CV- No chest pain, palpitations  Resp- No cough, dyspnea  GI- No N/V/D, abd pain  + weakness    Otherwise ROS is negative except as mentioned in the HPI.    Objective   Objective     Vital Signs:   Temp:  [97.9 °F (36.6 °C)-98.2 °F (36.8 °C)] 97.9 °F (36.6 °C)  Heart Rate:  [] 111  Resp:  [16-22] 16  BP: (102-119)/(64-81) 102/76        Physical Exam:  Constitutional: appears disheveled, No acute distress, awake, alert  HENT: NCAT, mucous membranes moist  Respiratory: Clear to auscultation bilaterally, respiratory effort normal   Cardiovascular: RRR, no murmurs, rubs, or gallops  Gastrointestinal: Positive bowel sounds, soft, nontender, nondistended  Musculoskeletal: No bilateral ankle edema  Psychiatric: Appropriate affect, cooperative  Neurologic: Oriented x 3, no focal deficits, no tremor  Skin: No rashes      Results Reviewed:  I have personally reviewed current lab, radiology, and data and agree.      Results from last 7 days  Lab Units 18  0558 18  1425   WBC 10*3/mm3 8.85  --  9.97   HEMOGLOBIN g/dL 11.9*  --  13.7   HEMATOCRIT % 34.0*  --  39.4   PLATELETS 10*3/mm3 193  --  225   INR   --  0.95  --        Results from last 7 days  Lab Units 18  1221 18  0558 18  23318  1425   SODIUM mmol/L 130* 129* 128* 122*   POTASSIUM mmol/L 4.3 5.1 4.8 3.5   CHLORIDE mmol/L 99 100 97* 89*   CO2 mmol/L 27.0 23.0 25.0 22.0   BUN mg/dL 5* 5* <5* <5*   CREATININE mg/dL 0.60 0.50* 0.50*  0.70   GLUCOSE mg/dL 81 97 114* 105*   CALCIUM mg/dL 8.4* 7.9* 8.5* 8.9   ALT (SGPT) U/L  --  82*  --  109*   AST (SGOT) U/L  --  135*  --  168*     Estimated Creatinine Clearance: 107.5 mL/min (by C-G formula based on Cr of 0.6).  BNP   Date Value Ref Range Status   02/13/2018 143.0 (H) 0.0 - 100.0 pg/mL Final     Comment:     Results may be falsely decreased if patient taking Biotin.     No results found for: PHART    Microbiology Results Abnormal     Procedure Component Value - Date/Time    Stool Culture - Stool, Per Rectum [[id number]] Collected:  02/13/18 2027    Lab Status:  Preliminary result Specimen:  Stool from Per Rectum Updated:  02/14/18 1111     Stool Culture Culture in progress    Narrative:         Not cultured for Yersinia.    Not cultured for Yersinia.    Clostridium Difficile Toxin - Stool, Per Rectum [[id number]] Collected:  02/13/18 2027    Lab Status:  Final result Specimen:  Stool from Per Rectum Updated:  02/13/18 2136    Narrative:       The following orders were created for panel order Clostridium Difficile Toxin - Stool, Per Rectum.  Procedure                               Abnormality         Status                     ---------                               -----------         ------                     Clostridium Difficile To...[[id number]]  Normal              Final result                 Please view results for these tests on the individual orders.    Clostridium Difficile Toxin, PCR - Stool, Per Rectum [[id number]]  (Normal) Collected:  02/13/18 2027    Lab Status:  Final result Specimen:  Stool from Per Rectum Updated:  02/13/18 2136     C. Difficile Toxins by PCR Not Detected    Narrative:         Performance characteristics of test not established for patients <2 years of age.  Negative for Toxigenic C. Difficile    Fecal Leukocytes - Stool, Per Rectum [[id number]]  (Normal) Collected:  02/13/18 2027    Lab Status:  Final result Specimen:  Stool from Per Rectum Updated:  02/13/18 2114      Fecal Leukocytes No WBC's Seen          Imaging Results (last 24 hours)     ** No results found for the last 24 hours. **             I have reviewed the medications.    Assessment/Plan   Assessment / Plan     Hospital Problem List     * (Principal)Acute hyponatremia    Alcohol abuse    Elevated liver enzymes    Moderate malnutrition    Diarrhea    Weakness    Fall             Brief Hospital Course to date:  Israel Bojorquez is a 62 y.o. male with EtOH abuse who presents with weakness and functional decline.  Found to have Na+ of 122.  Drinks at least 10-12 beers/day.  Admitted for further eval.    Assessment & Plan:  - Na+ is correcting on it's own.  Will d/c q6h checks and check in morning.  - likely related to beer potomania, though previously diagnosed with SIADH in 2015.  - LFTs related to EtOH, trending down.  - not much in way of withdrawals, but will continue with CIWA protocol  - continue PT.  They rec HH at discharge.  He lives by himself.  Seen by CM.  May go to the [facility] for EtOH treatment at discharge.    DVT Prophylaxis:  mechanical    CODE STATUS: Conditional Code    Disposition: I expect the patient to be discharged home 1-2 days.      Electronically signed by Sameer Medina MD, 02/14/18, 8:38 PM.

## 2018-02-15 NOTE — PROGRESS NOTES
Continued Stay Note  [facility]     Patient Name: Israel Bojorquez  MRN: [id number]  Today's Date: 2/15/2018    Admit Date: 2/13/2018          Discharge Plan       02/15/18 1153    Case Management/Social Work Plan    Plan DME    Patient/Family In Agreement With Plan yes    Additional Comments Pt states he could benefit from a walker. Does not have a preference of DME [employer]. Called referral to Shalini wilde [place] and she will bring to pt's room this afternoon. CM will cont to follow.               Discharge Codes     None        Expected Discharge Date and Time     Expected Discharge Date Expected Discharge Time    Feb 16, 2018             Darcie Gonzalez

## 2018-02-15 NOTE — PROGRESS NOTES
[facility] Medicine Services  PROGRESS NOTE    Patient Name: Israel Bojorquez  : 1955  MRN: [id number]    Date of Admission: 2018  Length of Stay: 2  Primary Care Physician: No Known Provider    Subjective   Subjective     CC:  Hyponatremia, weakness    HPI:  Says he is not doing well.  Main complaint continues to be weakness.  Got 1mg ativan last night.  Denies any withdrawal symptoms.  Ate all of breakfast.    Review of Systems  Gen- No fevers, chills  CV- No chest pain, palpitations  Resp- No cough, dyspnea  GI- No N/V/D, abd pain  + weakness    Otherwise ROS is negative except as mentioned in the HPI.    Objective   Objective     Vital Signs:   Temp:  [97.9 °F (36.6 °C)-98.2 °F (36.8 °C)] 98 °F (36.7 °C)  Heart Rate:  [] 87  Resp:  [16-20] 20  BP: ()/(62-81) 102/72        Physical Exam:  Constitutional: appears malnourished with some muscle wasting, No acute distress, awake, alert  HENT: NCAT, mucous membranes moist  Respiratory: Clear to auscultation bilaterally, respiratory effort normal   Cardiovascular: tachy around 100, no murmurs, rubs, or gallops  Gastrointestinal: Positive bowel sounds, soft, nontender, nondistended  Musculoskeletal: No bilateral ankle edema  Psychiatric: Appropriate affect, cooperative  Neurologic: Oriented x 3, no focal deficits, no tremor  Skin: No rashes      Results Reviewed:  I have personally reviewed current lab, radiology, and data and agree.      Results from last 7 days  Lab Units 18  0558 18  2331 18  1425   WBC 10*3/mm3 8.85  --  9.97   HEMOGLOBIN g/dL 11.9*  --  13.7   HEMATOCRIT % 34.0*  --  39.4   PLATELETS 10*3/mm3 193  --  225   INR   --  0.95  --        Results from last 7 days  Lab Units 02/15/18  0637 18  1221 18  0558  18  1425   SODIUM mmol/L 131* 130* 129*  < > 122*   POTASSIUM mmol/L 4.4 4.3 5.1  < > 3.5   CHLORIDE mmol/L 103 99 100  < > 89*   CO2 mmol/L 28.0 27.0 23.0  < > 22.0    BUN mg/dL 7* 5* 5*  < > <5*   CREATININE mg/dL 0.50* 0.60 0.50*  < > 0.70   GLUCOSE mg/dL 80 81 97  < > 105*   CALCIUM mg/dL 8.1* 8.4* 7.9*  < > 8.9   ALT (SGPT) U/L  --   --  82*  --  109*   AST (SGOT) U/L  --   --  135*  --  168*   < > = values in this interval not displayed.  Estimated Creatinine Clearance: 129 mL/min (by C-G formula based on Cr of 0.5).  BNP   Date Value Ref Range Status   02/13/2018 143.0 (H) 0.0 - 100.0 pg/mL Final     Comment:     Results may be falsely decreased if patient taking Biotin.     No results found for: PHART    Microbiology Results Abnormal     Procedure Component Value - Date/Time    Stool Culture - Stool, Per Rectum [[id number]] Collected:  02/13/18 2027    Lab Status:  Preliminary result Specimen:  Stool from Per Rectum Updated:  02/14/18 1111     Stool Culture Culture in progress    Narrative:         Not cultured for Yersinia.    Not cultured for Yersinia.    Clostridium Difficile Toxin - Stool, Per Rectum [[id number]] Collected:  02/13/18 2027    Lab Status:  Final result Specimen:  Stool from Per Rectum Updated:  02/13/18 2136    Narrative:       The following orders were created for panel order Clostridium Difficile Toxin - Stool, Per Rectum.  Procedure                               Abnormality         Status                     ---------                               -----------         ------                     Clostridium Difficile To...[[id number]]  Normal              Final result                 Please view results for these tests on the individual orders.    Clostridium Difficile Toxin, PCR - Stool, Per Rectum [[id number]]  (Normal) Collected:  02/13/18 2027    Lab Status:  Final result Specimen:  Stool from Per Rectum Updated:  02/13/18 2136     C. Difficile Toxins by PCR Not Detected    Narrative:         Performance characteristics of test not established for patients <2 years of age.  Negative for Toxigenic C. Difficile    Fecal Leukocytes - Stool, Per Rectum  [[id number]]  (Normal) Collected:  02/13/18 2027    Lab Status:  Final result Specimen:  Stool from Per Rectum Updated:  02/13/18 2114     Fecal Leukocytes No WBC's Seen          Imaging Results (last 24 hours)     ** No results found for the last 24 hours. **             I have reviewed the medications.    Assessment/Plan   Assessment / Plan     Hospital Problem List     * (Principal)Acute hyponatremia    Alcohol abuse    Elevated liver enzymes    Moderate malnutrition    Diarrhea    Weakness    Fall             Brief Hospital Course to date:  Israel Bojorquez is a 62 y.o. male with EtOH abuse who presents with weakness and functional decline.  Found to have Na+ of 122.  Drinks at least 10-12 beers/day.  Admitted for further eval.    Assessment & Plan:  - Na+ is correcting on it's own, likely related to beer potomania, though previously diagnosed with SIADH in 2015.  - LFTs related to EtOH, trended down.  - not much in way of withdrawals, but will continue with CIWA protocol  - continue PT.  They rec HH at discharge, however he would thinks he is too weak to go home by himself.  D/w CM - will make referrals for rehab.  - ok to d/c tele.    DVT Prophylaxis:  mechanical    CODE STATUS: Conditional Code    Disposition: I expect the patient to be discharged home 1-2 days.    Electronically signed by Sameer Medina MD, 02/15/18, 9:20 AM.

## 2018-02-15 NOTE — THERAPY TREATMENT NOTE
Acute Care - Physical Therapy Treatment Note  [facility]     Patient Name: Israel Bojorquez  : 1955  MRN: [id number]  Today's Date: 2/15/2018  Onset of Illness/Injury or Date of Surgery Date: 18  Date of Referral to PT: 18  Referring Physician: Carol    Admit Date: 2018    Visit Dx:    ICD-10-CM ICD-9-CM   1. Hyponatremia E87.1 276.1   2. Diarrhea, unspecified type R19.7 787.91   3. Weakness R53.1 780.79   4. Alcoholism F10.20 303.90   5. Impaired functional mobility, balance, gait, and endurance Z74.09 V49.89   6. Impaired mobility and ADLs Z74.09 799.89     Patient Active Problem List   Diagnosis   • Tobacco abuse   • Hypertension   • Alcohol abuse   • Elevated liver enzymes   • COPD (chronic obstructive pulmonary disease)   • Hyperglycemia   • Moderate malnutrition   • Acute hyponatremia   • Elevated TSH, T4 borderline low   • Anemia   • Diarrhea   • Weakness   • Fall               Adult Rehabilitation Note       02/15/18 0959          Rehab Assessment/Intervention    Discipline physical therapist  -      Document Type therapy note (daily note)  -MJ      Subjective Information agree to therapy;no complaints  -MJ      Patient Effort, Rehab Treatment good  -MJ      Symptoms Noted During/After Treatment fatigue  -MJ      Precautions/Limitations fall precautions  -MJ      Recorded by [MJ] Piedad Coronado, PT      Vital Signs    Pre Systolic BP Rehab --   VSS; RN provided consent for PTx  -MJ      O2 Delivery Intra Treatment room air  -MJ      O2 Delivery Post Treatment room air  -MJ      Pre Patient Position Supine  -MJ      Intra Patient Position Standing  -MJ      Post Patient Position Supine  -MJ      Recorded by [MJ] Piedad Coronado, PT      Pain Assessment    Pain Assessment No/denies pain  -MJ      Recorded by [MJ] Piedad Coronado, PT      Cognitive Assessment/Intervention    Current Cognitive/Communication Assessment functional  -MJ      Orientation Status oriented x 4  -MJ       Follows Commands/Answers Questions able to follow single-step instructions;100% of the time  -      Personal Safety mild impairment;decreased awareness, need for assist  -      Personal Safety Interventions fall prevention program maintained;gait belt;muscle strengthening facilitated;nonskid shoes/slippers when out of bed;supervised activity  -      Recorded by [MJ] Piedad Coronado, PT      Bed Mobility, Assessment/Treatment    Bed Mobility, Assistive Device bed rails  -      Bed Mob, Supine to Sit, [place] conditional independence  -MJ      Bed Mob, Sit to Supine, [place] conditional independence  -MJ      Bed Mobility, Comment good technique with bed mobility- no VCs required  -      Recorded by [MJ] Piedad Coronado, PT      Transfer Assessment/Treatment    Transfers, Sit-Stand [place] supervision required  -      Transfers, Stand-Sit [place] supervision required  -      Transfers, Sit-Stand-Sit, Assist Device rolling walker  -      Transfer, Impairments strength decreased;impaired balance  -      Transfer, Comment sit <>stand x5 from EOB with no LOB noted throughout.  Completed for strength and balance  -      Recorded by [MJ] Piedad Coronado, PT      Gait Assessment/Treatment    Gait, [place] Level stand by assist  -      Gait, Assistive Device rolling walker  -      Gait, Distance (Feet) 550  -      Gait, Gait Pattern Analysis swing-through gait  -      Gait, Comment demonstrated good rwx management throughout; reported fatigue upon completion  -      Recorded by [MJ] Piedad Coronado, PT      Motor Skills/Interventions    Additional Documentation Balance Skills Training (Group)  -      Recorded by [MJ] Piedad Coronado, PT      Balance Skills Training    Sitting-Level of Assistance Distant supervision  -      Sitting-Balance Support Feet supported  -      Sitting-Balance Activities --   seated BLE ther ex  -      Standing-Level of Assistance Close  supervision  -MJ      Static Standing Balance Support assistive device  -MJ      Standing-Balance Activities Weight Shift R-L;Weight Shift A-P  -MJ      Gait Balance-Level of Assistance Close supervision  -MJ      Gait Balance Support assistive device  -MJ      Gait Balance Activities scanning environment R/L  -MJ      Recorded by [MJ] Piedad Coronado PT      Therapy Exercises    Bilateral Lower Extremities sitting;AROM:;15 reps;ankle pumps/circles;hip flexion;LAQ  -MJ      Recorded by [MJ] Piedad Coronado PT      Positioning and Restraints    Pre-Treatment Position in bed  -MJ      Post Treatment Position bed  -MJ      In Bed supine;call light within reach;encouraged to call for assist;exit alarm on  -MJ      Recorded by [MJ] Piedad Coronado PT        User Key  (r) = Recorded By, (t) = Taken By, (c) = Cosigned By    Initials Name Effective Dates    LAZARO Coronado PT 10/30/17 -                 IP PT Goals       02/15/18 1315 02/14/18 1016       Transfer Training PT LTG    Transfer Training PT LTG, Date Established  02/14/18  -MJ     Transfer Training PT LTG, Time to Achieve  2 wks  -MJ     Transfer Training PT LTG, Activity Type  bed to chair /chair to bed  -MJ     Transfer Training PT LTG, [place] Level  independent  -MJ     Transfer Training PT LTG, Outcome goal ongoing  -MJ      Gait Training PT LTG    Gait Training Goal PT LTG, Date Established  02/14/18  -MJ     Gait Training Goal PT LTG, Time to Achieve  2 wks  -MJ     Gait Training Goal PT LTG, [place] Level  independent  -MJ     Gait Training Goal PT LTG, Distance to Achieve  400'  -MJ     Gait Training Goal PT LTG, Outcome goal ongoing  -MJ        User Key  (r) = Recorded By, (t) = Taken By, (c) = Cosigned By    Initials Name Provider Type    LAZARO Coronado PT Physical Therapist          Physical Therapy Education     Title: PT OT SLP Therapies (Active)     Topic: Physical Therapy (Done)     Point: Mobility training (Done)    Learning  Progress Summary    Learner Readiness Method Response Comment Documented by Status   Patient Acceptance E TERESO LILLY 02/15/18 1315 Done    Acceptance E,D VIJAYA WILLIS 02/14/18 1016 Done               Point: Home exercise program (Done)    Learning Progress Summary    Learner Readiness Method Response Comment Documented by Status   Patient Acceptance E TERESO LILLY 02/15/18 1315 Done    Acceptance E,D VU  MJ 02/14/18 1016 Done               Point: Body mechanics (Done)    Learning Progress Summary    Learner Readiness Method Response Comment Documented by Status   Patient Acceptance E TERESO LILLY 02/15/18 1315 Done    Acceptance E,D VU  MJ 02/14/18 1016 Done               Point: Precautions (Done)    Learning Progress Summary    Learner Readiness Method Response Comment Documented by Status   Patient Acceptance TERESO GIRALDO 02/15/18 1315 Done    Acceptance E,D VIJAYA WILLIS 02/14/18 1016 Done                      User Key     Initials Effective Dates Name Provider Type Discipline     10/30/17 -  Piedad Coronado, PT Physical Therapist PT                    PT Recommendation and Plan  PT Frequency: daily  Plan of Care Review  Plan Of Care Reviewed With: patient  Progress: improving  Outcome Summary/Follow up Plan: pt. continues to demonstrate decreased BLE strength and dynamic standing balance warrenting need for continued IPPT.  pt. completed ambulation 550' with rwx and SBA with fatigue upon completion.  Completed bed mobility modified independent.           Outcome Measures       02/15/18 0959 02/15/18 0850 02/14/18 0847    How much help from another person do you currently need...    Turning from your back to your side while in flat bed without using bedrails? 4  -MJ  4  -MJ    Moving from lying on back to sitting on the side of a flat bed without bedrails? 4  -MJ  4  -MJ    Moving to and from a bed to a chair (including a wheelchair)? 3  -MJ  3  -MJ    Standing up from a chair using your arms (e.g., wheelchair, bedside chair)? 3   -MJ  3  -MJ    Climbing 3-5 steps with a railing? 3  -MJ  3  -MJ    To walk in hospital room? 3  -MJ  3  -MJ    AM-PAC 6 Clicks Score 20  -MJ  20  -MJ    How much help from another is currently needed...    Putting on and taking off regular lower body clothing?  3  -TB     Bathing (including washing, rinsing, and drying)  3  -TB     Toileting (which includes using toilet bed pan or urinal)  3  -TB     Putting on and taking off regular upper body clothing  4  -TB     Taking care of personal grooming (such as brushing teeth)  4  -TB     Eating meals  4  -TB     Score  21  -TB     Functional Assessment    Outcome Measure Options AM-PAC 6 Clicks Basic Mobility (PT)  -MJ AM-PAC 6 Clicks Daily Activity (OT)  -TB AM-PAC 6 Clicks Basic Mobility (PT)  -MJ      User Key  (r) = Recorded By, (t) = Taken By, (c) = Cosigned By    Initials Name Provider Type    TB Queenie Yang, OT Occupational Therapist    LAZARO Coronado, PT Physical Therapist           Time Calculation:         PT Charges       02/15/18 1317          Time Calculation    Start Time 0959  -MJ      PT Received On 02/15/18  -MJ      PT Goal Re-Cert Due Date 02/24/18  -MJ      Time Calculation- PT    Total Timed Code Minutes- PT 26 minute(s)  -MJ        User Key  (r) = Recorded By, (t) = Taken By, (c) = Cosigned By    Initials Name Provider Type    LAZARO Coronado, PT Physical Therapist          Therapy Charges for Today     Code Description Service Date Service Provider Modifiers Qty    99730756820 HC PT EVAL LOW COMPLEXITY 4 2/14/2018 Piedad Coronado, PT GP 1    62398558554 HC PT THER SUPP EA 15 MIN 2/14/2018 Piedad Coronado, PT GP 2    59098092300 HC PT THER PROC EA 15 MIN 2/15/2018 Piedad Coronado, PT GP 2          PT G-Codes  Outcome Measure Options: AM-PAC 6 Clicks Basic Mobility (PT)    Piedad Coronado, PT  2/15/2018

## 2018-02-15 NOTE — PLAN OF CARE
Problem: Patient Care Overview (Adult)  Goal: Plan of Care Review  Outcome: Ongoing (interventions implemented as appropriate)   02/15/18 0615   Coping/Psychosocial Response Interventions   Plan Of Care Reviewed With patient   Patient Care Overview   Progress improving   Outcome Evaluation   Outcome Summary/Follow up Plan VSS and pt is NSR with frequent PACs. He has not c/o pain this shift and scored 8 on CIWA at bedtime. He was given one ativan and slept the rest of the shift. We will continue to monitor.

## 2018-02-16 VITALS
HEIGHT: 73 IN | DIASTOLIC BLOOD PRESSURE: 66 MMHG | BODY MASS INDEX: 17.63 KG/M2 | HEART RATE: 76 BPM | WEIGHT: 133 LBS | TEMPERATURE: 98 F | SYSTOLIC BLOOD PRESSURE: 109 MMHG | OXYGEN SATURATION: 96 % | RESPIRATION RATE: 15 BRPM

## 2018-02-16 PROCEDURE — 99239 HOSP IP/OBS DSCHRG MGMT >30: CPT | Performed by: INTERNAL MEDICINE

## 2018-02-16 RX ADMIN — MULTIPLE VITAMINS W/ MINERALS TAB 1 TABLET: TAB ORAL at 08:32

## 2018-02-16 RX ADMIN — LEVOTHYROXINE SODIUM 50 MCG: 50 TABLET ORAL at 06:58

## 2018-02-16 RX ADMIN — PROPRANOLOL HYDROCHLORIDE 80 MG: 80 CAPSULE, EXTENDED RELEASE ORAL at 08:31

## 2018-02-16 RX ADMIN — NICOTINE 1 PATCH: 21 PATCH, EXTENDED RELEASE TRANSDERMAL at 08:32

## 2018-02-16 RX ADMIN — Medication 100 MG: at 08:31

## 2018-02-16 NOTE — PROGRESS NOTES
Continued Stay Note  [facility]     Patient Name: Israel Bojorquez  MRN: [id number]  Today's Date: 2/16/2018    Admit Date: 2/13/2018          Discharge Plan       02/16/18 0856    Case Management/Social Work Plan    Additional Comments SW spoke with pt at bedside. Pt is agreeable and hoping to discharge to the [facility] for inpatient treatment. NIA called the [facility] mobile assessment team and someone will be at [facility] to assess pt today.              Discharge Codes     None        Expected Discharge Date and Time     Expected Discharge Date Expected Discharge Time    Feb 16, 2018             BLANCA Em

## 2018-02-16 NOTE — PROGRESS NOTES
Continued Stay Note  [facility]     Patient Name: Israel Bojorquez  MRN: [id number]  Today's Date: 2/16/2018    Admit Date: 2/13/2018          Discharge Plan       02/16/18 1237    Case Management/Social Work Plan    Additional Comments Awaiting nurse to nurse for the [facility] to determin eif pt medically stable enough to go inpatient to their facility. SW updated pt at bedside and pt reports his daughter will be able to provide transportation once she gets off of work.               Discharge Codes     None        Expected Discharge Date and Time     Expected Discharge Date Expected Discharge Time    Feb 16, 2018             BLANCA Em

## 2018-02-16 NOTE — PLAN OF CARE
Problem: Acute Alcohol Withdrawal Syndrome, Risk For/Actual (Adult)  Goal: Signs and Symptoms of Listed Potential Problems Will be Absent or Manageable (Acute Alcohol Withdrawal Syndrome, Risk For/Actual)  Outcome: Outcome(s) achieved Date Met: 02/16/18 02/16/18 1439   Acute Alcohol Withdrawal Syndrome, Risk For/Actual   Problems Assessed (Alcohol Withdrawal Syndrome) all   Problems Present (Alcohol Withdrawal Syndrome) situational response       Problem: Fall Risk (Adult)  Goal: Identify Related Risk Factors and Signs and Symptoms  Outcome: Outcome(s) achieved Date Met: 02/16/18   02/15/18 1332   Fall Risk   Fall Risk: Related Risk Factors fatigue/slow reaction   Fall Risk: Signs and Symptoms presence of risk factors     Goal: Absence of Falls  Outcome: Outcome(s) achieved Date Met: 02/16/18 02/16/18 1439   Fall Risk (Adult)   Absence of Falls achieves outcome       Problem: Patient Care Overview (Adult)  Goal: Plan of Care Review  Outcome: Outcome(s) achieved Date Met: 02/16/18 02/16/18 1439   Coping/Psychosocial Response Interventions   Plan Of Care Reviewed With patient   Patient Care Overview   Progress improving   Outcome Evaluation   Outcome Summary/Follow up Plan VSS. Pt has not required any ativan this shift. He has been accepted to the [facility]. He will be d/c'd to go there today.

## 2018-02-16 NOTE — DISCHARGE SUMMARY
[facility] Medicine Services  DISCHARGE SUMMARY    Patient Name: Israel Bojorquez  : 1955  MRN: [id number]    Date of Admission: 2018  Date of Discharge:  18  Primary Care Physician: No Known Provider    Consults     No orders found from 1/15/2018 to 2018.        Hospital Course     Presenting Problem:   Hyponatremia [E87.1]    Active Hospital Problems (** Indicates Principal Problem)    Diagnosis Date Noted   • **Acute hyponatremia [E87.1] 2017     Priority: Medium   • Diarrhea [R19.7] 2018   • Weakness [R53.1] 2018   • Fall [W19.XXXA] 2018   • Alcohol abuse [F10.10] 2017   • Elevated liver enzymes [R74.8] 2017   • Moderate malnutrition [E44.0] 2017      Resolved Hospital Problems    Diagnosis Date Noted Date Resolved   No resolved problems to display.          Hospital Course:  Israel Bojorquez is a 62 y.o. male with a history of alcohol abuse, tobacco abuse, COPD, essential tremor, SIADH, hypertension and hyperlipidemia who presents to the emergency room with complaints of increasing weakness and failure to thrive.  He admits to drinking at least 10-12 beers a day.  He was found to have a sodium of 122.  Previously diagnosed with SIADH .  Due to his hyponatremia and acute weakness he was admitted to the hospital for further evaluation.  This likely his acute hyponatremia is related to beer potomania.  It did correct on its own with sodium going up to 131.  He was monitored for any signs of withdrawals and did not have any significant withdrawal while in the hospital.  He was seen by physical therapy and the day prior to discharge is able to walk 550 feet with rolling walker independently.  He is interested in getting sober  has arranged for the [facility] to evaluate him prior to discharge today for possible inpatient rehabilitation.  That evaluation is pending, however if he is not felt to be a candidate for  inpatient rehabilitation plan will be discharge to home.  He has been arranged to have a walker at home.  He was counseled extensively regarding his need to stop drinking alcohol.  We'll continue his home medications and vitamin supplementation.  Hopefully if his stops drinking his nutrition will improve.  I like and follow-ups primary care physician within one to 2 weeks.           Day of Discharge     HPI:   No new problems overnight.  Not sleeping well due to alarms in hospital.    Review of Systems  Gen- No fevers, chills  CV- No chest pain, palpitations  Resp- No cough, dyspnea  GI- No N/V/D, abd pain    Otherwise ROS is negative except as mentioned in the HPI.    Vital Signs:   Temp:  [97.9 °F (36.6 °C)-98.3 °F (36.8 °C)] 97.9 °F (36.6 °C)  Heart Rate:  [72-87] 76  Resp:  [16-18] 16  BP: ()/(62-71) 109/66     Physical Exam:  Constitutional: No acute distress, awake, alert, appears older than age  HENT: NCAT, mucous membranes moist  Respiratory: Clear to auscultation bilaterally, respiratory effort normal   Cardiovascular: RRR, no murmurs, rubs, or gallops, palpable pedal pulses bilaterally  Gastrointestinal: Positive bowel sounds, soft, nontender, nondistended  Musculoskeletal: No bilateral ankle edema, tobacco stains on fingers  Psychiatric: Appropriate affect, cooperative  Neurologic: Oriented x 3, no focal deficits  Skin: No rashes      Pertinent  and/or Most Recent Results       Results from last 7 days  Lab Units 02/15/18  0637 02/14/18  1221 02/14/18  0558 02/13/18  2331 02/13/18  1425   WBC 10*3/mm3  --   --  8.85  --  9.97   HEMOGLOBIN g/dL  --   --  11.9*  --  13.7   HEMATOCRIT %  --   --  34.0*  --  39.4   PLATELETS 10*3/mm3  --   --  193  --  225   SODIUM mmol/L 131* 130* 129* 128* 122*   POTASSIUM mmol/L 4.4 4.3 5.1 4.8 3.5   CHLORIDE mmol/L 103 99 100 97* 89*   CO2 mmol/L 28.0 27.0 23.0 25.0 22.0   BUN mg/dL 7* 5* 5* <5* <5*   CREATININE mg/dL 0.50* 0.60 0.50* 0.50* 0.70   GLUCOSE mg/dL 80  81 97 114* 105*   CALCIUM mg/dL 8.1* 8.4* 7.9* 8.5* 8.9       Results from last 7 days  Lab Units 02/14/18  0558 02/13/18  2331 02/13/18  1425   BILIRUBIN mg/dL 1.2  --  1.1   ALK PHOS U/L 216*  --  260*   ALT (SGPT) U/L 82*  --  109*   AST (SGOT) U/L 135*  --  168*   PROTIME Seconds  --  10.0  --    INR   --  0.95  --            Invalid input(s): TG, LDLCALC, LDLREALC    Results from last 7 days  Lab Units 02/14/18  0558 02/13/18  1425   TSH mIU/mL 6.878*  --    BNP pg/mL  --  143.0*     Brief Urine Lab Results  (Last result in the past 365 days)      Color   Clarity   Blood   Leuk Est   Nitrite   Protein   CREAT   Urine HCG        02/15/18 0704 Dark Yellow(A) Clear Negative Negative Negative Negative               Microbiology Results Abnormal     Procedure Component Value - Date/Time    Ova & Parasite Examination - Stool, Per Rectum [[id number]] Collected:  02/13/18 2027    Lab Status:  Final result Specimen:  Stool from Per Rectum Updated:  02/15/18 1440     Ova + Parasite Exam No ova or parasites seen on concentrated smear     Trichrome Stain No ova or parasites seen on trichrome stain    Stool Culture - Stool, Per Rectum [[id number]] Collected:  02/13/18 2027    Lab Status:  Final result Specimen:  Stool from Per Rectum Updated:  02/15/18 1423     Stool Culture No Salmonella, Shigella, Campylobacter, E. coli O157, or Vibrio isolated    Narrative:         Not cultured for Yersinia.    Not cultured for Yersinia.    Clostridium Difficile Toxin - Stool, Per Rectum [[id number]] Collected:  02/13/18 2027    Lab Status:  Final result Specimen:  Stool from Per Rectum Updated:  02/13/18 2136    Narrative:       The following orders were created for panel order Clostridium Difficile Toxin - Stool, Per Rectum.  Procedure                               Abnormality         Status                     ---------                               -----------         ------                     Clostridium Difficile To...[[id number]]   Normal              Final result                 Please view results for these tests on the individual orders.    Clostridium Difficile Toxin, PCR - Stool, Per Rectum [[id number]]  (Normal) Collected:  02/13/18 2027    Lab Status:  Final result Specimen:  Stool from Per Rectum Updated:  02/13/18 2136     C. Difficile Toxins by PCR Not Detected    Narrative:         Performance characteristics of test not established for patients <2 years of age.  Negative for Toxigenic C. Difficile    Fecal Leukocytes - Stool, Per Rectum [[id number]]  (Normal) Collected:  02/13/18 2027    Lab Status:  Final result Specimen:  Stool from Per Rectum Updated:  02/13/18 2114     Fecal Leukocytes No WBC's Seen          Imaging Results (all)     Procedure Component Value Units Date/Time    XR Chest 1 View [[id number]] Collected:  02/13/18 1632     Updated:  02/13/18 1638    Narrative:       EXAMINATION: XR CHEST 1 VW- 02/13/2018     INDICATION: Weak/Dizzy/AMS triage protocol      COMPARISON: 08/01/2017     FINDINGS: There is a normal cardiac silhouette. There is no pulmonary  inflammatory process, mass or effusion.           Impression:       No active disease.     D:  02/13/2018  E:  02/13/2018     This report was finalized on 2/13/2018 4:36 PM by Dr. Freddy Fraser MD.                              Discharge Details      Israel Bojorquez   Home Medication Instructions UMBERTO:[id number]    Printed on:02/16/18 0935   Medication Information                      folic acid (FOLVITE) 1 MG tablet  Take 1 mg by mouth Daily.             levothyroxine (SYNTHROID, LEVOTHROID) 50 MCG tablet  Take 50 mcg by mouth Daily.             Multiple Vitamins-Minerals (MULTIVITAMIN ADULTS 50+) tablet  Take 1 tablet by mouth Daily.             pantoprazole (PROTONIX) 40 MG EC tablet  Take 40 mg by mouth Daily.             primidone (MYSOLINE) 50 MG tablet  Take 50 mg by mouth Every Night.             propranolol LA (INDERAL LA) 80 MG 24 hr capsule  Take 1 capsule by  mouth Daily.             thiamine (VITAMIN B-1) 100 MG tablet  Take 100 mg by mouth Daily.                   Discharge Disposition:  Home or Self Care    Discharge Diet:  Diet Instructions     Diet: Regular       Discharge Diet:  Regular                 Discharge Activity:   Activity Instructions     Activity as Tolerated                     No future appointments.    Additional Instructions for the Follow-ups that You Need to Schedule     Discharge Follow-up with PCP    As directed    Follow Up Details:  PCP 1-2 weeks                     Time Spent on Discharge:  32 minutes    Electronically signed by Sameer Medina MD, 02/16/18, 9:34 AM.

## 2018-02-16 NOTE — PROGRESS NOTES
Continued Stay Note  [facility]     Patient Name: Israel Bojorquez  MRN: [id number]  Today's Date: 2/16/2018    Admit Date: 2/13/2018          Discharge Plan       02/16/18 1606    Case Management/Social Work Plan    Plan [facility]    Patient/Family In Agreement With Plan yes    Additional Comments SW spoke with the [facility] as pt reports they called him and said his deductible will be $3,500. [facility] staff reports pt will have to pay up front for this. SW also asked about the intensive outpatient program which would cost pt $150 a day until he met the deductible. SW checked back with pt after pt was able to speak with his daughter. Pt reports they will be able to pay the [facility] and still want to go inpatient. SW notified Lam at the [facility] intake and Lam repotrs the deductible can be paid with credit or debit cards or cash. PT asked SW to call his daughter to let her know. SW called Kaity and let her know. Kaity reports she is bringing her credit card and she is on her way to get pt to take him to the [facility]. No further ss needs at this time.               Discharge Codes     None        Expected Discharge Date and Time     Expected Discharge Date Expected Discharge Time    Feb 16, 2018             BLANCA Em

## 2018-02-16 NOTE — PROGRESS NOTES
Adult Nutrition  Assessment/PES    Patient Name:  Israel Bojorquez  YOB: 1955  MRN: [id number]  Admit Date:  2/13/2018    Assessment Date:  2/16/2018    Comments:            Reason for Assessment       02/16/18 0930    Reason for Assessment    Reason For Assessment/Visit follow up protocol    Time Spent (min) 5                          Nutrition Prescription Ordered       02/16/18 0930    Nutrition Prescription PO    Current PO Diet Regular              Problem/Interventions:          Problem 2       02/16/18 0930    Nutrition Diagnoses Problem 2    Problem 2 Nutrition Appropriate for Condition at this Time    Signs/Symptoms (evidenced by) PO Intake    Percent (%) intake recorded 75 %    Over number of meals 3                        Education/Evaluation       02/16/18 0930    Monitor/Evaluation    Monitor Per protocol        Electronically signed by:  Geri Joseph RD  02/16/18 9:30 AM

## 2018-02-16 NOTE — PLAN OF CARE
Problem: Patient Care Overview (Adult)  Goal: Plan of Care Review  Outcome: Ongoing (interventions implemented as appropriate)   02/16/18 0645   Coping/Psychosocial Response Interventions   Plan Of Care Reviewed With patient   Patient Care Overview   Progress improving   Outcome Evaluation   Outcome Summary/Follow up Plan VSS and pt did not have to have any ativan this shift. He rested well - receptive to inpatient rehab.

## 2018-02-19 ENCOUNTER — TRANSITIONAL CARE MANAGEMENT TELEPHONE ENCOUNTER (OUTPATIENT)
Dept: FAMILY MEDICINE CLINIC | Facility: CLINIC | Age: 63
End: 2018-02-19

## 2018-06-22 ENCOUNTER — HOSPITAL ENCOUNTER (INPATIENT)
Facility: HOSPITAL | Age: 63
LOS: 18 days | Discharge: NURSING, REHAB OR LONG-TERM CARE | End: 2018-07-11
Attending: EMERGENCY MEDICINE | Admitting: INTERNAL MEDICINE

## 2018-06-22 DIAGNOSIS — Z74.09 IMPAIRED FUNCTIONAL MOBILITY, BALANCE, GAIT, AND ENDURANCE: ICD-10-CM

## 2018-06-22 DIAGNOSIS — E86.0 DEHYDRATION: ICD-10-CM

## 2018-06-22 DIAGNOSIS — R41.82 ALTERED MENTAL STATUS, UNSPECIFIED ALTERED MENTAL STATUS TYPE: Primary | ICD-10-CM

## 2018-06-22 DIAGNOSIS — E03.9 HYPOTHYROIDISM, UNSPECIFIED TYPE: ICD-10-CM

## 2018-06-22 PROCEDURE — 93005 ELECTROCARDIOGRAM TRACING: CPT | Performed by: EMERGENCY MEDICINE

## 2018-06-22 PROCEDURE — 99285 EMERGENCY DEPT VISIT HI MDM: CPT

## 2018-06-22 RX ORDER — FOLIC ACID 5 MG/ML
1 INJECTION, SOLUTION INTRAMUSCULAR; INTRAVENOUS; SUBCUTANEOUS ONCE
Status: DISCONTINUED | OUTPATIENT
Start: 2018-06-22 | End: 2018-06-22 | Stop reason: SDUPTHER

## 2018-06-22 RX ORDER — PROPRANOLOL HYDROCHLORIDE 20 MG/1
20 TABLET ORAL 2 TIMES DAILY
COMMUNITY
End: 2018-07-11 | Stop reason: HOSPADM

## 2018-06-22 RX ORDER — THIAMINE HYDROCHLORIDE 100 MG/ML
100 INJECTION, SOLUTION INTRAMUSCULAR; INTRAVENOUS ONCE
Status: DISCONTINUED | OUTPATIENT
Start: 2018-06-22 | End: 2018-06-22 | Stop reason: SDUPTHER

## 2018-06-23 ENCOUNTER — APPOINTMENT (OUTPATIENT)
Dept: GENERAL RADIOLOGY | Facility: HOSPITAL | Age: 63
End: 2018-06-23

## 2018-06-23 ENCOUNTER — APPOINTMENT (OUTPATIENT)
Dept: CT IMAGING | Facility: HOSPITAL | Age: 63
End: 2018-06-23

## 2018-06-23 PROBLEM — E03.9 HYPOTHYROIDISM: Chronic | Status: ACTIVE | Noted: 2018-06-23

## 2018-06-23 PROBLEM — R41.82 ALTERED MENTAL STATUS: Status: ACTIVE | Noted: 2018-06-23

## 2018-06-23 PROBLEM — F10.10 ALCOHOL ABUSE: Chronic | Status: ACTIVE | Noted: 2017-08-02

## 2018-06-23 PROBLEM — J44.9 COPD (CHRONIC OBSTRUCTIVE PULMONARY DISEASE) (HCC): Chronic | Status: ACTIVE | Noted: 2017-08-02

## 2018-06-23 PROBLEM — E46 MALNUTRITION (HCC): Status: ACTIVE | Noted: 2018-06-23

## 2018-06-23 PROBLEM — D72.829 LEUKOCYTOSIS: Status: ACTIVE | Noted: 2018-06-23

## 2018-06-23 PROBLEM — Z72.0 TOBACCO ABUSE: Chronic | Status: ACTIVE | Noted: 2017-08-02

## 2018-06-23 PROBLEM — E03.9 HYPOTHYROIDISM: Status: ACTIVE | Noted: 2018-06-23

## 2018-06-23 PROBLEM — R19.7 DIARRHEA: Chronic | Status: ACTIVE | Noted: 2018-02-13

## 2018-06-23 PROBLEM — Z91.199 MEDICALLY NONCOMPLIANT: Chronic | Status: ACTIVE | Noted: 2018-06-23

## 2018-06-23 PROBLEM — R74.8 ELEVATED LIVER ENZYMES: Chronic | Status: ACTIVE | Noted: 2017-08-02

## 2018-06-23 PROBLEM — E46 MALNUTRITION: Chronic | Status: ACTIVE | Noted: 2018-06-23

## 2018-06-23 PROBLEM — I10 HYPERTENSION: Chronic | Status: ACTIVE | Noted: 2017-08-02

## 2018-06-23 PROBLEM — E46 MALNUTRITION (HCC): Chronic | Status: ACTIVE | Noted: 2018-06-23

## 2018-06-23 LAB
ALBUMIN SERPL-MCNC: 4.38 G/DL (ref 3.2–4.8)
ALBUMIN/GLOB SERPL: 1 G/DL (ref 1.5–2.5)
ALP SERPL-CCNC: 160 U/L (ref 25–100)
ALT SERPL W P-5'-P-CCNC: 98 U/L (ref 7–40)
AMMONIA BLD-SCNC: 33 UMOL/L (ref 19–60)
AMPHET+METHAMPHET UR QL: NEGATIVE
AMPHETAMINES UR QL: NEGATIVE
ANION GAP SERPL CALCULATED.3IONS-SCNC: 16 MMOL/L (ref 3–11)
ANION GAP SERPL CALCULATED.3IONS-SCNC: 9 MMOL/L (ref 3–11)
AST SERPL-CCNC: 130 U/L (ref 0–33)
BACTERIA UR QL AUTO: ABNORMAL /HPF
BARBITURATES UR QL SCN: NEGATIVE
BASOPHILS # BLD AUTO: 0.05 10*3/MM3 (ref 0–0.2)
BASOPHILS # BLD AUTO: 0.06 10*3/MM3 (ref 0–0.2)
BASOPHILS NFR BLD AUTO: 0.3 % (ref 0–1)
BASOPHILS NFR BLD AUTO: 0.5 % (ref 0–1)
BENZODIAZ UR QL SCN: NEGATIVE
BILIRUB SERPL-MCNC: 1.3 MG/DL (ref 0.3–1.2)
BILIRUB UR QL STRIP: ABNORMAL
BNP SERPL-MCNC: 165 PG/ML (ref 0–100)
BUN BLD-MCNC: 36 MG/DL (ref 9–23)
BUN BLD-MCNC: 36 MG/DL (ref 9–23)
BUN/CREAT SERPL: 30.3 (ref 7–25)
BUN/CREAT SERPL: 37.1 (ref 7–25)
BUPRENORPHINE SERPL-MCNC: NEGATIVE NG/ML
CALCIUM SPEC-SCNC: 10 MG/DL (ref 8.7–10.4)
CALCIUM SPEC-SCNC: 8.5 MG/DL (ref 8.7–10.4)
CANNABINOIDS SERPL QL: NEGATIVE
CHLORIDE SERPL-SCNC: 108 MMOL/L (ref 99–109)
CHLORIDE SERPL-SCNC: 113 MMOL/L (ref 99–109)
CK SERPL-CCNC: 17 U/L (ref 26–174)
CLARITY UR: CLEAR
CO2 SERPL-SCNC: 22 MMOL/L (ref 20–31)
CO2 SERPL-SCNC: 25 MMOL/L (ref 20–31)
COCAINE UR QL: NEGATIVE
COLOR UR: ABNORMAL
CREAT BLD-MCNC: 0.97 MG/DL (ref 0.6–1.3)
CREAT BLD-MCNC: 1.19 MG/DL (ref 0.6–1.3)
DEPRECATED RDW RBC AUTO: 58.8 FL (ref 37–54)
DEPRECATED RDW RBC AUTO: 58.9 FL (ref 37–54)
EOSINOPHIL # BLD AUTO: 0.18 10*3/MM3 (ref 0–0.3)
EOSINOPHIL # BLD AUTO: 0.21 10*3/MM3 (ref 0–0.3)
EOSINOPHIL NFR BLD AUTO: 1.4 % (ref 0–3)
EOSINOPHIL NFR BLD AUTO: 1.5 % (ref 0–3)
ERYTHROCYTE [DISTWIDTH] IN BLOOD BY AUTOMATED COUNT: 16 % (ref 11.3–14.5)
ERYTHROCYTE [DISTWIDTH] IN BLOOD BY AUTOMATED COUNT: 16.2 % (ref 11.3–14.5)
ETHANOL BLD-MCNC: <10 MG/DL (ref 0–10)
GFR SERPL CREATININE-BSD FRML MDRD: 62 ML/MIN/1.73
GFR SERPL CREATININE-BSD FRML MDRD: 78 ML/MIN/1.73
GLOBULIN UR ELPH-MCNC: 4.5 GM/DL
GLUCOSE BLD-MCNC: 109 MG/DL (ref 70–100)
GLUCOSE BLD-MCNC: 85 MG/DL (ref 70–100)
GLUCOSE BLDC GLUCOMTR-MCNC: 116 MG/DL (ref 70–130)
GLUCOSE UR STRIP-MCNC: NEGATIVE MG/DL
HCT VFR BLD AUTO: 33 % (ref 38.9–50.9)
HCT VFR BLD AUTO: 41.4 % (ref 38.9–50.9)
HGB BLD-MCNC: 11.2 G/DL (ref 13.1–17.5)
HGB BLD-MCNC: 14.1 G/DL (ref 13.1–17.5)
HGB UR QL STRIP.AUTO: NEGATIVE
IMM GRANULOCYTES # BLD: 0.03 10*3/MM3 (ref 0–0.03)
IMM GRANULOCYTES # BLD: 0.04 10*3/MM3 (ref 0–0.03)
IMM GRANULOCYTES NFR BLD: 0.2 % (ref 0–0.6)
IMM GRANULOCYTES NFR BLD: 0.3 % (ref 0–0.6)
INR PPP: 0.99 (ref 0.91–1.09)
INR PPP: 1.02 (ref 0.91–1.09)
KETONES UR QL STRIP: ABNORMAL
LEUKOCYTE ESTERASE UR QL STRIP.AUTO: ABNORMAL
LYMPHOCYTES # BLD AUTO: 1.62 10*3/MM3 (ref 0.6–4.8)
LYMPHOCYTES # BLD AUTO: 1.69 10*3/MM3 (ref 0.6–4.8)
LYMPHOCYTES NFR BLD AUTO: 11.7 % (ref 24–44)
LYMPHOCYTES NFR BLD AUTO: 12.2 % (ref 24–44)
MAGNESIUM SERPL-MCNC: 2.5 MG/DL (ref 1.3–2.7)
MCH RBC QN AUTO: 34.6 PG (ref 27–31)
MCH RBC QN AUTO: 34.7 PG (ref 27–31)
MCHC RBC AUTO-ENTMCNC: 33.9 G/DL (ref 32–36)
MCHC RBC AUTO-ENTMCNC: 34.1 G/DL (ref 32–36)
MCV RBC AUTO: 101.7 FL (ref 80–99)
MCV RBC AUTO: 102.2 FL (ref 80–99)
METHADONE UR QL SCN: NEGATIVE
MONOCYTES # BLD AUTO: 0.94 10*3/MM3 (ref 0–1)
MONOCYTES # BLD AUTO: 1.1 10*3/MM3 (ref 0–1)
MONOCYTES NFR BLD AUTO: 7.1 % (ref 0–12)
MONOCYTES NFR BLD AUTO: 7.6 % (ref 0–12)
MYOGLOBIN SERPL-MCNC: 298 NG/ML (ref 3–110)
NEUTROPHILS # BLD AUTO: 10.49 10*3/MM3 (ref 1.5–8.3)
NEUTROPHILS # BLD AUTO: 11.32 10*3/MM3 (ref 1.5–8.3)
NEUTROPHILS NFR BLD AUTO: 78.6 % (ref 41–71)
NEUTROPHILS NFR BLD AUTO: 78.8 % (ref 41–71)
NITRITE UR QL STRIP: POSITIVE
OPIATES UR QL: NEGATIVE
OXYCODONE UR QL SCN: NEGATIVE
PCP UR QL SCN: NEGATIVE
PH UR STRIP.AUTO: <=5 [PH] (ref 5–8)
PLATELET # BLD AUTO: 253 10*3/MM3 (ref 150–450)
PLATELET # BLD AUTO: 305 10*3/MM3 (ref 150–450)
PMV BLD AUTO: 9.4 FL (ref 6–12)
PMV BLD AUTO: 9.8 FL (ref 6–12)
POTASSIUM BLD-SCNC: 3.4 MMOL/L (ref 3.5–5.5)
POTASSIUM BLD-SCNC: 3.7 MMOL/L (ref 3.5–5.5)
POTASSIUM BLD-SCNC: 4.4 MMOL/L (ref 3.5–5.5)
PROPOXYPH UR QL: NEGATIVE
PROT SERPL-MCNC: 8.9 G/DL (ref 5.7–8.2)
PROT UR QL STRIP: ABNORMAL
PROTHROMBIN TIME: 10.4 SECONDS (ref 9.6–11.5)
PROTHROMBIN TIME: 10.7 SECONDS (ref 9.6–11.5)
RBC # BLD AUTO: 3.23 10*6/MM3 (ref 4.2–5.76)
RBC # BLD AUTO: 4.07 10*6/MM3 (ref 4.2–5.76)
RBC # UR: ABNORMAL /HPF
REF LAB TEST METHOD: ABNORMAL
SODIUM BLD-SCNC: 146 MMOL/L (ref 132–146)
SODIUM BLD-SCNC: 147 MMOL/L (ref 132–146)
SP GR UR STRIP: 1.03 (ref 1–1.03)
SQUAMOUS #/AREA URNS HPF: ABNORMAL /HPF
T4 FREE SERPL-MCNC: 0.65 NG/DL (ref 0.89–1.76)
TRICYCLICS UR QL SCN: NEGATIVE
TROPONIN I SERPL-MCNC: 0.03 NG/ML (ref 0–0.07)
TSH SERPL DL<=0.05 MIU/L-ACNC: 31.6 MIU/ML (ref 0.35–5.35)
UROBILINOGEN UR QL STRIP: ABNORMAL
WBC NRBC COR # BLD: 13.29 10*3/MM3 (ref 3.5–10.8)
WBC NRBC COR # BLD: 14.41 10*3/MM3 (ref 3.5–10.8)
WBC UR QL AUTO: ABNORMAL /HPF

## 2018-06-23 PROCEDURE — 84484 ASSAY OF TROPONIN QUANT: CPT

## 2018-06-23 PROCEDURE — 83880 ASSAY OF NATRIURETIC PEPTIDE: CPT | Performed by: NURSE PRACTITIONER

## 2018-06-23 PROCEDURE — 80306 DRUG TEST PRSMV INSTRMNT: CPT | Performed by: EMERGENCY MEDICINE

## 2018-06-23 PROCEDURE — 85610 PROTHROMBIN TIME: CPT | Performed by: INTERNAL MEDICINE

## 2018-06-23 PROCEDURE — 25010000002 THIAMINE PER 100 MG: Performed by: INTERNAL MEDICINE

## 2018-06-23 PROCEDURE — 25010000002 THIAMINE PER 100 MG

## 2018-06-23 PROCEDURE — 85025 COMPLETE CBC W/AUTO DIFF WBC: CPT | Performed by: EMERGENCY MEDICINE

## 2018-06-23 PROCEDURE — 80048 BASIC METABOLIC PNL TOTAL CA: CPT | Performed by: INTERNAL MEDICINE

## 2018-06-23 PROCEDURE — 71045 X-RAY EXAM CHEST 1 VIEW: CPT

## 2018-06-23 PROCEDURE — 72125 CT NECK SPINE W/O DYE: CPT

## 2018-06-23 PROCEDURE — 83735 ASSAY OF MAGNESIUM: CPT | Performed by: EMERGENCY MEDICINE

## 2018-06-23 PROCEDURE — 82962 GLUCOSE BLOOD TEST: CPT

## 2018-06-23 PROCEDURE — 80307 DRUG TEST PRSMV CHEM ANLYZR: CPT | Performed by: EMERGENCY MEDICINE

## 2018-06-23 PROCEDURE — 85025 COMPLETE CBC W/AUTO DIFF WBC: CPT | Performed by: INTERNAL MEDICINE

## 2018-06-23 PROCEDURE — 84443 ASSAY THYROID STIM HORMONE: CPT | Performed by: EMERGENCY MEDICINE

## 2018-06-23 PROCEDURE — 82550 ASSAY OF CK (CPK): CPT | Performed by: INTERNAL MEDICINE

## 2018-06-23 PROCEDURE — 99223 1ST HOSP IP/OBS HIGH 75: CPT | Performed by: INTERNAL MEDICINE

## 2018-06-23 PROCEDURE — 25010000002 THIAMINE PER 100 MG: Performed by: FAMILY MEDICINE

## 2018-06-23 PROCEDURE — 85610 PROTHROMBIN TIME: CPT | Performed by: EMERGENCY MEDICINE

## 2018-06-23 PROCEDURE — 84439 ASSAY OF FREE THYROXINE: CPT | Performed by: INTERNAL MEDICINE

## 2018-06-23 PROCEDURE — 83874 ASSAY OF MYOGLOBIN: CPT | Performed by: EMERGENCY MEDICINE

## 2018-06-23 PROCEDURE — 25010000002 LORAZEPAM PER 2 MG: Performed by: INTERNAL MEDICINE

## 2018-06-23 PROCEDURE — 84132 ASSAY OF SERUM POTASSIUM: CPT | Performed by: FAMILY MEDICINE

## 2018-06-23 PROCEDURE — 93005 ELECTROCARDIOGRAM TRACING: CPT | Performed by: NURSE PRACTITIONER

## 2018-06-23 PROCEDURE — 70450 CT HEAD/BRAIN W/O DYE: CPT

## 2018-06-23 PROCEDURE — 81001 URINALYSIS AUTO W/SCOPE: CPT | Performed by: EMERGENCY MEDICINE

## 2018-06-23 PROCEDURE — 25010000002 HEPARIN (PORCINE) PER 1000 UNITS: Performed by: INTERNAL MEDICINE

## 2018-06-23 PROCEDURE — 82140 ASSAY OF AMMONIA: CPT | Performed by: INTERNAL MEDICINE

## 2018-06-23 PROCEDURE — 84481 FREE ASSAY (FT-3): CPT | Performed by: INTERNAL MEDICINE

## 2018-06-23 PROCEDURE — 80053 COMPREHEN METABOLIC PANEL: CPT | Performed by: EMERGENCY MEDICINE

## 2018-06-23 RX ORDER — THIAMINE MONONITRATE (VIT B1) 100 MG
100 TABLET ORAL DAILY
Status: DISCONTINUED | OUTPATIENT
Start: 2018-06-30 | End: 2018-06-28

## 2018-06-23 RX ORDER — POTASSIUM CHLORIDE 7.45 MG/ML
10 INJECTION INTRAVENOUS
Status: DISCONTINUED | OUTPATIENT
Start: 2018-06-23 | End: 2018-07-11 | Stop reason: HOSPADM

## 2018-06-23 RX ORDER — NICOTINE 21 MG/24HR
1 PATCH, TRANSDERMAL 24 HOURS TRANSDERMAL DAILY
Status: DISCONTINUED | OUTPATIENT
Start: 2018-06-23 | End: 2018-07-11 | Stop reason: HOSPADM

## 2018-06-23 RX ORDER — ACETAMINOPHEN 325 MG/1
650 TABLET ORAL EVERY 4 HOURS PRN
Status: DISCONTINUED | OUTPATIENT
Start: 2018-06-23 | End: 2018-07-11 | Stop reason: HOSPADM

## 2018-06-23 RX ORDER — ONDANSETRON 2 MG/ML
4 INJECTION INTRAMUSCULAR; INTRAVENOUS EVERY 6 HOURS PRN
Status: DISCONTINUED | OUTPATIENT
Start: 2018-06-23 | End: 2018-07-11 | Stop reason: HOSPADM

## 2018-06-23 RX ORDER — POTASSIUM CHLORIDE 750 MG/1
40 CAPSULE, EXTENDED RELEASE ORAL AS NEEDED
Status: DISCONTINUED | OUTPATIENT
Start: 2018-06-23 | End: 2018-07-11 | Stop reason: HOSPADM

## 2018-06-23 RX ORDER — LORAZEPAM 2 MG/ML
1 INJECTION INTRAMUSCULAR EVERY 6 HOURS
Status: DISPENSED | OUTPATIENT
Start: 2018-06-23 | End: 2018-06-24

## 2018-06-23 RX ORDER — VITS A,C,E/LUTEIN/MINERALS 300MCG-200
1 TABLET ORAL DAILY
Status: DISCONTINUED | OUTPATIENT
Start: 2018-06-23 | End: 2018-07-11 | Stop reason: HOSPADM

## 2018-06-23 RX ORDER — PANTOPRAZOLE SODIUM 40 MG/1
40 TABLET, DELAYED RELEASE ORAL
Status: DISCONTINUED | OUTPATIENT
Start: 2018-06-23 | End: 2018-07-11 | Stop reason: HOSPADM

## 2018-06-23 RX ORDER — LORAZEPAM 2 MG/ML
1 INJECTION INTRAMUSCULAR EVERY 8 HOURS
Status: ACTIVE | OUTPATIENT
Start: 2018-06-24 | End: 2018-06-25

## 2018-06-23 RX ORDER — PRIMIDONE 50 MG/1
50 TABLET ORAL NIGHTLY
Status: DISCONTINUED | OUTPATIENT
Start: 2018-06-23 | End: 2018-07-11 | Stop reason: HOSPADM

## 2018-06-23 RX ORDER — SODIUM CHLORIDE 9 MG/ML
125 INJECTION, SOLUTION INTRAVENOUS CONTINUOUS
Status: DISCONTINUED | OUTPATIENT
Start: 2018-06-23 | End: 2018-06-24

## 2018-06-23 RX ORDER — THIAMINE MONONITRATE (VIT B1) 100 MG
100 TABLET ORAL DAILY
Status: DISCONTINUED | OUTPATIENT
Start: 2018-06-23 | End: 2018-06-23

## 2018-06-23 RX ORDER — HEPARIN SODIUM 5000 [USP'U]/ML
5000 INJECTION, SOLUTION INTRAVENOUS; SUBCUTANEOUS EVERY 12 HOURS SCHEDULED
Status: DISCONTINUED | OUTPATIENT
Start: 2018-06-23 | End: 2018-07-11 | Stop reason: HOSPADM

## 2018-06-23 RX ORDER — IPRATROPIUM BROMIDE AND ALBUTEROL SULFATE 2.5; .5 MG/3ML; MG/3ML
3 SOLUTION RESPIRATORY (INHALATION) EVERY 4 HOURS PRN
Status: DISCONTINUED | OUTPATIENT
Start: 2018-06-23 | End: 2018-07-11 | Stop reason: HOSPADM

## 2018-06-23 RX ORDER — POTASSIUM CHLORIDE 1.5 G/1.77G
40 POWDER, FOR SOLUTION ORAL AS NEEDED
Status: DISCONTINUED | OUTPATIENT
Start: 2018-06-23 | End: 2018-07-11 | Stop reason: HOSPADM

## 2018-06-23 RX ORDER — PROPRANOLOL HYDROCHLORIDE 20 MG/1
20 TABLET ORAL 2 TIMES DAILY
Status: DISCONTINUED | OUTPATIENT
Start: 2018-06-23 | End: 2018-06-29

## 2018-06-23 RX ORDER — LEVOTHYROXINE SODIUM 0.03 MG/1
25 TABLET ORAL
Status: DISCONTINUED | OUTPATIENT
Start: 2018-06-23 | End: 2018-07-11 | Stop reason: HOSPADM

## 2018-06-23 RX ORDER — ONDANSETRON 4 MG/1
4 TABLET, FILM COATED ORAL EVERY 6 HOURS PRN
Status: DISCONTINUED | OUTPATIENT
Start: 2018-06-23 | End: 2018-07-11 | Stop reason: HOSPADM

## 2018-06-23 RX ORDER — FOLIC ACID 1 MG/1
1 TABLET ORAL DAILY
Status: DISCONTINUED | OUTPATIENT
Start: 2018-06-23 | End: 2018-07-11 | Stop reason: HOSPADM

## 2018-06-23 RX ADMIN — FOLIC ACID 1 MG: 1 TABLET ORAL at 09:20

## 2018-06-23 RX ADMIN — Medication 100 MG: at 09:20

## 2018-06-23 RX ADMIN — THIAMINE HYDROCHLORIDE 500 MG: 100 INJECTION, SOLUTION INTRAMUSCULAR; INTRAVENOUS at 20:39

## 2018-06-23 RX ADMIN — NICOTINE 1 PATCH: 21 PATCH, EXTENDED RELEASE TRANSDERMAL at 09:20

## 2018-06-23 RX ADMIN — THIAMINE HYDROCHLORIDE 500 MG: 100 INJECTION, SOLUTION INTRAMUSCULAR; INTRAVENOUS at 12:38

## 2018-06-23 RX ADMIN — LORAZEPAM 1 MG: 2 INJECTION INTRAMUSCULAR; INTRAVENOUS at 22:28

## 2018-06-23 RX ADMIN — PROPRANOLOL HYDROCHLORIDE 20 MG: 20 TABLET ORAL at 09:19

## 2018-06-23 RX ADMIN — THIAMINE HYDROCHLORIDE 100 MG: 100 INJECTION, SOLUTION INTRAMUSCULAR; INTRAVENOUS at 02:07

## 2018-06-23 RX ADMIN — SODIUM CHLORIDE 125 ML/HR: 9 INJECTION, SOLUTION INTRAVENOUS at 20:39

## 2018-06-23 RX ADMIN — POTASSIUM CHLORIDE 40 MEQ: 750 CAPSULE, EXTENDED RELEASE ORAL at 16:18

## 2018-06-23 RX ADMIN — FOLIC ACID 100 ML/HR: 5 INJECTION, SOLUTION INTRAMUSCULAR; INTRAVENOUS; SUBCUTANEOUS at 09:21

## 2018-06-23 RX ADMIN — FOLIC ACID 1 MG: 5 INJECTION, SOLUTION INTRAMUSCULAR; INTRAVENOUS; SUBCUTANEOUS at 00:50

## 2018-06-23 RX ADMIN — Medication 1 TABLET: at 09:20

## 2018-06-23 RX ADMIN — POTASSIUM CHLORIDE 40 MEQ: 750 CAPSULE, EXTENDED RELEASE ORAL at 12:38

## 2018-06-23 RX ADMIN — SODIUM CHLORIDE 500 ML: 9 INJECTION, SOLUTION INTRAVENOUS at 02:04

## 2018-06-23 RX ADMIN — HEPARIN SODIUM 5000 UNITS: 5000 INJECTION, SOLUTION INTRAVENOUS; SUBCUTANEOUS at 09:20

## 2018-06-23 RX ADMIN — LEVOTHYROXINE SODIUM 25 MCG: 25 TABLET ORAL at 09:20

## 2018-06-23 RX ADMIN — PROPRANOLOL HYDROCHLORIDE 20 MG: 20 TABLET ORAL at 20:39

## 2018-06-23 RX ADMIN — HEPARIN SODIUM 5000 UNITS: 5000 INJECTION, SOLUTION INTRAVENOUS; SUBCUTANEOUS at 20:39

## 2018-06-23 RX ADMIN — PANTOPRAZOLE SODIUM 40 MG: 40 TABLET, DELAYED RELEASE ORAL at 09:19

## 2018-06-24 PROBLEM — F10.27: Status: ACTIVE | Noted: 2018-06-24

## 2018-06-24 PROBLEM — E51.2 WERNICKE ENCEPHALOPATHY SYNDROME: Status: ACTIVE | Noted: 2018-06-24

## 2018-06-24 LAB — T3FREE SERPL-MCNC: 1.9 PG/ML (ref 2–4.4)

## 2018-06-24 PROCEDURE — 25010000002 THIAMINE PER 100 MG: Performed by: FAMILY MEDICINE

## 2018-06-24 PROCEDURE — 25010000002 LORAZEPAM PER 2 MG: Performed by: INTERNAL MEDICINE

## 2018-06-24 PROCEDURE — 25010000002 THIAMINE PER 100 MG: Performed by: INTERNAL MEDICINE

## 2018-06-24 PROCEDURE — 97530 THERAPEUTIC ACTIVITIES: CPT

## 2018-06-24 PROCEDURE — 99233 SBSQ HOSP IP/OBS HIGH 50: CPT | Performed by: FAMILY MEDICINE

## 2018-06-24 PROCEDURE — 25010000002 HEPARIN (PORCINE) PER 1000 UNITS: Performed by: INTERNAL MEDICINE

## 2018-06-24 PROCEDURE — 97162 PT EVAL MOD COMPLEX 30 MIN: CPT

## 2018-06-24 RX ADMIN — HEPARIN SODIUM 5000 UNITS: 5000 INJECTION, SOLUTION INTRAVENOUS; SUBCUTANEOUS at 08:46

## 2018-06-24 RX ADMIN — LEVOTHYROXINE SODIUM 25 MCG: 25 TABLET ORAL at 08:45

## 2018-06-24 RX ADMIN — THIAMINE HYDROCHLORIDE 500 MG: 100 INJECTION, SOLUTION INTRAMUSCULAR; INTRAVENOUS at 12:00

## 2018-06-24 RX ADMIN — Medication 1 TABLET: at 08:44

## 2018-06-24 RX ADMIN — THIAMINE HYDROCHLORIDE 500 MG: 100 INJECTION, SOLUTION INTRAMUSCULAR; INTRAVENOUS at 05:07

## 2018-06-24 RX ADMIN — PRIMIDONE 50 MG: 50 TABLET ORAL at 21:56

## 2018-06-24 RX ADMIN — HEPARIN SODIUM 5000 UNITS: 5000 INJECTION, SOLUTION INTRAVENOUS; SUBCUTANEOUS at 21:56

## 2018-06-24 RX ADMIN — THIAMINE HYDROCHLORIDE 500 MG: 100 INJECTION, SOLUTION INTRAMUSCULAR; INTRAVENOUS at 22:12

## 2018-06-24 RX ADMIN — FOLIC ACID 1 MG: 1 TABLET ORAL at 08:45

## 2018-06-24 RX ADMIN — PROPRANOLOL HYDROCHLORIDE 20 MG: 20 TABLET ORAL at 08:44

## 2018-06-24 RX ADMIN — LORAZEPAM 1 MG: 2 INJECTION INTRAMUSCULAR; INTRAVENOUS at 16:00

## 2018-06-24 RX ADMIN — FOLIC ACID 100 ML/HR: 5 INJECTION, SOLUTION INTRAMUSCULAR; INTRAVENOUS; SUBCUTANEOUS at 08:42

## 2018-06-24 RX ADMIN — NICOTINE 1 PATCH: 21 PATCH, EXTENDED RELEASE TRANSDERMAL at 09:00

## 2018-06-25 PROCEDURE — 25010000002 HEPARIN (PORCINE) PER 1000 UNITS: Performed by: INTERNAL MEDICINE

## 2018-06-25 PROCEDURE — 99233 SBSQ HOSP IP/OBS HIGH 50: CPT | Performed by: FAMILY MEDICINE

## 2018-06-25 PROCEDURE — 25010000002 THIAMINE PER 100 MG: Performed by: FAMILY MEDICINE

## 2018-06-25 PROCEDURE — 25010000002 THIAMINE PER 100 MG: Performed by: INTERNAL MEDICINE

## 2018-06-25 RX ADMIN — PRIMIDONE 50 MG: 50 TABLET ORAL at 21:59

## 2018-06-25 RX ADMIN — HEPARIN SODIUM 5000 UNITS: 5000 INJECTION, SOLUTION INTRAVENOUS; SUBCUTANEOUS at 21:59

## 2018-06-25 RX ADMIN — PROPRANOLOL HYDROCHLORIDE 20 MG: 20 TABLET ORAL at 09:00

## 2018-06-25 RX ADMIN — LEVOTHYROXINE SODIUM 25 MCG: 25 TABLET ORAL at 06:13

## 2018-06-25 RX ADMIN — THIAMINE HYDROCHLORIDE 500 MG: 100 INJECTION, SOLUTION INTRAMUSCULAR; INTRAVENOUS at 21:58

## 2018-06-25 RX ADMIN — THIAMINE HYDROCHLORIDE 500 MG: 100 INJECTION, SOLUTION INTRAMUSCULAR; INTRAVENOUS at 04:55

## 2018-06-25 RX ADMIN — PANTOPRAZOLE SODIUM 40 MG: 40 TABLET, DELAYED RELEASE ORAL at 06:13

## 2018-06-25 RX ADMIN — FOLIC ACID 100 ML/HR: 5 INJECTION, SOLUTION INTRAMUSCULAR; INTRAVENOUS; SUBCUTANEOUS at 09:00

## 2018-06-26 PROCEDURE — 97116 GAIT TRAINING THERAPY: CPT

## 2018-06-26 PROCEDURE — 97110 THERAPEUTIC EXERCISES: CPT

## 2018-06-26 PROCEDURE — 25010000002 HEPARIN (PORCINE) PER 1000 UNITS: Performed by: INTERNAL MEDICINE

## 2018-06-26 PROCEDURE — 99232 SBSQ HOSP IP/OBS MODERATE 35: CPT | Performed by: FAMILY MEDICINE

## 2018-06-26 PROCEDURE — 93010 ELECTROCARDIOGRAM REPORT: CPT | Performed by: INTERNAL MEDICINE

## 2018-06-26 PROCEDURE — 93005 ELECTROCARDIOGRAM TRACING: CPT | Performed by: FAMILY MEDICINE

## 2018-06-26 PROCEDURE — 25010000002 THIAMINE PER 100 MG: Performed by: FAMILY MEDICINE

## 2018-06-26 RX ADMIN — PANTOPRAZOLE SODIUM 40 MG: 40 TABLET, DELAYED RELEASE ORAL at 04:58

## 2018-06-26 RX ADMIN — PROPRANOLOL HYDROCHLORIDE 20 MG: 20 TABLET ORAL at 09:00

## 2018-06-26 RX ADMIN — LEVOTHYROXINE SODIUM 25 MCG: 25 TABLET ORAL at 04:58

## 2018-06-26 RX ADMIN — Medication 1 TABLET: at 09:00

## 2018-06-26 RX ADMIN — FOLIC ACID 1 MG: 1 TABLET ORAL at 09:00

## 2018-06-26 RX ADMIN — PRIMIDONE 50 MG: 50 TABLET ORAL at 20:37

## 2018-06-26 RX ADMIN — THIAMINE HYDROCHLORIDE 500 MG: 100 INJECTION, SOLUTION INTRAMUSCULAR; INTRAVENOUS at 04:58

## 2018-06-26 RX ADMIN — HEPARIN SODIUM 5000 UNITS: 5000 INJECTION, SOLUTION INTRAVENOUS; SUBCUTANEOUS at 20:37

## 2018-06-27 LAB
ANION GAP SERPL CALCULATED.3IONS-SCNC: 5 MMOL/L (ref 3–11)
BUN BLD-MCNC: 5 MG/DL (ref 9–23)
BUN/CREAT SERPL: 9.8 (ref 7–25)
CALCIUM SPEC-SCNC: 8.3 MG/DL (ref 8.7–10.4)
CHLORIDE SERPL-SCNC: 110 MMOL/L (ref 99–109)
CO2 SERPL-SCNC: 26 MMOL/L (ref 20–31)
CREAT BLD-MCNC: 0.51 MG/DL (ref 0.6–1.3)
DEPRECATED RDW RBC AUTO: 61.7 FL (ref 37–54)
ERYTHROCYTE [DISTWIDTH] IN BLOOD BY AUTOMATED COUNT: 17.2 % (ref 11.3–14.5)
GFR SERPL CREATININE-BSD FRML MDRD: >150 ML/MIN/1.73
GLUCOSE BLD-MCNC: 104 MG/DL (ref 70–100)
HCT VFR BLD AUTO: 29.7 % (ref 38.9–50.9)
HGB BLD-MCNC: 9.8 G/DL (ref 13.1–17.5)
MAGNESIUM SERPL-MCNC: 1.4 MG/DL (ref 1.3–2.7)
MCH RBC QN AUTO: 34 PG (ref 27–31)
MCHC RBC AUTO-ENTMCNC: 33 G/DL (ref 32–36)
MCV RBC AUTO: 103.1 FL (ref 80–99)
PLATELET # BLD AUTO: 205 10*3/MM3 (ref 150–450)
PMV BLD AUTO: 10.7 FL (ref 6–12)
POTASSIUM BLD-SCNC: 3.3 MMOL/L (ref 3.5–5.5)
POTASSIUM BLD-SCNC: 3.9 MMOL/L (ref 3.5–5.5)
RBC # BLD AUTO: 2.88 10*6/MM3 (ref 4.2–5.76)
SODIUM BLD-SCNC: 141 MMOL/L (ref 132–146)
WBC NRBC COR # BLD: 10.18 10*3/MM3 (ref 3.5–10.8)

## 2018-06-27 PROCEDURE — 93010 ELECTROCARDIOGRAM REPORT: CPT | Performed by: INTERNAL MEDICINE

## 2018-06-27 PROCEDURE — 25010000002 THIAMINE PER 100 MG: Performed by: FAMILY MEDICINE

## 2018-06-27 PROCEDURE — 80048 BASIC METABOLIC PNL TOTAL CA: CPT | Performed by: INTERNAL MEDICINE

## 2018-06-27 PROCEDURE — 99232 SBSQ HOSP IP/OBS MODERATE 35: CPT | Performed by: NURSE PRACTITIONER

## 2018-06-27 PROCEDURE — 84132 ASSAY OF SERUM POTASSIUM: CPT | Performed by: INTERNAL MEDICINE

## 2018-06-27 PROCEDURE — 83735 ASSAY OF MAGNESIUM: CPT | Performed by: INTERNAL MEDICINE

## 2018-06-27 PROCEDURE — 85027 COMPLETE CBC AUTOMATED: CPT | Performed by: INTERNAL MEDICINE

## 2018-06-27 PROCEDURE — 25010000002 HEPARIN (PORCINE) PER 1000 UNITS: Performed by: INTERNAL MEDICINE

## 2018-06-27 PROCEDURE — 93005 ELECTROCARDIOGRAM TRACING: CPT | Performed by: FAMILY MEDICINE

## 2018-06-27 RX ADMIN — PRIMIDONE 50 MG: 50 TABLET ORAL at 22:20

## 2018-06-27 RX ADMIN — SODIUM CHLORIDE 500 ML: 9 INJECTION, SOLUTION INTRAVENOUS at 11:29

## 2018-06-27 RX ADMIN — NICOTINE 1 PATCH: 21 PATCH, EXTENDED RELEASE TRANSDERMAL at 08:23

## 2018-06-27 RX ADMIN — LEVOTHYROXINE SODIUM 25 MCG: 25 TABLET ORAL at 06:16

## 2018-06-27 RX ADMIN — Medication 1 TABLET: at 08:23

## 2018-06-27 RX ADMIN — POTASSIUM CHLORIDE 40 MEQ: 1.5 POWDER, FOR SOLUTION ORAL at 17:35

## 2018-06-27 RX ADMIN — FOLIC ACID 1 MG: 1 TABLET ORAL at 08:22

## 2018-06-27 RX ADMIN — THIAMINE HYDROCHLORIDE 250 MG: 100 INJECTION, SOLUTION INTRAMUSCULAR; INTRAVENOUS at 08:24

## 2018-06-27 RX ADMIN — HEPARIN SODIUM 5000 UNITS: 5000 INJECTION, SOLUTION INTRAVENOUS; SUBCUTANEOUS at 22:16

## 2018-06-27 RX ADMIN — PANTOPRAZOLE SODIUM 40 MG: 40 TABLET, DELAYED RELEASE ORAL at 06:16

## 2018-06-27 RX ADMIN — HEPARIN SODIUM 5000 UNITS: 5000 INJECTION, SOLUTION INTRAVENOUS; SUBCUTANEOUS at 08:23

## 2018-06-27 RX ADMIN — POTASSIUM CHLORIDE 40 MEQ: 1.5 POWDER, FOR SOLUTION ORAL at 13:18

## 2018-06-28 LAB
ANION GAP SERPL CALCULATED.3IONS-SCNC: 2 MMOL/L (ref 3–11)
BASOPHILS # BLD AUTO: 0.05 10*3/MM3 (ref 0–0.2)
BASOPHILS NFR BLD AUTO: 0.4 % (ref 0–1)
BUN BLD-MCNC: 8 MG/DL (ref 9–23)
BUN/CREAT SERPL: 11.8 (ref 7–25)
CALCIUM SPEC-SCNC: 9.1 MG/DL (ref 8.7–10.4)
CHLORIDE SERPL-SCNC: 109 MMOL/L (ref 99–109)
CO2 SERPL-SCNC: 30 MMOL/L (ref 20–31)
CREAT BLD-MCNC: 0.68 MG/DL (ref 0.6–1.3)
DEPRECATED RDW RBC AUTO: 63.4 FL (ref 37–54)
EOSINOPHIL # BLD AUTO: 0.3 10*3/MM3 (ref 0–0.3)
EOSINOPHIL NFR BLD AUTO: 2.7 % (ref 0–3)
ERYTHROCYTE [DISTWIDTH] IN BLOOD BY AUTOMATED COUNT: 17.6 % (ref 11.3–14.5)
GFR SERPL CREATININE-BSD FRML MDRD: 117 ML/MIN/1.73
GLUCOSE BLD-MCNC: 88 MG/DL (ref 70–100)
HCT VFR BLD AUTO: 29.9 % (ref 38.9–50.9)
HGB BLD-MCNC: 9.8 G/DL (ref 13.1–17.5)
IMM GRANULOCYTES # BLD: 0.05 10*3/MM3 (ref 0–0.03)
IMM GRANULOCYTES NFR BLD: 0.4 % (ref 0–0.6)
LYMPHOCYTES # BLD AUTO: 2 10*3/MM3 (ref 0.6–4.8)
LYMPHOCYTES NFR BLD AUTO: 17.8 % (ref 24–44)
MAGNESIUM SERPL-MCNC: 1.6 MG/DL (ref 1.3–2.7)
MCH RBC QN AUTO: 34.1 PG (ref 27–31)
MCHC RBC AUTO-ENTMCNC: 32.8 G/DL (ref 32–36)
MCV RBC AUTO: 104.2 FL (ref 80–99)
MONOCYTES # BLD AUTO: 1.28 10*3/MM3 (ref 0–1)
MONOCYTES NFR BLD AUTO: 11.4 % (ref 0–12)
NEUTROPHILS # BLD AUTO: 7.6 10*3/MM3 (ref 1.5–8.3)
NEUTROPHILS NFR BLD AUTO: 67.7 % (ref 41–71)
PLATELET # BLD AUTO: 213 10*3/MM3 (ref 150–450)
PMV BLD AUTO: 12 FL (ref 6–12)
POTASSIUM BLD-SCNC: 4.9 MMOL/L (ref 3.5–5.5)
RBC # BLD AUTO: 2.87 10*6/MM3 (ref 4.2–5.76)
SODIUM BLD-SCNC: 141 MMOL/L (ref 132–146)
WBC NRBC COR # BLD: 11.23 10*3/MM3 (ref 3.5–10.8)

## 2018-06-28 PROCEDURE — 97110 THERAPEUTIC EXERCISES: CPT

## 2018-06-28 PROCEDURE — 80048 BASIC METABOLIC PNL TOTAL CA: CPT | Performed by: NURSE PRACTITIONER

## 2018-06-28 PROCEDURE — 25010000002 HEPARIN (PORCINE) PER 1000 UNITS: Performed by: INTERNAL MEDICINE

## 2018-06-28 PROCEDURE — 97116 GAIT TRAINING THERAPY: CPT

## 2018-06-28 PROCEDURE — 25010000002 THIAMINE PER 100 MG: Performed by: NURSE PRACTITIONER

## 2018-06-28 PROCEDURE — 99233 SBSQ HOSP IP/OBS HIGH 50: CPT | Performed by: NURSE PRACTITIONER

## 2018-06-28 PROCEDURE — 85025 COMPLETE CBC W/AUTO DIFF WBC: CPT | Performed by: NURSE PRACTITIONER

## 2018-06-28 PROCEDURE — 83735 ASSAY OF MAGNESIUM: CPT

## 2018-06-28 RX ORDER — THIAMINE HYDROCHLORIDE 100 MG/ML
250 INJECTION, SOLUTION INTRAMUSCULAR; INTRAVENOUS DAILY
Status: COMPLETED | OUTPATIENT
Start: 2018-06-28 | End: 2018-06-29

## 2018-06-28 RX ORDER — CASTOR OIL AND BALSAM, PERU 788; 87 MG/G; MG/G
OINTMENT TOPICAL EVERY 12 HOURS SCHEDULED
Status: DISCONTINUED | OUTPATIENT
Start: 2018-06-28 | End: 2018-07-11 | Stop reason: HOSPADM

## 2018-06-28 RX ORDER — THIAMINE MONONITRATE (VIT B1) 100 MG
100 TABLET ORAL DAILY
Status: DISCONTINUED | OUTPATIENT
Start: 2018-06-30 | End: 2018-07-11 | Stop reason: HOSPADM

## 2018-06-28 RX ADMIN — NICOTINE 1 PATCH: 21 PATCH, EXTENDED RELEASE TRANSDERMAL at 08:26

## 2018-06-28 RX ADMIN — HEPARIN SODIUM 5000 UNITS: 5000 INJECTION, SOLUTION INTRAVENOUS; SUBCUTANEOUS at 08:25

## 2018-06-28 RX ADMIN — LEVOTHYROXINE SODIUM 25 MCG: 25 TABLET ORAL at 05:58

## 2018-06-28 RX ADMIN — CASTOR OIL AND BALSAM, PERU: 788; 87 OINTMENT TOPICAL at 20:30

## 2018-06-28 RX ADMIN — Medication 1 TABLET: at 08:25

## 2018-06-28 RX ADMIN — PANTOPRAZOLE SODIUM 40 MG: 40 TABLET, DELAYED RELEASE ORAL at 05:58

## 2018-06-28 RX ADMIN — HEPARIN SODIUM 5000 UNITS: 5000 INJECTION, SOLUTION INTRAVENOUS; SUBCUTANEOUS at 20:30

## 2018-06-28 RX ADMIN — PROPRANOLOL HYDROCHLORIDE 20 MG: 20 TABLET ORAL at 20:30

## 2018-06-28 RX ADMIN — FOLIC ACID 1 MG: 1 TABLET ORAL at 08:25

## 2018-06-28 RX ADMIN — PRIMIDONE 50 MG: 50 TABLET ORAL at 20:30

## 2018-06-28 RX ADMIN — THIAMINE HYDROCHLORIDE 250 MG: 100 INJECTION, SOLUTION INTRAMUSCULAR; INTRAVENOUS at 09:58

## 2018-06-29 PROCEDURE — 25010000002 THIAMINE PER 100 MG: Performed by: NURSE PRACTITIONER

## 2018-06-29 PROCEDURE — 25010000002 HEPARIN (PORCINE) PER 1000 UNITS: Performed by: INTERNAL MEDICINE

## 2018-06-29 PROCEDURE — 99232 SBSQ HOSP IP/OBS MODERATE 35: CPT | Performed by: NURSE PRACTITIONER

## 2018-06-29 RX ORDER — PROPRANOLOL HYDROCHLORIDE 10 MG/1
10 TABLET ORAL 2 TIMES DAILY
Status: DISCONTINUED | OUTPATIENT
Start: 2018-06-29 | End: 2018-07-11 | Stop reason: HOSPADM

## 2018-06-29 RX ADMIN — NICOTINE 1 PATCH: 21 PATCH, EXTENDED RELEASE TRANSDERMAL at 08:37

## 2018-06-29 RX ADMIN — FOLIC ACID 1 MG: 1 TABLET ORAL at 08:38

## 2018-06-29 RX ADMIN — Medication 1 TABLET: at 08:38

## 2018-06-29 RX ADMIN — HEPARIN SODIUM 5000 UNITS: 5000 INJECTION, SOLUTION INTRAVENOUS; SUBCUTANEOUS at 08:33

## 2018-06-29 RX ADMIN — PANTOPRAZOLE SODIUM 40 MG: 40 TABLET, DELAYED RELEASE ORAL at 06:22

## 2018-06-29 RX ADMIN — CASTOR OIL AND BALSAM, PERU: 788; 87 OINTMENT TOPICAL at 20:43

## 2018-06-29 RX ADMIN — LEVOTHYROXINE SODIUM 25 MCG: 25 TABLET ORAL at 06:22

## 2018-06-29 RX ADMIN — HEPARIN SODIUM 5000 UNITS: 5000 INJECTION, SOLUTION INTRAVENOUS; SUBCUTANEOUS at 20:43

## 2018-06-29 RX ADMIN — PROPRANOLOL HYDROCHLORIDE 10 MG: 10 TABLET ORAL at 20:42

## 2018-06-29 RX ADMIN — THIAMINE HYDROCHLORIDE 250 MG: 100 INJECTION, SOLUTION INTRAMUSCULAR; INTRAVENOUS at 08:31

## 2018-06-29 RX ADMIN — CASTOR OIL AND BALSAM, PERU: 788; 87 OINTMENT TOPICAL at 08:38

## 2018-06-29 RX ADMIN — PRIMIDONE 50 MG: 50 TABLET ORAL at 20:43

## 2018-06-29 RX ADMIN — PROPRANOLOL HYDROCHLORIDE 20 MG: 20 TABLET ORAL at 08:37

## 2018-06-30 PROCEDURE — 25010000002 HEPARIN (PORCINE) PER 1000 UNITS: Performed by: INTERNAL MEDICINE

## 2018-06-30 PROCEDURE — 99232 SBSQ HOSP IP/OBS MODERATE 35: CPT | Performed by: INTERNAL MEDICINE

## 2018-06-30 PROCEDURE — 97110 THERAPEUTIC EXERCISES: CPT

## 2018-06-30 PROCEDURE — 97530 THERAPEUTIC ACTIVITIES: CPT

## 2018-06-30 RX ADMIN — Medication 100 MG: at 08:23

## 2018-06-30 RX ADMIN — PRIMIDONE 50 MG: 50 TABLET ORAL at 21:47

## 2018-06-30 RX ADMIN — CASTOR OIL AND BALSAM, PERU: 788; 87 OINTMENT TOPICAL at 21:50

## 2018-06-30 RX ADMIN — NICOTINE 1 PATCH: 21 PATCH, EXTENDED RELEASE TRANSDERMAL at 08:22

## 2018-06-30 RX ADMIN — HEPARIN SODIUM 5000 UNITS: 5000 INJECTION, SOLUTION INTRAVENOUS; SUBCUTANEOUS at 08:23

## 2018-06-30 RX ADMIN — FOLIC ACID 1 MG: 1 TABLET ORAL at 08:23

## 2018-06-30 RX ADMIN — Medication 1 TABLET: at 08:23

## 2018-06-30 RX ADMIN — HEPARIN SODIUM 5000 UNITS: 5000 INJECTION, SOLUTION INTRAVENOUS; SUBCUTANEOUS at 21:47

## 2018-06-30 RX ADMIN — LEVOTHYROXINE SODIUM 25 MCG: 25 TABLET ORAL at 05:43

## 2018-06-30 RX ADMIN — CASTOR OIL AND BALSAM, PERU: 788; 87 OINTMENT TOPICAL at 08:23

## 2018-06-30 RX ADMIN — PANTOPRAZOLE SODIUM 40 MG: 40 TABLET, DELAYED RELEASE ORAL at 05:43

## 2018-07-01 LAB — MAGNESIUM SERPL-MCNC: 1.5 MG/DL (ref 1.3–2.7)

## 2018-07-01 PROCEDURE — 25010000002 HEPARIN (PORCINE) PER 1000 UNITS: Performed by: INTERNAL MEDICINE

## 2018-07-01 PROCEDURE — 83735 ASSAY OF MAGNESIUM: CPT | Performed by: INTERNAL MEDICINE

## 2018-07-01 PROCEDURE — 99232 SBSQ HOSP IP/OBS MODERATE 35: CPT | Performed by: HOSPITALIST

## 2018-07-01 RX ADMIN — PROPRANOLOL HYDROCHLORIDE 10 MG: 10 TABLET ORAL at 08:40

## 2018-07-01 RX ADMIN — PRIMIDONE 50 MG: 50 TABLET ORAL at 20:49

## 2018-07-01 RX ADMIN — Medication 400 MG: at 20:46

## 2018-07-01 RX ADMIN — HEPARIN SODIUM 5000 UNITS: 5000 INJECTION, SOLUTION INTRAVENOUS; SUBCUTANEOUS at 20:45

## 2018-07-01 RX ADMIN — NICOTINE 1 PATCH: 21 PATCH, EXTENDED RELEASE TRANSDERMAL at 09:00

## 2018-07-01 RX ADMIN — Medication 100 MG: at 08:40

## 2018-07-01 RX ADMIN — LEVOTHYROXINE SODIUM 25 MCG: 25 TABLET ORAL at 05:40

## 2018-07-01 RX ADMIN — Medication 1 TABLET: at 08:40

## 2018-07-01 RX ADMIN — PANTOPRAZOLE SODIUM 40 MG: 40 TABLET, DELAYED RELEASE ORAL at 05:40

## 2018-07-01 RX ADMIN — CASTOR OIL AND BALSAM, PERU: 788; 87 OINTMENT TOPICAL at 20:46

## 2018-07-01 RX ADMIN — CASTOR OIL AND BALSAM, PERU: 788; 87 OINTMENT TOPICAL at 09:00

## 2018-07-01 RX ADMIN — FOLIC ACID 1 MG: 1 TABLET ORAL at 08:40

## 2018-07-01 NOTE — PROGRESS NOTES
[facility] Medicine Services  PROGRESS NOTE    Patient Name: Israel Bojorquez  : 1955  MRN: [id number]    Date of Admission: 2018  Length of Stay: 8  Primary Care Physician: No Known Provider    Subjective   Subjective     CC: F/U encephalopathy, suspected Wernicke's syndrome      HPI:  Alert, sitting up in bed eating breakfast, appetite good, denies any complaints. No family present.      Review of Systems   Gen- No fevers, chills  CV- No chest pain, palpitations  Resp- No cough, dyspnea  GI- No N/V/D, abd pain      Otherwise ROS is negative except as mentioned in the HPI.    Objective   Objective     Vital Signs:   Temp:  [97.7 °F (36.5 °C)-98.1 °F (36.7 °C)] 98.1 °F (36.7 °C)  Heart Rate:  [] 111  Resp:  [18] 18  BP: ()/(62-89) 94/65        Physical Exam:  Constitutional: No acute distress  HENT: NCAT, mucous membranes moist  Respiratory: Clear to auscultation bilaterally, respiratory effort normal   Cardiovascular: Regular, slightly tachycardic, no murmurs, rubs, or gallops  Gastrointestinal: Positive bowel sounds, soft, nontender, nondistended  Musculoskeletal: No bilateral ankle edema  Psychiatric: Affect is flat  Neurologic: Face symmetric, speech clear, moves all ext; answers simple questions appropriately  Skin: No rashes      Results Reviewed:  I have personally reviewed current lab, radiology, and data and agree.      Results from last 7 days  Lab Units 18  0620 18  1140   WBC 10*3/mm3 11.23* 10.18   HEMOGLOBIN g/dL 9.8* 9.8*   HEMATOCRIT % 29.9* 29.7*   PLATELETS 10*3/mm3 213 205       Results from last 7 days  Lab Units 18  0620 18  1824 18  1140   SODIUM mmol/L 141  --  141   POTASSIUM mmol/L 4.9 3.9 3.3*   CHLORIDE mmol/L 109  --  110*   CO2 mmol/L 30.0  --  26.0   BUN mg/dL 8*  --  5*   CREATININE mg/dL 0.68  --  0.51*   GLUCOSE mg/dL 88  --  104*   CALCIUM mg/dL 9.1  --  8.3*     Estimated Creatinine Clearance: 80.1  mL/min (by C-G formula based on SCr of 0.68 mg/dL).    I have reviewed the medications.    Assessment/Plan   Assessment / Plan     Hospital Problem List     * (Principal)Altered mental status    Tobacco abuse (Chronic)    Hypertension (Chronic)    Alcohol abuse (Chronic)    Elevated liver enzymes (Chronic)    COPD (chronic obstructive pulmonary disease) (Chronic)    Diarrhea (Chronic)    Hypothyroidism (Chronic)    Leukocytosis    Medically noncompliant (Chronic)    Severe malnutrition (Chronic)    Alcoholic dementia    Wernicke encephalopathy syndrome             Brief Hospital Course to date:  Israel Bojorquez is a 63 y.o. male with a past medical history significant for longstanding alcohol abuse with progressing ETOH dementia, Acosta's esophagus, COPD, hypothyroidism, SIADH, essential tremor, essential hypertension, hyperlipidemia, and tobacco abuse presents to the ED after son found down at home      Assessment & Plan:    Wernicke's encephalopathy (mental status improved)  EtOH dependence  -As he is too weak to walk safely, needs SNF rehab and has reliably voiced he is agreeable  -Family is pursuing emergency guardianship  -S/P high dose vitamin and thiamine reploacements IV, continue PO thiamine    Hypokalemia, resolved  -Replace electrolytes per protocol     Hypothyroidism  -Restarted home Synthroid as was prior noncompliant, noting abnormal thyroid studies, will need rechecked 6 weeks after d/c    Frequent PVC/PAC  -Asymptomatic   -Continue propranolol-reduced dose due to low BP    Hypotension  - relatively asymptomatic, monitor    DVT Prophylaxis:  SQ heparin    CODE STATUS:   Code Status and Medical Interventions:   Ordered at: 06/23/18 0403     Level Of Support Discussed With:    Patient     Code Status:    CPR     Medical Interventions (Level of Support Prior to Arrest):    Full       Disposition:awaiting placement      Electronically signed by Silvio Echevarria MD, 07/01/18, 3:04 PM.

## 2018-07-01 NOTE — PLAN OF CARE
Problem: Fall Risk (Adult)  Goal: Identify Related Risk Factors and Signs and Symptoms  Outcome: Ongoing (interventions implemented as appropriate)   07/01/18 0405   Fall Risk (Adult)   Related Risk Factors (Fall Risk) confusion/agitation;fatigue/slow reaction;gait/mobility problems;history of falls;environment unfamiliar   Signs and Symptoms (Fall Risk) presence of risk factors     Goal: Absence of Fall  Outcome: Ongoing (interventions implemented as appropriate)   07/01/18 0405   Fall Risk (Adult)   Absence of Fall making progress toward outcome       Problem: Skin Injury Risk (Adult)  Goal: Identify Related Risk Factors and Signs and Symptoms  Outcome: Ongoing (interventions implemented as appropriate)   07/01/18 0405   Skin Injury Risk (Adult)   Related Risk Factors (Skin Injury Risk) body weight extremes;cognitive impairment;hospitalization prolonged;mobility impaired     Goal: Skin Health and Integrity  Outcome: Ongoing (interventions implemented as appropriate)   07/01/18 0405   Skin Injury Risk (Adult)   Skin Health and Integrity making progress toward outcome       Problem: Patient Care Overview  Goal: Plan of Care Review  Outcome: Ongoing (interventions implemented as appropriate)   07/01/18 0405   Coping/Psychosocial   Plan of Care Reviewed With patient   Plan of Care Review   Progress improving

## 2018-07-01 NOTE — PLAN OF CARE
Problem: Fall Risk (Adult)  Goal: Identify Related Risk Factors and Signs and Symptoms  Outcome: Ongoing (interventions implemented as appropriate)    Goal: Absence of Fall  Outcome: Ongoing (interventions implemented as appropriate)      Problem: Skin Injury Risk (Adult)  Goal: Identify Related Risk Factors and Signs and Symptoms  Outcome: Ongoing (interventions implemented as appropriate)    Goal: Skin Health and Integrity  Outcome: Ongoing (interventions implemented as appropriate)      Problem: Patient Care Overview  Goal: Plan of Care Review  Outcome: Ongoing (interventions implemented as appropriate)   07/01/18 1406   Coping/Psychosocial   Plan of Care Reviewed With patient   Plan of Care Review   Progress improving

## 2018-07-02 LAB — MAGNESIUM SERPL-MCNC: 1.7 MG/DL (ref 1.3–2.7)

## 2018-07-02 PROCEDURE — 83735 ASSAY OF MAGNESIUM: CPT | Performed by: PHYSICIAN ASSISTANT

## 2018-07-02 PROCEDURE — 99232 SBSQ HOSP IP/OBS MODERATE 35: CPT | Performed by: HOSPITALIST

## 2018-07-02 PROCEDURE — 97110 THERAPEUTIC EXERCISES: CPT

## 2018-07-02 PROCEDURE — 97116 GAIT TRAINING THERAPY: CPT

## 2018-07-02 PROCEDURE — 25010000002 HEPARIN (PORCINE) PER 1000 UNITS: Performed by: INTERNAL MEDICINE

## 2018-07-02 RX ADMIN — CASTOR OIL AND BALSAM, PERU: 788; 87 OINTMENT TOPICAL at 09:00

## 2018-07-02 RX ADMIN — ACETAMINOPHEN 650 MG: 325 TABLET, FILM COATED ORAL at 06:26

## 2018-07-02 RX ADMIN — PANTOPRAZOLE SODIUM 40 MG: 40 TABLET, DELAYED RELEASE ORAL at 06:26

## 2018-07-02 RX ADMIN — HEPARIN SODIUM 5000 UNITS: 5000 INJECTION, SOLUTION INTRAVENOUS; SUBCUTANEOUS at 09:00

## 2018-07-02 RX ADMIN — PROPRANOLOL HYDROCHLORIDE 10 MG: 10 TABLET ORAL at 09:00

## 2018-07-02 RX ADMIN — Medication 400 MG: at 09:00

## 2018-07-02 RX ADMIN — NICOTINE 1 PATCH: 21 PATCH, EXTENDED RELEASE TRANSDERMAL at 09:00

## 2018-07-02 RX ADMIN — FOLIC ACID 1 MG: 1 TABLET ORAL at 09:00

## 2018-07-02 RX ADMIN — Medication 1 TABLET: at 09:00

## 2018-07-02 RX ADMIN — PRIMIDONE 50 MG: 50 TABLET ORAL at 20:16

## 2018-07-02 RX ADMIN — Medication 100 MG: at 09:00

## 2018-07-02 RX ADMIN — LEVOTHYROXINE SODIUM 25 MCG: 25 TABLET ORAL at 06:26

## 2018-07-02 RX ADMIN — HEPARIN SODIUM 5000 UNITS: 5000 INJECTION, SOLUTION INTRAVENOUS; SUBCUTANEOUS at 20:16

## 2018-07-02 RX ADMIN — PROPRANOLOL HYDROCHLORIDE 10 MG: 10 TABLET ORAL at 20:18

## 2018-07-02 NOTE — PROGRESS NOTES
[facility] Medicine Services  PROGRESS NOTE    Patient Name: Israel Bojorquez  : 1955  MRN: [id number]    Date of Admission: 2018  Length of Stay: 9  Primary Care Physician: No Known Provider    Subjective   Subjective     CC: F/U encephalopathy, suspected Wernicke's syndrome      HPI:    Alert, denies any complaints. No family present.      Review of Systems   Gen- No fevers, chills  CV- No chest pain, palpitations  Resp- No cough, dyspnea  GI- No N/V/D, abd pain      Otherwise ROS is negative except as mentioned in the HPI.    Objective   Objective     Vital Signs:   Temp:  [97.6 °F (36.4 °C)-98.2 °F (36.8 °C)] 97.8 °F (36.6 °C)  Heart Rate:  [88-95] 89  Resp:  [16-18] 16  BP: ()/(58-73) 87/58        Physical Exam:  Constitutional: No acute distress  HENT: NCAT, mucous membranes moist  Respiratory: Clear to auscultation bilaterally, respiratory effort normal   Cardiovascular: RRR, no murmurs, rubs, or gallops  Gastrointestinal: Positive bowel sounds, soft, nontender, nondistended  Musculoskeletal: No bilateral ankle edema  Psychiatric: Affect is flat  Neurologic: Face symmetric, speech clear, moves all ext; answers simple questions appropriately  Skin: No rashes      Results Reviewed:  I have personally reviewed current lab, radiology, and data and agree.      Results from last 7 days  Lab Units 18  0620 18  1140   WBC 10*3/mm3 11.23* 10.18   HEMOGLOBIN g/dL 9.8* 9.8*   HEMATOCRIT % 29.9* 29.7*   PLATELETS 10*3/mm3 213 205       Results from last 7 days  Lab Units 18  0620 18  1824 18  1140   SODIUM mmol/L 141  --  141   POTASSIUM mmol/L 4.9 3.9 3.3*   CHLORIDE mmol/L 109  --  110*   CO2 mmol/L 30.0  --  26.0   BUN mg/dL 8*  --  5*   CREATININE mg/dL 0.68  --  0.51*   GLUCOSE mg/dL 88  --  104*   CALCIUM mg/dL 9.1  --  8.3*     Estimated Creatinine Clearance: 80.1 mL/min (by C-G formula based on SCr of 0.68 mg/dL).    I have reviewed the  medications.    Assessment/Plan   Assessment / Plan     Hospital Problem List     * (Principal)Altered mental status    Tobacco abuse (Chronic)    Hypertension (Chronic)    Alcohol abuse (Chronic)    Elevated liver enzymes (Chronic)    COPD (chronic obstructive pulmonary disease) (Chronic)    Diarrhea (Chronic)    Hypothyroidism (Chronic)    Leukocytosis    Medically noncompliant (Chronic)    Severe malnutrition (Chronic)    Alcoholic dementia    Wernicke encephalopathy syndrome             Brief Hospital Course to date:  Israel Bojorquez is a 63 y.o. male with a past medical history significant for longstanding alcohol abuse with progressing ETOH dementia, Acosta's esophagus, COPD, hypothyroidism, SIADH, essential tremor, essential hypertension, hyperlipidemia, and tobacco abuse presents to the ED after son found down at home      Assessment & Plan:   Stable, nothing new to add. Pt encourage to increase PO intake and physical activities as tolerated. CM working on placement for STR.  --------------------------------------------------------------------------    Wernicke's encephalopathy (mental status improved)  EtOH dependence  -As he is too weak to walk safely, needs SNF rehab and has reliably voiced he is agreeable  -Family is pursuing emergency guardianship  -S/P high dose vitamin and thiamine reploacements IV, continue PO thiamine    Hypokalemia, resolved  -Replace electrolytes per protocol     Hypothyroidism  -Restarted home Synthroid as was prior noncompliant, noting abnormal thyroid studies, will need rechecked 6 weeks after d/c    Frequent PVC/PAC  -Asymptomatic   -Continue propranolol-reduced dose due to low BP    Hypotension  - relatively asymptomatic, monitor    DVT Prophylaxis:  SQ heparin    CODE STATUS:   Code Status and Medical Interventions:   Ordered at: 06/23/18 3231     Level Of Support Discussed With:    Patient     Code Status:    CPR     Medical Interventions (Level of Support Prior to Arrest):     Full       Disposition:awaiting placement      Electronically signed by Silvio Echevarria MD, 07/02/18, 2:10 PM.

## 2018-07-02 NOTE — PLAN OF CARE
Problem: Fall Risk (Adult)  Goal: Identify Related Risk Factors and Signs and Symptoms  Outcome: Ongoing (interventions implemented as appropriate)   07/02/18 0416   Fall Risk (Adult)   Related Risk Factors (Fall Risk) confusion/agitation;fatigue/slow reaction;gait/mobility problems;history of falls;environment unfamiliar   Signs and Symptoms (Fall Risk) presence of risk factors     Goal: Absence of Fall  Outcome: Ongoing (interventions implemented as appropriate)   07/02/18 0416   Fall Risk (Adult)   Absence of Fall making progress toward outcome       Problem: Skin Injury Risk (Adult)  Goal: Identify Related Risk Factors and Signs and Symptoms  Outcome: Ongoing (interventions implemented as appropriate)   07/02/18 0416   Skin Injury Risk (Adult)   Related Risk Factors (Skin Injury Risk) body weight extremes;cognitive impairment;hospitalization prolonged;mobility impaired     Goal: Skin Health and Integrity  Outcome: Ongoing (interventions implemented as appropriate)   07/02/18 0416   Skin Injury Risk (Adult)   Skin Health and Integrity making progress toward outcome       Problem: Patient Care Overview  Goal: Plan of Care Review  Outcome: Ongoing (interventions implemented as appropriate)   07/02/18 0416   Coping/Psychosocial   Plan of Care Reviewed With patient   Plan of Care Review   Progress improving

## 2018-07-02 NOTE — PLAN OF CARE
Problem: Patient Care Overview  Goal: Plan of Care Review  Outcome: Ongoing (interventions implemented as appropriate)   07/02/18 1816   Coping/Psychosocial   Plan of Care Reviewed With patient   OTHER   Outcome Summary Pt demonstrated improving functional mobility this PM; ambulating an increased distance of 200ft Min A with RWx. Pt limited primarily by balance deficits, BLE weakness, and anxiety about functional abilities. Pt req frequent encouragement/support to continue with functional tasks. Will continue to progress pt per PT POC as pt is appropriate. Recommend RWx for mobility at d/c due to safety concerns.   Plan of Care Review   Progress improving

## 2018-07-02 NOTE — THERAPY TREATMENT NOTE
Acute Care - Physical Therapy Treatment Note  [facility]     Patient Name: Israel Bojorquez  : 1955  MRN: [id number]  Today's Date: 2018  Onset of Illness/Injury or Date of Surgery: 18  Date of Referral to PT: 18  Referring Physician: Oni    Admit Date: 2018    Visit Dx:    ICD-10-CM ICD-9-CM   1. Altered mental status, unspecified altered mental status type R41.82 780.97   2. Dehydration E86.0 276.51   3. Hypothyroidism, unspecified type E03.9 244.9   4. Impaired functional mobility, balance, gait, and endurance Z74.09 V49.89     Patient Active Problem List   Diagnosis   • Tobacco abuse   • Hypertension   • Alcohol abuse   • Elevated liver enzymes   • COPD (chronic obstructive pulmonary disease) (CMS/HCC)   • Hyperglycemia   • Moderate malnutrition (CMS/HCC)   • Acute hyponatremia   • Elevated TSH, T4 borderline low   • Anemia   • Diarrhea   • Weakness   • Fall   • Hypothyroidism   • Leukocytosis   • Altered mental status   • Medically noncompliant   • Severe malnutrition   • Alcoholic dementia (CMS/HCC)   • Wernicke encephalopathy syndrome       Therapy Treatment          Rehabilitation Treatment Summary     Row Name 18 1453             Treatment Time/Intention    Discipline (P)  physical therapist  -STACIE      Document Type (P)  therapy note (daily note)  -STACIE      Subjective Information (P)  no complaints  -JB2      Mode of Treatment (P)  physical therapy  -STACIE      Patient Effort (P)  good  -STACIE      Existing Precautions/Restrictions (P)  fall  -STACIE      Recorded by [STACIE] Darian Lugo, PT Student 18 1523  [JB2] Darian Lugo, PT Student 18 1528      Row Name 18 1453             Vital Signs    Pre Systolic BP Rehab (P)  86  -STACIE      Pre Treatment Diastolic BP (P)  52  -STACIE      Pretreatment Heart Rate (beats/min) (P)  94  -STACIE      Posttreatment Heart Rate (beats/min) (P)  106  -STACIE      Pre Patient Position (P)  Supine  -STACIE      Intra Patient Position (P)  Standing   -STACIE      Post Patient Position (P)  Sitting  -STACIE      Recorded by [STACIE] Darian Lugo, PT Student 07/02/18 1523      Row Name 07/02/18 1453             Cognitive Assessment/Intervention    Additional Documentation (P)  Cognitive Assessment/Intervention (Group)  -STACIE      Recorded by [STACIE] Darian Lugo, PT Student 07/02/18 1523      Row Name 07/02/18 1453             Cognitive Assessment/Intervention- PT/OT    Affect/Mental Status (Cognitive) (P)  confused  -STACIE      Orientation Status (Cognition) (P)  oriented to;person;place  -STACIE      Follows Commands (Cognition) (P)  follows one step commands;over 90% accuracy;verbal cues/prompting required  -STACIE      Safety Deficit (Cognitive) (P)  mild deficit;awareness of need for assistance;insight into deficits/self awareness;safety precautions awareness  -STACIE      Recorded by [STACIE] Darian Lugo, PT Student 07/02/18 1526      Row Name 07/02/18 1453             Safety Issues, Functional Mobility    Safety Issues Affecting Function (Mobility) (P)  ability to follow commands;awareness of need for assistance;insight into deficits/self awareness;safety precaution awareness;positioning of assistive device;sequencing abilities  -STACIE      Impairments Affecting Function (Mobility) (P)  balance;cognition;coordination;endurance/activity tolerance;pain;strength  -STACIE      Recorded by [STACIE] Darian Lugo, PT Student 07/02/18 1526      Row Name 07/02/18 1453             Bed Mobility Assessment/Treatment    Supine-Sit [place] (Bed Mobility) (P)  independent  -STACIE      Assistive Device (Bed Mobility) (P)  bed rails;head of bed elevated  -STACIE      Recorded by [STACIE] Darian Lugo, PT Student 07/02/18 1526      Row Name 07/02/18 1453             Transfer Assessment/Treatment    Comment (Transfers) (P)  VC's for sequencing and hand placement on AD for safety.  -STACIE      Sit-Stand [place] (Transfers) (P)  contact guard;verbal cues  -STACIE      Stand-Sit [place] (Transfers) (P)  contact  guard;verbal cues  -STACIE      Recorded by [STACIE] Darian Lugo, PT Student 07/02/18 1526      Row Name 07/02/18 1453             Sit-Stand Transfer    Assistive Device (Sit-Stand Transfers) (P)  walker, front-wheeled  -STACIE      Recorded by [STACIE] Darian Lugo, PT Student 07/02/18 1526      Row Name 07/02/18 1453             Stand-Sit Transfer    Assistive Device (Stand-Sit Transfers) (P)  walker, front-wheeled  -STACIE      Recorded by [STACIE] Darian Lugo, PT Student 07/02/18 1526      Row Name 07/02/18 1453             Gait/Stairs Assessment/Training    14848 - Gait Training Minutes  (P)  12  -STACIE      Gait/Stairs Assessment/Training (P)  gait/ambulation assistive device  -STACIE      [place] Level (Gait) (P)  minimum assist (75% patient effort);verbal cues  -STACIE      Assistive Device (Gait) (P)  walker, front-wheeled  -STACIE      Distance in Feet (Gait) (P)  200  -STACIE      Pattern (Gait) (P)  step-to  -STACIE      Deviations/Abnormal Patterns (Gait) (P)  base of support, narrow;sherif decreased;gait speed decreased;other (see comments)   decreased step length  -STACIE      Bilateral Gait Deviations (P)  heel strike decreased;forward flexed posture  -STACIE      Comment (Gait/Stairs) (P)  Pt req encouragment to perform ambulation due to anxiety; req frequent acknowledgement of abilities to continue forward progression. Pt req VC to keep steps within RWx; pt tends to have RLE outside RWx and LLE inside. Req continuous TC for directioning of RWx.  -STACIE      Recorded by [STACIE] Darian Lugo, PT Student 07/02/18 1526      Row Name 07/02/18 1453             General ROM    GENERAL ROM COMMENTS (P)  BLE WFL  -STACIE      Recorded by [STACIE] Darian Lugo, PT Student 07/02/18 1526      Row Name 07/02/18 1453             Therapeutic Exercise    Therapeutic Exercise (P)  seated, lower extremities;seated, upper extremities  -STACIE      Additional Documentation (P)  Therapeutic Exercise (Row)  -STACIE      26981 - PT Therapeutic Exercise Minutes (P)  14  -JB2       Recorded by [STACIE] Darian Lugo, PT Student 07/02/18 1526  [JB2] Darian Lugo, PT Student 07/02/18 1528      Row Name 07/02/18 1453             Upper Extremity Seated Therapeutic Exercise    Performed, Seated Upper Extremity (Therapeutic Exercise) (P)  shoulder flexion/extension;shoulder abduction/adduction;elbow flexion/extension  -STACIE      Exercise Type, Seated Upper Extremity (Therapeutic Exercise) (P)  AROM (active range of motion)  -STACIE      Sets/Reps Detail, Seated Upper Extremity (Therapeutic Exercise) (P)  BUE 15x each  -STACIE      Recorded by [STACIE] Darian Lugo, PT Student 07/02/18 1528      Row Name 07/02/18 1453             Lower Extremity Seated Therapeutic Exercise    Performed, Seated Lower Extremity (Therapeutic Exercise) (P)  hip flexion/extension;LAQ (long arc quad), knee extension;ankle dorsiflexion/plantarflexion;hip abduction/adduction  -STACIE      Exercise Type, Seated Lower Extremity (Therapeutic Exercise) (P)  AROM (active range of motion)  -STACIE      Sets/Reps Detail, Seated Lower Extremity (Therapeutic Exercise) (P)  BLE 15x each  -STACIE      Recorded by [STACIE] Darian Lugo, PT Student 07/02/18 1528      Row Name 07/02/18 1453             Static Sitting Balance    Level of [place] (Unsupported Sitting, Static Balance) (P)  supervision  -STACIE      Sitting Position (Unsupported Sitting, Static Balance) (P)  sitting on edge of bed  -STACIE      Recorded by [STACIE] Darian Lugo, PT Student 07/02/18 1528      Row Name 07/02/18 1453             Static Standing Balance    Level of [place] (Supported Standing, Static Balance) (P)  contact guard assist  -STACIE      Assistive Device Utilized (Supported Standing, Static Balance) (P)  rolling walker  -STACIE      Recorded by [STACIE] Darian Lugo, PT Student 07/02/18 1528      Row Name 07/02/18 1453             Positioning and Restraints    Pre-Treatment Position (P)  in bed  -STACIE      Post Treatment Position (P)  chair  -STACIE      In Chair (P)  reclined;sitting;call light  within reach;encouraged to call for assist;exit alarm on;waffle cushion;legs elevated;heels elevated  -STACIE      Recorded by [STACIE] Darian Lugo, PT Student 07/02/18 1528      Row Name 07/02/18 1453             Pain Scale: Numbers Pre/Post-Treatment    Pain Scale: Numbers, Pretreatment (P)  0/10 - no pain  -STACIE      Pain Scale: Numbers, Post-Treatment (P)  0/10 - no pain  -STACIE      Recorded by [STACIE] Darian Lugo, PT Student 07/02/18 1528      Row Name                Wound 06/24/18 1245 medial coccyx skin tear    Wound - Properties Group Date first assessed: 06/24/18 [CP] Time first assessed: 1245 [CP] Present On Admission : yes;picture taken [CP] Orientation: medial [CP] Location: coccyx [CP] Type: skin tear [CP] Additional Comments: friction [CP] Recorded by:  [CP] Sandra Holden, IGNACIO 06/24/18 1415    Row Name                Wound 06/28/18 1015 Right lateral heel other (see comments)    Wound - Properties Group Date first assessed: 06/28/18 [CP] Time first assessed: 1015 [CP] Present On Admission : no;picture taken [CP] Side: Right [CP] Orientation: lateral [CP] Location: heel [CP] Type: other (see comments) [CP] Additional Comments: hematoma [CP] Recorded by:  [CP] Sandra Holden, IGNACIO 06/28/18 1321    Row Name 07/02/18 1453             Coping    Observed Emotional State (P)  calm;cooperative  -STACIE      Verbalized Emotional State (P)  acceptance  -STACIE      Recorded by [STACIE] Darian Lugo, PT Student 07/02/18 1528      Row Name 07/02/18 1453             Plan of Care Review    Plan of Care Reviewed With (P)  patient  -STACIE      Recorded by [STACIE] Darian Lugo, PT Student 07/02/18 1528      Row Name 07/02/18 1453             Outcome Summary/Treatment Plan (PT)    Daily Summary of Progress (PT) (P)  progress toward functional goals is good  -STACIE      Recorded by [STACIE] Darian Lugo, PT Student 07/02/18 1528        User Key  (r) = Recorded By, (t) = Taken By, (c) = Cosigned By    Initials Name Effective Dates Discipline     CARLIE Holden, APRN 06/08/18 -  Nurse    STACIE Lugo, PT Student 05/15/18 -  PT          Wound 06/24/18 1245 medial coccyx skin tear (Active)   Dressing Appearance dry;intact 7/2/2018  8:00 AM   Closure HOLGER 7/1/2018  8:00 PM   Base dressing in place, unable to visualize 7/2/2018  8:00 AM   Periwound pink;blanchable 7/2/2018 12:00 AM   Drainage Amount none 7/2/2018 12:00 AM   Care, Wound cleansed with;antimicrobial agent applied;other (see comments) 7/2/2018 12:00 AM   Dressing Care, Wound dressing changed;antimicrobial agent applied;low-adherent 7/2/2018 12:00 AM       Wound 06/28/18 1015 Right lateral heel other (see comments) (Active)   Dressing Appearance dry;intact 7/2/2018  8:00 AM   Closure HOLGER 7/1/2018  8:00 PM   Base dressing in place, unable to visualize 7/2/2018  8:00 AM   Periwound intact;dry;pink 7/2/2018 12:00 AM   Drainage Amount none 7/2/2018 12:00 AM   Care, Wound cleansed with;sterile normal saline 7/2/2018 12:00 AM   Dressing Care, Wound dressing changed;low-adherent 7/2/2018 12:00 AM             Physical Therapy Education     Title: PT OT SLP Therapies (Active)     Topic: Physical Therapy (Active)     Point: Mobility training (Active)    Learning Progress Summary     Learner Status Readiness Method Response Comment Documented by    Patient Active Acceptance E NR  STACIE 07/02/18 1453     Done Acceptance E VU,NR   06/30/18 1534     Done Acceptance E VU   06/28/18 1121     Active Acceptance E NR   06/24/18 1129    Family Done Acceptance E VU   06/28/18 1121     Active Acceptance E NR   06/24/18 1129          Point: Home exercise program (Active)    Learning Progress Summary     Learner Status Readiness Method Response Comment Documented by    Patient Active Acceptance E NR  STACIE 07/02/18 1453     Done Acceptance E VU   06/28/18 1121     Active Acceptance E NR   06/24/18 1129    Family Done Acceptance E VU   06/28/18 1121     Active Acceptance E NR   06/24/18 1129           Point: Body mechanics (Active)    Learning Progress Summary     Learner Status Readiness Method Response Comment Documented by    Patient Active Acceptance E NR  STACIE 07/02/18 1453     Done Acceptance E VU   06/28/18 1121     Active Acceptance E NR   06/24/18 1129    Family Done Acceptance E VU  KM 06/28/18 1121     Active Acceptance E NR   06/24/18 1129          Point: Precautions (Active)    Learning Progress Summary     Learner Status Readiness Method Response Comment Documented by    Patient Active Acceptance E NR  STACIE 07/02/18 1453     Done Acceptance E VU  KM 06/28/18 1121    Family Done Acceptance E VU   06/28/18 1121                      User Key     Initials Effective Dates Name Provider Type Discipline     06/19/15 -  Corine Sarabia, PT Physical Therapist PT     06/19/15 -  Becca Ro, PT Physical Therapist PT     06/19/15 -  Joselyn Rod, PT Physical Therapist PT     05/15/18 -  Darian Lugo, PT Student PT Student PT                    PT Recommendation and Plan     Outcome Summary/Treatment Plan (PT)  Daily Summary of Progress (PT): (P) progress toward functional goals is good  Plan of Care Reviewed With: (P) patient  Progress: (P) improving  Outcome Summary: (P) Pt demonstrated improving functional mobility this PM; ambulating an increased distance of 200ft Min A with RWx. Pt limited primarily by balance deficits, BLE weakness, and anxiety about functional abilities. Pt req frequent encouragement/support to continue with functional tasks. Will continue to progress pt per PT POC as pt is appropriate. Recommend RWx for mobility at d/c due to safety concerns.          Outcome Measures     Row Name 07/02/18 1453 06/30/18 1458          How much help from another person do you currently need...    Turning from your back to your side while in flat bed without using bedrails? (P)  4  -STACIE 4  -LS     Moving from lying on back to sitting on the side of a flat bed without  bedrails? (P)  4  -STACIE 4  -LS     Moving to and from a bed to a chair (including a wheelchair)? (P)  3  -STACIE 3  -LS     Standing up from a chair using your arms (e.g., wheelchair, bedside chair)? (P)  3  -STACIE 3  -LS     Climbing 3-5 steps with a railing? (P)  2  -STACIE 2  -LS     To walk in hospital room? (P)  3  -STACIE 3  -LS     AM-PAC 6 Clicks Score (P)  19  -STACIE 19  -LS        Functional Assessment    Outcome Measure Options (P)  AM-PAC 6 Clicks Basic Mobility (PT)  -STACIE AM-PAC 6 Clicks Basic Mobility (PT)  -LS       User Key  (r) = Recorded By, (t) = Taken By, (c) = Cosigned By    Initials Name Provider Type     Joselyn Rod, PT Physical Therapist    STACIE Lugo, PT Student PT Student           Time Calculation:         PT Charges     Row Name 07/02/18 1453             Time Calculation    Start Time (P)  0453  -STACIE      PT Received On (P)  07/02/18  -STACIE      PT Goal Re-Cert Due Date (P)  07/04/18  -         Time Calculation- PT    Total Timed Code Minutes- PT (P)  26 minute(s)  -STACIE         Timed Charges    16049 - PT Therapeutic Exercise Minutes (P)  14  -STACIE      53094 - Gait Training Minutes  (P)  12  -STACIE        User Key  (r) = Recorded By, (t) = Taken By, (c) = Cosigned By    Initials Name Provider Type    STACIE Lugo, PT Student PT Student        Therapy Suggested Charges     Code   Minutes Charges    48264 (CPT®) Hc Pt Neuromusc Re Education Ea 15 Min      68807 (CPT®) Hc Pt Ther Proc Ea 15 Min 14 1    84084 (CPT®) Hc Gait Training Ea 15 Min 12 1    21915 (CPT®) Hc Pt Therapeutic Act Ea 15 Min      00502 (CPT®) Hc Pt Manual Therapy Ea 15 Min      97132 (CPT®) Hc Pt Iontophoresis Ea 15 Min      12861 (CPT®) Hc Pt Elec Stim Ea-Per 15 Min      25537 (CPT®) Hc Pt Ultrasound Ea 15 Min      87690 (CPT®) Hc Pt Self Care/Mgmt/Train Ea 15 Min      Total  26 2        Therapy Charges for Today     Code Description Service Date Service Provider Modifiers Qty    02809425745 HC PT THER PROC EA 15 MIN 7/2/2018  Darian Lugo, PT Student GP 1    72469880339 HC GAIT TRAINING EA 15 MIN 7/2/2018 Darian Lugo, PT Student GP 1          PT G-Codes  Outcome Measure Options: (P) AM-PAC 6 Clicks Basic Mobility (PT)    Darian Lugo, PT Student  7/2/2018

## 2018-07-02 NOTE — PROGRESS NOTES
Continued Stay Note  [facility]     Patient Name: Israel Bojorquez  MRN: [id number]  Today's Date: 7/2/2018    Admit Date: 6/22/2018          Discharge Plan     Row Name 07/02/18 1431       Plan    Plan update    Patient/Family in Agreement with Plan yes    Plan Comments Received a call from Derrek with [facility] who advises that he only received part of the referral in the fax.  Refaxed referral to [phone].      Final Discharge Disposition Code 01 - home or self-care              Discharge Codes    No documentation.       Expected Discharge Date and Time     Expected Discharge Date Expected Discharge Time    Jul 2, 2018             Fatmata Berry RN

## 2018-07-02 NOTE — PROGRESS NOTES
Continued Stay Note  [facility]     Patient Name: Israel Bojorquez  MRN: [id number]  Today's Date: 7/2/2018    Admit Date: 6/22/2018          Discharge Plan     Row Name 07/02/18 1043       Plan    Plan update    Patient/Family in Agreement with Plan yes    Plan Comments Called [facility] and left voicemail with admission coordinator with callback number.  Called Fabienne with [facility] and left voicemail regarding referral with callback number.  Called patient daughter, Kaity, to advise and update who indicates that she will call [facility] [place] and try to get through today.  CM following.  Patient plan is to discharg [facility] via car with family to transport when bed available.     Final Discharge Disposition Code 03 - skilled nursing facility (SNF)              Discharge Codes    No documentation.       Expected Discharge Date and Time     Expected Discharge Date Expected Discharge Time    Jul 2, 2018             Fatmata Berry RN

## 2018-07-03 PROCEDURE — 97110 THERAPEUTIC EXERCISES: CPT

## 2018-07-03 PROCEDURE — 97116 GAIT TRAINING THERAPY: CPT

## 2018-07-03 PROCEDURE — 25010000002 HEPARIN (PORCINE) PER 1000 UNITS: Performed by: INTERNAL MEDICINE

## 2018-07-03 PROCEDURE — 99232 SBSQ HOSP IP/OBS MODERATE 35: CPT | Performed by: HOSPITALIST

## 2018-07-03 RX ADMIN — HEPARIN SODIUM 5000 UNITS: 5000 INJECTION, SOLUTION INTRAVENOUS; SUBCUTANEOUS at 20:28

## 2018-07-03 RX ADMIN — PRIMIDONE 50 MG: 50 TABLET ORAL at 20:28

## 2018-07-03 RX ADMIN — NICOTINE 1 PATCH: 21 PATCH, EXTENDED RELEASE TRANSDERMAL at 08:37

## 2018-07-03 RX ADMIN — Medication 100 MG: at 08:37

## 2018-07-03 RX ADMIN — PROPRANOLOL HYDROCHLORIDE 10 MG: 10 TABLET ORAL at 20:28

## 2018-07-03 RX ADMIN — PROPRANOLOL HYDROCHLORIDE 10 MG: 10 TABLET ORAL at 08:37

## 2018-07-03 RX ADMIN — FOLIC ACID 1 MG: 1 TABLET ORAL at 08:37

## 2018-07-03 RX ADMIN — LEVOTHYROXINE SODIUM 25 MCG: 25 TABLET ORAL at 05:46

## 2018-07-03 RX ADMIN — HEPARIN SODIUM 5000 UNITS: 5000 INJECTION, SOLUTION INTRAVENOUS; SUBCUTANEOUS at 08:37

## 2018-07-03 RX ADMIN — Medication 1 TABLET: at 08:36

## 2018-07-03 RX ADMIN — PANTOPRAZOLE SODIUM 40 MG: 40 TABLET, DELAYED RELEASE ORAL at 05:46

## 2018-07-03 RX ADMIN — CASTOR OIL AND BALSAM, PERU: 788; 87 OINTMENT TOPICAL at 08:37

## 2018-07-03 RX ADMIN — CASTOR OIL AND BALSAM, PERU: 788; 87 OINTMENT TOPICAL at 20:28

## 2018-07-03 NOTE — PROGRESS NOTES
Adult Nutrition  Assessment/PES    Patient Name:  Israel Bojorquez  YOB: 1955  MRN: [id number]  Admit Date:  6/22/2018    Assessment Date:  7/3/2018    Comments:            Reason for Assessment     Row Name 07/03/18 1313          Reason for Assessment    Reason For Assessment follow-up protocol   15 mins     Diagnosis --   per notes this adm                       Nutrition Prescription Ordered     Row Name 07/03/18 1315          Nutrition Prescription PO    Current PO Diet Regular     Supplement Boost Plus     Supplement Frequency Daily     Common Modifiers Cardiac             Evaluation of Received Nutrient/Fluid Intake     Row Name 07/03/18 1315          PO Evaluation    Number of Meals 10     % PO Intake 93             Problem/Interventions:        Problem 1     Row Name 07/03/18 1316          Nutrition Diagnoses Problem 1    Problem 1 Nutrition Appropriate for Condition at this Time     Etiology (related to) MNT for Treatment/Condition     Signs/Symptoms (evidenced by) PO Intake     Percent (%) intake recorded 93 %     Over number of meals 10                     Intervention Goal     Row Name 07/03/18 1316          Intervention Goal    PO Maintain intake             Nutrition Intervention     Row Name 07/03/18 1316          Nutrition Intervention    RD/Tech Action Follow Tx progress;Supplement provided               Education/Evaluation     Row Name 07/03/18 1316          Monitor/Evaluation    Monitor Per protocol         Electronically signed by:  Brigida Fenton MS,RD,LD  07/03/18 1:16 PM

## 2018-07-03 NOTE — THERAPY TREATMENT NOTE
Acute Care - Physical Therapy Treatment Note  [facility]     Patient Name: Israel Bojorquez  : 1955  MRN: [id number]  Today's Date: 7/3/2018  Onset of Illness/Injury or Date of Surgery: 18  Date of Referral to PT: 18  Referring Physician: Oni    Admit Date: 2018    Visit Dx:    ICD-10-CM ICD-9-CM   1. Altered mental status, unspecified altered mental status type R41.82 780.97   2. Dehydration E86.0 276.51   3. Hypothyroidism, unspecified type E03.9 244.9   4. Impaired functional mobility, balance, gait, and endurance Z74.09 V49.89     Patient Active Problem List   Diagnosis   • Tobacco abuse   • Hypertension   • Alcohol abuse   • Elevated liver enzymes   • COPD (chronic obstructive pulmonary disease) (CMS/HCC)   • Hyperglycemia   • Moderate malnutrition (CMS/HCC)   • Acute hyponatremia   • Elevated TSH, T4 borderline low   • Anemia   • Diarrhea   • Weakness   • Fall   • Hypothyroidism   • Leukocytosis   • Altered mental status   • Medically noncompliant   • Severe malnutrition   • Alcoholic dementia (CMS/HCC)   • Wernicke encephalopathy syndrome       Therapy Treatment          Rehabilitation Treatment Summary     Row Name 18 1533             Treatment Time/Intention    Discipline (P)  physical therapist  -STACIE      Document Type (P)  therapy note (daily note)  -STACIE      Subjective Information (P)  complains of;weakness  -STACIE      Mode of Treatment (P)  physical therapy  -STACIE      Therapy Frequency (PT Clinical Impression) (P)  daily  -STACIE      Patient Effort (P)  good  -STACIE      Existing Precautions/Restrictions (P)  fall  -STACIE      Recorded by [STACIE] Darian Lugo, PT Student 18 1608      Row Name 18 1533             Vital Signs    Pre Systolic BP Rehab (P)  112  -STACIE      Pre Treatment Diastolic BP (P)  83  -STACIE      Pretreatment Heart Rate (beats/min) (P)  101  -STACIE      Posttreatment Heart Rate (beats/min) (P)  107  -STACIE      Pre Patient Position (P)  Supine  -STACIE      Intra Patient  Position (P)  Standing  -STACIE      Post Patient Position (P)  Supine  -STACIE      Recorded by [STACIE] Darian Lugo, PT Student 07/03/18 1608      Row Name 07/03/18 1533             Cognitive Assessment/Intervention- PT/OT    Affect/Mental Status (Cognitive) (P)  WFL  -STACIE      Orientation Status (Cognition) (P)  oriented x 4  -STACIE      Follows Commands (Cognition) (P)  follows one step commands;over 90% accuracy;verbal cues/prompting required  -STACIE      Safety Deficit (Cognitive) (P)  mild deficit;awareness of need for assistance;insight into deficits/self awareness;safety precautions awareness  -STACIE      Recorded by [STACIE] Dairan Lugo, PT Student 07/03/18 1608      Row Name 07/03/18 1533             Bed Mobility Assessment/Treatment    Supine-Sit [place] (Bed Mobility) (P)  independent  -STACIE      Sit-Supine [place] (Bed Mobility) (P)  independent  -STACIE      Assistive Device (Bed Mobility) (P)  bed rails;head of bed elevated  -STACIE      Comment (Bed Mobility) (P)  Pt with appropriate safety awareness during bed mobility.  -STACIE      Recorded by [STACIE] Darian Lugo, PT Student 07/03/18 1608      Row Name 07/03/18 1533             Transfer Assessment/Treatment    Comment (Transfers) (P)  VC's for sequencing and hand placement on RWx.  -STACIE      Recorded by [STACIE] Darian Lugo, PT Student 07/03/18 1608      Row Name 07/03/18 1533             Sit-Stand Transfer    Sit-Stand [place] (Transfers) (P)  contact guard;verbal cues  -STACIE      Assistive Device (Sit-Stand Transfers) (P)  walker, front-wheeled  -STACIE      Recorded by [STACIE] Darian Lugo, PT Student 07/03/18 1608      Row Name 07/03/18 1533             Stand-Sit Transfer    Stand-Sit [place] (Transfers) (P)  contact guard;verbal cues  -STACIE      Assistive Device (Stand-Sit Transfers) (P)  walker, front-wheeled  -STACIE      Recorded by [STACIE] Darian Lugo, PT Student 07/03/18 1608      Row Name 07/03/18 1533             Gait/Stairs Assessment/Training    29097 - Gait Training  Minutes  (P)  12  -STACIE      Gait/Stairs Assessment/Training (P)  gait/ambulation assistive device  -STACIE      [place] Level (Gait) (P)  minimum assist (75% patient effort);verbal cues  -STACIE      Assistive Device (Gait) (P)  walker, front-wheeled  -STACIE      Distance in Feet (Gait) (P)  450  -STACIE      Pattern (Gait) (P)  step-through  -STACIE      Deviations/Abnormal Patterns (Gait) (P)  base of support, narrow;sherif decreased;other (see comments)   decreased step length  -STACIE      Bilateral Gait Deviations (P)  forward flexed posture;heel strike decreased  -STACIE      Comment (Gait/Stairs) (P)  VC's for proper gait mechanics: including increased step length, upright posture, and to keep steps within RWx for increased stability. Pt demonstrated several minor LOB during turns where he ventured too close to corners with RWx; educated pt to utilize wide hallway to avoid obstacles or to take caution with corners. Pt with decreased toe clearance; VC for pt to  feet to avoid tripping/falling.  -STACIE      Recorded by [STACIE] Darian Lugo, PT Student 07/03/18 1608      Row Name 07/03/18 1533             General ROM    GENERAL ROM COMMENTS (P)  BLE WFL  -STACIE      Recorded by [STACIE] Darian Lugo, PT Student 07/03/18 1608      Row Name 07/03/18 1533             Therapeutic Exercise    52501 - PT Therapeutic Exercise Minutes (P)  12  -STACIE      Recorded by [STACIE] Darian Lugo, PT Student 07/03/18 1608      Row Name 07/03/18 1533             Upper Extremity Seated Therapeutic Exercise    Performed, Seated Upper Extremity (Therapeutic Exercise) (P)  shoulder flexion/extension;shoulder abduction/adduction;shoulder horizontal abduction/adduction;elbow flexion/extension  -STACIE      Exercise Type, Seated Upper Extremity (Therapeutic Exercise) (P)  AROM (active range of motion)  -STACIE      Sets/Reps Detail, Seated Upper Extremity (Therapeutic Exercise) (P)  BUE 15x each  -STACIE      Recorded by [STACIE] Darian Lugo, PT Student 07/03/18 1608      Row Name  07/03/18 1533             Lower Extremity Seated Therapeutic Exercise    Performed, Seated Lower Extremity (Therapeutic Exercise) (P)  hip flexion/extension;hip abduction/adduction;hip external/internal rotation;LAQ (long arc quad), knee extension;ankle dorsiflexion/plantarflexion  -STACIE      Exercise Type, Seated Lower Extremity (Therapeutic Exercise) (P)  AROM (active range of motion)  -STACIE      Sets/Reps Detail, Seated Lower Extremity (Therapeutic Exercise) (P)  BLE 15x each  -STACIE      Recorded by [STACIE] Darian Lugo, PT Student 07/03/18 1608      Row Name 07/03/18 1533             Balance    Balance (P)  dynamic sitting balance  -STACIE      Recorded by [STACIE] Darian Lugo, PT Student 07/03/18 1608      Row Name 07/03/18 1533             Static Sitting Balance    Level of [place] (Unsupported Sitting, Static Balance) (P)  supervision  -STACIE      Sitting Position (Unsupported Sitting, Static Balance) (P)  sitting on edge of bed  -STACIE      Recorded by [STACIE] Darian Lugo, PT Student 07/03/18 1608      Row Name 07/03/18 1533             Dynamic Sitting Balance    Level of [place], Reaches Outside Midline (Sitting, Dynamic Balance) (P)  contact guard assist  -STACIE      Sitting Position, Reaches Outside Midline (Sitting, Dynamic Balance) (P)  sitting on edge of bed  -STACIE      Comment, Reaches Outside Midline (Sitting, Dynamic Balance) (P)  During performance of TE ~12 min. Slight posterior LOB noted with performance of LE TE; pt able to correct.  -STACIE      Recorded by [STACIE] Darian Lugo, PT Student 07/03/18 1610      Row Name 07/03/18 1533             Static Standing Balance    Level of [place] (Supported Standing, Static Balance) (P)  contact guard assist  -STACIE      Assistive Device Utilized (Supported Standing, Static Balance) (P)  rolling walker  -STACIE      Recorded by [STACIE] Darian Lugo, PT Student 07/03/18 1610      Row Name 07/03/18 1533             Positioning and Restraints    Pre-Treatment Position (P)  in bed   -STACIE      Post Treatment Position (P)  bed  -STACIE      In Bed (P)  supine;call light within reach;encouraged to call for assist;exit alarm on;legs elevated;heels elevated  -STACIE      Recorded by [STACIE] Darian Lugo, PT Student 07/03/18 1610      Row Name 07/03/18 1533             Pain Scale: Numbers Pre/Post-Treatment    Pain Scale: Numbers, Pretreatment (P)  0/10 - no pain  -STACIE      Pain Scale: Numbers, Post-Treatment (P)  0/10 - no pain  -STACIE      Recorded by [STACIE] Darian Lugo, PT Student 07/03/18 1610      Row Name                Wound 06/24/18 1245 medial coccyx skin tear    Wound - Properties Group Date first assessed: 06/24/18 [CP] Time first assessed: 1245 [CP] Present On Admission : yes;picture taken [CP] Orientation: medial [CP] Location: coccyx [CP] Type: skin tear [CP] Additional Comments: friction [CP] Recorded by:  [CP] Sandra Holden, IGNACIO 06/24/18 1415    Row Name                Wound 06/28/18 1015 Right lateral heel other (see comments)    Wound - Properties Group Date first assessed: 06/28/18 [CP] Time first assessed: 1015 [CP] Present On Admission : no;picture taken [CP] Side: Right [CP] Orientation: lateral [CP] Location: heel [CP] Type: other (see comments) [CP] Additional Comments: hematoma [CP] Recorded by:  [CP] IGNACIO Rangel 06/28/18 1321    Row Name 07/03/18 1533             Coping    Observed Emotional State (P)  calm;cooperative  -STACIE      Verbalized Emotional State (P)  acceptance  -STACIE      Recorded by [STACIE] Darian Lugo, PT Student 07/03/18 1610      Row Name 07/03/18 1533             Plan of Care Review    Plan of Care Reviewed With (P)  patient  -STACIE      Recorded by [STACIE] Darian Lugo, PT Student 07/03/18 1610      Row Name 07/03/18 1533             Outcome Summary/Treatment Plan (PT)    Daily Summary of Progress (PT) (P)  progress toward functional goals is good  -STACIE      Recorded by [STACIE] Darian Lugo, PT Student 07/03/18 1610        User Key  (r) = Recorded By, (t) = Taken  By, (c) = Cosigned By    Initials Name Effective Dates Discipline    CP Sandra Holden, APRN 06/08/18 -  Nurse    STACIE Lugo, PT Student 05/15/18 -  PT          Wound 06/24/18 1245 medial coccyx skin tear (Active)   Dressing Appearance dry;intact 7/3/2018  8:00 AM   Base dressing in place, unable to visualize 7/2/2018  8:00 PM   Periwound pink;blanchable 7/3/2018  8:00 AM   Edges irregular 7/3/2018  8:00 AM   Drainage Amount none 7/3/2018  8:00 AM   Care, Wound cleansed with 7/3/2018  8:00 AM   Dressing Care, Wound dressing applied;foam;low-adherent 7/3/2018  8:00 AM       Wound 06/28/18 1015 Right lateral heel other (see comments) (Active)   Dressing Appearance dry;intact 7/3/2018  8:00 AM   Base dressing in place, unable to visualize 7/2/2018  8:00 PM   Drainage Amount none 7/3/2018  8:00 AM   Dressing Care, Wound foam;low-adherent 7/3/2018  8:00 AM             Physical Therapy Education     Title: PT OT SLP Therapies (Active)     Topic: Physical Therapy (Active)     Point: Mobility training (Active)    Learning Progress Summary     Learner Status Readiness Method Response Comment Documented by    Patient Active Acceptance E NR  STACIE 07/03/18 1533     Active Acceptance E NR  STACIE 07/02/18 1453     Done Acceptance E VU,NR  LS 06/30/18 1534     Done Acceptance E VU   06/28/18 1121     Active Acceptance E NR   06/24/18 1129    Family Done Acceptance E VU   06/28/18 1121     Active Acceptance E NR   06/24/18 1129          Point: Home exercise program (Active)    Learning Progress Summary     Learner Status Readiness Method Response Comment Documented by    Patient Active Acceptance E NR  STACIE 07/03/18 1533     Active Acceptance E NR  STACIE 07/02/18 1453     Done Acceptance E VU   06/28/18 1121     Active Acceptance E NR   06/24/18 1129    Family Done Acceptance E VU   06/28/18 1121     Active Acceptance E NR   06/24/18 1129          Point: Body mechanics (Active)    Learning Progress Summary      Learner Status Readiness Method Response Comment Documented by    Patient Active Acceptance E NR  STACIE 07/03/18 1533     Active Acceptance E NR  STACIE 07/02/18 1453     Done Acceptance E VU  KM 06/28/18 1121     Active Acceptance E NR   06/24/18 1129    Family Done Acceptance E VU  KM 06/28/18 1121     Active Acceptance E NR   06/24/18 1129          Point: Precautions (Active)    Learning Progress Summary     Learner Status Readiness Method Response Comment Documented by    Patient Active Acceptance E NR  STACIE 07/03/18 1533     Active Acceptance E NR  STACIE 07/02/18 1453     Done Acceptance E VU  KM 06/28/18 1121    Family Done Acceptance E VU   06/28/18 1121                      User Key     Initials Effective Dates Name Provider Type Discipline     06/19/15 -  Corine Sarabia, PT Physical Therapist PT     06/19/15 -  Becca Ro, PT Physical Therapist PT     06/19/15 -  Joselyn Rod, PT Physical Therapist PT     05/15/18 -  Darian Lugo, PT Student PT Student PT                    PT Recommendation and Plan  Therapy Frequency (PT Clinical Impression): (P) daily  Outcome Summary/Treatment Plan (PT)  Daily Summary of Progress (PT): (P) progress toward functional goals is good  Plan of Care Reviewed With: (P) patient  Progress: (P) improving  Outcome Summary: (P) Pt demonstrated good progress toward functional independence this session; increasing ambulation distance to 450 ft CGA with RWx. Pt limited by fatigue and BLE weakness; educated pt on proper gait mechanics for safe ambulation with RWx. Will continue to progress pt per PT POC as pt is appropriate.          Outcome Measures     Row Name 07/03/18 1533 07/02/18 1453          How much help from another person do you currently need...    Turning from your back to your side while in flat bed without using bedrails? (P)  4  -STACIE 4  -LS (r) STACIE (t) LS (c)     Moving from lying on back to sitting on the side of a flat bed without bedrails? (P)  4   -STACIE 4  -LS (r) STACIE (t) LS (c)     Moving to and from a bed to a chair (including a wheelchair)? (P)  3  -STACIE 3  -LS (r) STACIE (t) LS (c)     Standing up from a chair using your arms (e.g., wheelchair, bedside chair)? (P)  3  -STACIE 3  -LS (r) STACIE (t) LS (c)     Climbing 3-5 steps with a railing? (P)  2  -STACIE 2  -LS (r) STACIE (t) LS (c)     To walk in hospital room? (P)  3  -STACIE 3  -LS (r) STACIE (t) LS (c)     AM-PAC 6 Clicks Score (P)  19  -STACIE 19  -LS (r) STACIE (t)        Functional Assessment    Outcome Measure Options (P)  AM-PAC 6 Clicks Basic Mobility (PT)  -STACIE AM-PAC 6 Clicks Basic Mobility (PT)  -LS (r) STACIE (t) LS (c)       User Key  (r) = Recorded By, (t) = Taken By, (c) = Cosigned By    Initials Name Provider Type    JAIDA Muller, PT Physical Therapist    STACIE Lugo, PT Student PT Student           Time Calculation:         PT Charges     Row Name 07/03/18 1533             Time Calculation    Start Time (P)  1533  -STACIE      PT Received On (P)  07/03/18  -      PT Goal Re-Cert Due Date (P)  07/04/18  -         Time Calculation- PT    Total Timed Code Minutes- PT (P)  24 minute(s)  -         Timed Charges    00167 - PT Therapeutic Exercise Minutes (P)  12  -STACIE      78798 - Gait Training Minutes  (P)  12  -        User Key  (r) = Recorded By, (t) = Taken By, (c) = Cosigned By    Initials Name Provider Type    STACIE Lugo, PT Student PT Student        Therapy Suggested Charges     Code   Minutes Charges    14769 (CPT®) Hc Pt Neuromusc Re Education Ea 15 Min      35399 (CPT®) Hc Pt Ther Proc Ea 15 Min 12 1    36133 (CPT®) Hc Gait Training Ea 15 Min 12 1    96028 (CPT®) Hc Pt Therapeutic Act Ea 15 Min      69589 (CPT®) Hc Pt Manual Therapy Ea 15 Min      26903 (CPT®) Hc Pt Iontophoresis Ea 15 Min      38582 (CPT®) Hc Pt Elec Stim Ea-Per 15 Min      71114 (CPT®) Hc Pt Ultrasound Ea 15 Min      85726 (CPT®) Hc Pt Self Care/Mgmt/Train Ea 15 Min      Total  24 2        Therapy Charges for Today     Code Description  Service Date Service Provider Modifiers Qty    08852784268 HC PT THER PROC EA 15 MIN 7/2/2018 Darian Lugo, PT Student GP 1    79413054350 HC GAIT TRAINING EA 15 MIN 7/2/2018 Darian Lugo, PT Student GP 1    59737775512 HC PT THER PROC EA 15 MIN 7/3/2018 Darian Lugo, PT Student GP 1    20023591817 HC GAIT TRAINING EA 15 MIN 7/3/2018 Darian Lugo, PT Student GP 1          PT G-Codes  Outcome Measure Options: (P) AM-PAC 6 Clicks Basic Mobility (PT)    Darian Lugo, PT Student  7/3/2018

## 2018-07-03 NOTE — PROGRESS NOTES
Continued Stay Note  [facility]     Patient Name: Israel Bojorquez  MRN: [id number]  Today's Date: 7/3/2018    Admit Date: 6/22/2018          Discharge Plan     Row Name 07/03/18 1011       Plan    Plan update    Patient/Family in Agreement with Plan yes    Plan Comments Called Aslhee with [facility] with referral and left voicemail with callback number.  Called [facility] and left voicemail with callback number with admissions office.  Received a call from Derrek with [facility] who cannot offer patient a bed.  CM following.      Final Discharge Disposition Code 03 - skilled nursing facility (SNF)    Row Name 07/03/18 0922       Plan    Plan update-    Patient/Family in Agreement with Plan yes    Plan Comments Per voicemail left by patients daughter, Kaity, she has contacted a person at [facility] in [place] and provided a number to call [phone] and speak with either Mercedes or Tati.  Called number provided and left voicemail with name, title, reason for call and callback number.   Callked [facility] and left voicemail for Derrek regarding referral refaxed to him yesterday.  CM following.  Patient plan is to discharge to rehab via car with family to transport when bed available.      Final Discharge Disposition Code 03 - skilled nursing facility (SNF)    Row Name 07/03/18 0916       Plan    Plan update    Patient/Family in Agreement with Plan yes              Discharge Codes    No documentation.       Expected Discharge Date and Time     Expected Discharge Date Expected Discharge Time    Jul 3, 2018             Fatmata Berry RN

## 2018-07-03 NOTE — PROGRESS NOTES
Continued Stay Note  [facility]     Patient Name: Israel Bojorquez  MRN: [id number]  Today's Date: 7/3/2018    Admit Date: 6/22/2018          Discharge Plan     Row Name 07/03/18 1411       Plan    Plan update    Patient/Family in Agreement with Plan yes    Plan Comments Received a call back from Christine with [facility] who has a male bed available that is an all inclusive apartment with full care on the Memory Care unit and the cost of $5900/month averaging out to $196/day.  Called patients daughter, Kaity, to advise and left voicemail to call back to discuss.  CM following.    Final Discharge Disposition Code 04 - intermediate care facility    Row Name 07/03/18 7011       Plan    Plan update    Patient/Family in Agreement with Plan yes    Plan Comments Received a call back from Mercedes with [facility] who advises that they do not have any beds open at this time but referred to a [facility] facility [facility].  [facility] [phone] and left voicemail for Christine regarding referral.  CM following.      Final Discharge Disposition Code 03 - skilled nursing facility (SNF)              Discharge Codes    No documentation.       Expected Discharge Date and Time     Expected Discharge Date Expected Discharge Time    Jul 3, 2018             Fatmata Berry, RN

## 2018-07-03 NOTE — PROGRESS NOTES
Continued Stay Note  [facility]     Patient Name: Israel Bojorquez  MRN: [id number]  Today's Date: 7/3/2018    Admit Date: 6/22/2018          Discharge Plan     Row Name 07/03/18 0922       Plan    Plan update-    Patient/Family in Agreement with Plan yes    Plan Comments Per voicemail left by patients daughter, Kaity, she has contacted a person at [facility] in [place] and provided a number to call [phone] and speak with either Mercedes or Tati.  Called number provided and left voicemail with name, title, reason for call and callback number.   Callked [facility] and left voicemail for Derrek regarding referral refaxed to him yesterday.  CM following.  Patient plan is to discharge to rehab via car with family to transport when bed available.      Final Discharge Disposition Code 03 - skilled nursing facility (SNF)    Row Name 07/03/18 0916       Plan    Plan update    Patient/Family in Agreement with Plan yes              Discharge Codes    No documentation.       Expected Discharge Date and Time     Expected Discharge Date Expected Discharge Time    Jul 3, 2018             Fatmata Berry, RN

## 2018-07-03 NOTE — PLAN OF CARE
Problem: Patient Care Overview  Goal: Plan of Care Review  Outcome: Ongoing (interventions implemented as appropriate)   07/03/18 3736   Coping/Psychosocial   Plan of Care Reviewed With patient   OTHER   Outcome Summary Pt demonstrated good progress toward functional independence this session; increasing ambulation distance to 450 ft CGA with RWx. Pt limited by fatigue and BLE weakness; educated pt on proper gait mechanics for safe ambulation with RWx. Will continue to progress pt per PT POC as pt is appropriate.   Plan of Care Review   Progress improving

## 2018-07-03 NOTE — PROGRESS NOTES
Continued Stay Note  [facility]     Patient Name: Israel Bojorquez  MRN: [id number]  Today's Date: 7/3/2018    Admit Date: 6/22/2018          Discharge Plan     Row Name 07/03/18 1331       Plan    Plan update    Patient/Family in Agreement with Plan yes    Plan Comments Received a call back from Mercedes with [facility] who advises that they do not have any beds open at this time but referred to a [facility] at [facility].  Called [facility] [phone] and left voicemail for Christine regarding referral.  CM following.      Final Discharge Disposition Code 03 - skilled nursing facility (SNF)              Discharge Codes    No documentation.       Expected Discharge Date and Time     Expected Discharge Date Expected Discharge Time    Jul 3, 2018             Fatmata Berry RN

## 2018-07-03 NOTE — PROGRESS NOTES
[facility] Medicine Services  PROGRESS NOTE    Patient Name: Israel Bojorquez  : 1955  MRN: [id number]    Date of Admission: 2018  Length of Stay: 10  Primary Care Physician: No Known Provider    Subjective   Subjective     CC: F/U encephalopathy, suspected Wernicke's syndrome      HPI:    Doing well, denies any complaints, family not present.      Review of Systems   Gen- No fevers, chills  CV- No chest pain, palpitations  Resp- No cough, dyspnea  GI- No N/V/D, abd pain      Otherwise ROS is negative except as mentioned in the HPI.    Objective   Objective     Vital Signs:   Temp:  [97.5 °F (36.4 °C)-98.8 °F (37.1 °C)] 97.5 °F (36.4 °C)  Heart Rate:  [] 85  Resp:  [16-18] 16  BP: ()/(53-78) 88/53        Physical Exam:  Constitutional: No acute distress  HENT: NCAT, mucous membranes moist  Respiratory: Clear to auscultation bilaterally, respiratory effort normal   Cardiovascular: RRR, no murmurs  Gastrointestinal: Positive bowel sounds, soft, nontender, nondistended  Musculoskeletal: No bilateral ankle edema  Psychiatric: Affect is flat  Neurologic: Face symmetric, speech clear, moves all ext; answers simple questions appropriately  Skin: No rashes      Results Reviewed:  I have personally reviewed current lab, radiology, and data and agree.      Results from last 7 days  Lab Units 18  0620 18  1140   WBC 10*3/mm3 11.23* 10.18   HEMOGLOBIN g/dL 9.8* 9.8*   HEMATOCRIT % 29.9* 29.7*   PLATELETS 10*3/mm3 213 205       Results from last 7 days  Lab Units 18  0620 18  1824 18  1140   SODIUM mmol/L 141  --  141   POTASSIUM mmol/L 4.9 3.9 3.3*   CHLORIDE mmol/L 109  --  110*   CO2 mmol/L 30.0  --  26.0   BUN mg/dL 8*  --  5*   CREATININE mg/dL 0.68  --  0.51*   GLUCOSE mg/dL 88  --  104*   CALCIUM mg/dL 9.1  --  8.3*     Estimated Creatinine Clearance: 80.1 mL/min (by C-G formula based on SCr of 0.68 mg/dL).    I have reviewed the  medications.    Assessment/Plan   Assessment / Plan     Hospital Problem List     * (Principal)Altered mental status    Tobacco abuse (Chronic)    Hypertension (Chronic)    Alcohol abuse (Chronic)    Elevated liver enzymes (Chronic)    COPD (chronic obstructive pulmonary disease) (CMS/HCC) (Chronic)    Diarrhea (Chronic)    Hypothyroidism (Chronic)    Leukocytosis    Medically noncompliant (Chronic)    Severe malnutrition (Chronic)    Alcoholic dementia (CMS/HCC)    Wernicke encephalopathy syndrome             Brief Hospital Course to date:  Israel Bojorquez is a 63 y.o. male with a past medical history significant for longstanding alcohol abuse with progressing ETOH dementia, Acosta's esophagus, COPD, hypothyroidism, SIADH, essential tremor, essential hypertension, hyperlipidemia, and tobacco abuse presents to the ED after son found down at home      Assessment & Plan:   Stable, nothing new to add. BP/mental status continues to improve. Pt encourage to increase PO intake and physical activities as tolerated. CM working on placement for STR.  --------------------------------------------------------------------------    Wernicke's encephalopathy (mental status improved)  EtOH dependence  -As he is too weak to walk safely, needs SNF rehab and has reliably voiced he is agreeable  -Family is pursuing emergency guardianship  -S/P high dose vitamin and thiamine reploacements IV, continue PO thiamine    Hypokalemia, resolved  -Replace electrolytes per protocol     Hypothyroidism  -Restarted home Synthroid as was prior noncompliant, noting abnormal thyroid studies, will need rechecked 6 weeks after d/c    Frequent PVC/PAC  -Asymptomatic   -Continue propranolol-reduced dose due to low BP    Hypotension  - relatively asymptomatic, monitor    DVT Prophylaxis:  SQ heparin    CODE STATUS:   Code Status and Medical Interventions:   Ordered at: 06/23/18 6747     Level Of Support Discussed With:    Patient     Code Status:    CPR      Medical Interventions (Level of Support Prior to Arrest):    Full       Disposition:awaiting placement      Electronically signed by Silvio Echevarria MD, 07/03/18, 1:28 PM.

## 2018-07-04 PROCEDURE — 99232 SBSQ HOSP IP/OBS MODERATE 35: CPT | Performed by: NURSE PRACTITIONER

## 2018-07-04 PROCEDURE — 25010000002 HEPARIN (PORCINE) PER 1000 UNITS: Performed by: INTERNAL MEDICINE

## 2018-07-04 PROCEDURE — 97110 THERAPEUTIC EXERCISES: CPT

## 2018-07-04 RX ADMIN — FOLIC ACID 1 MG: 1 TABLET ORAL at 08:26

## 2018-07-04 RX ADMIN — PANTOPRAZOLE SODIUM 40 MG: 40 TABLET, DELAYED RELEASE ORAL at 05:13

## 2018-07-04 RX ADMIN — HEPARIN SODIUM 5000 UNITS: 5000 INJECTION, SOLUTION INTRAVENOUS; SUBCUTANEOUS at 20:16

## 2018-07-04 RX ADMIN — NICOTINE 1 PATCH: 21 PATCH, EXTENDED RELEASE TRANSDERMAL at 08:25

## 2018-07-04 RX ADMIN — HEPARIN SODIUM 5000 UNITS: 5000 INJECTION, SOLUTION INTRAVENOUS; SUBCUTANEOUS at 08:26

## 2018-07-04 RX ADMIN — Medication 1 TABLET: at 08:25

## 2018-07-04 RX ADMIN — CASTOR OIL AND BALSAM, PERU: 788; 87 OINTMENT TOPICAL at 08:26

## 2018-07-04 RX ADMIN — PROPRANOLOL HYDROCHLORIDE 10 MG: 10 TABLET ORAL at 20:16

## 2018-07-04 RX ADMIN — PRIMIDONE 50 MG: 50 TABLET ORAL at 20:16

## 2018-07-04 RX ADMIN — LEVOTHYROXINE SODIUM 25 MCG: 25 TABLET ORAL at 05:13

## 2018-07-04 RX ADMIN — PROPRANOLOL HYDROCHLORIDE 10 MG: 10 TABLET ORAL at 08:27

## 2018-07-04 RX ADMIN — Medication 100 MG: at 08:26

## 2018-07-04 RX ADMIN — CASTOR OIL AND BALSAM, PERU: 788; 87 OINTMENT TOPICAL at 20:16

## 2018-07-04 NOTE — PLAN OF CARE
Problem: Patient Care Overview  Goal: Plan of Care Review  Outcome: Ongoing (interventions implemented as appropriate)   07/04/18 1612   Coping/Psychosocial   Plan of Care Reviewed With patient   OTHER   Outcome Summary PT recert completed. PT goals updated to reflect current level of performance. Pt. participated in BUE and BLE TherEx seated EOB. Pt. declined transfers and gait d/t fatigue. Pt. will continue to benefit from skilled IPPT to improve patient's safety and ind. w/ mobility.   Plan of Care Review   Progress improving      07/04/18 1612   Coping/Psychosocial   Plan of Care Reviewed With patient   OTHER   Outcome Summary PT recert completed; goals updated to reflect current level of performance. Pt. participated in BUE and BLE TherEx seated EOB. Pt. declined transfers and gait d/t fatigue. Pt. will continue to benefit from skilled IPPT to improve patient's safety and ind. w/ mobility.   Plan of Care Review   Progress improving      07/04/18 1612   Coping/Psychosocial   Plan of Care Reviewed With patient   OTHER   Outcome Summary PT recert completed; goals updated to reflect current level of performance. Pt. participated in BUE and BLE TherEx seated EOB and postural control/dynamic sitting balance activities. Pt. declined transfers and gait d/t fatigue. Pt. will continue to benefit from skilled IPPT to improve patient's safety and ind. w/ mobility.   Plan of Care Review   Progress improving

## 2018-07-04 NOTE — THERAPY TREATMENT NOTE
Acute Care - Physical Therapy Treatment Note  [facility]     Patient Name: Israel Bojorquez  : 1955  MRN: [id number]  Today's Date: 2018  Onset of Illness/Injury or Date of Surgery: 18  Date of Referral to PT: 18  Referring Physician: Oni    Admit Date: 2018    Visit Dx:    ICD-10-CM ICD-9-CM   1. Altered mental status, unspecified altered mental status type R41.82 780.97   2. Dehydration E86.0 276.51   3. Hypothyroidism, unspecified type E03.9 244.9   4. Impaired functional mobility, balance, gait, and endurance Z74.09 V49.89     Patient Active Problem List   Diagnosis   • Tobacco abuse   • Hypertension   • Alcohol abuse   • Elevated liver enzymes   • COPD (chronic obstructive pulmonary disease) (CMS/HCC)   • Hyperglycemia   • Moderate malnutrition (CMS/HCC)   • Acute hyponatremia   • Elevated TSH, T4 borderline low   • Anemia   • Diarrhea   • Weakness   • Fall   • Hypothyroidism   • Leukocytosis   • Altered mental status   • Medically noncompliant   • Severe malnutrition   • Alcoholic dementia (CMS/HCC)   • Wernicke encephalopathy syndrome       Therapy Treatment          Rehabilitation Treatment Summary     Row Name 18 1535             Treatment Time/Intention    Discipline physical therapist  -MB      Document Type progress note/recertification  -MB      Subjective Information complains of;fatigue  -MB      Mode of Treatment physical therapy;individual therapy  -MB      Patient/Family Observations No family present at bedside.  -MB      Care Plan Review care plan/treatment goals reviewed;risks/benefits reviewed;current/potential barriers reviewed;patient/other agree to care plan  -MB      Therapy Frequency (PT Clinical Impression) daily  -MB      Patient Effort good  -MB      Existing Precautions/Restrictions fall  -MB      Recorded by [MB] Heather Byrne, PT 18 1605      Row Name 18 1535             Vital Signs    Pre Systolic BP Rehab 95  -MB      Pre  Treatment Diastolic BP 66  -MB      Intra Systolic BP Rehab 110  -MB      Intra Treatment Diastolic BP 78  -MB      Pre SpO2 (%) 99  -MB      O2 Delivery Pre Treatment room air  -MB      O2 Delivery Intra Treatment room air  -MB      Post SpO2 (%) 99  -MB      O2 Delivery Post Treatment room air  -MB      Pre Patient Position Supine  -MB      Intra Patient Position Sitting  -MB      Post Patient Position Supine  -MB      Recorded by [MB] Heather Byrne, PT 07/04/18 1612      Row Name 07/04/18 1535             Cognitive Assessment/Intervention- PT/OT    Affect/Mental Status (Cognitive) confused  -MB      Orientation Status (Cognition) oriented to;person;place  -MB      Follows Commands (Cognition) follows one step commands;75-90% accuracy;verbal cues/prompting required;repetition of directions required  -MB      Safety Deficit (Cognitive) awareness of need for assistance;insight into deficits/self awareness;safety precautions awareness  -MB      Personal Safety Interventions fall prevention program maintained;muscle strengthening facilitated;nonskid shoes/slippers when out of bed  -MB      Recorded by [MB] Heather Byrne, PT 07/04/18 1612      Row Name 07/04/18 1535             Bed Mobility Assessment/Treatment    Supine-Sit [place] (Bed Mobility) supervision  -MB      Sit-Supine [place] (Bed Mobility) supervision  -MB      Bed Mobility, Safety Issues decreased use of arms for pushing/pulling;decreased use of legs for bridging/pushing  -MB      Comment (Bed Mobility) Pt. advanced to EOB and returned to supine w/ supervision only.  -MB      Recorded by [MB] Heather Byrne, PT 07/04/18 1612      Row Name 07/04/18 1535             Transfer Assessment/Treatment    Comment (Transfers) Pt. declined transfers this date d/t fatigue, requesting to rest.   -MB      Recorded by [MB] Heather Byrne, PT 07/04/18 1612      Row Name 07/04/18 1535             Gait/Stairs Assessment/Training    [place]  Level (Gait) not tested  -MB      Comment (Gait/Stairs) Pt. declined ambulation d/t fatigue.  -MB      Recorded by [MB] Heather Byrne, PT 07/04/18 1612      Row Name 07/04/18 1535             Upper Extremity Seated Therapeutic Exercise    Performed, Seated Upper Extremity (Therapeutic Exercise) shoulder flexion/extension;scapular protraction/retraction;elbow flexion/extension  -MB      Exercise Type, Seated Upper Extremity (Therapeutic Exercise) AROM (active range of motion)  -MB      Sets/Reps Detail, Seated Upper Extremity (Therapeutic Exercise) 1/15 BUEs  -MB      Recorded by [MB] Heather Byrne, PT 07/04/18 1612      Row Name 07/04/18 1535             Lower Extremity Seated Therapeutic Exercise    Performed, Seated Lower Extremity (Therapeutic Exercise) hip flexion/extension;hip abduction/adduction;knee flexion/extension;ankle dorsiflexion/plantarflexion;LAQ (long arc quad), knee extension  -MB      Exercise Type, Seated Lower Extremity (Therapeutic Exercise) AROM (active range of motion)  -MB      Expected Outcomes, Seated Lower Extremity (Therapeutic Exercise) improve functional stability;improve performance, gait skills;improve performance, transfer skills  -MB      Sets/Reps Detail, Seated Lower Extremity (Therapeutic Exercise) 1/15 BLEs  -MB      Recorded by [MB] Heather Byrne, PT 07/04/18 1612      Row Name 07/04/18 1535             Dynamic Sitting Balance    Level of [place], Reaches Outside Midline (Sitting, Dynamic Balance) supervision  -MB      Sitting Position, Reaches Outside Midline (Sitting, Dynamic Balance) sitting on edge of bed  -MB      Comment, Reaches Outside Midline (Sitting, Dynamic Balance) Performing UE and LE TherEx x 12 minutes.  -MB      Recorded by [MB] Heather Byrne, PT 07/04/18 1612      Row Name 07/04/18 1535             Positioning and Restraints    Pre-Treatment Position in bed  -MB      Post Treatment Position bed  -MB      In Bed notified nsg;supine;call  light within reach;encouraged to call for assist;exit alarm on  -MB      Recorded by [MB] Heather Byrne, PT 07/04/18 1612      Row Name 07/04/18 1535             Pain Scale: Numbers Pre/Post-Treatment    Pain Scale: Numbers, Pretreatment 0/10 - no pain  -MB      Pain Scale: Numbers, Post-Treatment 0/10 - no pain  -MB      Recorded by [MB] Heather Byrne, PT 07/04/18 1612      Row Name                Wound 06/24/18 1245 medial coccyx skin tear    Wound - Properties Group Date first assessed: 06/24/18 [CP] Time first assessed: 1245 [CP] Present On Admission : yes;picture taken [CP] Orientation: medial [CP] Location: coccyx [CP] Type: skin tear [CP] Additional Comments: friction [CP] Recorded by:  [CP] Sandra Holden, IGNACIO 06/24/18 1415    Row Name                Wound 06/28/18 1015 Right lateral heel other (see comments)    Wound - Properties Group Date first assessed: 06/28/18 [CP] Time first assessed: 1015 [CP] Present On Admission : no;picture taken [CP] Side: Right [CP] Orientation: lateral [CP] Location: heel [CP] Type: other (see comments) [CP] Additional Comments: hematoma [CP] Recorded by:  [CP] Sandra Holden, IGNACIO 06/28/18 1321    Row Name 07/04/18 1535             Plan of Care Review    Plan of Care Reviewed With patient  -MB      Recorded by [MB] Heather Byrne, PT 07/04/18 1612      Row Name 07/04/18 1535             Outcome Summary/Treatment Plan (PT)    Daily Summary of Progress (PT) progress toward functional goals as expected  -MB      Recorded by [MB] Heather Byrne, PT 07/04/18 1612        User Key  (r) = Recorded By, (t) = Taken By, (c) = Cosigned By    Initials Name Effective Dates Discipline    CP Sandra Holden, IGNACIO 06/08/18 -  Nurse    BRENDAN Byrne, PT 03/14/16 -  PT          Wound 06/24/18 1245 medial coccyx skin tear (Active)   Dressing Appearance dry;intact 7/4/2018  8:20 AM   Base pink 7/4/2018  8:20 AM   Periwound pink;blanchable 7/4/2018  8:20 AM    Drainage Amount none 7/4/2018  8:20 AM   Care, Wound cleansed with;soap and water 7/4/2018  8:20 AM   Dressing Care, Wound dressing changed;foam;low-adherent 7/4/2018  8:20 AM       Wound 06/28/18 1015 Right lateral heel other (see comments) (Active)   Dressing Appearance dry;intact 7/4/2018  8:20 AM   Base dressing in place, unable to visualize 7/3/2018  8:00 PM   Periwound intact;dry;pink 7/4/2018  8:20 AM   Care, Wound cleansed with;soap and water 7/4/2018  8:20 AM   Dressing Care, Wound foam;low-adherent 7/4/2018  8:20 AM               PT Rehab Goals     Row Name 07/04/18 6228             Bed Mobility Goal 1 (PT)    Activity/Assistive Device (Bed Mobility Goal 1, PT) bed mobility activities, all  -MB      [place] Level/Cues Needed (Bed Mobility Goal 1, PT) conditional independence  -MB      Time Frame (Bed Mobility Goal 1, PT) 2 weeks;long term goal (LTG)  -MB         Transfer Goal 1 (PT)    Activity/Assistive Device (Transfer Goal 1, PT) sit-to-stand/stand-to-sit;bed-to-chair/chair-to-bed  -MB      [place] Level/Cues Needed (Transfer Goal 1, PT) conditional independence  -MB      Time Frame (Transfer Goal 1, PT) 2 weeks;long term goal (LTG)  -MB         Gait Training Goal 1 (PT)    Activity/Assistive Device (Gait Training Goal 1, PT) walker, rolling  -MB      [place] Level (Gait Training Goal 1, PT) standby assist  -MB      Distance (Gait Goal 1, PT) 250  -MB      Time Frame (Gait Training Goal 1, PT) 2 weeks;long term goal (LTG)  -MB        User Key  (r) = Recorded By, (t) = Taken By, (c) = Cosigned By    Initials Name Provider Type Discipline    BRENDAN Byrne, PT Physical Therapist PT          Physical Therapy Education     Title: PT OT SLP Therapies (Active)     Topic: Physical Therapy (Active)     Point: Mobility training (Active)    Learning Progress Summary     Learner Status Readiness Method Response Comment Documented by    Patient Active Acceptance E NR  STACIE 07/03/18 0958      Active Acceptance E NR  STACIE 07/02/18 1453     Done Acceptance E VU,NR   06/30/18 1534     Done Acceptance E VU  KM 06/28/18 1121     Active Acceptance E NR   06/24/18 1129    Family Done Acceptance E VU  KM 06/28/18 1121     Active Acceptance E NR   06/24/18 1129          Point: Home exercise program (Active)    Learning Progress Summary     Learner Status Readiness Method Response Comment Documented by    Patient Active Acceptance E NR  STACIE 07/03/18 1533     Active Acceptance E NR  STACIE 07/02/18 1453     Done Acceptance E VU  KM 06/28/18 1121     Active Acceptance E NR   06/24/18 1129    Family Done Acceptance E VU   06/28/18 1121     Active Acceptance E NR   06/24/18 1129          Point: Body mechanics (Active)    Learning Progress Summary     Learner Status Readiness Method Response Comment Documented by    Patient Active Acceptance E NR  STACIE 07/03/18 1533     Active Acceptance E NR  STACIE 07/02/18 1453     Done Acceptance E VU   06/28/18 1121     Active Acceptance E NR   06/24/18 1129    Family Done Acceptance E VU   06/28/18 1121     Active Acceptance E NR   06/24/18 1129          Point: Precautions (Active)    Learning Progress Summary     Learner Status Readiness Method Response Comment Documented by    Patient Active Acceptance E NR  STACIE 07/03/18 1533     Active Acceptance E NR  STACIE 07/02/18 1453     Done Acceptance E VU   06/28/18 1121    Family Done Acceptance E VU   06/28/18 1121                      User Key     Initials Effective Dates Name Provider Type Discipline     06/19/15 -  Corine Sarabia, PT Physical Therapist PT     06/19/15 -  Becca Ro, PT Physical Therapist PT     06/19/15 -  Joselyn Rod, PT Physical Therapist PT     05/15/18 -  Darian Lugo, PT Student PT Student PT                    PT Recommendation and Plan  Therapy Frequency (PT Clinical Impression): daily  Outcome Summary/Treatment Plan (PT)  Daily Summary of Progress (PT): progress toward  functional goals as expected  Plan of Care Reviewed With: patient  Progress: improving  Outcome Summary: PT recert completed; goals updated to reflect current level of performance.  Pt. participated in BUE and BLE TherEx seated EOB and postural control/dynamic sitting balance activities.  Pt. declined transfers and gait d/t fatigue.  Pt. will continue to benefit from skilled IPPT to improve patient's safety and ind. w/ mobility.          Outcome Measures     Row Name 07/04/18 1535 07/03/18 1533 07/02/18 1453       How much help from another person do you currently need...    Turning from your back to your side while in flat bed without using bedrails? 4  -MB 4  -STACIE (r) JBA (t) STACIE (c) 4  -LS (r) JBA (t) LS (c)    Moving from lying on back to sitting on the side of a flat bed without bedrails? 4  -MB 4  -STACIE (r) JBA (t) STACIE (c) 4  -LS (r) JBA (t) LS (c)    Moving to and from a bed to a chair (including a wheelchair)? 3  -MB 3  -STACIE (r) JBA (t) STACIE (c) 3  -LS (r) JBA (t) LS (c)    Standing up from a chair using your arms (e.g., wheelchair, bedside chair)? 3  -MB 3  -STACIE (r) JBA (t) STACIE (c) 3  -LS (r) JBA (t) LS (c)    Climbing 3-5 steps with a railing? 2  -MB 2  -STACIE (r) JBA (t) STACIE (c) 2  -LS (r) JBA (t) LS (c)    To walk in hospital room? 3  -MB 3  -STACIE (r) JBA (t) STACIE (c) 3  -LS (r) JBA (t) LS (c)    AM-PAC 6 Clicks Score 19  -MB 19  -STACIE (r) JBA (t) 19  -LS (r) JBA (t)       Functional Assessment    Outcome Measure Options AM-PAC 6 Clicks Basic Mobility (PT)  -MB AM-PAC 6 Clicks Basic Mobility (PT)  -STACIE (r) JBA (t) STACIE (c) AM-PAC 6 Clicks Basic Mobility (PT)  -LS (r) JBA (t) LS (c)      User Key  (r) = Recorded By, (t) = Taken By, (c) = Cosigned By    Initials Name Provider Type    STACIE Castañeda, PT Physical Therapist    JAIDA Muller, PT Physical Therapist    BRENDAN Byrne, PT Physical Therapist    CARMELO Lugo, PT Student PT Student           Time Calculation:         PT Charges     Row Name 07/04/18  1617 07/04/18 1616          Time Calculation    Start Time  -- 1535  -MB     PT Received On  -- 07/04/18  -MB     PT Goal Re-Cert Due Date  -- 07/14/18  -MB        Time Calculation- PT    Total Timed Code Minutes- PT  -- 24 minute(s)  -MB        Timed Charges    52917 - PT Therapeutic Exercise Minutes 24  -MB  --       User Key  (r) = Recorded By, (t) = Taken By, (c) = Cosigned By    Initials Name Provider Type    BRENDAN Byrne, PT Physical Therapist        Therapy Suggested Charges     Code   Minutes Charges    77097 (CPT®) Hc Pt Neuromusc Re Education Ea 15 Min      41299 (CPT®) Hc Pt Ther Proc Ea 15 Min 24 2    24333 (CPT®) Hc Gait Training Ea 15 Min      03641 (CPT®) Hc Pt Therapeutic Act Ea 15 Min      33263 (CPT®) Hc Pt Manual Therapy Ea 15 Min      43648 (CPT®) Hc Pt Iontophoresis Ea 15 Min      75102 (CPT®) Hc Pt Elec Stim Ea-Per 15 Min      99674 (CPT®) Hc Pt Ultrasound Ea 15 Min      96798 (CPT®) Hc Pt Self Care/Mgmt/Train Ea 15 Min      Total  24 2        Therapy Charges for Today     Code Description Service Date Service Provider Modifiers Qty    [id number] HC PT THER PROC EA 15 MIN 7/4/2018 Heather Byrne, PT GP 2          PT G-Codes  Outcome Measure Options: AM-PAC 6 Clicks Basic Mobility (PT)    Heather Byrne, PT  7/4/2018

## 2018-07-04 NOTE — PROGRESS NOTES
[facility] Medicine Services  PROGRESS NOTE    Patient Name: Israel Bojorquez  : 1955  MRN: [id number]    Date of Admission: 2018  Length of Stay: 11  Primary Care Physician: No Known Provider    Subjective   Subjective     CC: F/U encephalopathy, suspected Wernicke's syndrome      HPI:    Patient sleeping in bed in NAD. He does awaken to touch and when name called. Asked if he was doing ok he said  Yes. No acute events overnight per nursing.       Review of Systems   Gen- No fevers, chills  CV- No chest pain, palpitations  Resp- No cough, dyspnea  GI- No N/V/D, abd pain      Otherwise ROS is negative except as mentioned in the HPI.    Objective   Objective     Vital Signs:   Temp:  [97.7 °F (36.5 °C)-98.1 °F (36.7 °C)] 98.1 °F (36.7 °C)  Heart Rate:  [] 91  Resp:  [16-18] 16  BP: ()/(62-88) 90/62        Physical Exam:  Constitutional: No acute distress, sleeping in bed, awakens easily   HENT: NCAT, mucous membranes moist  Respiratory: Clear to auscultation bilaterally, respiratory effort normal   Cardiovascular: RRR, no murmurs  Gastrointestinal: Positive bowel sounds, soft, nontender, nondistended  Musculoskeletal: No bilateral ankle edema  Psychiatric: Affect is flat  Neurologic: Face symmetric, speech clear, moves all ext; answers simple questions appropriately  Skin: No rashes      Results Reviewed:  I have personally reviewed current lab, radiology, and data and agree.      Results from last 7 days  Lab Units 18  0620   WBC 10*3/mm3 11.23*   HEMOGLOBIN g/dL 9.8*   HEMATOCRIT % 29.9*   PLATELETS 10*3/mm3 213       Results from last 7 days  Lab Units 18  0620 18  1824   SODIUM mmol/L 141  --    POTASSIUM mmol/L 4.9 3.9   CHLORIDE mmol/L 109  --    CO2 mmol/L 30.0  --    BUN mg/dL 8*  --    CREATININE mg/dL 0.68  --    GLUCOSE mg/dL 88  --    CALCIUM mg/dL 9.1  --      Estimated Creatinine Clearance: 80.1 mL/min (by C-G formula based on SCr of  0.68 mg/dL).    I have reviewed the medications.    Assessment/Plan   Assessment / Plan     Hospital Problem List     * (Principal)Altered mental status    Tobacco abuse (Chronic)    Hypertension (Chronic)    Alcohol abuse (Chronic)    Elevated liver enzymes (Chronic)    COPD (chronic obstructive pulmonary disease) (CMS/HCC) (Chronic)    Diarrhea (Chronic)    Hypothyroidism (Chronic)    Leukocytosis    Medically noncompliant (Chronic)    Severe malnutrition (Chronic)    Alcoholic dementia (CMS/HCC)    Wernicke encephalopathy syndrome             Brief Hospital Course to date:  Israel Bojorquez is a 63 y.o. male with a past medical history significant for longstanding alcohol abuse with progressing ETOH dementia, Acosta's esophagus, COPD, hypothyroidism, SIADH, essential tremor, essential hypertension, hyperlipidemia, and tobacco abuse presents to the ED after son found down at home      Assessment & Plan:   Stable, nothing new to add. BP/mental status continues to improve. Pt encourage to increase PO intake and physical activities as tolerated. CM working on placement for STR.  --------------------------------------------------------------------------    Wernicke's encephalopathy (mental status improved)  EtOH dependence  -As he is too weak to walk safely, needs SNF rehab and has reliably voiced he is agreeable  -Family is pursuing emergency guardianship  -S/P high dose vitamin and thiamine reploacements IV, continue PO thiamine    Hypokalemia, resolved  -Replace electrolytes per protocol     Hypothyroidism  -Restarted home Synthroid as was prior noncompliant, noting abnormal thyroid studies, will need rechecked 6 weeks after d/c    Frequent PVC/PAC  -Asymptomatic   -Continue propranolol-reduced dose due to low BP    Hypotension  - relatively asymptomatic, monitor    No labs for few days recheck in am. Has been accepted to facility but they need to come and do a face to face evaluation. Hopefully on Thursday after  the holidays.     DVT Prophylaxis:  SQ heparin    CODE STATUS:   Code Status and Medical Interventions:   Ordered at: 06/23/18 0403     Level Of Support Discussed With:    Patient     Code Status:    CPR     Medical Interventions (Level of Support Prior to Arrest):    Full       Disposition:awaiting placement, maybe Thursday after face to face evaluation       Electronically signed by IGNACIO Avila, 07/04/18, 1:42 PM.

## 2018-07-05 LAB
ANION GAP SERPL CALCULATED.3IONS-SCNC: 4 MMOL/L (ref 3–11)
BUN BLD-MCNC: 8 MG/DL (ref 9–23)
BUN/CREAT SERPL: 15.4 (ref 7–25)
CALCIUM SPEC-SCNC: 8.6 MG/DL (ref 8.7–10.4)
CHLORIDE SERPL-SCNC: 104 MMOL/L (ref 99–109)
CO2 SERPL-SCNC: 31 MMOL/L (ref 20–31)
CREAT BLD-MCNC: 0.52 MG/DL (ref 0.6–1.3)
DEPRECATED RDW RBC AUTO: 63.4 FL (ref 37–54)
ERYTHROCYTE [DISTWIDTH] IN BLOOD BY AUTOMATED COUNT: 16.4 % (ref 11.3–14.5)
GFR SERPL CREATININE-BSD FRML MDRD: >150 ML/MIN/1.73
GLUCOSE BLD-MCNC: 85 MG/DL (ref 70–100)
HCT VFR BLD AUTO: 31.5 % (ref 38.9–50.9)
HGB BLD-MCNC: 10.1 G/DL (ref 13.1–17.5)
MCH RBC QN AUTO: 33.7 PG (ref 27–31)
MCHC RBC AUTO-ENTMCNC: 32.1 G/DL (ref 32–36)
MCV RBC AUTO: 105 FL (ref 80–99)
PLATELET # BLD AUTO: 268 10*3/MM3 (ref 150–450)
PMV BLD AUTO: 11 FL (ref 6–12)
POTASSIUM BLD-SCNC: 4.1 MMOL/L (ref 3.5–5.5)
RBC # BLD AUTO: 3 10*6/MM3 (ref 4.2–5.76)
SODIUM BLD-SCNC: 139 MMOL/L (ref 132–146)
WBC NRBC COR # BLD: 7.99 10*3/MM3 (ref 3.5–10.8)

## 2018-07-05 PROCEDURE — 85027 COMPLETE CBC AUTOMATED: CPT | Performed by: NURSE PRACTITIONER

## 2018-07-05 PROCEDURE — 80048 BASIC METABOLIC PNL TOTAL CA: CPT | Performed by: NURSE PRACTITIONER

## 2018-07-05 PROCEDURE — 97110 THERAPEUTIC EXERCISES: CPT

## 2018-07-05 PROCEDURE — 99232 SBSQ HOSP IP/OBS MODERATE 35: CPT | Performed by: NURSE PRACTITIONER

## 2018-07-05 PROCEDURE — 25010000002 HEPARIN (PORCINE) PER 1000 UNITS: Performed by: INTERNAL MEDICINE

## 2018-07-05 PROCEDURE — 97530 THERAPEUTIC ACTIVITIES: CPT

## 2018-07-05 RX ORDER — SENNA AND DOCUSATE SODIUM 50; 8.6 MG/1; MG/1
2 TABLET, FILM COATED ORAL 2 TIMES DAILY
Status: DISCONTINUED | OUTPATIENT
Start: 2018-07-05 | End: 2018-07-11 | Stop reason: HOSPADM

## 2018-07-05 RX ADMIN — HEPARIN SODIUM 5000 UNITS: 5000 INJECTION, SOLUTION INTRAVENOUS; SUBCUTANEOUS at 09:04

## 2018-07-05 RX ADMIN — NICOTINE 1 PATCH: 21 PATCH, EXTENDED RELEASE TRANSDERMAL at 09:05

## 2018-07-05 RX ADMIN — PRIMIDONE 50 MG: 50 TABLET ORAL at 20:43

## 2018-07-05 RX ADMIN — PROPRANOLOL HYDROCHLORIDE 10 MG: 10 TABLET ORAL at 20:43

## 2018-07-05 RX ADMIN — LEVOTHYROXINE SODIUM 25 MCG: 25 TABLET ORAL at 05:57

## 2018-07-05 RX ADMIN — CASTOR OIL AND BALSAM, PERU: 788; 87 OINTMENT TOPICAL at 20:43

## 2018-07-05 RX ADMIN — FOLIC ACID 1 MG: 1 TABLET ORAL at 09:05

## 2018-07-05 RX ADMIN — HEPARIN SODIUM 5000 UNITS: 5000 INJECTION, SOLUTION INTRAVENOUS; SUBCUTANEOUS at 20:43

## 2018-07-05 RX ADMIN — CASTOR OIL AND BALSAM, PERU: 788; 87 OINTMENT TOPICAL at 09:05

## 2018-07-05 RX ADMIN — PANTOPRAZOLE SODIUM 40 MG: 40 TABLET, DELAYED RELEASE ORAL at 05:57

## 2018-07-05 RX ADMIN — PROPRANOLOL HYDROCHLORIDE 10 MG: 10 TABLET ORAL at 09:05

## 2018-07-05 RX ADMIN — Medication 1 TABLET: at 09:05

## 2018-07-05 RX ADMIN — Medication 100 MG: at 09:05

## 2018-07-05 RX ADMIN — SENNOSIDES AND DOCUSATE SODIUM 2 TABLET: 8.6; 5 TABLET ORAL at 20:43

## 2018-07-05 NOTE — NURSING NOTE
Pt seen for wound on coccyx area that is pink, blanchable and has new reepithelialization. Will continue zguard to area and a nonadherent dressing. Small scab noted on right lateral ankle. Will continue treatment with nonadherent dressing. Pt on a waffle mattress. WOCN will s/o and please call with any concerns.

## 2018-07-05 NOTE — THERAPY TREATMENT NOTE
Acute Care - Physical Therapy Treatment Note  [facility]     Patient Name: Israel Bojorquez  : 1955  MRN: [id number]  Today's Date: 2018  Onset of Illness/Injury or Date of Surgery: 18  Date of Referral to PT: 18  Referring Physician: Oni    Admit Date: 2018    Visit Dx:    ICD-10-CM ICD-9-CM   1. Altered mental status, unspecified altered mental status type R41.82 780.97   2. Dehydration E86.0 276.51   3. Hypothyroidism, unspecified type E03.9 244.9   4. Impaired functional mobility, balance, gait, and endurance Z74.09 V49.89     Patient Active Problem List   Diagnosis   • Tobacco abuse   • Hypertension   • Alcohol abuse   • Elevated liver enzymes   • COPD (chronic obstructive pulmonary disease) (CMS/HCC)   • Hyperglycemia   • Moderate malnutrition (CMS/HCC)   • Acute hyponatremia   • Elevated TSH, T4 borderline low   • Anemia   • Diarrhea   • Weakness   • Fall   • Hypothyroidism   • Leukocytosis   • Altered mental status   • Medically noncompliant   • Severe malnutrition   • Alcoholic dementia (CMS/HCC)   • Wernicke encephalopathy syndrome       Therapy Treatment          Rehabilitation Treatment Summary     Row Name 18 1415             Treatment Time/Intention    Discipline (P)  physical therapist  -CM      Document Type (P)  therapy note (daily note)  -CM      Subjective Information (P)  complains of;weakness;fatigue  -CM      Mode of Treatment (P)  physical therapy  -CM      Care Plan Review (P)  evaluation/treatment results reviewed;risks/benefits reviewed;care plan/treatment goals reviewed;patient/other agree to care plan  -CM      Patient Effort (P)  good  -CM      Comment (P)  Pt req'd encouraging to participate but was pleasant and agreeing to therapy;   -CM2      Existing Precautions/Restrictions (P)  fall  -CM      Recorded by [CM] Selena Souza, PT Student 18 1531  [CM2] Selena Souza, PT Student 18 1537      Row Name 18 1415             Vital  Signs    Pre Systolic BP Rehab (P)  100  -CM      Pre Treatment Diastolic BP (P)  67  -CM      Post Systolic BP Rehab (P)  124  -CM      Post Treatment Diastolic BP (P)  82  -CM      Pretreatment Heart Rate (beats/min) (P)  95  -CM      Posttreatment Heart Rate (beats/min) (P)  91  -CM      Pre SpO2 (%) (P)  98  -CM      O2 Delivery Pre Treatment (P)  room air  -CM      O2 Delivery Intra Treatment (P)  room air  -CM      Post SpO2 (%) (P)  100  -CM      O2 Delivery Post Treatment (P)  room air  -CM      Pre Patient Position (P)  Supine  -CM      Intra Patient Position (P)  Standing  -CM      Post Patient Position (P)  Supine  -CM      Rest Breaks  (P)  1  -CM      Recorded by [CM] Selena Souza, PT Student 07/05/18 1531      Row Name 07/05/18 1415             Cognitive Assessment/Intervention    Additional Documentation (P)  Cognitive Assessment/Intervention (Group)  -CM      Recorded by [CM] Selena Souza PT Student 07/05/18 1531      Row Name 07/05/18 1415             Cognitive Assessment/Intervention- PT/OT    Affect/Mental Status (Cognitive) (P)  WFL  -CM      Orientation Status (Cognition) (P)  oriented x 4  -CM      Follows Commands (Cognition) (P)  follows one step commands;over 90% accuracy;verbal cues/prompting required  -CM      Cognitive Function (Cognitive) (P)  safety deficit  -CM      Safety Deficit (Cognitive) (P)  mild deficit;safety precautions awareness;insight into deficits/self awareness  -CM      Personal Safety Interventions (P)  fall prevention program maintained;gait belt;nonskid shoes/slippers when out of bed  -CM      Recorded by [CM] Selena Souza, PT Student 07/05/18 1531      Row Name 07/05/18 1415             Safety Issues, Functional Mobility    Safety Issues Affecting Function (Mobility) (P)  safety precaution awareness;insight into deficits/self awareness  -CM      Recorded by [CM] Selena Souza PT Student 07/05/18 1531      Row Name 07/05/18 1415             Bed  Mobility Assessment/Treatment    Bed Mobility Assessment/Treatment (P)  supine-sit;sit-supine  -CM      Supine-Sit [place] (Bed Mobility) (P)  supervision  -CM      Sit-Supine [place] (Bed Mobility) (P)  supervision  -CM      Bed Mobility, Safety Issues (P)  decreased use of arms for pushing/pulling  -CM      Assistive Device (Bed Mobility) (P)  bed rails;head of bed elevated  -CM      Recorded by [CM] Selena Souza, PT Student 07/05/18 1531      Row Name 07/05/18 1415             Transfer Assessment/Treatment    Transfer Assessment/Treatment (P)  sit-stand transfer;stand-sit transfer  -CM      Recorded by [CM] Selena Souza, PT Student 07/05/18 1531      Row Name 07/05/18 1415             Sit-Stand Transfer    Sit-Stand [place] (Transfers) (P)  contact guard;verbal cues  -CM      Assistive Device (Sit-Stand Transfers) (P)  walker, front-wheeled  -CM      Recorded by [CM] Selena Souza, PT Student 07/05/18 1531      Row Name 07/05/18 1415             Stand-Sit Transfer    Stand-Sit [place] (Transfers) (P)  contact guard;verbal cues  -CM      Assistive Device (Stand-Sit Transfers) (P)  walker, front-wheeled  -CM      Recorded by [CM] Selena Souza, PT Student 07/05/18 1531      Row Name 07/05/18 1415             Gait/Stairs Assessment/Training    Gait/Stairs Assessment/Training (P)  gait/ambulation assistive device  -CM      [place] Level (Gait) (P)  contact guard;1 person assist;verbal cues  -CM      Assistive Device (Gait) (P)  walker, front-wheeled  -CM      Distance in Feet (Gait) (P)  300  -CM      Pattern (Gait) (P)  step-through  -CM      Deviations/Abnormal Patterns (Gait) (P)  stride length decreased;base of support, narrow;gait speed decreased  -CM      Bilateral Gait Deviations (P)  heel strike decreased;forward flexed posture  -CM      Comment (Gait/Stairs) (P)  VC's req'd for proper management of FWW; When ambulating pt reports his biggest limiting factors are  weakness in BLE's and lack of balance.   -CM2      Recorded by [CM] Selena Souza, PT Student 07/05/18 1531  [CM2] Selena Souza, PT Student 07/05/18 1537      Row Name 07/05/18 1415             Motor Skills Assessment/Interventions    Additional Documentation (P)  Balance (Group);Therapeutic Exercise (Group)  -CM      Recorded by [CM] Selena Souza PT Student 07/05/18 1531      Row Name 07/05/18 1415             Therapeutic Exercise    Therapeutic Exercise (P)  supine, lower extremities  -CM      88061 - PT Therapeutic Exercise Minutes (P)  10  -CM2      19607 - PT Therapeutic Activity Minutes (P)  15  -CM2      Recorded by [CM] Selena Souza, PT Student 07/05/18 1531  [CM2] Selena Souza, PT Student 07/05/18 1532      Row Name 07/05/18 1415             Lower Extremity Supine Therapeutic Exercise    Performed, Supine Lower Extremity (Therapeutic Exercise) (P)  hip abduction/adduction;SAQ (short arc quad) over bolster;SLR (straight leg raise);ankle pumps  -CM      Exercise Type, Supine Lower Extremity (Therapeutic Exercise) (P)  AROM (active range of motion)  -CM      Sets/Reps Detail, Supine Lower Extremity (Therapeutic Exercise) (P)  1/10  -CM      Recorded by [CM] Selena Souza, PT Student 07/05/18 1532      Row Name 07/05/18 1415             Balance    Balance (P)  static sitting balance;static standing balance  -CM      Recorded by [CM] Selena Souza PT Student 07/05/18 1532      Row Name 07/05/18 1415             Static Sitting Balance    Level of [place] (Unsupported Sitting, Static Balance) (P)  supervision  -CM      Sitting Position (Unsupported Sitting, Static Balance) (P)  sitting on edge of bed  -CM      Recorded by [CM] Selena Souza PT Student 07/05/18 1532      Row Name 07/05/18 1415             Static Standing Balance    Level of [place] (Supported Standing, Static Balance) (P)  contact guard assist  -CM      Assistive Device Utilized (Supported Standing, Static  Balance) (P)  rolling walker  -CM      Recorded by [CM] Selena Souza, PT Student 07/05/18 1532      Row Name 07/05/18 1415             Positioning and Restraints    Pre-Treatment Position (P)  in bed  -CM      Post Treatment Position (P)  bed  -CM      In Bed (P)  supine;call light within reach;exit alarm on;side rails up x2  -CM      Recorded by [CM] Selena Souza, PT Student 07/05/18 1532      Row Name 07/05/18 1415             Pain Assessment    Additional Documentation (P)  Pain Scale: Numbers Pre/Post-Treatment (Group)  -CM      Recorded by [CM] Selena Souza, PT Student 07/05/18 1533      Row Name 07/05/18 1415             Pain Scale: Numbers Pre/Post-Treatment    Pain Scale: Numbers, Pretreatment (P)  0/10 - no pain  -CM      Pain Scale: Numbers, Post-Treatment (P)  0/10 - no pain  -CM      Recorded by [CM] Selena Souza, PT Student 07/05/18 1533      Row Name                Wound 06/24/18 1245 medial coccyx skin tear    Wound - Properties Group Date first assessed: 06/24/18 [CP] Time first assessed: 1245 [CP] Present On Admission : yes;picture taken [CP] Orientation: medial [CP] Location: coccyx [CP] Type: skin tear [CP] Additional Comments: friction [CP] Recorded by:  [CP] IGNACIO Rangel 06/24/18 1415    Row Name                Wound 06/28/18 1015 Right lateral heel other (see comments)    Wound - Properties Group Date first assessed: 06/28/18 [CP] Time first assessed: 1015 [CP] Present On Admission : no;picture taken [CP] Side: Right [CP] Orientation: lateral [CP] Location: heel [CP] Type: other (see comments) [CP] Additional Comments: hematoma [CP] Recorded by:  [CP] Sandra Holden, IGNACIO 06/28/18 1321    Row Name 07/05/18 1415             Coping    Observed Emotional State (P)  calm;cooperative  -CM      Verbalized Emotional State (P)  acceptance  -CM      Recorded by [CM] Selena Souza, PT Student 07/05/18 1533      Row Name 07/05/18 1415             Plan of Care Review     Plan of Care Reviewed With (P)  patient  -CM      Recorded by [CM] Selena Souza, PT Student 07/05/18 1533      Row Name 07/05/18 1415             Outcome Summary/Treatment Plan (PT)    Daily Summary of Progress (PT) (P)  progress toward functional goals is good  -CM      Recorded by [CM] Seelna Souza, PT Student 07/05/18 1533        User Key  (r) = Recorded By, (t) = Taken By, (c) = Cosigned By    Initials Name Effective Dates Discipline    CP IGNACIO Rangel 06/08/18 -  Nurse    CM Selena Souza, PT Student 06/07/18 -  PT          Wound 06/24/18 1245 medial coccyx skin tear (Active)   Wound Image   7/5/2018  1:00 PM   Dressing Appearance dry;intact 7/5/2018  1:00 PM   Base moist;pink;other (see comments) 7/5/2018  1:00 PM   Red (%), Wound Tissue Color 100 7/5/2018  1:00 PM   Periwound intact;dry;pink;blanchable 7/5/2018  1:00 PM   Periwound Temperature warm 7/5/2018  1:00 PM   Edges open 7/5/2018  1:00 PM   Wound Length (cm) 1.2 cm 7/5/2018  1:00 PM   Wound Width (cm) 1 cm 7/5/2018  1:00 PM   Wound Depth (cm) 0.1 cm 7/5/2018  1:00 PM   Drainage Characteristics/Odor serous 7/5/2018  1:00 PM   Drainage Amount scant 7/5/2018  1:00 PM   Care, Wound cleansed with;other (see comments) 7/5/2018  1:00 PM   Dressing Care, Wound dressing applied;dressing changed 7/5/2018  1:00 PM       Wound 06/28/18 1015 Right lateral heel other (see comments) (Active)   Dressing Appearance dry;intact;no drainage 7/5/2018  1:00 PM   Base dry;other (see comments) 7/5/2018  1:00 PM   Periwound intact;dry;pink 7/5/2018  1:00 PM   Periwound Temperature warm 7/5/2018  1:00 PM   Wound Length (cm) 0.3 cm 7/5/2018  1:00 PM   Wound Width (cm) 0.3 cm 7/5/2018  1:00 PM   Wound Depth (cm) 0 cm 7/5/2018  1:00 PM   Dressing Care, Wound foam;low-adherent 7/4/2018  8:00 PM             Physical Therapy Education     Title: PT OT SLP Therapies (Active)     Topic: Physical Therapy (Active)     Point: Mobility training (Active)    Learning  Progress Summary     Learner Status Readiness Method Response Comment Documented by    Patient Active Acceptance E NR  CM 07/05/18 1415     Active Acceptance E NR  STACIE 07/03/18 1533     Active Acceptance E NR  STACIE 07/02/18 1453     Done Acceptance E VU,NR   06/30/18 1534     Done Acceptance E VU  KM 06/28/18 1121     Active Acceptance E NR   06/24/18 1129    Family Done Acceptance E VU  KM 06/28/18 1121     Active Acceptance E NR   06/24/18 1129          Point: Home exercise program (Active)    Learning Progress Summary     Learner Status Readiness Method Response Comment Documented by    Patient Active Acceptance E NR  CM 07/05/18 1415     Active Acceptance E NR  STACIE 07/03/18 1533     Active Acceptance E NR  STACIE 07/02/18 1453     Done Acceptance E VU  KM 06/28/18 1121     Active Acceptance E NR   06/24/18 1129    Family Done Acceptance E VU  KM 06/28/18 1121     Active Acceptance E NR   06/24/18 1129          Point: Body mechanics (Active)    Learning Progress Summary     Learner Status Readiness Method Response Comment Documented by    Patient Active Acceptance E NR  CM 07/05/18 1415     Active Acceptance E NR  STACIE 07/03/18 1533     Active Acceptance E NR  STACIE 07/02/18 1453     Done Acceptance E VU   06/28/18 1121     Active Acceptance E NR   06/24/18 1129    Family Done Acceptance E VU   06/28/18 1121     Active Acceptance E NR   06/24/18 1129          Point: Precautions (Active)    Learning Progress Summary     Learner Status Readiness Method Response Comment Documented by    Patient Active Acceptance E NR  CM 07/05/18 1415     Active Acceptance E NR  STACIE 07/03/18 1533     Active Acceptance E NR  STACIE 07/02/18 1453     Done Acceptance E VU  KM 06/28/18 1121    Family Done Acceptance E VU  KM 06/28/18 1121                      User Key     Initials Effective Dates Name Provider Type Discipline     06/19/15 -  Corine Sarabia PT Physical Therapist PT     06/19/15 -  Becca Ro PT Physical  Therapist PT    LS 06/19/15 -  Joselyn Rod, PT Physical Therapist PT    STACIE 05/15/18 -  Darian Lugo, PT Student PT Student PT    CM 06/07/18 -  Selena Souza, PT Student PT Student PT                    PT Recommendation and Plan     Outcome Summary/Treatment Plan (PT)  Daily Summary of Progress (PT): (P) progress toward functional goals is good  Plan of Care Reviewed With: (P) patient  Progress: (P) improving  Outcome Summary: (P) Pt progressed in distance ambulated today to 300 ft, CGA, w/ FWW and 1 rest break. Pt limited by fatigue and weakness in BLE's. Pt would benefit from cont'd skilled PT services.           Outcome Measures     Row Name 07/05/18 1415 07/04/18 1535 07/03/18 1533       How much help from another person do you currently need...    Turning from your back to your side while in flat bed without using bedrails? (P)  3  -CM 4  -MB 4  -STACIE (r) JBA (t) STACIE (c)    Moving from lying on back to sitting on the side of a flat bed without bedrails? (P)  3  -CM 4  -MB 4  -STACIE (r) JBA (t) STACIE (c)    Moving to and from a bed to a chair (including a wheelchair)? (P)  3  -CM 3  -MB 3  -STACIE (r) JBA (t) STACIE (c)    Standing up from a chair using your arms (e.g., wheelchair, bedside chair)? (P)  3  -CM 3  -MB 3  -STACIE (r) JBA (t) STACIE (c)    Climbing 3-5 steps with a railing? (P)  2  -CM 2  -MB 2  -STACIE (r) JBA (t) STACIE (c)    To walk in hospital room? (P)  3  -CM 3  -MB 3  -STACIE (r) JBA (t) STACIE (c)    AM-PAC 6 Clicks Score (P)  17  -CM 19  -MB 19  -STACIE (r) JBA (t)       Functional Assessment    Outcome Measure Options (P)  AM-PAC 6 Clicks Basic Mobility (PT)  -CM AM-PAC 6 Clicks Basic Mobility (PT)  -MB AM-PAC 6 Clicks Basic Mobility (PT)  -STACIE (r) JBA (t) STACIE (c)      User Key  (r) = Recorded By, (t) = Taken By, (c) = Cosigned By    Initials Name Provider Type    STACIE Bea Castañeda, PT Physical Therapist    BRENDAN Byrne, PT Physical Therapist    CARMELO Lugo, PT Student PT Student    CM Selena Souza,  PT Student PT Student           Time Calculation:         PT Charges     Row Name 07/05/18 1415             Time Calculation    Start Time (P)  1415  -CM      PT Received On (P)  07/05/18  -CM      PT Goal Re-Cert Due Date (P)  07/14/18  -CM         Time Calculation- PT    Total Timed Code Minutes- PT (P)  25 minute(s)  -CM         Timed Charges    70121 - PT Therapeutic Exercise Minutes (P)  10  -CM      98039 - PT Therapeutic Activity Minutes (P)  15  -CM        User Key  (r) = Recorded By, (t) = Taken By, (c) = Cosigned By    Initials Name Provider Type    CM Selena Souza, PT Student PT Student        Therapy Suggested Charges     Code   Minutes Charges    59573 (CPT®) Hc Pt Neuromusc Re Education Ea 15 Min      23620 (CPT®) Hc Pt Ther Proc Ea 15 Min 10 1    18555 (CPT®) Hc Gait Training Ea 15 Min      11855 (CPT®) Hc Pt Therapeutic Act Ea 15 Min 15 1    43731 (CPT®) Hc Pt Manual Therapy Ea 15 Min      69259 (CPT®) Hc Pt Iontophoresis Ea 15 Min      41338 (CPT®) Hc Pt Elec Stim Ea-Per 15 Min      84665 (CPT®) Hc Pt Ultrasound Ea 15 Min      71002 (CPT®) Hc Pt Self Care/Mgmt/Train Ea 15 Min      Total  25 2        Therapy Charges for Today     Code Description Service Date Service Provider Modifiers Qty    25915583661 HC PT THER PROC EA 15 MIN 7/5/2018 Selena Souza, PT Student GP 1    98202536182 HC PT THERAPEUTIC ACT EA 15 MIN 7/5/2018 Selena Souza, PT Student GP 1          PT G-Codes  Outcome Measure Options: (P) AM-PAC 6 Clicks Basic Mobility (PT)    Selena Souza, PT Student  7/5/2018

## 2018-07-05 NOTE — PROGRESS NOTES
[facility] Medicine Services  PROGRESS NOTE    Patient Name: Israel Bojorquez  : 1955  MRN: [id number]    Date of Admission: 2018  Length of Stay: 12  Primary Care Physician: No Known Provider    Subjective   Subjective   CC: F/U encephalopathy, suspected Wernicke's syndrome    HPI:    Patient lying in bed in NAD.  Patient was sleeping, but easily arousable with verbal stimuli.  Patient new he was waiting to go to [facility] and wanted to know when he would be going.  Does not remember anybody coming to speak with him about being admitted.   notified and is going to follow-up. Positive BM, but states it's difficult to go sometimes.  No adverse events overnight.  No family in the room.    Review of Systems   Gen- No fevers, chills  CV- No chest pain, palpitations  Resp- No cough, dyspnea  GI- No N/V/D, abd pain  Otherwise ROS is negative except as mentioned in the HPI.    Objective   Objective   Vital Signs:   Temp:  [97.8 °F (36.6 °C)-98.5 °F (36.9 °C)] 97.8 °F (36.6 °C)  Heart Rate:  [] 84  Resp:  [16-18] 18  BP: ()/(66-83) 100/67   Physical Exam:  General: Alert, well-developed well-nourished male in no acute distress    Head: Normocephalic atraumatic    Eyes: PERRLA, EOMI, nonicteric, conjunctiva normal    ENT: Pink, moist mucous membranes    Neck: Supple, nontender, trachea midline without lymphadenopathy, JVD, nuchal rigidity.      Cardiovascular: RRR  no M/R/G +1DP pulses bilaterally    Respiratory: Nonlabored, symmetrical chest expansion, clear to auscultation bilaterally    Abdomen: Soft, nontender, nondistended,  positive bowel sounds in all 4 quadrants     Extremities: FROM in upper and lower extremities bilaterally. Negative for edema/cyanosis..  Negative calf pain    Skin: Pink/warm/dry.  No rash or lesions noted    Neuro: Oriented to person place and situation(knew daughter had spoken to [facility] about admitting him), speech is clear,  follows all commands.    Psych: Patient is pleasant and cooperative.  Flat affect.     Results Reviewed:  I have personally reviewed current lab, radiology, and data and agree.    Results from last 7 days  Lab Units 07/05/18  0643   WBC 10*3/mm3 7.99   HEMOGLOBIN g/dL 10.1*   HEMATOCRIT % 31.5*   PLATELETS 10*3/mm3 268       Results from last 7 days  Lab Units 07/05/18  0643   SODIUM mmol/L 139   POTASSIUM mmol/L 4.1   CHLORIDE mmol/L 104   CO2 mmol/L 31.0   BUN mg/dL 8*   CREATININE mg/dL 0.52*   GLUCOSE mg/dL 85   CALCIUM mg/dL 8.6*     Estimated Creatinine Clearance: 104.7 mL/min (A) (by C-G formula based on SCr of 0.52 mg/dL (L)).    I have reviewed the medications.    Assessment/Plan   Assessment / Plan     Hospital Problem List     * (Principal)Altered mental status    Tobacco abuse (Chronic)    Hypertension (Chronic)    Alcohol abuse (Chronic)    Elevated liver enzymes (Chronic)    COPD (chronic obstructive pulmonary disease) (CMS/HCC) (Chronic)    Diarrhea (Chronic)    Hypothyroidism (Chronic)    Leukocytosis    Medically noncompliant (Chronic)    Severe malnutrition (Chronic)    Alcoholic dementia (CMS/HCC)    Wernicke encephalopathy syndrome        Brief Hospital Course to date:  Israel Bojorquez is a 63 y.o. male with a past medical history significant for longstanding alcohol abuse with progressing ETOH dementia, Acosta's esophagus, COPD, hypothyroidism, SIADH, essential tremor, essential hypertension, hyperlipidemia, and tobacco abuse presents to the ED after son found down at home    Assessment & Plan:   Stable, nothing new to add. BP/mental status continues to improve. Pt encouraged to increase PO intake and physical activities as tolerated. CM working on placement for STR.  --------------------------------------------------------------------------    Wernicke's encephalopathy (mental status improved)  EtOH dependence  -As he is too weak to walk safely, needs SNF rehab and has reliably voiced he is  agreeable  -Family is pursuing emergency guardianship  -S/P high dose vitamin and thiamine reploacements IV, continue PO thiamine    Hypokalemia, resolved  -Replace electrolytes per protocol     Hypothyroidism  -Restarted home Synthroid as was prior noncompliant, noting abnormal thyroid studies, will need rechecked 6 weeks after d/c    Frequent PVC/PAC  -Asymptomatic   -Continue propranolol-reduced dose due to low BP    Hypotension  - relatively asymptomatic, monitor    --Added Senakot to help with difficult BM's  --AM labs unremarkable     Has been accepted to facility but they need to come and do a face to face evaluation. CM checking since there has been no face to face evaluation completed yet.    DVT Prophylaxis:  SQ heparin    CODE STATUS:   Code Status and Medical Interventions:   Ordered at: 06/23/18 0403     Level Of Support Discussed With:    Patient     Code Status:    CPR     Medical Interventions (Level of Support Prior to Arrest):    Full     Disposition:awaiting placement, maybe after face to face evaluation From [facility] ; CM checking with facility     Electronically signed by IGNACIO Arellano, 07/05/18, 2:10 PM

## 2018-07-05 NOTE — PROGRESS NOTES
Continued Stay Note  [facility]     Patient Name: Israel Bojorquez  MRN: [id number]  Today's Date: 7/5/2018    Admit Date: 6/22/2018          Discharge Plan     Row Name 07/05/18 1603       Plan    Plan update    Patient/Family in Agreement with Plan yes    Plan Comments Spoke with patients daughter, Kaity and Lana from [facility] at bedside regarding plans for transfer to facility.  Papers provided to family member to complete as well as paperwork to CM for physician to complete.  APRN advised.  CM following    Final Discharge Disposition Code 04 - intermediate care facility              Discharge Codes    No documentation.       Expected Discharge Date and Time     Expected Discharge Date Expected Discharge Time    Jul 5, 2018             Fatmata Berry RN

## 2018-07-05 NOTE — PLAN OF CARE
Problem: Patient Care Overview  Goal: Plan of Care Review  Outcome: Ongoing (interventions implemented as appropriate)   07/05/18 1415   Coping/Psychosocial   Plan of Care Reviewed With patient   OTHER   Outcome Summary Pt progressed in distance ambulated today to 300 ft, CGA, w/ FWW and 1 rest break. Pt limited by fatigue and weakness in BLE's. Pt would benefit from cont'd skilled PT services.    Plan of Care Review   Progress improving

## 2018-07-06 PROCEDURE — 97110 THERAPEUTIC EXERCISES: CPT

## 2018-07-06 PROCEDURE — 25010000002 HEPARIN (PORCINE) PER 1000 UNITS: Performed by: INTERNAL MEDICINE

## 2018-07-06 PROCEDURE — 97116 GAIT TRAINING THERAPY: CPT

## 2018-07-06 PROCEDURE — 99232 SBSQ HOSP IP/OBS MODERATE 35: CPT | Performed by: NURSE PRACTITIONER

## 2018-07-06 PROCEDURE — 97530 THERAPEUTIC ACTIVITIES: CPT

## 2018-07-06 RX ADMIN — PRIMIDONE 50 MG: 50 TABLET ORAL at 19:56

## 2018-07-06 RX ADMIN — HEPARIN SODIUM 5000 UNITS: 5000 INJECTION, SOLUTION INTRAVENOUS; SUBCUTANEOUS at 09:02

## 2018-07-06 RX ADMIN — Medication 100 MG: at 09:02

## 2018-07-06 RX ADMIN — PANTOPRAZOLE SODIUM 40 MG: 40 TABLET, DELAYED RELEASE ORAL at 09:01

## 2018-07-06 RX ADMIN — FOLIC ACID 1 MG: 1 TABLET ORAL at 09:02

## 2018-07-06 RX ADMIN — ACETAMINOPHEN 650 MG: 325 TABLET, FILM COATED ORAL at 23:15

## 2018-07-06 RX ADMIN — PROPRANOLOL HYDROCHLORIDE 10 MG: 10 TABLET ORAL at 19:56

## 2018-07-06 RX ADMIN — HEPARIN SODIUM 5000 UNITS: 5000 INJECTION, SOLUTION INTRAVENOUS; SUBCUTANEOUS at 19:55

## 2018-07-06 RX ADMIN — SENNOSIDES AND DOCUSATE SODIUM 2 TABLET: 8.6; 5 TABLET ORAL at 19:56

## 2018-07-06 RX ADMIN — NICOTINE 1 PATCH: 21 PATCH, EXTENDED RELEASE TRANSDERMAL at 09:02

## 2018-07-06 RX ADMIN — LEVOTHYROXINE SODIUM 25 MCG: 25 TABLET ORAL at 09:02

## 2018-07-06 RX ADMIN — SENNOSIDES AND DOCUSATE SODIUM 2 TABLET: 8.6; 5 TABLET ORAL at 09:02

## 2018-07-06 RX ADMIN — Medication 1 TABLET: at 09:02

## 2018-07-06 RX ADMIN — CASTOR OIL AND BALSAM, PERU: 788; 87 OINTMENT TOPICAL at 09:03

## 2018-07-06 RX ADMIN — CASTOR OIL AND BALSAM, PERU: 788; 87 OINTMENT TOPICAL at 19:57

## 2018-07-06 NOTE — PROGRESS NOTES
Continued Stay Note  [facility]     Patient Name: Israel Bojorquez  MRN: [id number]  Today's Date: 7/6/2018    Admit Date: 6/22/2018          Discharge Plan     Row Name 07/06/18 1420       Plan    Plan update    Patient/Family in Agreement with Plan yes    Plan Comments Spoke with patient at bedside regarding discharge plan.  Patient aware that he is discharging on Monday and is in agreement.  No needs noted at this time.  Patient plan is to discharge to [facility] Monday via car with family to transport.      Final Discharge Disposition Code 04 - intermediate care facility              Discharge Codes    No documentation.       Expected Discharge Date and Time     Expected Discharge Date Expected Discharge Time    Jul 9, 2018             Fatmata Berry RN

## 2018-07-06 NOTE — PROGRESS NOTES
"    [facility] Medicine Services  PROGRESS NOTE    Patient Name: Israel Bojorquez  : 1955  MRN: [id number]    Date of Admission: 2018  Length of Stay: 13  Primary Care Physician: No Known Provider    Subjective   Subjective   CC: F/U encephalopathy, suspected Wernicke's syndrome    HPI:    Patient lying in bed in NAD sleeping soundly.  Patient awakened to verbal stimuli.  Patient was shocked that was 1:00 in the afternoon and asked about eating.  Patient's lunch tray was sitting there and 8.  Patient asked about when he would be transferred and explained that it would not be until Monday.  Patient signed \"MOST\" paperwork.  Patient alert to person, hospital, but does not know the year why he was in the hospital.  Positive BM.  No adverse events overnight.  No family in the room.    Review of Systems   Gen- No fevers, chills  CV- No chest pain, palpitations  Resp- No cough, dyspnea  GI- No N/V/D, abd pain  Otherwise ROS is negative except as mentioned in the HPI.    Objective   Objective   Vital Signs:   Temp:  [97.8 °F (36.6 °C)-98.5 °F (36.9 °C)] 98.5 °F (36.9 °C)  Heart Rate:  [] 95  Resp:  [16-18] 16  BP: ()/(58-86) 110/70   Physical Exam:  General: Alert, well-developed well-nourished male in no acute distress    Head: Normocephalic atraumatic    Eyes: PERRLA, EOMI, nonicteric, conjunctiva normal    ENT: Pink, moist mucous membranes    Neck: Supple, nontender, trachea midline without lymphadenopathy, JVD, nuchal rigidity.      Cardiovascular: RRR  no M/R/G +1DP pulses bilaterally    Respiratory: Nonlabored, symmetrical chest expansion, clear to auscultation bilaterally    Abdomen: Soft, nontender, nondistended,  positive bowel sounds in all 4 quadrants     Extremities: FROM in upper and lower extremities bilaterally. Negative for edema/cyanosis..  Negative calf pain    Skin: Pink/warm/dry.  No rash or lesions noted    Neuro: Oriented to person place and situation(knew " daughter had spoken to [place] about admitting him), speech is clear, follows all commands    Psych: Patient is pleasant and cooperative.  Flat affect.     Results Reviewed:  I have personally reviewed current lab, radiology, and data and agree.    Results from last 7 days  Lab Units 07/05/18  0643   WBC 10*3/mm3 7.99   HEMOGLOBIN g/dL 10.1*   HEMATOCRIT % 31.5*   PLATELETS 10*3/mm3 268       Results from last 7 days  Lab Units 07/05/18  0643   SODIUM mmol/L 139   POTASSIUM mmol/L 4.1   CHLORIDE mmol/L 104   CO2 mmol/L 31.0   BUN mg/dL 8*   CREATININE mg/dL 0.52*   GLUCOSE mg/dL 85   CALCIUM mg/dL 8.6*     Estimated Creatinine Clearance: 104.7 mL/min (A) (by C-G formula based on SCr of 0.52 mg/dL (L)).    I have reviewed the medications.    Assessment/Plan   Assessment / Plan     Hospital Problem List     * (Principal)Altered mental status    Tobacco abuse (Chronic)    Hypertension (Chronic)    Alcohol abuse (Chronic)    Elevated liver enzymes (Chronic)    COPD (chronic obstructive pulmonary disease) (CMS/HCC) (Chronic)    Diarrhea (Chronic)    Hypothyroidism (Chronic)    Leukocytosis    Medically noncompliant (Chronic)    Severe malnutrition (Chronic)    Alcoholic dementia (CMS/HCC)    Wernicke encephalopathy syndrome        Brief Hospital Course to date:  Israel Bojorquez is a 63 y.o. male with a past medical history significant for longstanding alcohol abuse with progressing ETOH dementia, Acosta's esophagus, COPD, hypothyroidism, SIADH, essential tremor, essential hypertension, hyperlipidemia, and tobacco abuse presents to the ED after son found down at home    Assessment & Plan:   Stable, nothing new to add. BP/mental status continues to improve. Pt encouraged to increase PO intake and physical activities as tolerated. CM working on placement for STR.  --------------------------------------------------------------------------    Wernicke's encephalopathy (mental status improved)  EtOH dependence  -As he is  "too weak to walk safely, needs SNF rehab and has reliably voiced he is agreeable  -Family is pursuing emergency guardianship  -S/P high dose vitamin and thiamine reploacements IV, continue PO thiamine    Hypokalemia, resolved  -Replace electrolytes per protocol     Hypothyroidism  -Restarted home Synthroid as was prior noncompliant, noting abnormal thyroid studies, will need rechecked 6 weeks after d/c    Frequent PVC/PAC  -Asymptomatic   -Continue propranolol-reduced dose due to low BP    Hypotension  - relatively asymptomatic, monitor    --Added Senakot to help with difficult BM's  --AM labs unremarkable     DVT Prophylaxis:  SQ heparin    CODE STATUS:   Code Status and Medical Interventions:   Ordered at: 06/23/18 0403     Level Of Support Discussed With:    Patient     Code Status:    CPR     Medical Interventions (Level of Support Prior to Arrest):    Full     Disposition:  Awaiting placement; [facility] accepted the patient and has paperwork for the family fIll out; patient signed \"MOST\" forms and states he does not have a living will, but he wants everything done to save him.    Electronically signed by IGNACIO Arellano, 07/06/18, 10:47 AM      "

## 2018-07-06 NOTE — PLAN OF CARE
Problem: Fall Risk (Adult)  Goal: Identify Related Risk Factors and Signs and Symptoms  Outcome: Ongoing (interventions implemented as appropriate)    Goal: Absence of Fall  Outcome: Ongoing (interventions implemented as appropriate)      Problem: Skin Injury Risk (Adult)  Goal: Identify Related Risk Factors and Signs and Symptoms  Outcome: Ongoing (interventions implemented as appropriate)    Goal: Skin Health and Integrity  Outcome: Ongoing (interventions implemented as appropriate)      Problem: Patient Care Overview  Goal: Plan of Care Review  Outcome: Ongoing (interventions implemented as appropriate)   07/06/18 0451   Coping/Psychosocial   Plan of Care Reviewed With patient   OTHER   Outcome Summary patient rested during the night. vital signs stable. no acute episodes this shift.    Plan of Care Review   Progress no change     Goal: Individualization and Mutuality  Outcome: Ongoing (interventions implemented as appropriate)    Goal: Discharge Needs Assessment  Outcome: Ongoing (interventions implemented as appropriate)

## 2018-07-06 NOTE — THERAPY TREATMENT NOTE
Acute Care - Physical Therapy Treatment Note  [facility]     Patient Name: Israel Bojorquez  : 1955  MRN: [id number]  Today's Date: 2018  Onset of Illness/Injury or Date of Surgery: 18  Date of Referral to PT: 18  Referring Physician: Oni    Admit Date: 2018    Visit Dx:    ICD-10-CM ICD-9-CM   1. Altered mental status, unspecified altered mental status type R41.82 780.97   2. Dehydration E86.0 276.51   3. Hypothyroidism, unspecified type E03.9 244.9   4. Impaired functional mobility, balance, gait, and endurance Z74.09 V49.89     Patient Active Problem List   Diagnosis   • Tobacco abuse   • Hypertension   • Alcohol abuse   • Elevated liver enzymes   • COPD (chronic obstructive pulmonary disease) (CMS/HCC)   • Hyperglycemia   • Moderate malnutrition (CMS/HCC)   • Acute hyponatremia   • Elevated TSH, T4 borderline low   • Anemia   • Diarrhea   • Weakness   • Fall   • Hypothyroidism   • Leukocytosis   • Altered mental status   • Medically noncompliant   • Severe malnutrition   • Alcoholic dementia (CMS/HCC)   • Wernicke encephalopathy syndrome       Therapy Treatment          Rehabilitation Treatment Summary     Row Name 18 1431             Treatment Time/Intention    Discipline (P)  physical therapist  -STACIE      Document Type (P)  therapy note (daily note)  -STACIE      Subjective Information (P)  complains of;weakness;fatigue  -STACIE      Mode of Treatment (P)  physical therapy  -STACIE      Therapy Frequency (PT Clinical Impression) (P)  daily  -STACIE      Patient Effort (P)  excellent  -STACIE      Existing Precautions/Restrictions (P)  fall  -STACIE      Recorded by [STACIE] Darian Lugo, PT Student 18 1518      Row Name 18 1431             Vital Signs    Pre Systolic BP Rehab (P)  121  -STACIE      Pre Treatment Diastolic BP (P)  83  -STACIE      Pre Patient Position (P)  Supine  -STACIE      Intra Patient Position (P)  Standing  -STACIE      Post Patient Position (P)  Sitting  -STACIE      Recorded by [STACIE]  Darian Lugo, PT Student 07/06/18 1518      Row Name 07/06/18 1431             Cognitive Assessment/Intervention- PT/OT    Affect/Mental Status (Cognitive) (P)  WFL  -STACIE      Orientation Status (Cognition) (P)  oriented x 4  -STACIE      Follows Commands (Cognition) (P)  follows one step commands;over 90% accuracy;verbal cues/prompting required  -STACIE      Cognitive Function (Cognitive) (P)  safety deficit  -STACIE      Safety Deficit (Cognitive) (P)  mild deficit;insight into deficits/self awareness;safety precautions awareness  -STACIE      Personal Safety Interventions (P)  fall prevention program maintained;gait belt;nonskid shoes/slippers when out of bed  -STACIE      Recorded by [STACIE] Darian Lugo, PT Student 07/06/18 1518      Row Name 07/06/18 1431             Safety Issues, Functional Mobility    Safety Issues Affecting Function (Mobility) (P)  safety precaution awareness;insight into deficits/self awareness  -STACIE      Impairments Affecting Function (Mobility) (P)  balance;coordination;endurance/activity tolerance;strength  -STACIE      Recorded by [STACIE] Darian Lugo, PT Student 07/06/18 1518      Row Name 07/06/18 1431             Bed Mobility Assessment/Treatment    Supine-Sit [place] (Bed Mobility) (P)  supervision  -STACIE      Assistive Device (Bed Mobility) (P)  head of bed elevated  -STACIE      Comment (Bed Mobility) (P)  Pt with appropriate safety precaution during bed mobility, performed supine to sitting EOB with supervision only.  -STACIE      Recorded by [STACIE] Darian Lugo, PT Student 07/06/18 1518      Row Name 07/06/18 1431             Transfer Assessment/Treatment    Comment (Transfers) (P)  VC's for sequencing and hand placement on AD. Performed toilet t/f; pt req min A due to low toilet height.  -STACIE      Recorded by [STACIE] Darian Lugo, PT Student 07/06/18 1518      Row Name 07/06/18 1431             Sit-Stand Transfer    Sit-Stand [place] (Transfers) (P)  contact guard;verbal cues  -STACIE      Assistive Device  (Sit-Stand Transfers) (P)  walker, front-wheeled  -STACIE      Recorded by [STACIE] Darian Lugo, PT Student 07/06/18 1518      Row Name 07/06/18 1431             Stand-Sit Transfer    Stand-Sit [place] (Transfers) (P)  contact guard;verbal cues  -STACIE      Assistive Device (Stand-Sit Transfers) (P)  walker, front-wheeled  -STACIE      Recorded by [STACIE] Darian Lugo, PT Student 07/06/18 1518      Row Name 07/06/18 1431             Toilet Transfer    Type (Toilet Transfer) (P)  stand pivot/stand step  -STACIE      [place] Level (Toilet Transfer) (P)  minimum assist (75% patient effort);verbal cues  -STACIE      Assistive Device (Toilet Transfer) (P)  grab bars/safety frame;walker, front-wheeled  -STACIE      Recorded by [STACIE] Darian Lugo, PT Student 07/06/18 1518      Row Name 07/06/18 1431             Gait/Stairs Assessment/Training    65984 - Gait Training Minutes  (P)  20  -STACIE      Gait/Stairs Assessment/Training (P)  gait/ambulation assistive device  -STACIE      [place] Level (Gait) (P)  contact guard;verbal cues  -STACIE      Assistive Device (Gait) (P)  walker, front-wheeled  -STACIE      Distance in Feet (Gait) (P)  450  -STACIE      Pattern (Gait) (P)  step-through  -STACIE      Deviations/Abnormal Patterns (Gait) (P)  gait speed decreased;sherif decreased  -STACIE      Bilateral Gait Deviations (P)  heel strike decreased;forward flexed posture  -STACIE      Comment (Gait/Stairs) (P)  VC's for hand placement and sequencing using RWx. Pt very anxious about functional abilities; reporting that he felt very unsteady with ambulation despite no LOB being noted throughout. Pt very cautious during ambulation with decreased gait speed and stride length primarily due to fear of falling. Worked on side-stepping, backward ambulation, and ambulation without RWx (railing and UE support at wall) to challenge balance.  -STACIE      Recorded by [STACIE] Darian Lugo, PT Student 07/06/18 1518      Row Name 07/06/18 1431             General ROM    GENERAL ROM COMMENTS  (P)  BLE WFL  -STACIE      Recorded by [STACIE] Darian Lugo, PT Student 07/06/18 1518      Row Name 07/06/18 1431             Therapeutic Exercise    Therapeutic Exercise (P)  standing, lower extremities  -STACIE      73586 - PT Therapeutic Exercise Minutes (P)  8  -STACIE      87894 - PT Therapeutic Activity Minutes (P)  10  -STACIE      Recorded by [STACIE] Darian Lugo, PT Student 07/06/18 1518      Row Name 07/06/18 1431             Lower Extremity Standing Therapeutic Exercise    Performed, Standing Lower Extremity (Therapeutic Exercise) (P)  mini-squats;toe raises;other (see comments)   marching in place  -STACIE      Exercise Type, Standing Lower Extremity (Therapeutic Exercise) (P)  AROM (active range of motion)  -STACIE      Sets/Reps Detail, Standing Lower Extremity (Therapeutic Exercise) (P)  BLE 10x each  -STACIE      Recorded by [STACIE] Darian Lugo, PT Student 07/06/18 1520      Row Name 07/06/18 1431             Balance    Balance (P)  dynamic standing balance  -STACIE      Recorded by [STACIE] Darian Lugo, PT Student 07/06/18 1525      Row Name 07/06/18 1431             Static Sitting Balance    Level of [place] (Unsupported Sitting, Static Balance) (P)  supervision  -STACIE      Sitting Position (Unsupported Sitting, Static Balance) (P)  sitting on edge of bed  -STACIE      Recorded by [STACIE] Darian Lugo, PT Student 07/06/18 1520      Row Name 07/06/18 1431             Static Standing Balance    Level of [place] (Supported Standing, Static Balance) (P)  contact guard assist  -STACIE      Assistive Device Utilized (Supported Standing, Static Balance) (P)  rolling walker  -STACIE      Recorded by [STACIE] Darian Lugo, PT Student 07/06/18 1520      Row Name 07/06/18 1431             Dynamic Standing Balance    Level of [place], Reaches Outside Midline (Standing, Dynamic Balance) (P)  contact guard assist  -STACIE      Time Able to Maintain Position, Reaches Outside Midline (Standing, Dynamic Balance) (P)  more than 5 minutes  -STACIE      Comment,  Reaches Outside Midline (Standing, Dynamic Balance) (P)  Worked on forward ambulation without RWx (UE support), backward ambulation, and side-stepping. Performed standing TE also. No LOB noted.  -STACIE      Recorded by [STACIE] Darian Lugo, PT Student 07/06/18 1526      Row Name 07/06/18 1431             Positioning and Restraints    Pre-Treatment Position (P)  in bed  -STACIE      Post Treatment Position (P)  chair  -STACIE      In Chair (P)  reclined;sitting;call light within reach;encouraged to call for assist;exit alarm on;waffle cushion;legs elevated;heels elevated  -STACIE      Recorded by [STACIE] Darian Lugo, PT Student 07/06/18 1520      Row Name 07/06/18 1431             Pain Scale: Numbers Pre/Post-Treatment    Pain Scale: Numbers, Pretreatment (P)  0/10 - no pain  -STACIE      Pain Scale: Numbers, Post-Treatment (P)  0/10 - no pain  -STACIE      Recorded by [STACIE] Darian Lugo, PT Student 07/06/18 1520      Row Name                Wound 06/24/18 1245 medial coccyx skin tear    Wound - Properties Group Date first assessed: 06/24/18 [CP] Time first assessed: 1245 [CP] Present On Admission : yes;picture taken [CP] Orientation: medial [CP] Location: coccyx [CP] Type: skin tear [CP] Additional Comments: friction [CP] Recorded by:  [CP] Sandra Holden, IGNACIO 06/24/18 1415    Row Name                Wound 06/28/18 1015 Right lateral heel other (see comments)    Wound - Properties Group Date first assessed: 06/28/18 [CP] Time first assessed: 1015 [CP] Present On Admission : no;picture taken [CP] Side: Right [CP] Orientation: lateral [CP] Location: heel [CP] Type: other (see comments) [CP] Additional Comments: hematoma [CP] Recorded by:  [CP] Sandra Holden, IGNACIO 06/28/18 1321    Row Name 07/06/18 1431             Coping    Observed Emotional State (P)  calm;cooperative  -STACIE      Verbalized Emotional State (P)  acceptance  -STACIE      Recorded by [STACIE] Darian Lugo PT Student 07/06/18 1520      Row Name 07/06/18 1431             Plan  of Care Review    Plan of Care Reviewed With (P)  patient  -STACIE      Recorded by [STACIE] Darian Lugo, PT Student 07/06/18 1520      Row Name 07/06/18 1431             Outcome Summary/Treatment Plan (PT)    Daily Summary of Progress (PT) (P)  progress toward functional goals is good  -STACIE      Recorded by [STACIE] Darian Lugo, PT Student 07/06/18 1520        User Key  (r) = Recorded By, (t) = Taken By, (c) = Cosigned By    Initials Name Effective Dates Discipline    CP IGNACIO Rangel 06/08/18 -  Nurse    STACIE Lugo, PT Student 05/15/18 -  PT          Wound 06/24/18 1245 medial coccyx skin tear (Active)   Dressing Appearance dry;intact 7/6/2018  8:30 AM   Closure None 7/6/2018  8:30 AM   Base pink 7/6/2018  8:30 AM   Periwound intact;dry;pink;blanchable 7/6/2018  8:30 AM   Periwound Temperature warm 7/6/2018  8:30 AM   Drainage Characteristics/Odor serous 7/6/2018  8:30 AM   Drainage Amount scant 7/6/2018  8:30 AM   Care, Wound cleansed with;sterile normal saline 7/6/2018  8:30 AM   Dressing Care, Wound dressing changed 7/6/2018  8:30 AM       Wound 06/28/18 1015 Right lateral heel other (see comments) (Active)   Dressing Appearance dry;intact 7/6/2018  8:30 AM   Closure HOLGER 7/5/2018  8:30 PM   Base dry 7/6/2018  8:30 AM   Periwound intact;dry;pink 7/6/2018  8:30 AM   Drainage Amount none 7/6/2018  8:30 AM   Care, Wound cleansed with;sterile water 7/6/2018  8:30 AM   Dressing Care, Wound foam;low-adherent 7/6/2018  8:30 AM             Physical Therapy Education     Title: PT OT SLP Therapies (Active)     Topic: Physical Therapy (Active)     Point: Mobility training (Active)    Learning Progress Summary     Learner Status Readiness Method Response Comment Documented by    Patient Active Acceptance E NR  STACIE 07/06/18 1431     Active Acceptance E NR  CM 07/05/18 1415     Active Acceptance E NR  STACIE 07/03/18 1533     Active Acceptance E NR  STACIE 07/02/18 1453     Done Acceptance E VU,NR  JAIDA 06/30/18 7009     Done  Acceptance E VU   06/28/18 1121     Active Acceptance E NR   06/24/18 1129    Family Done Acceptance E VU   06/28/18 1121     Active Acceptance E NR   06/24/18 1129          Point: Home exercise program (Active)    Learning Progress Summary     Learner Status Readiness Method Response Comment Documented by    Patient Active Acceptance E NR  STACIE 07/06/18 1431     Active Acceptance E NR  CM 07/05/18 1415     Active Acceptance E NR  STACIE 07/03/18 1533     Active Acceptance E NR  STACIE 07/02/18 1453     Done Acceptance E VU   06/28/18 1121     Active Acceptance E NR   06/24/18 1129    Family Done Acceptance E VU   06/28/18 1121     Active Acceptance E NR   06/24/18 1129          Point: Body mechanics (Active)    Learning Progress Summary     Learner Status Readiness Method Response Comment Documented by    Patient Active Acceptance E NR  STACIE 07/06/18 1431     Active Acceptance E NR   07/05/18 1415     Active Acceptance E NR  STACIE 07/03/18 1533     Active Acceptance E NR   07/02/18 1453     Done Acceptance E VU   06/28/18 1121     Active Acceptance E NR   06/24/18 1129    Family Done Acceptance E VU   06/28/18 1121     Active Acceptance E NR   06/24/18 1129          Point: Precautions (Active)    Learning Progress Summary     Learner Status Readiness Method Response Comment Documented by    Patient Active Acceptance E NR  STACIE 07/06/18 1431     Active Acceptance E NR   07/05/18 1415     Active Acceptance E NR  STACIE 07/03/18 1533     Active Acceptance E NR  STACIE 07/02/18 1453     Done Acceptance E VU   06/28/18 1121    Family Done Acceptance E VU   06/28/18 1121                      User Key     Initials Effective Dates Name Provider Type Discipline     06/19/15 -  Corine Sarabia, PT Physical Therapist PT     06/19/15 -  Becca Ro, PT Physical Therapist PT     06/19/15 -  Joselyn Rod, PT Physical Therapist PT    STACIE 05/15/18 -  Darian Lugo, PT Student PT Student PT      06/07/18 -  Seelna Souza, PT Student PT Student PT                    PT Recommendation and Plan  Therapy Frequency (PT Clinical Impression): (P) daily  Outcome Summary/Treatment Plan (PT)  Daily Summary of Progress (PT): (P) progress toward functional goals is good  Plan of Care Reviewed With: (P) patient  Progress: (P) improving  Outcome Summary: (P) Pt demonstrated improved functional independence this session; increasing ambulation distance to 450 ft CGA with RWx. Pt also progressed to performing dynamic standing balance tasks. Pt with no LOB throughout functional mobility, but pt very anxious about performance of standing functional tasks due to fear of falling. Req encouragement to participate. Will continue to progress per PT POC as pt is appropriate.          Outcome Measures     Row Name 07/06/18 1431 07/05/18 1415 07/04/18 1535       How much help from another person do you currently need...    Turning from your back to your side while in flat bed without using bedrails? (P)  4  -STACIE 3  -LS (r) CM (t) LS (c) 4  -MB    Moving from lying on back to sitting on the side of a flat bed without bedrails? (P)  3  -STACIE 3  -LS (r) CM (t) LS (c) 4  -MB    Moving to and from a bed to a chair (including a wheelchair)? (P)  3  -STACIE 3  -LS (r) CM (t) LS (c) 3  -MB    Standing up from a chair using your arms (e.g., wheelchair, bedside chair)? (P)  3  -STACIE 3  -LS (r) CM (t) LS (c) 3  -MB    Climbing 3-5 steps with a railing? (P)  2  -STACIE 2  -LS (r) CM (t) LS (c) 2  -MB    To walk in hospital room? (P)  3  -STACIE 3  -LS (r) CM (t) LS (c) 3  -MB    AM-PAC 6 Clicks Score (P)  18  -STACIE 17  -LS (r) CM (t) 19  -MB       Functional Assessment    Outcome Measure Options (P)  AM-PAC 6 Clicks Basic Mobility (PT)  -STACIE AM-PAC 6 Clicks Basic Mobility (PT)  -LS (r) CM (t) LS (c) AM-PAC 6 Clicks Basic Mobility (PT)  -MB    Row Name 07/03/18 1533             How much help from another person do you currently need...    Turning from your back  to your side while in flat bed without using bedrails? 4  -JBA (r) STAICE (t) JBA (c)      Moving from lying on back to sitting on the side of a flat bed without bedrails? 4  -JBA (r) STACIE (t) JBA (c)      Moving to and from a bed to a chair (including a wheelchair)? 3  -JBA (r) STACIE (t) JBA (c)      Standing up from a chair using your arms (e.g., wheelchair, bedside chair)? 3  -JBA (r) STACIE (t) JBA (c)      Climbing 3-5 steps with a railing? 2  -JBA (r) STACIE (t) JBA (c)      To walk in hospital room? 3  -JBA (r) STACIE (t) JBA (c)      AM-PAC 6 Clicks Score 19  -JBA (r) STACIE (t)         Functional Assessment    Outcome Measure Options AM-PAC 6 Clicks Basic Mobility (PT)  -JBA (r) STACIE (t) JBA (c)        User Key  (r) = Recorded By, (t) = Taken By, (c) = Cosigned By    Initials Name Provider Type    CARMELO Castañeda, PT Physical Therapist    JAIDA Muller, PT Physical Therapist    BRENDAN Byrne, PT Physical Therapist    STACIE Lugo, PT Student PT Student    CM Selena Souza, PT Student PT Student           Time Calculation:         PT Charges     Row Name 07/06/18 1431             Time Calculation    Start Time (P)  1431  -STACIE      PT Received On (P)  07/06/18  -STACIE      PT Goal Re-Cert Due Date (P)  07/14/18  -STACIE         Time Calculation- PT    Total Timed Code Minutes- PT (P)  38 minute(s)  -STACIE         Timed Charges    58677 - PT Therapeutic Exercise Minutes (P)  8  -STACIE      93179 - Gait Training Minutes  (P)  20  -STACIE      70053 - PT Therapeutic Activity Minutes (P)  10  -STACIE        User Key  (r) = Recorded By, (t) = Taken By, (c) = Cosigned By    Initials Name Provider Type    STACIE Lugo, PT Student PT Student        Therapy Suggested Charges     Code   Minutes Charges    57344 (CPT®) Hc Pt Neuromusc Re Education Ea 15 Min      52512 (CPT®) Hc Pt Ther Proc Ea 15 Min 8 1    84849 (CPT®) Hc Gait Training Ea 15 Min 20 1    23356 (CPT®) Hc Pt Therapeutic Act Ea 15 Min 10 1    36484 (CPT®) Hc Pt Manual Therapy  Ea 15 Min      30143 (CPT®) Hc Pt Iontophoresis Ea 15 Min      35683 (CPT®) Hc Pt Elec Stim Ea-Per 15 Min      04384 (CPT®) Hc Pt Ultrasound Ea 15 Min      32990 (CPT®) Hc Pt Self Care/Mgmt/Train Ea 15 Min      Total  38 3        Therapy Charges for Today     Code Description Service Date Service Provider Modifiers Qty    13637686469 HC PT THER PROC EA 15 MIN 7/6/2018 Darian Lugo, PT Student GP 1    07432483233 HC GAIT TRAINING EA 15 MIN 7/6/2018 Darian Lugo, PT Student GP 1    [id number]  PT THERAPEUTIC ACT EA 15 MIN 7/6/2018 Darian Lugo, PT Student GP 1          PT G-Codes  Outcome Measure Options: (P) AM-PAC 6 Clicks Basic Mobility (PT)    Darian Lugo, PT Student  7/6/2018

## 2018-07-06 NOTE — PLAN OF CARE
Problem: Patient Care Overview  Goal: Plan of Care Review  Outcome: Ongoing (interventions implemented as appropriate)   07/06/18 1431   Coping/Psychosocial   Plan of Care Reviewed With patient   OTHER   Outcome Summary Pt demonstrated improved functional independence this session; increasing ambulation distance to 450 ft CGA with RWx. Pt also progressed to performing dynamic standing balance tasks. Pt with no LOB throughout functional mobility, but pt very anxious about performance of standing functional tasks due to fear of falling. Req encouragement to participate. Will continue to progress per PT POC as pt is appropriate.   Plan of Care Review   Progress improving

## 2018-07-07 PROCEDURE — 97116 GAIT TRAINING THERAPY: CPT | Performed by: PHYSICAL THERAPIST

## 2018-07-07 PROCEDURE — 99232 SBSQ HOSP IP/OBS MODERATE 35: CPT | Performed by: NURSE PRACTITIONER

## 2018-07-07 PROCEDURE — 25010000002 HEPARIN (PORCINE) PER 1000 UNITS: Performed by: INTERNAL MEDICINE

## 2018-07-07 RX ADMIN — Medication 1 TABLET: at 08:38

## 2018-07-07 RX ADMIN — FOLIC ACID 1 MG: 1 TABLET ORAL at 08:38

## 2018-07-07 RX ADMIN — PROPRANOLOL HYDROCHLORIDE 10 MG: 10 TABLET ORAL at 21:58

## 2018-07-07 RX ADMIN — PRIMIDONE 50 MG: 50 TABLET ORAL at 21:57

## 2018-07-07 RX ADMIN — NICOTINE 1 PATCH: 21 PATCH, EXTENDED RELEASE TRANSDERMAL at 08:39

## 2018-07-07 RX ADMIN — Medication 100 MG: at 08:38

## 2018-07-07 RX ADMIN — ACETAMINOPHEN 650 MG: 325 TABLET, FILM COATED ORAL at 14:33

## 2018-07-07 RX ADMIN — LEVOTHYROXINE SODIUM 25 MCG: 25 TABLET ORAL at 08:38

## 2018-07-07 RX ADMIN — SENNOSIDES AND DOCUSATE SODIUM 2 TABLET: 8.6; 5 TABLET ORAL at 08:36

## 2018-07-07 RX ADMIN — PANTOPRAZOLE SODIUM 40 MG: 40 TABLET, DELAYED RELEASE ORAL at 08:38

## 2018-07-07 RX ADMIN — HEPARIN SODIUM 5000 UNITS: 5000 INJECTION, SOLUTION INTRAVENOUS; SUBCUTANEOUS at 08:38

## 2018-07-07 RX ADMIN — CASTOR OIL AND BALSAM, PERU: 788; 87 OINTMENT TOPICAL at 21:58

## 2018-07-07 RX ADMIN — PROPRANOLOL HYDROCHLORIDE 10 MG: 10 TABLET ORAL at 08:36

## 2018-07-07 RX ADMIN — HEPARIN SODIUM 5000 UNITS: 5000 INJECTION, SOLUTION INTRAVENOUS; SUBCUTANEOUS at 21:57

## 2018-07-07 RX ADMIN — ACETAMINOPHEN 650 MG: 325 TABLET, FILM COATED ORAL at 23:59

## 2018-07-07 RX ADMIN — CASTOR OIL AND BALSAM, PERU: 788; 87 OINTMENT TOPICAL at 08:39

## 2018-07-07 NOTE — THERAPY TREATMENT NOTE
Acute Care - Physical Therapy Treatment Note  [facility]     Patient Name: Israel Bojorquez  : 1955  MRN: [id number]  Today's Date: 2018  Onset of Illness/Injury or Date of Surgery: 18  Date of Referral to PT: 18  Referring Physician: Oni    Admit Date: 2018    Visit Dx:    ICD-10-CM ICD-9-CM   1. Altered mental status, unspecified altered mental status type R41.82 780.97   2. Dehydration E86.0 276.51   3. Hypothyroidism, unspecified type E03.9 244.9   4. Impaired functional mobility, balance, gait, and endurance Z74.09 V49.89     Patient Active Problem List   Diagnosis   • Tobacco abuse   • Hypertension   • Alcohol abuse   • Elevated liver enzymes   • COPD (chronic obstructive pulmonary disease) (CMS/HCC)   • Hyperglycemia   • Moderate malnutrition (CMS/HCC)   • Acute hyponatremia   • Elevated TSH, T4 borderline low   • Anemia   • Diarrhea   • Weakness   • Fall   • Hypothyroidism   • Leukocytosis   • Altered mental status   • Medically noncompliant   • Severe malnutrition   • Alcoholic dementia (CMS/HCC)   • Wernicke encephalopathy syndrome       Therapy Treatment          Rehabilitation Treatment Summary     Row Name 18 1457             Treatment Time/Intention    Discipline physical therapist  -LM      Document Type therapy note (daily note)  -LM      Subjective Information complains of;pain   Headache  -LM      Mode of Treatment individual therapy;physical therapy  -LM      Care Plan Review care plan/treatment goals reviewed;patient/other agree to care plan  -LM      Patient Effort good  -LM      Existing Precautions/Restrictions fall  -LM      Recorded by [LM] Nicole Rose, ROSALINA 18 1526      Row Name 18 1457             Vital Signs    Pre Systolic BP Rehab 101  -LM      Pre Treatment Diastolic BP 70  -LM      Pre Patient Position Supine  -LM      Intra Patient Position Standing  -LM      Post Patient Position Supine  -LM      Recorded by [LM] Nicole Rose, PT  07/07/18 1526      Row Name 07/07/18 1457             Cognitive Assessment/Intervention    Additional Documentation Cognitive Assessment/Intervention (Group)  -LM      Recorded by [LM] Nicole Rose, PT 07/07/18 1526      Row Name 07/07/18 1457             Cognitive Assessment/Intervention- PT/OT    Affect/Mental Status (Cognitive) WFL  -LM      Orientation Status (Cognition) oriented x 4  -LM      Follows Commands (Cognition) WFL  -LM      Cognitive Function (Cognitive) safety deficit  -LM      Safety Deficit (Cognitive) mild deficit;safety precautions awareness  -LM      Personal Safety Interventions fall prevention program maintained;gait belt;muscle strengthening facilitated;nonskid shoes/slippers when out of bed  -LM      Recorded by [LM] Nicole Rose, PT 07/07/18 1526      Row Name 07/07/18 1457             Bed Mobility Assessment/Treatment    Bed Mobility Assessment/Treatment supine-sit;sit-supine  -LM      Supine-Sit [place] (Bed Mobility) independent  -LM      Sit-Supine [place] (Bed Mobility) independent  -LM      Recorded by [LM] Nicole Rose, PT 07/07/18 1526      Row Name 07/07/18 1457             Transfer Assessment/Treatment    Transfer Assessment/Treatment sit-stand transfer;stand-sit transfer;toilet transfer  -LM      Recorded by [LM] Nicole Rose, PT 07/07/18 1526      Row Name 07/07/18 1457             Sit-Stand Transfer    Sit-Stand [place] (Transfers) contact guard;verbal cues  -LM      Assistive Device (Sit-Stand Transfers) walker, front-wheeled  -LM      Recorded by [LM] Nicole Rose, PT 07/07/18 1526      Row Name 07/07/18 1457             Stand-Sit Transfer    Stand-Sit [place] (Transfers) contact guard;verbal cues  -LM      Assistive Device (Stand-Sit Transfers) walker, front-wheeled  -LM      Recorded by [LM] Nicole Rose, PT 07/07/18 1526      Row Name 07/07/18 1457             Toilet Transfer    Type (Toilet Transfer) sit-stand  -LM      [place] Level (Toilet  Transfer) minimum assist (75% patient effort)  -LM      Assistive Device (Toilet Transfer) walker, front-wheeled;grab bars/safety frame  -LM      Recorded by [LM] Nicole Rose, PT 07/07/18 1526      Row Name 07/07/18 1457             Gait/Stairs Assessment/Training    89341 - Gait Training Minutes  25  -LM      Gait/Stairs Assessment/Training gait/ambulation independence;gait/ambulation assistive device  -LM      [place] Level (Gait) contact guard  -LM      Assistive Device (Gait) walker, front-wheeled  -LM      Distance in Feet (Gait) 14 feet; 300 feet  -LM      Comment (Gait/Stairs) Vc's to stay inside walker during gait; Improved step to gait to swing through with cues.  -LM      Recorded by [LM] Nicole Rose, PT 07/07/18 1526      Row Name 07/07/18 1457             Positioning and Restraints    Pre-Treatment Position in bed  -LM      Post Treatment Position bed  -LM      In Bed supine;call light within reach;encouraged to call for assist;exit alarm on;notified nsg  -LM      Recorded by [LM] Nicole Rose, PT 07/07/18 1526      Row Name 07/07/18 1457             Pain Assessment    Additional Documentation Pain Scale: Numbers Pre/Post-Treatment (Group)  -LM      Recorded by [LM] Nicole Rose, PT 07/07/18 1526      Row Name 07/07/18 1457             Pain Scale: Numbers Pre/Post-Treatment    Pain Scale: Numbers, Pretreatment 6/10  -LM      Pain Scale: Numbers, Post-Treatment 4/10  -LM      Pain Location head  -LM      Recorded by [LM] Nicole Rose, PT 07/07/18 1526      Row Name                Wound 06/24/18 1245 medial coccyx skin tear    Wound - Properties Group Date first assessed: 06/24/18 [CP] Time first assessed: 1245 [CP] Present On Admission : yes;picture taken [CP] Orientation: medial [CP] Location: coccyx [CP] Type: skin tear [CP] Additional Comments: friction [CP] Recorded by:  [CP] IGNACIO Rangel 06/24/18 1415    Row Name                Wound 06/28/18 1015 Right lateral heel other (see  comments)    Wound - Properties Group Date first assessed: 06/28/18 [CP] Time first assessed: 1015 [CP] Present On Admission : no;picture taken [CP] Side: Right [CP] Orientation: lateral [CP] Location: heel [CP] Type: other (see comments) [CP] Additional Comments: hematoma [CP] Recorded by:  [CP] Sandra Holden, IGNACIO 06/28/18 1321    Row Name 07/07/18 1457             Plan of Care Review    Plan of Care Reviewed With patient  -LM      Recorded by [LM] Nicole Rose, PT 07/07/18 1526      Row Name 07/07/18 1457             Outcome Summary/Treatment Plan (PT)    Daily Summary of Progress (PT) progress toward functional goals is good  -LM      Recorded by [LM] Nicole Rose, PT 07/07/18 1526        User Key  (r) = Recorded By, (t) = Taken By, (c) = Cosigned By    Initials Name Effective Dates Discipline    CP Sandra Holden, IGNACIO 06/08/18 -  Nurse    LM Nicole Rose, PT 06/15/16 -  PT          Wound 06/24/18 1245 medial coccyx skin tear (Active)   Dressing Appearance dry;intact 7/7/2018  8:15 AM   Closure None 7/7/2018  8:15 AM   Base pink 7/7/2018  8:15 AM   Periwound intact;dry 7/7/2018  8:15 AM   Periwound Temperature warm 7/7/2018  8:15 AM   Periwound Skin Turgor soft 7/7/2018  8:15 AM   Edges open 7/7/2018  8:15 AM   Drainage Amount none 7/7/2018  8:15 AM   Care, Wound cleansed with;sterile normal saline 7/7/2018  8:15 AM   Dressing Care, Wound dressing changed;low-adherent;other (see comments) 7/6/2018  7:45 PM       Wound 06/28/18 1015 Right lateral heel other (see comments) (Active)   Dressing Appearance dry;intact 7/7/2018  8:15 AM   Closure Other (Comment) 7/7/2018  8:15 AM   Base clean;dry 7/7/2018  8:15 AM   Periwound intact;dry 7/7/2018  8:15 AM   Periwound Temperature warm 7/7/2018  8:15 AM   Periwound Skin Turgor soft 7/7/2018  8:15 AM   Drainage Amount none 7/7/2018  8:15 AM   Dressing Care, Wound dressing removed 7/7/2018  8:15 AM             Physical Therapy Education     Title: PT OT SLP  Therapies (Active)     Topic: Physical Therapy (Active)     Point: Mobility training (Active)    Learning Progress Summary     Learner Status Readiness Method Response Comment Documented by    Patient Active Acceptance E NR  STACIE 07/06/18 1431     Active Acceptance E NR  CM 07/05/18 1415     Active Acceptance E NR  STACIE 07/03/18 1533     Active Acceptance E NR  STACIE 07/02/18 1453     Done Acceptance E VU,NR   06/30/18 1534     Done Acceptance E VU  KM 06/28/18 1121     Active Acceptance E NR  SJ 06/24/18 1129    Family Done Acceptance E VU  KM 06/28/18 1121     Active Acceptance E NR  SJ 06/24/18 1129          Point: Home exercise program (Active)    Learning Progress Summary     Learner Status Readiness Method Response Comment Documented by    Patient Active Acceptance E NR  STACIE 07/06/18 1431     Active Acceptance E NR  CM 07/05/18 1415     Active Acceptance E NR  STACIE 07/03/18 1533     Active Acceptance E NR  STACIE 07/02/18 1453     Done Acceptance E VU  KM 06/28/18 1121     Active Acceptance E NR   06/24/18 1129    Family Done Acceptance E VU  KM 06/28/18 1121     Active Acceptance E NR   06/24/18 1129          Point: Body mechanics (Active)    Learning Progress Summary     Learner Status Readiness Method Response Comment Documented by    Patient Active Acceptance E NR  STACIE 07/06/18 1431     Active Acceptance E NR  CM 07/05/18 1415     Active Acceptance E NR  STACIE 07/03/18 1533     Active Acceptance E NR  STACIE 07/02/18 1453     Done Acceptance E VU  KM 06/28/18 1121     Active Acceptance E NR   06/24/18 1129    Family Done Acceptance E VU  KM 06/28/18 1121     Active Acceptance E NR   06/24/18 1129          Point: Precautions (Active)    Learning Progress Summary     Learner Status Readiness Method Response Comment Documented by    Patient Active Acceptance E NR  STACIE 07/06/18 1431     Active Acceptance E NR  CM 07/05/18 1415     Active Acceptance E NR  STACIE 07/03/18 1533     Active Acceptance E NR  STACIE 07/02/18 1453      Done Acceptance E [facility] 06/28/18 1121    Family Done Acceptance E [facility] 06/28/18 1121                      User Key     Initials Effective Dates Name Provider Type Discipline    SJ 06/19/15 -  Corine Sarabia, PT Physical Therapist PT     06/19/15 -  Becca Ro, PT Physical Therapist PT     06/19/15 -  Joselyn Rod, PT Physical Therapist PT     05/15/18 -  Darian Lugo, PT Student PT Student PT    CM 06/07/18 -  Selena Souza, PT Student PT Student PT                    PT Recommendation and Plan     Outcome Summary/Treatment Plan (PT)  Daily Summary of Progress (PT): progress toward functional goals is good  Plan of Care Reviewed With: patient  Outcome Summary: Pt progressing well towards skilled PT goals.  Pt transferred supine<-->sit independently, stood with CGA from bed and Serena from toilet, and ambulated 14 feet and 300 feet using rw with CGA.  Continue skilled PT to improve mobility and safety prior to d/c.          Outcome Measures     Row Name 07/07/18 1457 07/06/18 1431 07/05/18 1415       How much help from another person do you currently need...    Turning from your back to your side while in flat bed without using bedrails? 4  -LM 4  -LS (r) STACIE (t) LS (c) 3  -LS (r) CM (t) LS (c)    Moving from lying on back to sitting on the side of a flat bed without bedrails? 4  -LM 3  -LS (r) STACIE (t) LS (c) 3  -LS (r) CM (t) LS (c)    Moving to and from a bed to a chair (including a wheelchair)? 3  -LM 3  -LS (r) STACIE (t) LS (c) 3  -LS (r) CM (t) LS (c)    Standing up from a chair using your arms (e.g., wheelchair, bedside chair)? 3  -LM 3  -LS (r) STACIE (t) LS (c) 3  -LS (r) CM (t) LS (c)    Climbing 3-5 steps with a railing? 3  -LM 2  -LS (r) STACIE (t) LS (c) 2  -LS (r) CM (t) LS (c)    To walk in hospital room? 3  -LM 3  -LS (r) STACIE (t) LS (c) 3  -LS (r) CM (t) LS (c)    AM-PAC 6 Clicks Score 20  -LM 18  -LS (r) STACIE (t) 17  -LS (r) CM (t)       Functional Assessment    Outcome Measure Options  AM-PAC 6 Clicks Basic Mobility (PT)  -LM AM-PAC 6 Clicks Basic Mobility (PT)  -LS (r) STACIE (t) LS (c) AM-PAC 6 Clicks Basic Mobility (PT)  -LS (r) CM (t) LS (c)    Row Name 07/04/18 1535             How much help from another person do you currently need...    Turning from your back to your side while in flat bed without using bedrails? 4  -MB      Moving from lying on back to sitting on the side of a flat bed without bedrails? 4  -MB      Moving to and from a bed to a chair (including a wheelchair)? 3  -MB      Standing up from a chair using your arms (e.g., wheelchair, bedside chair)? 3  -MB      Climbing 3-5 steps with a railing? 2  -MB      To walk in hospital room? 3  -MB      AM-PAC 6 Clicks Score 19  -MB         Functional Assessment    Outcome Measure Options AM-PAC 6 Clicks Basic Mobility (PT)  -MB        User Key  (r) = Recorded By, (t) = Taken By, (c) = Cosigned By    Initials Name Provider Type    LS Bisi Muller, PT Physical Therapist    LM Nicole Rose, PT Physical Therapist    MB Heather Byrne, PT Physical Therapist    STACIE Lugo, PT Student PT Student    CM Selena Souza, PT Student PT Student           Time Calculation:         PT Charges     Row Name 07/07/18 1457             Time Calculation    Start Time 1457  -LM      PT Received On 07/07/18  -LM      PT Goal Re-Cert Due Date 07/14/18  -LM         Timed Charges    31982 - Gait Training Minutes  25  -LM        User Key  (r) = Recorded By, (t) = Taken By, (c) = Cosigned By    Initials Name Provider Type    LM Nicole Rose, PT Physical Therapist        Therapy Suggested Charges     Code   Minutes Charges    36900 (CPT®) Hc Pt Neuromusc Re Education Ea 15 Min      00065 (CPT®) Hc Pt Ther Proc Ea 15 Min      59461 (CPT®) Hc Gait Training Ea 15 Min 25 2    73449 (CPT®) Hc Pt Therapeutic Act Ea 15 Min      57035 (CPT®) Hc Pt Manual Therapy Ea 15 Min      51327 (CPT®) Hc Pt Iontophoresis Ea 15 Min      39405 (CPT®) Hc Pt Elec Stim Ea-Per 15  Min      10851 (CPT®) Hc Pt Ultrasound Ea 15 Min      27935 (CPT®) Hc Pt Self Care/Mgmt/Train Ea 15 Min      Total  25 2        Therapy Charges for Today     Code Description Service Date Service Provider Modifiers Qty    50421975671 HC GAIT TRAINING EA 15 MIN 7/7/2018 Nicole Rose, PT GP 2          PT G-Codes  Outcome Measure Options: AM-PAC 6 Clicks Basic Mobility (PT)    Nicole Rose, PT  7/7/2018

## 2018-07-07 NOTE — PLAN OF CARE
Problem: Fall Risk (Adult)  Goal: Identify Related Risk Factors and Signs and Symptoms  Outcome: Ongoing (interventions implemented as appropriate)    Goal: Absence of Fall  Outcome: Ongoing (interventions implemented as appropriate)      Problem: Skin Injury Risk (Adult)  Goal: Identify Related Risk Factors and Signs and Symptoms  Outcome: Ongoing (interventions implemented as appropriate)    Goal: Skin Health and Integrity  Outcome: Ongoing (interventions implemented as appropriate)      Problem: Patient Care Overview  Goal: Plan of Care Review  Outcome: Ongoing (interventions implemented as appropriate)   07/07/18 0348   Coping/Psychosocial   Plan of Care Reviewed With patient   OTHER   Outcome Summary patient slept most of night. no acute events this shift. vital signs stable.    Plan of Care Review   Progress no change     Goal: Individualization and Mutuality  Outcome: Ongoing (interventions implemented as appropriate)    Goal: Discharge Needs Assessment  Outcome: Ongoing (interventions implemented as appropriate)

## 2018-07-07 NOTE — PLAN OF CARE
Problem: Fall Risk (Adult)  Goal: Identify Related Risk Factors and Signs and Symptoms  Outcome: Ongoing (interventions implemented as appropriate)  Bed alarm in place.  Patient has not attempted to get OOB without calling for assistance.     07/07/18 1327   Fall Risk (Adult)   Signs and Symptoms (Fall Risk) presence of risk factors     Goal: Absence of Fall  Outcome: Ongoing (interventions implemented as appropriate)      Problem: Skin Injury Risk (Adult)  Goal: Identify Related Risk Factors and Signs and Symptoms  Outcome: Ongoing (interventions implemented as appropriate)  Ointment applied to wound on his buttocks, bilateral feet elevated on pillows.     07/07/18 1327   Skin Injury Risk (Adult)   Related Risk Factors (Skin Injury Risk) other (see comments)     Goal: Skin Health and Integrity  Outcome: Ongoing (interventions implemented as appropriate)      Problem: Patient Care Overview  Goal: Plan of Care Review  Outcome: Ongoing (interventions implemented as appropriate)  Patient given enema and moved his bowels.    Goal: Individualization and Mutuality  Outcome: Ongoing (interventions implemented as appropriate)    Goal: Discharge Needs Assessment  Outcome: Ongoing (interventions implemented as appropriate)

## 2018-07-07 NOTE — PROGRESS NOTES
[facility] Medicine Services  PROGRESS NOTE    Patient Name: Israel Bojorquez  : 1955  MRN: [id number]    Date of Admission: 2018  Length of Stay: 14  Primary Care Physician: No Known Provider    Subjective   Subjective   CC: F/U encephalopathy, suspected Wernicke's syndrome      HPI:    Patient lying in bed sleeping, but arousable to verbal stimuli.  No complaints at this time.  No adverse event overnight.  No family in the room.  Patient asked again today when he was being transferred.  Does not appear to remember previous conversations.    Review of Systems   Gen- No fevers, chills  CV- No chest pain, palpitations  Resp- No cough, dyspnea  GI- No N/V/D, abd pain  Otherwise ROS is negative except as mentioned in the HPI.    Objective   Objective   Vital Signs:   Temp:  [97.6 °F (36.4 °C)-98 °F (36.7 °C)] 97.9 °F (36.6 °C)  Heart Rate:  [95] 95  Resp:  [18] 18  BP: ()/(62-86) 116/82   Physical Exam:  General: Alert, well-developed well-nourished male in no acute distress    Head: Normocephalic atraumatic    Eyes: PERRLA, EOMI, nonicteric, conjunctiva normal    ENT: Pink, moist mucous membranes    Neck: Supple, nontender, trachea midline without lymphadenopathy, JVD, nuchal rigidity.      Cardiovascular: RRR  no M/R/G +1DP pulses bilaterally    Respiratory: Nonlabored, symmetrical chest expansion, clear to auscultation bilaterally    Abdomen: Soft, nontender, nondistended,  positive bowel sounds in all 4 quadrants     Extremities: FROM in upper and lower extremities bilaterally. Negative for edema/cyanosis..  Negative calf pain    Skin: Pink/warm/dry.  No rash or lesions noted    Neuro: Oriented to person place and situation(knew daughter had spoken to [place] about admitting him), speech is clear, follows all commands    Psych: Patient is pleasant and cooperative.  Flat affect.     Results Reviewed:  I have personally reviewed current lab, radiology, and data and  agree.    Results from last 7 days  Lab Units 07/05/18  0643   WBC 10*3/mm3 7.99   HEMOGLOBIN g/dL 10.1*   HEMATOCRIT % 31.5*   PLATELETS 10*3/mm3 268       Results from last 7 days  Lab Units 07/05/18  0643   SODIUM mmol/L 139   POTASSIUM mmol/L 4.1   CHLORIDE mmol/L 104   CO2 mmol/L 31.0   BUN mg/dL 8*   CREATININE mg/dL 0.52*   GLUCOSE mg/dL 85   CALCIUM mg/dL 8.6*     Estimated Creatinine Clearance: 104.7 mL/min (A) (by C-G formula based on SCr of 0.52 mg/dL (L)).    I have reviewed the medications.    Assessment/Plan   Assessment / Plan     Hospital Problem List     * (Principal)Altered mental status    Tobacco abuse (Chronic)    Hypertension (Chronic)    Alcohol abuse (Chronic)    Elevated liver enzymes (Chronic)    COPD (chronic obstructive pulmonary disease) (CMS/HCC) (Chronic)    Diarrhea (Chronic)    Hypothyroidism (Chronic)    Leukocytosis    Medically noncompliant (Chronic)    Severe malnutrition (Chronic)    Alcoholic dementia (CMS/HCC)    Wernicke encephalopathy syndrome        Brief Hospital Course to date:  Israel Bojorquez is a 63 y.o. male with a past medical history significant for longstanding alcohol abuse with progressing ETOH dementia, Acosta's esophagus, COPD, hypothyroidism, SIADH, essential tremor, essential hypertension, hyperlipidemia, and tobacco abuse presents to the ED after son found down at home    Assessment & Plan:   Stable, nothing new to add. BP/mental status continues to improve. Pt encouraged to increase PO intake and physical activities as tolerated. CM working on placement for STR.  --------------------------------------------------------------------------  Wernicke's encephalopathy (mental status improved)  EtOH dependence  -As he is too weak to walk safely, needs SNF rehab and has reliably voiced he is agreeable  -Family is pursuing emergency guardianship  -S/P high dose vitamin and thiamine reploacements IV, continue PO thiamine    Hypokalemia, resolved  -Replace  "electrolytes per protocol     Hypothyroidism  -Restarted home Synthroid as was prior noncompliant, noting abnormal thyroid studies, will need rechecked 6 weeks after d/c    Frequent PVC/PAC  -Asymptomatic   -Continue propranolol-reduced dose due to low BP    Hypotension  - relatively asymptomatic, monitor    --Added enema; no BM documented since 6/28  --AM labs     DVT Prophylaxis:  SQ heparin    CODE STATUS:   Code Status and Medical Interventions:   Ordered at: 06/23/18 0403     Level Of Support Discussed With:    Patient     Code Status:    CPR     Medical Interventions (Level of Support Prior to Arrest):    Full     Disposition:  Awaiting placement; [facility] accepted the patient and has paperwork for the family fIll out; patient signed \"MOST\" forms and states he does not have a living will, but he wants everything done to save him.    Electronically signed by IGNACIO Arellano, 07/07/18, 9:52 AM      "

## 2018-07-07 NOTE — PLAN OF CARE
Problem: Patient Care Overview  Goal: Plan of Care Review  Outcome: Ongoing (interventions implemented as appropriate)   07/07/18 5796   Coping/Psychosocial   Plan of Care Reviewed With patient   OTHER   Outcome Summary Pt progressing well towards skilled PT goals. Pt transferred supine<-->sit independently, stood with CGA from bed and Serena from toilet, and ambulated 14 feet and 300 feet using rw with CGA. Continue skilled PT to improve mobility and safety prior to d/c.

## 2018-07-08 LAB
ANION GAP SERPL CALCULATED.3IONS-SCNC: 7 MMOL/L (ref 3–11)
BUN BLD-MCNC: 7 MG/DL (ref 9–23)
BUN/CREAT SERPL: 13.7 (ref 7–25)
CALCIUM SPEC-SCNC: 8.5 MG/DL (ref 8.7–10.4)
CHLORIDE SERPL-SCNC: 104 MMOL/L (ref 99–109)
CO2 SERPL-SCNC: 27 MMOL/L (ref 20–31)
CREAT BLD-MCNC: 0.51 MG/DL (ref 0.6–1.3)
DEPRECATED RDW RBC AUTO: 61.2 FL (ref 37–54)
ERYTHROCYTE [DISTWIDTH] IN BLOOD BY AUTOMATED COUNT: 16.2 % (ref 11.3–14.5)
GFR SERPL CREATININE-BSD FRML MDRD: >150 ML/MIN/1.73
GLUCOSE BLD-MCNC: 78 MG/DL (ref 70–100)
HCT VFR BLD AUTO: 29 % (ref 38.9–50.9)
HGB BLD-MCNC: 9.4 G/DL (ref 13.1–17.5)
MCH RBC QN AUTO: 33.9 PG (ref 27–31)
MCHC RBC AUTO-ENTMCNC: 32.4 G/DL (ref 32–36)
MCV RBC AUTO: 104.7 FL (ref 80–99)
PLATELET # BLD AUTO: 265 10*3/MM3 (ref 150–450)
PMV BLD AUTO: 11.2 FL (ref 6–12)
POTASSIUM BLD-SCNC: 4.3 MMOL/L (ref 3.5–5.5)
RBC # BLD AUTO: 2.77 10*6/MM3 (ref 4.2–5.76)
SODIUM BLD-SCNC: 138 MMOL/L (ref 132–146)
WBC NRBC COR # BLD: 7.45 10*3/MM3 (ref 3.5–10.8)

## 2018-07-08 PROCEDURE — 80048 BASIC METABOLIC PNL TOTAL CA: CPT | Performed by: NURSE PRACTITIONER

## 2018-07-08 PROCEDURE — 85027 COMPLETE CBC AUTOMATED: CPT | Performed by: NURSE PRACTITIONER

## 2018-07-08 PROCEDURE — 25010000002 HEPARIN (PORCINE) PER 1000 UNITS: Performed by: INTERNAL MEDICINE

## 2018-07-08 PROCEDURE — 99232 SBSQ HOSP IP/OBS MODERATE 35: CPT | Performed by: NURSE PRACTITIONER

## 2018-07-08 RX ADMIN — PROPRANOLOL HYDROCHLORIDE 10 MG: 10 TABLET ORAL at 08:38

## 2018-07-08 RX ADMIN — SENNOSIDES AND DOCUSATE SODIUM 2 TABLET: 8.6; 5 TABLET ORAL at 08:37

## 2018-07-08 RX ADMIN — FOLIC ACID 1 MG: 1 TABLET ORAL at 08:38

## 2018-07-08 RX ADMIN — Medication 100 MG: at 08:38

## 2018-07-08 RX ADMIN — HEPARIN SODIUM 5000 UNITS: 5000 INJECTION, SOLUTION INTRAVENOUS; SUBCUTANEOUS at 08:37

## 2018-07-08 RX ADMIN — CASTOR OIL AND BALSAM, PERU: 788; 87 OINTMENT TOPICAL at 08:37

## 2018-07-08 RX ADMIN — PANTOPRAZOLE SODIUM 40 MG: 40 TABLET, DELAYED RELEASE ORAL at 06:39

## 2018-07-08 RX ADMIN — LEVOTHYROXINE SODIUM 25 MCG: 25 TABLET ORAL at 06:39

## 2018-07-08 RX ADMIN — CASTOR OIL AND BALSAM, PERU: 788; 87 OINTMENT TOPICAL at 21:40

## 2018-07-08 RX ADMIN — PROPRANOLOL HYDROCHLORIDE 10 MG: 10 TABLET ORAL at 21:43

## 2018-07-08 RX ADMIN — SENNOSIDES AND DOCUSATE SODIUM 2 TABLET: 8.6; 5 TABLET ORAL at 21:40

## 2018-07-08 RX ADMIN — NICOTINE 1 PATCH: 21 PATCH, EXTENDED RELEASE TRANSDERMAL at 08:40

## 2018-07-08 RX ADMIN — ACETAMINOPHEN 650 MG: 325 TABLET, FILM COATED ORAL at 15:41

## 2018-07-08 RX ADMIN — PRIMIDONE 50 MG: 50 TABLET ORAL at 21:40

## 2018-07-08 RX ADMIN — Medication 1 TABLET: at 08:38

## 2018-07-08 RX ADMIN — HEPARIN SODIUM 5000 UNITS: 5000 INJECTION, SOLUTION INTRAVENOUS; SUBCUTANEOUS at 21:40

## 2018-07-08 NOTE — PLAN OF CARE
Problem: Fall Risk (Adult)  Goal: Identify Related Risk Factors and Signs and Symptoms  Outcome: Ongoing (interventions implemented as appropriate)    Goal: Absence of Fall  Outcome: Ongoing (interventions implemented as appropriate)      Problem: Skin Injury Risk (Adult)  Goal: Identify Related Risk Factors and Signs and Symptoms  Outcome: Ongoing (interventions implemented as appropriate)  Attempted to get the patient up OOB to bedside chair twice.  Patient has refused to get OOB.     07/08/18 1342   Skin Injury Risk (Adult)   Related Risk Factors (Skin Injury Risk) body weight extremes;hospitalization prolonged;mechanical forces;mobility impaired;other (see comments)     Goal: Skin Health and Integrity  Outcome: Ongoing (interventions implemented as appropriate)  Pt rests on a waffle mattress and is able to turn self side to side independently.     07/08/18 1342   Skin Injury Risk (Adult)   Skin Health and Integrity making progress toward outcome       Problem: Patient Care Overview  Goal: Plan of Care Review  Outcome: Ongoing (interventions implemented as appropriate)    Goal: Individualization and Mutuality  Outcome: Ongoing (interventions implemented as appropriate)    Goal: Discharge Needs Assessment  Outcome: Ongoing (interventions implemented as appropriate)

## 2018-07-08 NOTE — PROGRESS NOTES
[facility] Medicine Services  PROGRESS NOTE    Patient Name: Israel Bojorquez  : 1955  MRN: [id number]    Date of Admission: 2018  Length of Stay: 15  Primary Care Physician: No Known Provider    Subjective   Subjective   CC: F/U encephalopathy, suspected Wernicke's syndrome      HPI:    Patient laying in bed in NAD. Appetite fair doesn't like our food. working with PT. No acute events overnight  Per nursing. He is aware of rehab plans and is agreeable.     Review of Systems   Gen- No fevers, chills  CV- No chest pain, palpitations  Resp- No cough, dyspnea  GI- No N/V/D, abd pain  Otherwise ROS is negative except as mentioned in the HPI.    Objective   Objective   Vital Signs:   Temp:  [97.7 °F (36.5 °C)-97.9 °F (36.6 °C)] 97.9 °F (36.6 °C)  Heart Rate:  [84-95] 84  Resp:  [16-18] 18  BP: (105-114)/(65-81) 106/65   Physical Exam:  General: Alert, well-developed well-nourished male in no acute distress    Head: Normocephalic atraumatic    Eyes: PERRLA, EOMI, nonicteric, conjunctiva normal    ENT: Pink, moist mucous membranes    Neck: Supple, nontendfer, no JVD, nuchal rigidity.      Cardiovascular: RRR  no M/R/G +1DP pulses bilaterally    Respiratory: Nonlabored, symmetrical chest expansion, clear to auscultation bilaterally    Abdomen: Soft, nontender, nondistended,  positive bowel sounds in all 4 quadrants     Extremities: FROM in upper and lower extremities bilaterally. Negative for edema/cyanosis..  Negative calf pain    Skin: Pink/warm/dry.  No rash or lesions noted    Neuro: Oriented to person place and situation(knew daughter had spoken to [facility] about admitting him), speech is clear, follows all commands    Psych: Patient is pleasant and cooperative.  Flat affect.     Results Reviewed:  I have personally reviewed current lab, radiology, and data and agree.    Results from last 7 days  Lab Units 18  0541 18  0643   WBC 10*3/mm3 7.45 7.99   HEMOGLOBIN g/dL  9.4* 10.1*   HEMATOCRIT % 29.0* 31.5*   PLATELETS 10*3/mm3 265 268       Results from last 7 days  Lab Units 07/08/18  0541 07/05/18  0643   SODIUM mmol/L 138 139   POTASSIUM mmol/L 4.3 4.1   CHLORIDE mmol/L 104 104   CO2 mmol/L 27.0 31.0   BUN mg/dL 7* 8*   CREATININE mg/dL 0.51* 0.52*   GLUCOSE mg/dL 78 85   CALCIUM mg/dL 8.5* 8.6*     Estimated Creatinine Clearance: 106.8 mL/min (A) (by C-G formula based on SCr of 0.51 mg/dL (L)).    I have reviewed the medications.    Assessment/Plan   Assessment / Plan     Hospital Problem List     * (Principal)Altered mental status    Tobacco abuse (Chronic)    Hypertension (Chronic)    Alcohol abuse (Chronic)    Elevated liver enzymes (Chronic)    COPD (chronic obstructive pulmonary disease) (CMS/HCC) (Chronic)    Diarrhea (Chronic)    Hypothyroidism (Chronic)    Leukocytosis    Medically noncompliant (Chronic)    Severe malnutrition (Chronic)    Alcoholic dementia (CMS/HCC)    Wernicke encephalopathy syndrome        Brief Hospital Course to date:  Israel Bojorquez is a 63 y.o. male with a past medical history significant for longstanding alcohol abuse with progressing ETOH dementia, Acosta's esophagus, COPD, hypothyroidism, SIADH, essential tremor, essential hypertension, hyperlipidemia, and tobacco abuse presents to the ED after son found down at home    Assessment & Plan:   Stable, nothing new to add. BP/mental status continues to improve. Pt encouraged to increase PO intake and physical activities as tolerated. CM working on placement for STR.  --------------------------------------------------------------------------  Wernicke's encephalopathy (mental status improved)  EtOH dependence  -As he is too weak to walk safely, needs SNF rehab and has reliably voiced he is agreeable  -Family is pursuing emergency guardianship  -S/P high dose vitamin and thiamine reploacements IV, continue PO thiamine    Hypokalemia, resolved  -Replace electrolytes per protocol  "    Hypothyroidism  -Restarted home Synthroid as was prior noncompliant, noting abnormal thyroid studies, will need rechecked 6 weeks after d/c    Frequent PVC/PAC  -Asymptomatic   -Continue propranolol-reduced dose due to low BP    Hypotension  - relatively asymptomatic, monitor    --Added enema; no BM documented since 6/28      DVT Prophylaxis:  SQ heparin    CODE STATUS:   Code Status and Medical Interventions:   Ordered at: 06/23/18 0403     Level Of Support Discussed With:    Patient     Code Status:    CPR     Medical Interventions (Level of Support Prior to Arrest):    Full     Disposition:  Awaiting placement; [facility] accepted the patient and has paperwork for the family fIll out; patient signed \"MOST\" forms and states he does not have a living will, but he wants everything done to save him.    Electronically signed by IGNACIO Avila, 07/08/18, 11:13 AM      "

## 2018-07-08 NOTE — PLAN OF CARE
Problem: Fall Risk (Adult)  Goal: Identify Related Risk Factors and Signs and Symptoms  Outcome: Ongoing (interventions implemented as appropriate)   07/08/18 0330   Fall Risk (Adult)   Related Risk Factors (Fall Risk) history of falls;environment unfamiliar   Signs and Symptoms (Fall Risk) presence of risk factors     Goal: Absence of Fall  Outcome: Ongoing (interventions implemented as appropriate)   07/08/18 0330   Fall Risk (Adult)   Absence of Fall making progress toward outcome       Problem: Skin Injury Risk (Adult)  Goal: Identify Related Risk Factors and Signs and Symptoms  Outcome: Ongoing (interventions implemented as appropriate)   07/08/18 0330   Skin Injury Risk (Adult)   Related Risk Factors (Skin Injury Risk) hospitalization prolonged     Goal: Skin Health and Integrity  Outcome: Ongoing (interventions implemented as appropriate)   07/08/18 0330   Skin Injury Risk (Adult)   Skin Health and Integrity making progress toward outcome       Problem: Patient Care Overview  Goal: Plan of Care Review  Outcome: Ongoing (interventions implemented as appropriate)   07/08/18 0330   Coping/Psychosocial   Plan of Care Reviewed With patient   Plan of Care Review   Progress improving

## 2018-07-09 PROBLEM — R19.7 DIARRHEA: Chronic | Status: RESOLVED | Noted: 2018-02-13 | Resolved: 2018-07-09

## 2018-07-09 PROBLEM — D72.829 LEUKOCYTOSIS: Status: RESOLVED | Noted: 2018-06-23 | Resolved: 2018-07-09

## 2018-07-09 PROCEDURE — 99239 HOSP IP/OBS DSCHRG MGMT >30: CPT | Performed by: NURSE PRACTITIONER

## 2018-07-09 PROCEDURE — 25010000002 HEPARIN (PORCINE) PER 1000 UNITS: Performed by: INTERNAL MEDICINE

## 2018-07-09 PROCEDURE — 97116 GAIT TRAINING THERAPY: CPT

## 2018-07-09 PROCEDURE — 97530 THERAPEUTIC ACTIVITIES: CPT

## 2018-07-09 PROCEDURE — 63710000001 DIPHENHYDRAMINE PER 50 MG: Performed by: PHYSICIAN ASSISTANT

## 2018-07-09 RX ORDER — SENNA AND DOCUSATE SODIUM 50; 8.6 MG/1; MG/1
2 TABLET, FILM COATED ORAL 2 TIMES DAILY
Start: 2018-07-09

## 2018-07-09 RX ORDER — ONDANSETRON 4 MG/1
4 TABLET, FILM COATED ORAL EVERY 6 HOURS PRN
Start: 2018-07-09 | End: 2019-11-18

## 2018-07-09 RX ORDER — LEVOTHYROXINE SODIUM 0.03 MG/1
25 TABLET ORAL DAILY
Start: 2018-07-09

## 2018-07-09 RX ORDER — PROPRANOLOL HYDROCHLORIDE 10 MG/1
10 TABLET ORAL 2 TIMES DAILY
Status: ON HOLD
Start: 2018-07-09 | End: 2022-10-15 | Stop reason: SDUPTHER

## 2018-07-09 RX ORDER — PRIMIDONE 50 MG/1
50 TABLET ORAL NIGHTLY
Start: 2018-07-09 | End: 2019-11-18

## 2018-07-09 RX ORDER — DIPHENHYDRAMINE HCL 25 MG
25 CAPSULE ORAL ONCE
Status: COMPLETED | OUTPATIENT
Start: 2018-07-09 | End: 2018-07-09

## 2018-07-09 RX ORDER — CASTOR OIL AND BALSAM, PERU 788; 87 MG/G; MG/G
OINTMENT TOPICAL
Start: 2018-07-09 | End: 2019-11-18

## 2018-07-09 RX ORDER — ACETAMINOPHEN 325 MG/1
650 TABLET ORAL EVERY 4 HOURS PRN
Status: ON HOLD
Start: 2018-07-09 | End: 2022-10-15 | Stop reason: SDUPTHER

## 2018-07-09 RX ORDER — IPRATROPIUM BROMIDE AND ALBUTEROL SULFATE 2.5; .5 MG/3ML; MG/3ML
3 SOLUTION RESPIRATORY (INHALATION) EVERY 4 HOURS PRN
Qty: 360 ML
Start: 2018-07-09 | End: 2019-11-18

## 2018-07-09 RX ORDER — NICOTINE 21 MG/24HR
1 PATCH, TRANSDERMAL 24 HOURS TRANSDERMAL DAILY
Start: 2018-07-10 | End: 2019-11-18

## 2018-07-09 RX ADMIN — CASTOR OIL AND BALSAM, PERU: 788; 87 OINTMENT TOPICAL at 20:02

## 2018-07-09 RX ADMIN — PRIMIDONE 50 MG: 50 TABLET ORAL at 20:01

## 2018-07-09 RX ADMIN — Medication 100 MG: at 09:00

## 2018-07-09 RX ADMIN — CASTOR OIL AND BALSAM, PERU: 788; 87 OINTMENT TOPICAL at 08:59

## 2018-07-09 RX ADMIN — HEPARIN SODIUM 5000 UNITS: 5000 INJECTION, SOLUTION INTRAVENOUS; SUBCUTANEOUS at 09:00

## 2018-07-09 RX ADMIN — HEPARIN SODIUM 5000 UNITS: 5000 INJECTION, SOLUTION INTRAVENOUS; SUBCUTANEOUS at 20:02

## 2018-07-09 RX ADMIN — SENNOSIDES AND DOCUSATE SODIUM 2 TABLET: 8.6; 5 TABLET ORAL at 20:01

## 2018-07-09 RX ADMIN — NICOTINE 1 PATCH: 21 PATCH, EXTENDED RELEASE TRANSDERMAL at 09:00

## 2018-07-09 RX ADMIN — PANTOPRAZOLE SODIUM 40 MG: 40 TABLET, DELAYED RELEASE ORAL at 06:27

## 2018-07-09 RX ADMIN — ACETAMINOPHEN 650 MG: 325 TABLET, FILM COATED ORAL at 11:29

## 2018-07-09 RX ADMIN — SENNOSIDES AND DOCUSATE SODIUM 2 TABLET: 8.6; 5 TABLET ORAL at 08:59

## 2018-07-09 RX ADMIN — FOLIC ACID 1 MG: 1 TABLET ORAL at 09:00

## 2018-07-09 RX ADMIN — PROPRANOLOL HYDROCHLORIDE 10 MG: 10 TABLET ORAL at 09:00

## 2018-07-09 RX ADMIN — PROPRANOLOL HYDROCHLORIDE 10 MG: 10 TABLET ORAL at 20:02

## 2018-07-09 RX ADMIN — ACETAMINOPHEN 650 MG: 325 TABLET, FILM COATED ORAL at 20:02

## 2018-07-09 RX ADMIN — Medication 1 TABLET: at 09:00

## 2018-07-09 RX ADMIN — DIPHENHYDRAMINE HYDROCHLORIDE 25 MG: 25 CAPSULE ORAL at 20:01

## 2018-07-09 RX ADMIN — LEVOTHYROXINE SODIUM 25 MCG: 25 TABLET ORAL at 06:27

## 2018-07-09 NOTE — PROGRESS NOTES
Continued Stay Note  [facility]     Patient Name: Israel Bojorquez  MRN: [id number]  Today's Date: 7/9/2018    Admit Date: 6/22/2018          Discharge Plan     Row Name 07/09/18 8800       Plan    Plan update    Patient/Family in Agreement with Plan yes    Plan Comments Faxed packet with d/c summary to [phone].  Called Christine with [facility] to notify fax has been sent with discharge summary who advises that she has spoken with patients daughter and that the guardianship papers have not been signed as of today and they will need to accept patient on Wednesday.  They will need receipt of signed guardianship papers to proceed with transfer.  Spoke with patient daughter Kaity who confirms that transfer will need to be on Wednesday and that the facility will only accept morning transfers.  RN notified of d/c cancellation.  APRN notified of d/c cancellation.  CM following.  Patient plan is to discharge to [facility] on Wenesday via car with family to transport.      Final Discharge Disposition Code 04 - intermediate care facility              Discharge Codes    No documentation.       Expected Discharge Date and Time     Expected Discharge Date Expected Discharge Time    Jul 9, 2018             Fatmata Berry, RN

## 2018-07-09 NOTE — DISCHARGE SUMMARY
[facility] Medicine Services  TRANSFER SUMMARY    Patient Name: Israel Bojorquez  : 1955  MRN: [id number]    Date of Admission: 2018  Date of Discharge:  2018  Length of Stay: 16  Primary Care Physician: No Known Provider    Consults     No orders found from 2018 to 2018.          Hospital Course     Presenting Problem:   Altered mental status, unspecified altered mental status type [R41.82]      Active Hospital Problems    Diagnosis Date Noted   • **Altered mental status [R41.82] 2018   • Alcoholic dementia (CMS/HCC) [F10.27] 2018   • Wernicke encephalopathy syndrome [E51.2] 2018   • Hypothyroidism [E03.9] 2018   • Medically noncompliant [Z91.19] 2018   • Severe malnutrition [E46] 2018   • Tobacco abuse [Z72.0] 2017   • Alcohol abuse [F10.10] 2017   • COPD (chronic obstructive pulmonary disease) (CMS/HCC) [J44.9] 2017   • Elevated liver enzymes [R74.8] 2017   • Hypertension [I10] 2017      Resolved Hospital Problems    Diagnosis Date Noted Date Resolved   • Leukocytosis [D72.829] 2018   • Diarrhea [R19.7] 2018          Hospital Course:  Israel Bojorquez is a 63 y.o. male with a past medical history significant for longstanding alcohol abuse with progressing ETOH dementia, Acosta's esophagus, COPD, hypothyroidism, SIADH, essential tremor, essential hypertension, hyperlipidemia, and tobacco abuse presents to the ED after son found down at home.  MD had discussion with pts family/son regarding care.  He was admitted to hospitalist service due to AMS.  Montiored closely with w/d precautions. CT of head with mild atrophy, no acute findings.  Likely CT indicative of small vessel ischemic disease.   Rcvd high dose vitamin and thiamine replacements IV and tapered to po once stable.  Electrolyte abnormalities were repleted. He had hx of medical noncompliance with all  "meds/care.  He was resumed on synthroid at lower dose due to not having been taking at home.  Will need ju of TSH in approx 6 weeks of resuming meds.      Pt was followed by PT/OT.  He remained drowsy, confused.  Slowly progressed.  Has remained very weak and debilitated.  He is now awake and alert.  His progressive ETOH dementia appears to be stable and he is now eating, drinking himself and communicating.  Still with some mild confusion, but significantly improved.  He should avoid ALL ETOH in the future.  CM was involved due to complexity of issues.  Pts children are working on emergency guardianship.  Medication changes were made to home meds as he had NOT been taking them anyway.  Now stable on current meds/doses.  He had a good bm's last several days.      Currently pt is hemodynamically stable and afebrile.  He is still mildly confused, but agrees to rehab today as planned.  No family at  at this time.  He has rcvd a bed at [facility] for ongoing rehab.  He will transfer today via car per family.  He should be seen by provider at SNF in 24-48 hrs, PCP 1 week of dc.  Recommend PCP to consider referral to neurologist outpt as indicated for ongoing care.  Workup with NS per PCP to arrange in near future for spondysosis if indicated.       Recommend ju of TSH in 4-6 weeks.  Rec referral to neuro outpt per PCP if indicated.  Pt also incidentally found to have spondylosis on CT of cervical spine.  Recommend outpt workup further with neurosurgery per PCP to arrange once medically stable for further workup.  Recommend ju of CBC/BMP in 1-2 days to re-eval electrolytes and anemia.         Day of Discharge     HPI:   Pt is seen resting upright in bed in NAD.  Just finished his breakfast and ate 100%.  No visitors at bs.  Awake and alert.  States he feels okay.  Asked \"how long have I been in here\".  When I told him how many days, he was shocked.  States \"I had no idea\".  No current n/v, abd pain, cp or soa. + BM today. "  States he feels a little foggy and weak.  Agrees to rehab on dc as planned. No new issues.     Review of Systems  Gen- No fevers, chills  CV- No chest pain, palpitations  Resp- No cough, dyspnea  GI- No N/V/D, abd pain    Otherwise complete ROS is negative except as mentioned in the HPI.    Vital Signs:   Temp:  [97.3 °F (36.3 °C)-98.3 °F (36.8 °C)] 98 °F (36.7 °C)  Heart Rate:  [] 94  Resp:  [16-18] 16  BP: (100-124)/(65-78) 100/65     Physical Exam:  Constitutional: No acute distress, awake, alert.  Sitting up in bed.  No visitors at bs.   HENT: NCAT, mucous membranes moist  Respiratory: Clear to auscultation bilaterally A/P, respiratory effort normal on RA   Cardiovascular: RRR, no murmurs, rubs, or gallops, palpable pedal pulses bilaterally  Gastrointestinal: Positive bowel sounds, soft, nontender, nondistended  Musculoskeletal: No bilateral ankle edema.  MILLER spont   Psychiatric: Flat affect, cooperative and calm   Neurologic: Alert and oriented to person, general situation/events currently, and hospital , strength symmetric in all extremities, Cranial Nerves grossly intact to confrontation, speech clear.  Folllows commands   Skin: No rashes        Pertinent Results       Results from last 7 days  Lab Units 07/08/18  0541 07/05/18  0643   WBC 10*3/mm3 7.45 7.99   HEMOGLOBIN g/dL 9.4* 10.1*   HEMATOCRIT % 29.0* 31.5*   PLATELETS 10*3/mm3 265 268   SODIUM mmol/L 138 139   POTASSIUM mmol/L 4.3 4.1   CHLORIDE mmol/L 104 104   CO2 mmol/L 27.0 31.0   BUN mg/dL 7* 8*   CREATININE mg/dL 0.51* 0.52*   GLUCOSE mg/dL 78 85   CALCIUM mg/dL 8.5* 8.6*           Invalid input(s): PROT, LABALBU        Invalid input(s): TG, LDLCALC, LDLREALC      Brief Urine Lab Results  (Last result in the past 365 days)      Color   Clarity   Blood   Leuk Est   Nitrite   Protein   CREAT   Urine HCG        06/23/18 0419 Orange(A) Clear Negative Small (1+)(A) Positive(A) 100 mg/dL (2+)(A)               Microbiology Results Abnormal      None          Imaging Results (all)     Procedure Component Value Units Date/Time    XR Chest 1 View [[id number]] Collected:  06/23/18 0244     Updated:  06/23/18 0346    Narrative:       EXAM:    XR Chest, 1 View     EXAM DATE/TIME:    6/23/2018 2:44 AM     CLINICAL HISTORY:    63 years old, male; Signs and symptoms; Other: Ms change     TECHNIQUE:    XR of the chest, 1 view.     COMPARISON:    CR - XR CHEST 1 VW 2018-02-13 15:37     FINDINGS:    Lungs:  The lungs are hyperinflated, consistent with COPD. There is no   confluent infiltrate. Left apical bullae are better visualized on the CT   cervical spine from the same day. Within the lateral aspect of the left lower   lobe, there is a 7 mm nodular opacity. This may be contributed by a nipple   shadow, although a lung nodule is also considered. The patient is rotated to   the left.   Pleural space:  There is right apical pleural thickening. No pneumothorax.    Heart/Mediastinum:  Mediastinum: There is atherosclerotic calcification of the   aortic arch.    Bones/joints:  Osteopenia. There is slight convexity of the thoracic spine to   the right.    Other findings:  A small calcification is identified in superior to the right   greater tuberosity, suggestive of calcific tendinosis.       Impression:       1. The lungs are hyperinflated, consistent with COPD.   2. There is no confluent infiltrate.   3. There is right apical pleural thickening.   4.  Within the lateral aspect of the left lower lobe, there is a 7 mm nodular   opacity. This may be contributed by a nipple shadow, although a lung nodule is   also considered. A nonemergent chest CT suggested.    THIS DOCUMENT HAS BEEN ELECTRONICALLY SIGNED BY ADRI MASON MD    CT Head Without Contrast [[id number]] Collected:  06/22/18 2356     Updated:  06/23/18 0208    Narrative:       EXAM:    CT Head Without Intravenous Contrast    EXAM DATE/TIME:    6/22/2018 11:56 PM    CLINICAL HISTORY:    The patient age is 63  years old and is male; Signs and symptoms; Altered   mental status/memory loss; Additional info: Confusion/delirium, altered loc,   unexplained AMS    TECHNIQUE:    Axial computed tomography images of the head/brain without intravenous   contrast.  All CT scans at this facility use at least one of these dose   optimization techniques: automated exposure control; mA and/or kV adjustment   per patient size (includes targeted exams where dose is matched to clinical   indication); or iterative reconstruction.    Coronal and sagittal reformatted images were created and reviewed.    COMPARISON:    No relevant prior studies available.    FINDINGS:    Brain:  There are scattered foci of hypodensity within the cerebral white   matter, likely representing small vessel ischemic disease in a patient this   age.  The acuity of the white matter disease is indeterminate.  The white-gray   differentiation is preserved demonstrating no acute territorial type infarct.    No acute intracranial hemorrhage is seen.    Midline shift:  There is no midline shift.    Ventricles:  There is mild prominence of the ventricles and sulci, compatible   with atrophy.    Bones/joints:  The calvarium demonstrates no evidence for a depressed   fracture.    Soft tissues:  No acute abnormality.    Vasculature:  There is atherosclerotic calcification of the intracranial   internal carotid arteries and distal left vertebral artery.    Sinuses:  Unremarkable as visualized.  No acute sinusitis.    Mastoid air cells:  No mastoid effusion.      Impression:       1.  No acute intracranial hemorrhage or acute territorial type infarct.  2.  There are scattered foci of hypodensity within the cerebral white matter,   likely representing small vessel ischemic disease in a patient this age.  3.  Mild atrophy.  4.  Alberta Stroke Program Early CT Score (ASPECTS) = 10    THIS DOCUMENT HAS BEEN ELECTRONICALLY SIGNED BY ADRI MASON MD    CT Cervical Spine Without  Contrast [[id number]] Collected:  06/22/18 2356     Updated:  06/23/18 0204    Narrative:       EXAM:    CT Cervical Spine Without Intravenous Contrast    EXAM DATE/TIME:    6/22/2018 11:56 PM    CLINICAL HISTORY:    The patient age is 63 years old and is male; Pain; Neck pain; Additional   info: Neck pain, first study Patient complains of neck stiffness    TECHNIQUE:    Axial computed tomography images of the cervical spine without intravenous   contrast.  All CT scans at this facility use at least one of these dose   optimization techniques: automated exposure control; mA and/or kV adjustment   per patient size (includes targeted exams where dose is matched to clinical   indication); or iterative reconstruction.    Coronal and sagittal reformatted images were created and reviewed.    COMPARISON:    No relevant prior studies available.    FINDINGS:    Vertebrae:  No acute cervical spine fracture.  There is rotation of C1 on C2,   with subluxation of the C1 lateral masses relative to C2. This can be   contributed by patient positioning.  There is mild anterolisthesis of C7 on T1.    The cervical lordosis is preserved.  The facet alignment is preserved   bilaterally.  The occipital condyles and C1-C2 articulations appear intact.    The right C1 transverse foramen is incomplete.    Discs/spinal canal/neural foramina:  Spondylosis is visualized at multiple   cervical levels.  A decrease of disc height is identified at C6-7. Bilateral   neural foraminal narrowing is identified at C6-7, with left neural foraminal   narrowing at C5-6.    Soft tissues:  The prevertebral soft tissues appear within normal limits.    Vasculature:  There is atherosclerotic calcification of the extracranial   carotid arteries.    Lung apices:  No pneumothorax.  Left apical bullae are visualized. There is   biapical parenchymal scarring. Centrilobular emphysematous changes are also   visualized.      Impression:       1.  No acute cervical spine  fracture.  2.  There is rotation of C1 on C2, with subluxation of the C1 lateral masses   relative to C2.   3.  There is mild anterolisthesis of C7 on T1.  4.  Spondylosis is visualized at multiple cervical levels. Bilateral neural   foraminal narrowing is identified at C6-7, with left neural foraminal narrowing   at C5-6.    THIS DOCUMENT HAS BEEN ELECTRONICALLY SIGNED BY ADRI MASON MD                   Discharge Details        Discharge Medications      New Medications      Instructions Start Date   acetaminophen 325 MG tablet  Commonly known as:  TYLENOL   650 mg, Oral, Every 4 Hours PRN      castor oil-balsam peru ointment   Apply thin coat to affected area on coccyx q12 hours as needed      ipratropium-albuterol 0.5-2.5 mg/3 ml nebulizer  Commonly known as:  DUO-NEB   3 mL, Nebulization, Every 4 Hours PRN      nicotine 21 MG/24HR patch  Commonly known as:  NICODERM CQ   1 patch, Transdermal, Daily   Start Date:  7/10/2018     ondansetron 4 MG tablet  Commonly known as:  ZOFRAN   4 mg, Oral, Every 6 Hours PRN      sennosides-docusate sodium 8.6-50 MG tablet  Commonly known as:  SENOKOT-S   2 tablets, Oral, 2 Times Daily         Changes to Medications      Instructions Start Date   levothyroxine 25 MCG tablet  Commonly known as:  SYNTHROID, LEVOTHROID  What changed:  · medication strength  · how much to take   25 mcg, Oral, Daily      primidone 50 MG tablet  Commonly known as:  MYSOLINE  What changed:  additional instructions   50 mg, Oral, Nightly      propranolol 10 MG tablet  Commonly known as:  INDERAL  What changed:  · medication strength  · how much to take  · Another medication with the same name was removed. Continue taking this medication, and follow the directions you see here.   10 mg, Oral, 2 Times Daily         Continue These Medications      Instructions Start Date   folic acid 1 MG tablet  Commonly known as:  FOLVITE   1 mg, Oral, Daily      MULTIVITAMIN ADULTS 50+ tablet   1 tablet, Oral,  Daily      pantoprazole 40 MG EC tablet  Commonly known as:  PROTONIX   40 mg, Oral, Daily      thiamine 100 MG tablet  Commonly known as:  VITAMIN B-1   100 mg, Oral, Daily               Discharge Disposition:  Skilled Nursing Facility (DC - External)    Discharge Diet:  Diet Instructions     Diet: Regular, Cardiac; Thin       Discharge Diet:   Regular  Cardiac       Fluid Consistency:  Thin    Recommend boost, or equivalent supplements          Discharge Activity:  Activity Instructions     Activity as Tolerated       Up with assist    Measure Blood Pressure             No future appointments.    Additional Instructions for the Follow-ups that You Need to Schedule     Discharge Follow-up with PCP    As directed      Currently Documented PCP:  No Known Provider  PCP Phone Number:  None    Follow Up Details:  To be seen by provider at SNF in 24-48 hrs, PCP 1 week of dc         Discharge Follow-up with Specialty: Neurology prn outpt per PCP to arrange as needed.    As directed      Specialty:  Neurology prn outpt per PCP to arrange as needed.                 Time Spent on Discharge:  50 minutes    Electronically signed by IGNACIO Goodwin, 07/09/18, 12:17 PM.

## 2018-07-09 NOTE — THERAPY TREATMENT NOTE
Acute Care - Physical Therapy Treatment Note  [facility]     Patient Name: Israel Bojorqeuz  : 1955  MRN: [id number]  Today's Date: 2018  Onset of Illness/Injury or Date of Surgery: 18  Date of Referral to PT: 18  Referring Physician: Oni    Admit Date: 2018    Visit Dx:    ICD-10-CM ICD-9-CM   1. Altered mental status, unspecified altered mental status type R41.82 780.97   2. Dehydration E86.0 276.51   3. Hypothyroidism, unspecified type E03.9 244.9   4. Impaired functional mobility, balance, gait, and endurance Z74.09 V49.89     Patient Active Problem List   Diagnosis   • Tobacco abuse   • Hypertension   • Alcohol abuse   • Elevated liver enzymes   • COPD (chronic obstructive pulmonary disease) (CMS/HCC)   • Hyperglycemia   • Moderate malnutrition (CMS/HCC)   • Acute hyponatremia   • Elevated TSH, T4 borderline low   • Anemia   • Weakness   • Fall   • Hypothyroidism   • Altered mental status   • Medically noncompliant   • Severe malnutrition   • Alcoholic dementia (CMS/HCC)   • Wernicke encephalopathy syndrome       Therapy Treatment          Rehabilitation Treatment Summary     Row Name 18 1428             Treatment Time/Intention    Discipline (P)  physical therapist  -STACIE      Document Type (P)  therapy note (daily note)  -STACIE      Subjective Information (P)  complains of;pain  -STACIE      Mode of Treatment (P)  physical therapy  -STACIE      Therapy Frequency (PT Clinical Impression) (P)  daily  -STACIE      Patient Effort (P)  good  -STACIE      Existing Precautions/Restrictions (P)  fall  -STACIE      Recorded by [STACIE] Darian Lugo, PT Student 18 1517      Row Name 18 1448             Vital Signs    Pre Systolic BP Rehab (P)  100  -STACIE      Pre Treatment Diastolic BP (P)  65  -STACIE      Pretreatment Heart Rate (beats/min) (P)  --   HR disconnected; RN cleared for tx.  -STACIE      Pre SpO2 (%) (P)  --   O2 disconnected; RN cleared for tx.  -STACIE      Pre Patient Position (P)  Supine  -STACIE       Intra Patient Position (P)  Standing  -STACIE      Post Patient Position (P)  Supine  -STACIE      Recorded by [STACIE] Darian Lugo, PT Student 07/09/18 1517      Row Name 07/09/18 1448             Cognitive Assessment/Intervention- PT/OT    Affect/Mental Status (Cognitive) (P)  WFL  -STACIE      Orientation Status (Cognition) (P)  oriented x 4  -STACIE      Follows Commands (Cognition) (P)  WFL  -STACIE      Cognitive Function (Cognitive) (P)  safety deficit  -STACIE      Safety Deficit (Cognitive) (P)  mild deficit;safety precautions awareness  -STACIE      Personal Safety Interventions (P)  fall prevention program maintained;gait belt;nonskid shoes/slippers when out of bed  -STACIE      Recorded by [STACIE] Darian Lugo, PT Student 07/09/18 1517      Row Name 07/09/18 1448             Safety Issues, Functional Mobility    Impairments Affecting Function (Mobility) (P)  balance  -STACIE      Recorded by [STACIE] Darian Lugo, PT Student 07/09/18 1517      Row Name 07/09/18 1448             Bed Mobility Assessment/Treatment    Supine-Sit [place] (Bed Mobility) (P)  independent  -STACIE      Sit-Supine [place] (Bed Mobility) (P)  independent  -STACIE      Recorded by [STACIE] Darian Lugo, PT Student 07/09/18 1517      Row Name 07/09/18 1448             Transfer Assessment/Treatment    Comment (Transfers) (P)  VC's for sequencing and hand placement. Pt with improved safety awareness utilizing RWx. Pt continues to req Min A for assist with toilet transfer, primarily upon standing due to low toilet height.  -STACIE      Recorded by [STACIE] Darian Lugo, PT Student 07/09/18 1517      Row Name 07/09/18 1448             Sit-Stand Transfer    Sit-Stand [place] (Transfers) (P)  contact guard;verbal cues  -STACIE      Assistive Device (Sit-Stand Transfers) (P)  walker, front-wheeled  -STACIE      Recorded by [STACIE] Darian Lugo, PT Student 07/09/18 1517      Row Name 07/09/18 1448             Stand-Sit Transfer    Stand-Sit [place] (Transfers) (P)  contact guard;verbal  cues  -STACIE      Assistive Device (Stand-Sit Transfers) (P)  walker, front-wheeled  -STACIE      Recorded by [STACIE] Darian Lugo, PT Student 07/09/18 1517      Row Name 07/09/18 1448             Toilet Transfer    Type (Toilet Transfer) (P)  sit-stand;stand-sit  -STACIE      [place] Level (Toilet Transfer) (P)  minimum assist (75% patient effort)  -STACIE      Assistive Device (Toilet Transfer) (P)  walker, front-wheeled;grab bars/safety frame  -STACIE      Recorded by [STACIE] Darian Lugo, PT Student 07/09/18 1517      Row Name 07/09/18 1448             Gait/Stairs Assessment/Training    50816 - Gait Training Minutes  (P)  15  -STACIE      Gait/Stairs Assessment/Training (P)  gait/ambulation assistive device  -STACIE      [place] Level (Gait) (P)  contact guard;verbal cues  -STACIE      Assistive Device (Gait) (P)  walker, front-wheeled  -STACIE      Distance in Feet (Gait) (P)  450  -STACIE      Pattern (Gait) (P)  step-through  -STACIE      Deviations/Abnormal Patterns (Gait) (P)  gait speed decreased;sherif decreased;base of support, narrow  -STACIE      Bilateral Gait Deviations (P)  forward flexed posture;heel strike decreased  -STACIE      Comment (Gait/Stairs) (P)  VC's for sequencing and to keep steps within RWx. With decreased concentration (during conversation), pt req frequent VC's for steering RWx to avoid obstacles. Pt with improved step length, but continues to exhibit narrow SUZANNE and decreased pace.   -STACIE      Recorded by [STACIE] Darian Lugo, PT Student 07/09/18 1517      Row Name 07/09/18 1448             General ROM    GENERAL ROM COMMENTS (P)  BLE WFL  -STACIE      Recorded by [STACIE] Darian Lugo, PT Student 07/09/18 1517      Row Name 07/09/18 1448             Therapeutic Exercise    47909 - PT Therapeutic Activity Minutes (P)  13  -STACIE      Recorded by [STACIE] Darian Lugo, PT Student 07/09/18 1517      Row Name 07/09/18 1448             Static Sitting Balance    Level of [place] (Unsupported Sitting, Static Balance) (P)  supervision  -STACIE       Sitting Position (Unsupported Sitting, Static Balance) (P)  sitting on edge of bed  -STACIE      Recorded by [STACIE] Darian Lugo, PT Student 07/09/18 1517      Row Name 07/09/18 1448             Static Standing Balance    Level of [place] (Supported Standing, Static Balance) (P)  contact guard assist  -STACIE      Time Able to Maintain Position (Supported Standing, Static Balance) (P)  more than 5 minutes  -STACIE      Assistive Device Utilized (Supported Standing, Static Balance) (P)  rolling walker  -STACIE      Comment (Supported Standing, Static Balance) (P)  Worked on shoulder-width stance and tandem stance, eyes open and eyes closed x 30 seconds each. Pt with increased sway during eyes closed maneuvers; pt with no significant LOB throughout.  -STACIE      Recorded by [STACIE] Darian Lugo, PT Student 07/09/18 1517      Row Name 07/09/18 1448             Positioning and Restraints    Pre-Treatment Position (P)  in bed  -STACIE      Post Treatment Position (P)  bed  -STACIE      In Bed (P)  supine;call light within reach;encouraged to call for assist;exit alarm on;with other staff  -JB2      Recorded by [STACIE] Darian Lugo, PT Student 07/09/18 1517  [JB2] Darian Lugo, PT Student 07/09/18 1521      Row Name 07/09/18 1448             Pain Scale: Numbers Pre/Post-Treatment    Pain Scale: Numbers, Pretreatment (P)  5/10  -STACIE      Pain Scale: Numbers, Post-Treatment (P)  3/10  -STACIE      Pain Location (P)  head  -STACIE      Pre/Post Treatment Pain Comment (P)  Tolerated session with no increasing c/o of headache.  -STACIE      Recorded by [STACIE] Darian Lugo, PT Student 07/09/18 1529      Row Name                Wound 06/24/18 1245 medial coccyx skin tear    Wound - Properties Group Date first assessed: 06/24/18 [CP] Time first assessed: 1245 [CP] Present On Admission : yes;picture taken [CP] Orientation: medial [CP] Location: coccyx [CP] Type: skin tear [CP] Additional Comments: friction [CP] Recorded by:  [CP] IGNACIO Rangel 06/24/18  1415    Row Name                Wound 06/28/18 1015 Right lateral heel other (see comments)    Wound - Properties Group Date first assessed: 06/28/18 [CP] Time first assessed: 1015 [CP] Present On Admission : no;picture taken [CP] Side: Right [CP] Orientation: lateral [CP] Location: heel [CP] Type: other (see comments) [CP] Additional Comments: hematoma [CP] Recorded by:  [CP] Sandra Holden, IGNACIO 06/28/18 1321    Row Name 07/09/18 1448             Coping    Observed Emotional State (P)  calm;cooperative;pleasant  -STACIE      Verbalized Emotional State (P)  acceptance  -STACIE      Recorded by [STACIE] Darian Lugo, PT Student 07/09/18 1529      Row Name 07/09/18 1448             Plan of Care Review    Plan of Care Reviewed With (P)  patient  -STACIE      Recorded by [STACIE] Darian Lugo, PT Student 07/09/18 1529      Row Name 07/09/18 1448             Outcome Summary/Treatment Plan (PT)    Daily Summary of Progress (PT) (P)  progress toward functional goals is good  -STACIE      Recorded by [STACIE] Darian Lugo, PT Student 07/09/18 1529        User Key  (r) = Recorded By, (t) = Taken By, (c) = Cosigned By    Initials Name Effective Dates Discipline    CP Sandra Holden, IGNACIO 06/08/18 -  Nurse    STACIE Lugo, PT Student 05/15/18 -  PT          Wound 06/24/18 1245 medial coccyx skin tear (Active)   Dressing Appearance dry;intact 7/9/2018  8:15 AM   Closure None 7/9/2018  8:15 AM   Base pink 7/9/2018  8:15 AM   Periwound intact;dry 7/9/2018  8:15 AM   Periwound Temperature warm 7/9/2018  8:15 AM   Periwound Skin Turgor soft 7/9/2018  8:15 AM   Edges open 7/9/2018  8:15 AM   Drainage Amount none 7/9/2018  8:15 AM   Care, Wound cleansed with;sterile normal saline 7/9/2018  8:15 AM   Dressing Care, Wound low-adherent 7/9/2018  8:15 AM       Wound 06/28/18 1015 Right lateral heel other (see comments) (Active)   Dressing Appearance open to air 7/9/2018  8:15 AM   Closure Other (Comment) 7/9/2018  8:15 AM   Base clean;dry 7/9/2018   8:15 AM   Periwound intact;dry 7/9/2018  8:15 AM   Periwound Temperature warm 7/9/2018  8:15 AM   Periwound Skin Turgor soft 7/9/2018  8:15 AM   Drainage Amount none 7/9/2018  8:15 AM             Physical Therapy Education     Title: PT OT SLP Therapies (Active)     Topic: Physical Therapy (Active)     Point: Mobility training (Active)    Learning Progress Summary     Learner Status Readiness Method Response Comment Documented by    Patient Active Acceptance E NR  STACIE 07/09/18 1428     Active Acceptance E NR  STACIE 07/06/18 1431     Active Acceptance E NR  CM 07/05/18 1415     Active Acceptance E NR  STACIE 07/03/18 1533     Active Acceptance E NR  STACIE 07/02/18 1453     Done Acceptance E VU,NR   06/30/18 1534     Done Acceptance E VU  KM 06/28/18 1121     Active Acceptance E NR   06/24/18 1129    Family Done Acceptance E VU  KM 06/28/18 1121     Active Acceptance E NR   06/24/18 1129          Point: Home exercise program (Active)    Learning Progress Summary     Learner Status Readiness Method Response Comment Documented by    Patient Active Acceptance E NR  STACIE 07/09/18 1428     Active Acceptance E NR  STACIE 07/06/18 1431     Active Acceptance E NR  CM 07/05/18 1415     Active Acceptance E NR  STACIE 07/03/18 1533     Active Acceptance E NR  STACIE 07/02/18 1453     Done Acceptance E VU  KM 06/28/18 1121     Active Acceptance E NR   06/24/18 1129    Family Done Acceptance E VU  KM 06/28/18 1121     Active Acceptance E NR   06/24/18 1129          Point: Body mechanics (Active)    Learning Progress Summary     Learner Status Readiness Method Response Comment Documented by    Patient Active Acceptance E NR  STACIE 07/09/18 1428     Active Acceptance E NR  STACIE 07/06/18 1431     Active Acceptance E NR  CM 07/05/18 1415     Active Acceptance E NR  STACIE 07/03/18 1533     Active Acceptance E NR  STACIE 07/02/18 1453     Done Acceptance E VU  KM 06/28/18 1121     Active Acceptance E NR   06/24/18 1129    Family Done Acceptance E VU  KM  06/28/18 1121     Active Acceptance E NR  SJ 06/24/18 1129          Point: Precautions (Active)    Learning Progress Summary     Learner Status Readiness Method Response Comment Documented by    Patient Active Acceptance E NR  STACIE 07/09/18 1428     Active Acceptance E NR  STACIE 07/06/18 1431     Active Acceptance E NR  CM 07/05/18 1415     Active Acceptance E NR  STACIE 07/03/18 1533     Active Acceptance E NR  STACIE 07/02/18 1453     Done Acceptance E VU  KM 06/28/18 1121    Family Done Acceptance E VU  KM 06/28/18 1121                      User Key     Initials Effective Dates Name Provider Type Discipline     06/19/15 -  Corine Sarabia, PT Physical Therapist PT    KM 06/19/15 -  Becca Ro, PT Physical Therapist PT    LS 06/19/15 -  Joselyn Rod, PT Physical Therapist PT    STACIE 05/15/18 -  Darian Lugo, PT Student PT Student PT    CM 06/07/18 -  Selena Souza, PT Student PT Student PT                    PT Recommendation and Plan  Therapy Frequency (PT Clinical Impression): (P) daily  Outcome Summary/Treatment Plan (PT)  Daily Summary of Progress (PT): (P) progress toward functional goals is good  Plan of Care Reviewed With: (P) patient  Progress: (P) improving  Outcome Summary: (P) Pt progressing toward functional mobility goals with improvement noted during standing balance exercises; pt with improved standing duration during complex balance tasks and no LOB noted. Pt also ambulated 450 ft CGA with RWx. Will continue to progress pt per PT POC. Anticipate pt to meet goals in 1-2 sessions pending continued functional progress and safety awareness,          Outcome Measures     Row Name 07/09/18 1428 07/07/18 1457          How much help from another person do you currently need...    Turning from your back to your side while in flat bed without using bedrails? (P)  4  -STACIE 4  -LM     Moving from lying on back to sitting on the side of a flat bed without bedrails? (P)  4  -STACIE 4  -LM     Moving to and  from a bed to a chair (including a wheelchair)? (P)  3  -STACIE 3  -LM     Standing up from a chair using your arms (e.g., wheelchair, bedside chair)? (P)  3  -STACIE 3  -LM     Climbing 3-5 steps with a railing? (P)  3  -STACIE 3  -LM     To walk in hospital room? (P)  3  -STACIE 3  -LM     AM-PAC 6 Clicks Score (P)  20  -STACIE 20  -LM        Functional Assessment    Outcome Measure Options (P)  AM-PAC 6 Clicks Basic Mobility (PT)  -STACIE AM-PAC 6 Clicks Basic Mobility (PT)  -LM       User Key  (r) = Recorded By, (t) = Taken By, (c) = Cosigned By    Initials Name Provider Type    RAINER Rose, PT Physical Therapist    STACIE Lugo, PT Student PT Student           Time Calculation:         PT Charges     Row Name 07/09/18 1448             Time Calculation    Start Time (P)  1428  -STACIE      PT Received On (P)  07/09/18  -STACIE      PT Goal Re-Cert Due Date (P)  07/14/18  -STACIE         Time Calculation- PT    Total Timed Code Minutes- PT (P)  28 minute(s)  -STACIE         Timed Charges    85591 - Gait Training Minutes  (P)  15  -STACIE      60274 - PT Therapeutic Activity Minutes (P)  13  -STACIE        User Key  (r) = Recorded By, (t) = Taken By, (c) = Cosigned By    Initials Name Provider Type    STACIE Lugo, PT Student PT Student        Therapy Suggested Charges     Code   Minutes Charges    07327 (CPT®) Hc Pt Neuromusc Re Education Ea 15 Min      50507 (CPT®) Hc Pt Ther Proc Ea 15 Min      06113 (CPT®) Hc Gait Training Ea 15 Min 15 1    66949 (CPT®) Hc Pt Therapeutic Act Ea 15 Min 13 1    37680 (CPT®) Hc Pt Manual Therapy Ea 15 Min      74101 (CPT®) Hc Pt Iontophoresis Ea 15 Min      38744 (CPT®) Hc Pt Elec Stim Ea-Per 15 Min      43659 (CPT®) Hc Pt Ultrasound Ea 15 Min      86589 (CPT®) Hc Pt Self Care/Mgmt/Train Ea 15 Min      Total  28 2        Therapy Charges for Today     Code Description Service Date Service Provider Modifiers Qty    44396959258 HC GAIT TRAINING EA 15 MIN 7/9/2018 Darian Lugo, PT Student GP 1    60230288273 HC PT  THERAPEUTIC ACT EA 15 MIN 7/9/2018 Darian Lugo, PT Student GP 1          PT G-Codes  Outcome Measure Options: (P) AM-PAC 6 Clicks Basic Mobility (PT)    Darian Lugo, PT Student  7/9/2018

## 2018-07-09 NOTE — PLAN OF CARE
Problem: Patient Care Overview  Goal: Plan of Care Review  Outcome: Ongoing (interventions implemented as appropriate)   07/09/18 0374   Coping/Psychosocial   Plan of Care Reviewed With patient   OTHER   Outcome Summary Pt progressing toward functional mobility goals with improvement noted during standing balance exercises; pt with improved standing duration during complex balance tasks and no LOB noted. Pt also ambulated 450 ft CGA with RWx. Will continue to progress pt per PT POC. Anticipate pt to meet goals in 1-2 sessions pending continued functional progress and safety awareness.   Plan of Care Review   Progress improving

## 2018-07-09 NOTE — PLAN OF CARE
Problem: Fall Risk (Adult)  Goal: Identify Related Risk Factors and Signs and Symptoms  Outcome: Ongoing (interventions implemented as appropriate)   07/09/18 0429   Fall Risk (Adult)   Related Risk Factors (Fall Risk) depression/anxiety;fatigue/slow reaction;history of falls;objects hard to reach;environment unfamiliar   Signs and Symptoms (Fall Risk) presence of risk factors     Goal: Absence of Fall  Outcome: Ongoing (interventions implemented as appropriate)   07/09/18 0429   Fall Risk (Adult)   Absence of Fall making progress toward outcome       Problem: Skin Injury Risk (Adult)  Goal: Identify Related Risk Factors and Signs and Symptoms  Outcome: Ongoing (interventions implemented as appropriate)   07/09/18 0429   Skin Injury Risk (Adult)   Related Risk Factors (Skin Injury Risk) body weight extremes;hospitalization prolonged;mechanical forces;mobility impaired     Goal: Skin Health and Integrity  Outcome: Ongoing (interventions implemented as appropriate)   07/09/18 0429   Skin Injury Risk (Adult)   Skin Health and Integrity making progress toward outcome       Problem: Patient Care Overview  Goal: Plan of Care Review  Outcome: Ongoing (interventions implemented as appropriate)   07/09/18 0429   Coping/Psychosocial   Plan of Care Reviewed With patient   Plan of Care Review   Progress improving

## 2018-07-10 PROCEDURE — 25010000002 HEPARIN (PORCINE) PER 1000 UNITS: Performed by: INTERNAL MEDICINE

## 2018-07-10 PROCEDURE — 99232 SBSQ HOSP IP/OBS MODERATE 35: CPT | Performed by: NURSE PRACTITIONER

## 2018-07-10 RX ORDER — IBUPROFEN 400 MG/1
400 TABLET ORAL ONCE
Status: COMPLETED | OUTPATIENT
Start: 2018-07-10 | End: 2018-07-10

## 2018-07-10 RX ADMIN — NICOTINE 1 PATCH: 21 PATCH, EXTENDED RELEASE TRANSDERMAL at 09:28

## 2018-07-10 RX ADMIN — PROPRANOLOL HYDROCHLORIDE 10 MG: 10 TABLET ORAL at 09:26

## 2018-07-10 RX ADMIN — Medication 1 TABLET: at 09:26

## 2018-07-10 RX ADMIN — Medication 100 MG: at 09:26

## 2018-07-10 RX ADMIN — HEPARIN SODIUM 5000 UNITS: 5000 INJECTION, SOLUTION INTRAVENOUS; SUBCUTANEOUS at 09:25

## 2018-07-10 RX ADMIN — CASTOR OIL AND BALSAM, PERU: 788; 87 OINTMENT TOPICAL at 09:29

## 2018-07-10 RX ADMIN — HEPARIN SODIUM 5000 UNITS: 5000 INJECTION, SOLUTION INTRAVENOUS; SUBCUTANEOUS at 20:24

## 2018-07-10 RX ADMIN — ACETAMINOPHEN 650 MG: 325 TABLET, FILM COATED ORAL at 19:38

## 2018-07-10 RX ADMIN — PRIMIDONE 50 MG: 50 TABLET ORAL at 20:24

## 2018-07-10 RX ADMIN — ACETAMINOPHEN 650 MG: 325 TABLET, FILM COATED ORAL at 11:15

## 2018-07-10 RX ADMIN — PANTOPRAZOLE SODIUM 40 MG: 40 TABLET, DELAYED RELEASE ORAL at 05:17

## 2018-07-10 RX ADMIN — CASTOR OIL AND BALSAM, PERU: 788; 87 OINTMENT TOPICAL at 20:25

## 2018-07-10 RX ADMIN — SENNOSIDES AND DOCUSATE SODIUM 2 TABLET: 8.6; 5 TABLET ORAL at 20:24

## 2018-07-10 RX ADMIN — LEVOTHYROXINE SODIUM 25 MCG: 25 TABLET ORAL at 05:17

## 2018-07-10 RX ADMIN — IBUPROFEN 400 MG: 400 TABLET ORAL at 17:26

## 2018-07-10 RX ADMIN — PROPRANOLOL HYDROCHLORIDE 10 MG: 10 TABLET ORAL at 20:24

## 2018-07-10 RX ADMIN — FOLIC ACID 1 MG: 1 TABLET ORAL at 09:26

## 2018-07-10 NOTE — PROGRESS NOTES
"    [facility] Medicine Services  PROGRESS NOTE    Patient Name: Israel Bojorquez  : 1955  MRN: [id number]    Date of Admission: 2018  Length of Stay: 17  Primary Care Physician: No Known Provider    Subjective   Subjective   CC: F/U encephalopathy, suspected Wernicke's syndrome      HPI:    She is seen resting upright in bed dosing intermittently.  Easily awakens to voice.  No visitors at bedside.  Still very sleepy upon my exam.  Appears to be a little more confused this morning.  States he \"drained all night someone was chasing him\".  Reports he did not sleep well at all.  Likely contributing to drowsiness.  Denies nausea or vomiting, chest pain or shortness of air.  Awaiting transfer tomorrow to SNF as planned.      Review of Systems   Gen- No fevers, chills  CV- No chest pain, palpitations  Resp- No cough, dyspnea  GI- No N/V/D, abd pain    Otherwise ROS is negative except as mentioned in the HPI.    Objective   Objective   Vital Signs:   Temp:  [97.6 °F (36.4 °C)-98.1 °F (36.7 °C)] 97.8 °F (36.6 °C)  Heart Rate:  [91] 91  Resp:  [16] 16  BP: (100-122)/(67-81) 122/78       Physical Exam:  Constitutional: No acute distress, dozing back in bed.  Easily awakens but still drowsy. Sitting up in bed.  No visitors at bs.   HENT: NCAT, mucous membranes moist  Respiratory: Clear to auscultation bilaterally A/P, respiratory effort normal on RA   Cardiovascular: RRR, no murmurs, rubs, or gallops, palpable pedal pulses bilaterally  Gastrointestinal: Positive bowel sounds, soft, nontender, nondistended  Musculoskeletal: No bilateral ankle edema.  MILLER spont   Psychiatric: Flat affect, cooperative and calm   Neurologic: Alert and oriented to person, general situation/events currently, and hospital, 8 CT did not sleep well because he was dreaming all night that people were chasing him causing him to wake up frequently.  Appears slightly more drowsy and confused this morning but no focal " findings.  Strength symmetric in all extremities, Cranial Nerves grossly intact to confrontation, speech clear.  Folllows commands   Skin: No rashes    Results Reviewed:  I have personally reviewed current lab, radiology, and data and agree.    Results from last 7 days  Lab Units 07/08/18  0541 07/05/18  0643   WBC 10*3/mm3 7.45 7.99   HEMOGLOBIN g/dL 9.4* 10.1*   HEMATOCRIT % 29.0* 31.5*   PLATELETS 10*3/mm3 265 268       Results from last 7 days  Lab Units 07/08/18  0541 07/05/18  0643   SODIUM mmol/L 138 139   POTASSIUM mmol/L 4.3 4.1   CHLORIDE mmol/L 104 104   CO2 mmol/L 27.0 31.0   BUN mg/dL 7* 8*   CREATININE mg/dL 0.51* 0.52*   GLUCOSE mg/dL 78 85   CALCIUM mg/dL 8.5* 8.6*     Estimated Creatinine Clearance: 106.8 mL/min (A) (by C-G formula based on SCr of 0.51 mg/dL (L)).    I have reviewed the medications.    Assessment/Plan   Assessment / Plan     Hospital Problem List     * (Principal)Altered mental status    Tobacco abuse (Chronic)    Hypertension (Chronic)    Alcohol abuse (Chronic)    Elevated liver enzymes (Chronic)    COPD (chronic obstructive pulmonary disease) (CMS/HCC) (Chronic)    Hypothyroidism (Chronic)    Medically noncompliant (Chronic)    Severe malnutrition (Chronic)    Alcoholic dementia (CMS/HCC)    Wernicke encephalopathy syndrome        Brief Hospital Course to date:  Israel Bojorquez is a 63 y.o. male with a past medical history significant for longstanding alcohol abuse with progressing ETOH dementia, Acosta's esophagus, COPD, hypothyroidism, SIADH, essential tremor, essential hypertension, hyperlipidemia, and tobacco abuse presents to the ED after son found down at home    Assessment & Plan:   Stable, nothing new to add. BP/mental status continues to improve. Pt encouraged to increase PO intake and physical activities as tolerated.   Pt has a been for tomorrow, Wednesday 7/11/18 at [facility].      "--------------------------------------------------------------------------  Wernicke's encephalopathy (mental status improved)  EtOH dependence  -As he is too weak to walk safely, needs SNF rehab and has reliably voiced he is agreeable  -Family is pursuing emergency guardianship.  Per CM, daughter hadn't completed all necessary papers yesterday. See CM note.  -S/P high dose vitamin and thiamine reploacements IV, continue PO thiamine    Hypokalemia, resolved  -Replace electrolytes per protocol     Hypothyroidism  -Restarted home Synthroid as was prior noncompliant, noting abnormal thyroid studies, will need rechecked 6 weeks after d/c    Frequent PVC/PAC  -Asymptomatic   -Continue propranolol-reduced dose due to low BP    Hypotension  - relatively asymptomatic, monitor    Constipation  --Resolved   --Added enema; Now has had several good BMs in last 36 hours.       DVT Prophylaxis:  SQ heparin    CODE STATUS:   Code Status and Medical Interventions:   Ordered at: 06/23/18 0403     Level Of Support Discussed With:    Patient     Code Status:    CPR     Medical Interventions (Level of Support Prior to Arrest):    Full     Disposition:  Has bed at [facility] for tomorrow 7/11.  Pt was to transfer yesterday 7/9 as they had accepted the patient ..however family still hadn't finished his paperwork; patient signed \"MOST\" forms and states he does not have a living will, but he wants everything done to save him.  Daughter to finish.  Bed to transfer tomorrow.  See CM notes.     Electronically signed by IGNACIO Goodwin, 07/10/18, 12:24 PM      "

## 2018-07-10 NOTE — PLAN OF CARE
Problem: Fall Risk (Adult)  Goal: Identify Related Risk Factors and Signs and Symptoms  Outcome: Ongoing (interventions implemented as appropriate)   07/10/18 0151   Fall Risk (Adult)   Related Risk Factors (Fall Risk) confusion/agitation;fatigue/slow reaction;age-related changes;history of falls;gait/mobility problems;sensory deficits;environment unfamiliar   Signs and Symptoms (Fall Risk) presence of risk factors     Goal: Absence of Fall  Outcome: Ongoing (interventions implemented as appropriate)   07/10/18 0151   Fall Risk (Adult)   Absence of Fall making progress toward outcome       Problem: Skin Injury Risk (Adult)  Goal: Identify Related Risk Factors and Signs and Symptoms  Outcome: Ongoing (interventions implemented as appropriate)   07/10/18 0151   Skin Injury Risk (Adult)   Related Risk Factors (Skin Injury Risk) cognitive impairment;mobility impaired;nutritional deficiencies;advanced age     Goal: Skin Health and Integrity  Outcome: Ongoing (interventions implemented as appropriate)   07/10/18 0151   Skin Injury Risk (Adult)   Skin Health and Integrity making progress toward outcome       Problem: Patient Care Overview  Goal: Plan of Care Review  Outcome: Ongoing (interventions implemented as appropriate)   07/10/18 0151   Coping/Psychosocial   Plan of Care Reviewed With patient;family   Plan of Care Review   Progress improving

## 2018-07-10 NOTE — PROGRESS NOTES
"Clinical Nutrition   Reason For Visit: Follow-up protocol    Patient Name: Israel Bojorquez  YOB: 1955  MRN: [id number]  Date of Encounter: 07/10/18 9:37 AM  Admission date: 6/22/2018        Nutrition Assessment     Hospital Problem List  Principal Problem:    Altered mental status  Active Problems:    Tobacco abuse    Hypertension    Alcohol abuse    Elevated liver enzymes    COPD (chronic obstructive pulmonary disease) (CMS/HCC)    Hypothyroidism    Medically noncompliant    Severe malnutrition    Alcoholic dementia (CMS/HCC)    Wernicke encephalopathy syndrome        PMH: He  has a past medical history of Alcohol abuse; Acosta's esophagus; COPD (chronic obstructive pulmonary disease) (CMS/HCC); Depression; Disease of thyroid gland; Essential tremor; Essential tremor; Hiatal hernia; History of SIADH; Hyperlipidemia; Hypertension; Insomnia; Occasional tremors; and Tendinitis.   PSxH: He  has no past surgical history on file.     Other Applicable Diagnosis:  Severe, Social/Environmental Circumstance Malnutrition (dx per RD 6/23 earlier this admission)      Anthropometrics   Height: 73 in  Weight: 113 lb per bed scale on (6/23)  BMI: 15.0  BMI classification: Underweight:<18.5kg/m2   Weight change: Pt reports that he knows he has lost wt, but is not sure how much or the time frame. States that he is not sure what is usual body weight is. RD reviewed EMR wt hx with pt, which pt verified. EMR wt hx review reflects a 20 lb/15% unintentional wt loss over the past 4 months, 37 lb/25% unintentional wt loss over the past 10 months. Pt attributes wt loss to \"habits\", which RD inferred to mean that pt chooses to drink alcohol instead of eat.      Per EMR RD note (2/14/18)- \"HE REPORTS HIS UBW IS AROUND 150# BUT DOESN'T KNOW WHEN HE LAST WEIGHED THIS.\"     Date Weight (kg) Weight (lbs) Weight Method   6/23/2018 51.619 kg 113 lb 12.8 oz Bed scale   6/22/2018 58.968 kg 130 lb Stated   2/14/2018 - - Standing scale "   2/14/2018 60.328 kg 133 lb Standing scale    2/13/2018 68.04 kg 150 lb Stated   8/14/2017 67.586 kg 149 lb -   8/2/2017 68.153 kg 150 lb 4 oz -   8/1/2017 68.153 kg 150 lb 4 oz Standing scale   8/1/2017 68.04 kg 150 lb Stated          Labs reviewed   Labs reviewed: Yes      Medications reviewed   Medications reviewed: Yes      Current Nutrition Prescription   PO: Diet Regular; Cardiac  Oral Nutrition Supplement: Boost Plus daily (bkfst)    Evaluation of Received Nutrient/Fluid Intake: 86% / 13 meals      Nutrition Diagnosis   6/23  Problem Malnutrition  severe social or environmental circunstances   Etiology Inadequate PO intake in the setting of alcohol abuse   Signs/Symptoms Per pt report, 25% unintentional wt loss over the past 10 months, physical findings of severe muscle wasting   Status: ongoing      Intervention   Intervention: Follow treatment progress, Care plan reviewed      Goal:   General: Nutrition support treatment, RD notes patient scheduled to discharge to rehab tomorrow 7/11  PO: Maintain intake      Monitoring/Evaluation:       Monitoring/Evaluation: Per protocol, PO intake, Supplement intake  Will Continue to follow per protocol  Xiomy Landa RD  Time Spent: 15 min

## 2018-07-11 VITALS
HEART RATE: 93 BPM | TEMPERATURE: 98.4 F | DIASTOLIC BLOOD PRESSURE: 59 MMHG | HEIGHT: 73 IN | WEIGHT: 113.8 LBS | BODY MASS INDEX: 15.08 KG/M2 | RESPIRATION RATE: 18 BRPM | SYSTOLIC BLOOD PRESSURE: 97 MMHG | OXYGEN SATURATION: 98 %

## 2018-07-11 PROCEDURE — 25010000002 HEPARIN (PORCINE) PER 1000 UNITS: Performed by: INTERNAL MEDICINE

## 2018-07-11 PROCEDURE — 99232 SBSQ HOSP IP/OBS MODERATE 35: CPT | Performed by: NURSE PRACTITIONER

## 2018-07-11 RX ADMIN — PANTOPRAZOLE SODIUM 40 MG: 40 TABLET, DELAYED RELEASE ORAL at 05:11

## 2018-07-11 RX ADMIN — HEPARIN SODIUM 5000 UNITS: 5000 INJECTION, SOLUTION INTRAVENOUS; SUBCUTANEOUS at 09:07

## 2018-07-11 RX ADMIN — Medication 100 MG: at 09:07

## 2018-07-11 RX ADMIN — FOLIC ACID 1 MG: 1 TABLET ORAL at 09:07

## 2018-07-11 RX ADMIN — LEVOTHYROXINE SODIUM 25 MCG: 25 TABLET ORAL at 05:11

## 2018-07-11 RX ADMIN — Medication 1 TABLET: at 09:07

## 2018-07-11 RX ADMIN — ACETAMINOPHEN 650 MG: 325 TABLET, FILM COATED ORAL at 05:11

## 2018-07-11 NOTE — PROGRESS NOTES
Case Management Discharge Note    Final Note: Spoke with patient and daughter at bedside regarding discharge plan.  Patients daughter getting patient dressed and is ready to transport.  No further needs noted.  Patient plan is to discharge to [facility] today via car with family to transport.  Nursing to call report to [phone].  Discharge Summary has been faxed to [phone].    Destination     No service has been selected for the patient.      Durable Medical Equipment     No service has been selected for the patient.      Dialysis/Infusion     No service has been selected for the patient.      Home Medical Care     No service has been selected for the patient.      Social Care     No service has been selected for the patient.             Final Discharge Disposition Code: 04 - intermediate care facility

## 2018-07-11 NOTE — DISCHARGE SUMMARY
[facility] Medicine Services  TRANSFER SUMMARY    Patient Name: Israel Bojorquez  : 1955  MRN: [id number]    Date of Admission: 2018  Date of Discharge:  2018  Length of Stay: 18  Primary Care Physician: No Known Provider    Consults     No orders found from 2018 to 2018.          Hospital Course     Presenting Problem:   Altered mental status, unspecified altered mental status type [R41.82]      Active Hospital Problems    Diagnosis Date Noted   • **Altered mental status [R41.82] 2018   • Alcoholic dementia (CMS/HCC) [F10.27] 2018   • Wernicke encephalopathy syndrome [E51.2] 2018   • Hypothyroidism [E03.9] 2018   • Medically noncompliant [Z91.19] 2018   • Severe malnutrition [E46] 2018   • Tobacco abuse [Z72.0] 2017   • Alcohol abuse [F10.10] 2017   • COPD (chronic obstructive pulmonary disease) (CMS/HCC) [J44.9] 2017   • Elevated liver enzymes [R74.8] 2017   • Hypertension [I10] 2017      Resolved Hospital Problems    Diagnosis Date Noted Date Resolved   • Leukocytosis [D72.829] 2018   • Diarrhea [R19.7] 2018          Hospital Course:  Israel Bojorquez is a 63 y.o. male with a past medical history significant for longstanding alcohol abuse with progressing ETOH dementia, Acosta's esophagus, COPD, hypothyroidism, SIADH, essential tremor, essential hypertension, hyperlipidemia, and tobacco abuse presents to the ED after son found down at home.  MD had discussion with pts family/son regarding care.  He was admitted to hospitalist service due to AMS.  Montiored closely with w/d precautions. CT of head with mild atrophy, no acute findings.  Likely CT indicative of small vessel ischemic disease.   Rcvd high dose vitamin and thiamine replacements IV and tapered to po once stable.  Electrolyte abnormalities were repleted. He had hx of medical noncompliance with all  meds/care.  He was resumed on synthroid at lower dose due to not having been taking at home.  Will need ju of TSH in approx 6 weeks of resuming meds.       Pt was followed by PT/OT.  He remained drowsy, confused.  Slowly progressed.  Has remained very weak and debilitated.  He is now awake and alert.  His progressive ETOH dementia appears to be stable and he is now eating, drinking himself and communicating.  Still with some mild confusion, but significantly improved.  He should avoid ALL ETOH in the future.  CM was involved due to complexity of issues.  Pts children are working on emergency guardianship.  Medication changes were made to home meds as he had NOT been taking them anyway.  Now stable on current meds/doses.  He had a good bm's last several days.       Currently pt is hemodynamically stable and afebrile.  He is still mildly confused, but agrees to rehab today as planned.  No family at  at this time.  He has rcvd a bed at [facility] for ongoing rehab.  He will transfer today via car per family.  He should be seen by provider at SNF in 24-48 hrs, PCP 1 week of dc.  Recommend PCP to consider referral to neurologist outpt as indicated for ongoing care.  Workup with NS per PCP to arrange in near future for spondysosis if indicated.         Recommend ju of TSH in 4-6 weeks.  Rec referral to neuro outpt per PCP if indicated.  Pt also incidentally found to have spondylosis on CT of cervical spine.  Recommend outpt workup further with neurosurgery per PCP to arrange once medically stable for further workup.  Recommend ju of CBC/BMP in 1-2 days to re-eval electrolytes and anemia.         Addendem 7/11/2018 at 0920 am  Pt was to transfer to SNF on Monday 7/9, however ultimately his court papers were not complete for transfer with daughter as guardian.  Pt remained in the hospital until today awaiting finalization of court papers by .  He had remained hemodynamically stable and afebrile.  No new issues since  "Monday.  Here only awaiting transfer today with papers completed.  Spoke with Fatmata VELÁSQUEZ who states all is in place for pt to transfer this am as prior planned to SNF for rehab.  Daughter to transport this am.  All meds, follow up care as noted prior /above.  No new issues.  He is currently awake and alert and feeding himself breakfast in NAD.  No visitors at bs at this time.  He is aware that he is transferring to SNF today and voices agreement.               Day of Discharge     HPI:   Pt is seen resting up in bed in NAD.  Eating breakfast.  No visitors at bs.  States he feels a little better this morning but still always feels \"foggy\".  He is awake and alert.  Feeding himself.  Denies n/v, abd pain, cp or soa.  Says \"I'm getting out of here today aren't I?\".  No new issues.  Daughter coming to transport.     Review of Systems  Gen- No fevers, chills  CV- No chest pain, palpitations  Resp- No cough, dyspnea  GI- No N/V/D, abd pain    Otherwise complete ROS is negative except as mentioned in the HPI.    Vital Signs:   Temp:  [97.8 °F (36.6 °C)-98.4 °F (36.9 °C)] 98.4 °F (36.9 °C)  Heart Rate:  [93] 93  Resp:  [16-18] 18  BP: ()/(59-77) 97/59     Physical Exam:  Constitutional: No acute distress, awake, alert.  Frail gentleman.  Chronically ill appearing.  Sitting up in bed.  No visitors at bs.   HENT: NCAT, mucous membranes moist  Respiratory: Clear to auscultation bilaterally A/P, respiratory effort normal on RA   Cardiovascular: RRR, no murmurs, rubs, or gallops, palpable pedal pulses bilaterally  Gastrointestinal: Positive bowel sounds, soft, nontender, nondistended  Musculoskeletal: No bilateral ankle edema.  MILLER spont   Psychiatric: Flat affect, cooperative and calm   Neurologic: Alert and oriented to person, general situation/events currently, and hospital , strength symmetric in all extremities, Cranial Nerves grossly intact to confrontation, speech clear.  Folllows commands   Skin: No " rashes      Pertinent Results       Results from last 7 days  Lab Units 07/08/18  0541 07/05/18  0643   WBC 10*3/mm3 7.45 7.99   HEMOGLOBIN g/dL 9.4* 10.1*   HEMATOCRIT % 29.0* 31.5*   PLATELETS 10*3/mm3 265 268   SODIUM mmol/L 138 139   POTASSIUM mmol/L 4.3 4.1   CHLORIDE mmol/L 104 104   CO2 mmol/L 27.0 31.0   BUN mg/dL 7* 8*   CREATININE mg/dL 0.51* 0.52*   GLUCOSE mg/dL 78 85   CALCIUM mg/dL 8.5* 8.6*           Invalid input(s): PROT, LABALBU        Invalid input(s): TG, LDLCALC, LDLREALC      Brief Urine Lab Results  (Last result in the past 365 days)      Color   Clarity   Blood   Leuk Est   Nitrite   Protein   CREAT   Urine HCG        06/23/18 0419 Orange(A) Clear Negative Small (1+)(A) Positive(A) 100 mg/dL (2+)(A)               Microbiology Results Abnormal     None          Imaging Results (all)     Procedure Component Value Units Date/Time    XR Chest 1 View [[id number]] Collected:  06/23/18 0244     Updated:  06/23/18 0346    Narrative:       EXAM:    XR Chest, 1 View     EXAM DATE/TIME:    6/23/2018 2:44 AM     CLINICAL HISTORY:    63 years old, male; Signs and symptoms; Other: Ms change     TECHNIQUE:    XR of the chest, 1 view.     COMPARISON:    CR - XR CHEST 1 VW 2018-02-13 15:37     FINDINGS:    Lungs:  The lungs are hyperinflated, consistent with COPD. There is no   confluent infiltrate. Left apical bullae are better visualized on the CT   cervical spine from the same day. Within the lateral aspect of the left lower   lobe, there is a 7 mm nodular opacity. This may be contributed by a nipple   shadow, although a lung nodule is also considered. The patient is rotated to   the left.   Pleural space:  There is right apical pleural thickening. No pneumothorax.    Heart/Mediastinum:  Mediastinum: There is atherosclerotic calcification of the   aortic arch.    Bones/joints:  Osteopenia. There is slight convexity of the thoracic spine to   the right.    Other findings:  A small calcification is identified  in superior to the right   greater tuberosity, suggestive of calcific tendinosis.       Impression:       1. The lungs are hyperinflated, consistent with COPD.   2. There is no confluent infiltrate.   3. There is right apical pleural thickening.   4.  Within the lateral aspect of the left lower lobe, there is a 7 mm nodular   opacity. This may be contributed by a nipple shadow, although a lung nodule is   also considered. A nonemergent chest CT suggested.    THIS DOCUMENT HAS BEEN ELECTRONICALLY SIGNED BY ADRI MASON MD    CT Head Without Contrast [[id number]] Collected:  06/22/18 2356     Updated:  06/23/18 0208    Narrative:       EXAM:    CT Head Without Intravenous Contrast    EXAM DATE/TIME:    6/22/2018 11:56 PM    CLINICAL HISTORY:    The patient age is 63 years old and is male; Signs and symptoms; Altered   mental status/memory loss; Additional info: Confusion/delirium, altered loc,   unexplained AMS    TECHNIQUE:    Axial computed tomography images of the head/brain without intravenous   contrast.  All CT scans at this facility use at least one of these dose   optimization techniques: automated exposure control; mA and/or kV adjustment   per patient size (includes targeted exams where dose is matched to clinical   indication); or iterative reconstruction.    Coronal and sagittal reformatted images were created and reviewed.    COMPARISON:    No relevant prior studies available.    FINDINGS:    Brain:  There are scattered foci of hypodensity within the cerebral white   matter, likely representing small vessel ischemic disease in a patient this   age.  The acuity of the white matter disease is indeterminate.  The white-gray   differentiation is preserved demonstrating no acute territorial type infarct.    No acute intracranial hemorrhage is seen.    Midline shift:  There is no midline shift.    Ventricles:  There is mild prominence of the ventricles and sulci, compatible   with atrophy.    Bones/joints:   The calvarium demonstrates no evidence for a depressed   fracture.    Soft tissues:  No acute abnormality.    Vasculature:  There is atherosclerotic calcification of the intracranial   internal carotid arteries and distal left vertebral artery.    Sinuses:  Unremarkable as visualized.  No acute sinusitis.    Mastoid air cells:  No mastoid effusion.      Impression:       1.  No acute intracranial hemorrhage or acute territorial type infarct.  2.  There are scattered foci of hypodensity within the cerebral white matter,   likely representing small vessel ischemic disease in a patient this age.  3.  Mild atrophy.  4.  Alberta Stroke Program Early CT Score (ASPECTS) = 10    THIS DOCUMENT HAS BEEN ELECTRONICALLY SIGNED BY ADRI MASON MD    CT Cervical Spine Without Contrast [[id number]] Collected:  06/22/18 4076     Updated:  06/23/18 0206    Narrative:       EXAM:    CT Cervical Spine Without Intravenous Contrast    EXAM DATE/TIME:    6/22/2018 11:56 PM    CLINICAL HISTORY:    The patient age is 63 years old and is male; Pain; Neck pain; Additional   info: Neck pain, first study Patient complains of neck stiffness    TECHNIQUE:    Axial computed tomography images of the cervical spine without intravenous   contrast.  All CT scans at this facility use at least one of these dose   optimization techniques: automated exposure control; mA and/or kV adjustment   per patient size (includes targeted exams where dose is matched to clinical   indication); or iterative reconstruction.    Coronal and sagittal reformatted images were created and reviewed.    COMPARISON:    No relevant prior studies available.    FINDINGS:    Vertebrae:  No acute cervical spine fracture.  There is rotation of C1 on C2,   with subluxation of the C1 lateral masses relative to C2. This can be   contributed by patient positioning.  There is mild anterolisthesis of C7 on T1.    The cervical lordosis is preserved.  The facet alignment is preserved    bilaterally.  The occipital condyles and C1-C2 articulations appear intact.    The right C1 transverse foramen is incomplete.    Discs/spinal canal/neural foramina:  Spondylosis is visualized at multiple   cervical levels.  A decrease of disc height is identified at C6-7. Bilateral   neural foraminal narrowing is identified at C6-7, with left neural foraminal   narrowing at C5-6.    Soft tissues:  The prevertebral soft tissues appear within normal limits.    Vasculature:  There is atherosclerotic calcification of the extracranial   carotid arteries.    Lung apices:  No pneumothorax.  Left apical bullae are visualized. There is   biapical parenchymal scarring. Centrilobular emphysematous changes are also   visualized.      Impression:       1.  No acute cervical spine fracture.  2.  There is rotation of C1 on C2, with subluxation of the C1 lateral masses   relative to C2.   3.  There is mild anterolisthesis of C7 on T1.  4.  Spondylosis is visualized at multiple cervical levels. Bilateral neural   foraminal narrowing is identified at C6-7, with left neural foraminal narrowing   at C5-6.    THIS DOCUMENT HAS BEEN ELECTRONICALLY SIGNED BY ADRI MASON MD                   Discharge Details        Discharge Medications      New Medications      Instructions Start Date   acetaminophen 325 MG tablet  Commonly known as:  TYLENOL   650 mg, Oral, Every 4 Hours PRN      castor oil-balsam peru ointment   Apply thin coat to affected area on coccyx q12 hours as needed      ipratropium-albuterol 0.5-2.5 mg/3 ml nebulizer  Commonly known as:  DUO-NEB   3 mL, Nebulization, Every 4 Hours PRN      nicotine 21 MG/24HR patch  Commonly known as:  NICODERM CQ   1 patch, Transdermal, Daily      ondansetron 4 MG tablet  Commonly known as:  ZOFRAN   4 mg, Oral, Every 6 Hours PRN      sennosides-docusate sodium 8.6-50 MG tablet  Commonly known as:  SENOKOT-S   2 tablets, Oral, 2 Times Daily         Changes to Medications       Instructions Start Date   levothyroxine 25 MCG tablet  Commonly known as:  SYNTHROID, LEVOTHROID  What changed:  · medication strength  · how much to take   25 mcg, Oral, Daily      primidone 50 MG tablet  Commonly known as:  MYSOLINE  What changed:  additional instructions   50 mg, Oral, Nightly      propranolol 10 MG tablet  Commonly known as:  INDERAL  What changed:  · medication strength  · how much to take  · Another medication with the same name was removed. Continue taking this medication, and follow the directions you see here.   10 mg, Oral, 2 Times Daily         Continue These Medications      Instructions Start Date   folic acid 1 MG tablet  Commonly known as:  FOLVITE   1 mg, Oral, Daily      MULTIVITAMIN ADULTS 50+ tablet   1 tablet, Oral, Daily      pantoprazole 40 MG EC tablet  Commonly known as:  PROTONIX   40 mg, Oral, Daily      thiamine 100 MG tablet  Commonly known as:  VITAMIN B-1   100 mg, Oral, Daily               Discharge Disposition:  Skilled Nursing Facility (DC - External)    Discharge Diet:  Diet Instructions     Diet: Regular, Cardiac; Thin       Discharge Diet:   Regular  Cardiac       Fluid Consistency:  Thin    Recommend boost, or equivalent supplements          Discharge Activity:  Activity Instructions     Activity as Tolerated       Up with assist    Activity as Tolerated       Measure Blood Pressure                 No future appointments.    Additional Instructions for the Follow-ups that You Need to Schedule     Discharge Follow-up with PCP    As directed      Currently Documented PCP:  No Known Provider  PCP Phone Number:  None    Follow Up Details:  To be seen by provider at SNF in 24-48 hrs, PCP 1 week of dc         Discharge Follow-up with Specialty: Neurology prn outpt per PCP to arrange as needed.    As directed      Specialty:  Neurology prn outpt per PCP to arrange as needed.                 Time Spent on Discharge:  35 minutes    Electronically signed by Kayley Caceres  IGNACIO Munguia, 07/11/18, 10:32 AM.

## 2018-07-11 NOTE — DISCHARGE PLACEMENT REQUEST
"Israel Bojorquez (63 y.o. Male)     Date of Birth Social Security Number Address Home Phone MRN    1955  [address]  [place] [id number] [phone] [id number]    [employer] Marital Status          None        Admission Date Admission Type Admitting Provider Attending Provider Department, Room/Bed    18 Emergency Shaq Archer MD West, Christopher R, MD [facility] 6B, N635/1    Discharge Date Discharge Disposition Discharge Destination         Skilled Nursing Facility (DC - External)              Attending Provider:  Shaq Archer MD    Allergies:  No Known Allergies    Isolation:  None   Infection:  None   Code Status:  CPR    Ht:  185.4 cm (73\")   Wt:  51.6 kg (113 lb 12.8 oz)    Admission Cmt:  None   Principal Problem:  Altered mental status [R41.82]                 Active Insurance as of 2018     Patient has no active insurance coverage on file for 2018.          Emergency Contacts      (Rel.) Home Phone Work Phone Mobile Phone    Stefan Bojorquez (Son) [phone] -- --    ZainaKaity (Daughter) [phone] -- --               Discharge Summary      IGNACIO Goodwin at 2018  9:00 AM              [facility] Medicine Services  TRANSFER SUMMARY    Patient Name: Israel Bojorquez  : 1955  MRN: [id number]    Date of Admission: 2018  Date of Discharge:  2018  Length of Stay: 16  Primary Care Physician: No Known Provider    Consults     No orders found from 2018 to 2018.          Hospital Course     Presenting Problem:   Altered mental status, unspecified altered mental status type [R41.82]      Active Hospital Problems    Diagnosis Date Noted   • **Altered mental status [R41.82] 2018   • Alcoholic dementia (CMS/HCC) [F10.27] 2018   • Wernicke encephalopathy syndrome [E51.2] 2018   • Hypothyroidism [E03.9] 2018   • Medically " noncompliant [Z91.19] 06/23/2018   • Severe malnutrition [E46] 06/23/2018   • Tobacco abuse [Z72.0] 08/02/2017   • Alcohol abuse [F10.10] 08/02/2017   • COPD (chronic obstructive pulmonary disease) (CMS/HCC) [J44.9] 08/02/2017   • Elevated liver enzymes [R74.8] 08/02/2017   • Hypertension [I10] 08/02/2017      Resolved Hospital Problems    Diagnosis Date Noted Date Resolved   • Leukocytosis [D72.829] 06/23/2018 07/09/2018   • Diarrhea [R19.7] 02/13/2018 07/09/2018          Hospital Course:  Israel Bojorquez is a 63 y.o. male with a past medical history significant for longstanding alcohol abuse with progressing ETOH dementia, Acosta's esophagus, COPD, hypothyroidism, SIADH, essential tremor, essential hypertension, hyperlipidemia, and tobacco abuse presents to the ED after son found down at home.  MD had discussion with pts family/son regarding care.  He was admitted to hospitalist service due to AMS.  Montiored closely with w/d precautions. CT of head with mild atrophy, no acute findings.  Likely CT indicative of small vessel ischemic disease.   Rcvd high dose vitamin and thiamine replacements IV and tapered to po once stable.  Electrolyte abnormalities were repleted. He had hx of medical noncompliance with all meds/care.  He was resumed on synthroid at lower dose due to not having been taking at home.  Will need ju of TSH in approx 6 weeks of resuming meds.      Pt was followed by PT/OT.  He remained drowsy, confused.  Slowly progressed.  Has remained very weak and debilitated.  He is now awake and alert.  His progressive ETOH dementia appears to be stable and he is now eating, drinking himself and communicating.  Still with some mild confusion, but significantly improved.  He should avoid ALL ETOH in the future.  CM was involved due to complexity of issues.  Pts children are working on emergency guardianship.  Medication changes were made to home meds as he had NOT been taking them anyway.  Now stable on current  "meds/doses.  He had a good bm's last several days.      Currently pt is hemodynamically stable and afebrile.  He is still mildly confused, but agrees to rehab today as planned.  No family at bs at this time.  He has rcvd a bed at [facility] for ongoing rehab.  He will transfer today via car per family.  He should be seen by provider at SNF in 24-48 hrs, PCP 1 week of dc.  Recommend PCP to consider referral to neurologist outpt as indicated for ongoing care.  Workup with NS per PCP to arrange in near future for spondysosis if indicated.       Recommend ju of TSH in 4-6 weeks.  Rec referral to neuro outpt per PCP if indicated.  Pt also incidentally found to have spondylosis on CT of cervical spine.  Recommend outpt workup further with neurosurgery per PCP to arrange once medically stable for further workup.  Recommend ju of CBC/BMP in 1-2 days to re-eval electrolytes and anemia.         Day of Discharge     HPI:   Pt is seen resting upright in bed in NAD.  Just finished his breakfast and ate 100%.  No visitors at bs.  Awake and alert.  States he feels okay.  Asked \"how long have I been in here\".  When I told him how many days, he was shocked.  States \"I had no idea\".  No current n/v, abd pain, cp or soa. + BM today.  States he feels a little foggy and weak.  Agrees to rehab on dc as planned. No new issues.     Review of Systems  Gen- No fevers, chills  CV- No chest pain, palpitations  Resp- No cough, dyspnea  GI- No N/V/D, abd pain    Otherwise complete ROS is negative except as mentioned in the HPI.    Vital Signs:   Temp:  [97.3 °F (36.3 °C)-98.3 °F (36.8 °C)] 98 °F (36.7 °C)  Heart Rate:  [] 94  Resp:  [16-18] 16  BP: (100-124)/(65-78) 100/65     Physical Exam:  Constitutional: No acute distress, awake, alert.  Sitting up in bed.  No visitors at bs.   HENT: NCAT, mucous membranes moist  Respiratory: Clear to auscultation bilaterally A/P, respiratory effort normal on RA   Cardiovascular: RRR, no murmurs, rubs, or " gallops, palpable pedal pulses bilaterally  Gastrointestinal: Positive bowel sounds, soft, nontender, nondistended  Musculoskeletal: No bilateral ankle edema.  MILLER spont   Psychiatric: Flat affect, cooperative and calm   Neurologic: Alert and oriented to person, general situation/events currently, and hospital , strength symmetric in all extremities, Cranial Nerves grossly intact to confrontation, speech clear.  Folllows commands   Skin: No rashes        Pertinent Results       Results from last 7 days  Lab Units 07/08/18  0541 07/05/18  0643   WBC 10*3/mm3 7.45 7.99   HEMOGLOBIN g/dL 9.4* 10.1*   HEMATOCRIT % 29.0* 31.5*   PLATELETS 10*3/mm3 265 268   SODIUM mmol/L 138 139   POTASSIUM mmol/L 4.3 4.1   CHLORIDE mmol/L 104 104   CO2 mmol/L 27.0 31.0   BUN mg/dL 7* 8*   CREATININE mg/dL 0.51* 0.52*   GLUCOSE mg/dL 78 85   CALCIUM mg/dL 8.5* 8.6*           Invalid input(s): PROT, LABALBU        Invalid input(s): TG, LDLCALC, LDLREALC      Brief Urine Lab Results  (Last result in the past 365 days)      Color   Clarity   Blood   Leuk Est   Nitrite   Protein   CREAT   Urine HCG        06/23/18 0419 Orange(A) Clear Negative Small (1+)(A) Positive(A) 100 mg/dL (2+)(A)               Microbiology Results Abnormal     None          Imaging Results (all)     Procedure Component Value Units Date/Time    XR Chest 1 View [[id number]] Collected:  06/23/18 0244     Updated:  06/23/18 0346    Narrative:       EXAM:    XR Chest, 1 View     EXAM DATE/TIME:    6/23/2018 2:44 AM     CLINICAL HISTORY:    63 years old, male; Signs and symptoms; Other: Ms change     TECHNIQUE:    XR of the chest, 1 view.     COMPARISON:    CR - XR CHEST 1 VW 2018-02-13 15:37     FINDINGS:    Lungs:  The lungs are hyperinflated, consistent with COPD. There is no   confluent infiltrate. Left apical bullae are better visualized on the CT   cervical spine from the same day. Within the lateral aspect of the left lower   lobe, there is a 7 mm nodular opacity.  This may be contributed by a nipple   shadow, although a lung nodule is also considered. The patient is rotated to   the left.   Pleural space:  There is right apical pleural thickening. No pneumothorax.    Heart/Mediastinum:  Mediastinum: There is atherosclerotic calcification of the   aortic arch.    Bones/joints:  Osteopenia. There is slight convexity of the thoracic spine to   the right.    Other findings:  A small calcification is identified in superior to the right   greater tuberosity, suggestive of calcific tendinosis.       Impression:       1. The lungs are hyperinflated, consistent with COPD.   2. There is no confluent infiltrate.   3. There is right apical pleural thickening.   4.  Within the lateral aspect of the left lower lobe, there is a 7 mm nodular   opacity. This may be contributed by a nipple shadow, although a lung nodule is   also considered. A nonemergent chest CT suggested.    THIS DOCUMENT HAS BEEN ELECTRONICALLY SIGNED BY ADRI MASON MD    CT Head Without Contrast [[id number]] Collected:  06/22/18 2356     Updated:  06/23/18 0208    Narrative:       EXAM:    CT Head Without Intravenous Contrast    EXAM DATE/TIME:    6/22/2018 11:56 PM    CLINICAL HISTORY:    The patient age is 63 years old and is male; Signs and symptoms; Altered   mental status/memory loss; Additional info: Confusion/delirium, altered loc,   unexplained AMS    TECHNIQUE:    Axial computed tomography images of the head/brain without intravenous   contrast.  All CT scans at this facility use at least one of these dose   optimization techniques: automated exposure control; mA and/or kV adjustment   per patient size (includes targeted exams where dose is matched to clinical   indication); or iterative reconstruction.    Coronal and sagittal reformatted images were created and reviewed.    COMPARISON:    No relevant prior studies available.    FINDINGS:    Brain:  There are scattered foci of hypodensity within the cerebral  white   matter, likely representing small vessel ischemic disease in a patient this   age.  The acuity of the white matter disease is indeterminate.  The white-gray   differentiation is preserved demonstrating no acute territorial type infarct.    No acute intracranial hemorrhage is seen.    Midline shift:  There is no midline shift.    Ventricles:  There is mild prominence of the ventricles and sulci, compatible   with atrophy.    Bones/joints:  The calvarium demonstrates no evidence for a depressed   fracture.    Soft tissues:  No acute abnormality.    Vasculature:  There is atherosclerotic calcification of the intracranial   internal carotid arteries and distal left vertebral artery.    Sinuses:  Unremarkable as visualized.  No acute sinusitis.    Mastoid air cells:  No mastoid effusion.      Impression:       1.  No acute intracranial hemorrhage or acute territorial type infarct.  2.  There are scattered foci of hypodensity within the cerebral white matter,   likely representing small vessel ischemic disease in a patient this age.  3.  Mild atrophy.  4.  Alberta Stroke Program Early CT Score (ASPECTS) = 10    THIS DOCUMENT HAS BEEN ELECTRONICALLY SIGNED BY ADRI MASON MD    CT Cervical Spine Without Contrast [[id number]] Collected:  06/22/18 2066     Updated:  06/23/18 0204    Narrative:       EXAM:    CT Cervical Spine Without Intravenous Contrast    EXAM DATE/TIME:    6/22/2018 11:56 PM    CLINICAL HISTORY:    The patient age is 63 years old and is male; Pain; Neck pain; Additional   info: Neck pain, first study Patient complains of neck stiffness    TECHNIQUE:    Axial computed tomography images of the cervical spine without intravenous   contrast.  All CT scans at this facility use at least one of these dose   optimization techniques: automated exposure control; mA and/or kV adjustment   per patient size (includes targeted exams where dose is matched to clinical   indication); or iterative  reconstruction.    Coronal and sagittal reformatted images were created and reviewed.    COMPARISON:    No relevant prior studies available.    FINDINGS:    Vertebrae:  No acute cervical spine fracture.  There is rotation of C1 on C2,   with subluxation of the C1 lateral masses relative to C2. This can be   contributed by patient positioning.  There is mild anterolisthesis of C7 on T1.    The cervical lordosis is preserved.  The facet alignment is preserved   bilaterally.  The occipital condyles and C1-C2 articulations appear intact.    The right C1 transverse foramen is incomplete.    Discs/spinal canal/neural foramina:  Spondylosis is visualized at multiple   cervical levels.  A decrease of disc height is identified at C6-7. Bilateral   neural foraminal narrowing is identified at C6-7, with left neural foraminal   narrowing at C5-6.    Soft tissues:  The prevertebral soft tissues appear within normal limits.    Vasculature:  There is atherosclerotic calcification of the extracranial   carotid arteries.    Lung apices:  No pneumothorax.  Left apical bullae are visualized. There is   biapical parenchymal scarring. Centrilobular emphysematous changes are also   visualized.      Impression:       1.  No acute cervical spine fracture.  2.  There is rotation of C1 on C2, with subluxation of the C1 lateral masses   relative to C2.   3.  There is mild anterolisthesis of C7 on T1.  4.  Spondylosis is visualized at multiple cervical levels. Bilateral neural   foraminal narrowing is identified at C6-7, with left neural foraminal narrowing   at C5-6.    THIS DOCUMENT HAS BEEN ELECTRONICALLY SIGNED BY ADRI MASON MD                   Discharge Details        Discharge Medications      New Medications      Instructions Start Date   acetaminophen 325 MG tablet  Commonly known as:  TYLENOL   650 mg, Oral, Every 4 Hours PRN      castor oil-balsam peru ointment   Apply thin coat to affected area on coccyx q12 hours as needed       ipratropium-albuterol 0.5-2.5 mg/3 ml nebulizer  Commonly known as:  DUO-NEB   3 mL, Nebulization, Every 4 Hours PRN      nicotine 21 MG/24HR patch  Commonly known as:  NICODERM CQ   1 patch, Transdermal, Daily   Start Date:  7/10/2018     ondansetron 4 MG tablet  Commonly known as:  ZOFRAN   4 mg, Oral, Every 6 Hours PRN      sennosides-docusate sodium 8.6-50 MG tablet  Commonly known as:  SENOKOT-S   2 tablets, Oral, 2 Times Daily         Changes to Medications      Instructions Start Date   levothyroxine 25 MCG tablet  Commonly known as:  SYNTHROID, LEVOTHROID  What changed:  · medication strength  · how much to take   25 mcg, Oral, Daily      primidone 50 MG tablet  Commonly known as:  MYSOLINE  What changed:  additional instructions   50 mg, Oral, Nightly      propranolol 10 MG tablet  Commonly known as:  INDERAL  What changed:  · medication strength  · how much to take  · Another medication with the same name was removed. Continue taking this medication, and follow the directions you see here.   10 mg, Oral, 2 Times Daily         Continue These Medications      Instructions Start Date   folic acid 1 MG tablet  Commonly known as:  FOLVITE   1 mg, Oral, Daily      MULTIVITAMIN ADULTS 50+ tablet   1 tablet, Oral, Daily      pantoprazole 40 MG EC tablet  Commonly known as:  PROTONIX   40 mg, Oral, Daily      thiamine 100 MG tablet  Commonly known as:  VITAMIN B-1   100 mg, Oral, Daily               Discharge Disposition:  Skilled Nursing Facility (DC - External)    Discharge Diet:  Diet Instructions     Diet: Regular, Cardiac; Thin       Discharge Diet:   Regular  Cardiac       Fluid Consistency:  Thin    Recommend boost, or equivalent supplements          Discharge Activity:  Activity Instructions     Activity as Tolerated       Up with assist    Measure Blood Pressure             No future appointments.    Additional Instructions for the Follow-ups that You Need to Schedule     Discharge Follow-up with PCP     As directed      Currently Documented PCP:  No Known Provider  PCP Phone Number:  None    Follow Up Details:  To be seen by provider at SNF in 24-48 hrs, PCP 1 week of dc         Discharge Follow-up with Specialty: Neurology prn outpt per PCP to arrange as needed.    As directed      Specialty:  Neurology prn outpt per PCP to arrange as needed.                 Time Spent on Discharge:  50 minutes    Electronically signed by IGNACIO Goodwin, 18, 12:17 PM.      Electronically signed by IGNACIO Goodwin at 2018 12:39 PM     IGNACIO Goodwin at 2018  9:20 AM                [facility] Medicine Services  TRANSFER SUMMARY    Patient Name: Israel Bojorquez  : 1955  MRN: [id number]    Date of Admission: 2018  Date of Discharge:  2018  Length of Stay: 18  Primary Care Physician: No Known Provider    Consults     No orders found from 2018 to 2018.          Hospital Course     Presenting Problem:   Altered mental status, unspecified altered mental status type [R41.82]      Active Hospital Problems    Diagnosis Date Noted   • **Altered mental status [R41.82] 2018   • Alcoholic dementia (CMS/HCC) [F10.27] 2018   • Wernicke encephalopathy syndrome [E51.2] 2018   • Hypothyroidism [E03.9] 2018   • Medically noncompliant [Z91.19] 2018   • Severe malnutrition [E46] 2018   • Tobacco abuse [Z72.0] 2017   • Alcohol abuse [F10.10] 2017   • COPD (chronic obstructive pulmonary disease) (CMS/HCC) [J44.9] 2017   • Elevated liver enzymes [R74.8] 2017   • Hypertension [I10] 2017      Resolved Hospital Problems    Diagnosis Date Noted Date Resolved   • Leukocytosis [D72.829] 2018   • Diarrhea [R19.7] 2018          Hospital Course:  Israel Bojorquez is a 63 y.o. male with a past medical history significant for longstanding alcohol abuse with  progressing ETOH dementia, Acosta's esophagus, COPD, hypothyroidism, SIADH, essential tremor, essential hypertension, hyperlipidemia, and tobacco abuse presents to the ED after son found down at home.  MD had discussion with pts family/son regarding care.  He was admitted to hospitalist service due to AMS.  Montiored closely with w/d precautions. CT of head with mild atrophy, no acute findings.  Likely CT indicative of small vessel ischemic disease.   Rcvd high dose vitamin and thiamine replacements IV and tapered to po once stable.  Electrolyte abnormalities were repleted. He had hx of medical noncompliance with all meds/care.  He was resumed on synthroid at lower dose due to not having been taking at home.  Will need ju of TSH in approx 6 weeks of resuming meds.       Pt was followed by PT/OT.  He remained drowsy, confused.  Slowly progressed.  Has remained very weak and debilitated.  He is now awake and alert.  His progressive ETOH dementia appears to be stable and he is now eating, drinking himself and communicating.  Still with some mild confusion, but significantly improved.  He should avoid ALL ETOH in the future.  CM was involved due to complexity of issues.  Pts children are working on emergency guardianship.  Medication changes were made to home meds as he had NOT been taking them anyway.  Now stable on current meds/doses.  He had a good bm's last several days.       Currently pt is hemodynamically stable and afebrile.  He is still mildly confused, but agrees to rehab today as planned.  No family at  at this time.  He has rcvd a bed at SNF for ongoing rehab.  He will transfer today via car per family.  He should be seen by provider at SNF in 24-48 hrs, PCP 1 week of dc.  Recommend PCP to consider referral to neurologist outpt as indicated for ongoing care.  Workup with NS per PCP to arrange in near future for spondysosis if indicated.         Recommend ju of TSH in 4-6 weeks.  Rec referral to neuro  "outpt per PCP if indicated.  Pt also incidentally found to have spondylosis on CT of cervical spine.  Recommend outpt workup further with neurosurgery per PCP to arrange once medically stable for further workup.  Recommend ju of CBC/BMP in 1-2 days to re-eval electrolytes and anemia.         Addendem 7/11/2018 at 0920 am  Pt was to transfer to SNF on Monday 7/9, however ultimately his court papers were not complete for transfer with daughter as guardian.  Pt remained in the hospital until today awaiting finalization of court papers by .  He had remained hemodynamically stable and afebrile.  No new issues since Monday.  Here only awaiting transfer today with papers completed.  Spoke with Fatmata VELÁSQUEZ who states all is in place for pt to transfer this am as prior planned to SNF for rehab.  Daughter to transport this am.  All meds, follow up care as noted prior /above.  No new issues.  He is currently awake and alert and feeding himself breakfast in NAD.  No visitors at bs at this time.  He is aware that he is transferring to SNF today and voices agreement.               Day of Discharge     HPI:   Pt is seen resting up in bed in NAD.  Eating breakfast.  No visitors at bs.  States he feels a little better this morning but still always feels \"foggy\".  He is awake and alert.  Feeding himself.  Denies n/v, abd pain, cp or soa.  Says \"I'm getting out of here today aren't I?\".  No new issues.  Daughter coming to transport.     Review of Systems  Gen- No fevers, chills  CV- No chest pain, palpitations  Resp- No cough, dyspnea  GI- No N/V/D, abd pain    Otherwise complete ROS is negative except as mentioned in the HPI.    Vital Signs:   Temp:  [97.8 °F (36.6 °C)-98.4 °F (36.9 °C)] 98.4 °F (36.9 °C)  Heart Rate:  [93] 93  Resp:  [16-18] 18  BP: ()/(59-77) 97/59     Physical Exam:  Constitutional: No acute distress, awake, alert.   Frail gentleman.  Chronically ill appearing.  Sitting up in bed.  No visitors at bs. "   HENT: NCAT, mucous membranes moist  Respiratory: Clear to auscultation bilaterally A/P, respiratory effort normal on RA   Cardiovascular: RRR, no murmurs, rubs, or gallops, palpable pedal pulses bilaterally  Gastrointestinal: Positive bowel sounds, soft, nontender, nondistended  Musculoskeletal: No bilateral ankle edema.  MILLER spont   Psychiatric: Flat affect, cooperative and calm   Neurologic: Alert and oriented to person, general situation/events currently, and hospital , strength symmetric in all extremities, Cranial Nerves grossly intact to confrontation, speech clear.  Folllows commands   Skin: No rashes      Pertinent Results       Results from last 7 days  Lab Units 07/08/18  0541 07/05/18  0643   WBC 10*3/mm3 7.45 7.99   HEMOGLOBIN g/dL 9.4* 10.1*   HEMATOCRIT % 29.0* 31.5*   PLATELETS 10*3/mm3 265 268   SODIUM mmol/L 138 139   POTASSIUM mmol/L 4.3 4.1   CHLORIDE mmol/L 104 104   CO2 mmol/L 27.0 31.0   BUN mg/dL 7* 8*   CREATININE mg/dL 0.51* 0.52*   GLUCOSE mg/dL 78 85   CALCIUM mg/dL 8.5* 8.6*           Invalid input(s): PROT, LABALBU        Invalid input(s): TG, LDLCALC, LDLREALC      Brief Urine Lab Results  (Last result in the past 365 days)      Color   Clarity   Blood   Leuk Est   Nitrite   Protein   CREAT   Urine HCG        06/23/18 0419 Orange(A) Clear Negative Small (1+)(A) Positive(A) 100 mg/dL (2+)(A)               Microbiology Results Abnormal     None          Imaging Results (all)     Procedure Component Value Units Date/Time    XR Chest 1 View [[id number]] Collected:  06/23/18 0244     Updated:  06/23/18 0346    Narrative:       EXAM:    XR Chest, 1 View     EXAM DATE/TIME:    6/23/2018 2:44 AM     CLINICAL HISTORY:    63 years old, male; Signs and symptoms; Other: Ms change     TECHNIQUE:    XR of the chest, 1 view.     COMPARISON:    CR - XR CHEST 1 VW 2018-02-13 15:37     FINDINGS:    Lungs:  The lungs are hyperinflated, consistent with COPD. There is no   confluent infiltrate. Left apical  bullae are better visualized on the CT   cervical spine from the same day. Within the lateral aspect of the left lower   lobe, there is a 7 mm nodular opacity. This may be contributed by a nipple   shadow, although a lung nodule is also considered. The patient is rotated to   the left.   Pleural space:  There is right apical pleural thickening. No pneumothorax.    Heart/Mediastinum:  Mediastinum: There is atherosclerotic calcification of the   aortic arch.    Bones/joints:  Osteopenia. There is slight convexity of the thoracic spine to   the right.    Other findings:  A small calcification is identified in superior to the right   greater tuberosity, suggestive of calcific tendinosis.       Impression:       1. The lungs are hyperinflated, consistent with COPD.   2. There is no confluent infiltrate.   3. There is right apical pleural thickening.   4.  Within the lateral aspect of the left lower lobe, there is a 7 mm nodular   opacity. This may be contributed by a nipple shadow, although a lung nodule is   also considered. A nonemergent chest CT suggested.    THIS DOCUMENT HAS BEEN ELECTRONICALLY SIGNED BY ADRI MASON MD    CT Head Without Contrast [[id number]] Collected:  06/22/18 2356     Updated:  06/23/18 0208    Narrative:       EXAM:    CT Head Without Intravenous Contrast    EXAM DATE/TIME:    6/22/2018 11:56 PM    CLINICAL HISTORY:    The patient age is 63 years old and is male; Signs and symptoms; Altered   mental status/memory loss; Additional info: Confusion/delirium, altered loc,   unexplained AMS    TECHNIQUE:    Axial computed tomography images of the head/brain without intravenous   contrast.  All CT scans at this facility use at least one of these dose   optimization techniques: automated exposure control; mA and/or kV adjustment   per patient size (includes targeted exams where dose is matched to clinical   indication); or iterative reconstruction.    Coronal and sagittal reformatted images were  created and reviewed.    COMPARISON:    No relevant prior studies available.    FINDINGS:    Brain:  There are scattered foci of hypodensity within the cerebral white   matter, likely representing small vessel ischemic disease in a patient this   age.  The acuity of the white matter disease is indeterminate.  The white-gray   differentiation is preserved demonstrating no acute territorial type infarct.    No acute intracranial hemorrhage is seen.    Midline shift:  There is no midline shift.    Ventricles:  There is mild prominence of the ventricles and sulci, compatible   with atrophy.    Bones/joints:  The calvarium demonstrates no evidence for a depressed   fracture.    Soft tissues:  No acute abnormality.    Vasculature:  There is atherosclerotic calcification of the intracranial   internal carotid arteries and distal left vertebral artery.    Sinuses:  Unremarkable as visualized.  No acute sinusitis.    Mastoid air cells:  No mastoid effusion.      Impression:       1.  No acute intracranial hemorrhage or acute territorial type infarct.  2.  There are scattered foci of hypodensity within the cerebral white matter,   likely representing small vessel ischemic disease in a patient this age.  3.  Mild atrophy.  4.  Alberta Stroke Program Early CT Score (ASPECTS) = 10    THIS DOCUMENT HAS BEEN ELECTRONICALLY SIGNED BY ADRI MASON MD    CT Cervical Spine Without Contrast [[id number]] Collected:  06/22/18 2356     Updated:  06/23/18 0204    Narrative:       EXAM:    CT Cervical Spine Without Intravenous Contrast    EXAM DATE/TIME:    6/22/2018 11:56 PM    CLINICAL HISTORY:    The patient age is 63 years old and is male; Pain; Neck pain; Additional   info: Neck pain, first study Patient complains of neck stiffness    TECHNIQUE:    Axial computed tomography images of the cervical spine without intravenous   contrast.  All CT scans at this facility use at least one of these dose   optimization techniques: automated  exposure control; mA and/or kV adjustment   per patient size (includes targeted exams where dose is matched to clinical   indication); or iterative reconstruction.    Coronal and sagittal reformatted images were created and reviewed.    COMPARISON:    No relevant prior studies available.    FINDINGS:    Vertebrae:  No acute cervical spine fracture.  There is rotation of C1 on C2,   with subluxation of the C1 lateral masses relative to C2. This can be   contributed by patient positioning.  There is mild anterolisthesis of C7 on T1.    The cervical lordosis is preserved.  The facet alignment is preserved   bilaterally.  The occipital condyles and C1-C2 articulations appear intact.    The right C1 transverse foramen is incomplete.    Discs/spinal canal/neural foramina:  Spondylosis is visualized at multiple   cervical levels.  A decrease of disc height is identified at C6-7. Bilateral   neural foraminal narrowing is identified at C6-7, with left neural foraminal   narrowing at C5-6.    Soft tissues:  The prevertebral soft tissues appear within normal limits.    Vasculature:  There is atherosclerotic calcification of the extracranial   carotid arteries.    Lung apices:  No pneumothorax.  Left apical bullae are visualized. There is   biapical parenchymal scarring. Centrilobular emphysematous changes are also   visualized.      Impression:       1.  No acute cervical spine fracture.  2.  There is rotation of C1 on C2, with subluxation of the C1 lateral masses   relative to C2.   3.  There is mild anterolisthesis of C7 on T1.  4.  Spondylosis is visualized at multiple cervical levels. Bilateral neural   foraminal narrowing is identified at C6-7, with left neural foraminal narrowing   at C5-6.    THIS DOCUMENT HAS BEEN ELECTRONICALLY SIGNED BY ADRI MASON MD                   Discharge Details        Discharge Medications      New Medications      Instructions Start Date   acetaminophen 325 MG tablet  Commonly known as:   TYLENOL   650 mg, Oral, Every 4 Hours PRN      castor oil-balsam peru ointment   Apply thin coat to affected area on coccyx q12 hours as needed      ipratropium-albuterol 0.5-2.5 mg/3 ml nebulizer  Commonly known as:  DUO-NEB   3 mL, Nebulization, Every 4 Hours PRN      nicotine 21 MG/24HR patch  Commonly known as:  NICODERM CQ   1 patch, Transdermal, Daily      ondansetron 4 MG tablet  Commonly known as:  ZOFRAN   4 mg, Oral, Every 6 Hours PRN      sennosides-docusate sodium 8.6-50 MG tablet  Commonly known as:  SENOKOT-S   2 tablets, Oral, 2 Times Daily         Changes to Medications      Instructions Start Date   levothyroxine 25 MCG tablet  Commonly known as:  SYNTHROID, LEVOTHROID  What changed:  · medication strength  · how much to take   25 mcg, Oral, Daily      primidone 50 MG tablet  Commonly known as:  MYSOLINE  What changed:  additional instructions   50 mg, Oral, Nightly      propranolol 10 MG tablet  Commonly known as:  INDERAL  What changed:  · medication strength  · how much to take  · Another medication with the same name was removed. Continue taking this medication, and follow the directions you see here.   10 mg, Oral, 2 Times Daily         Continue These Medications      Instructions Start Date   folic acid 1 MG tablet  Commonly known as:  FOLVITE   1 mg, Oral, Daily      MULTIVITAMIN ADULTS 50+ tablet   1 tablet, Oral, Daily      pantoprazole 40 MG EC tablet  Commonly known as:  PROTONIX   40 mg, Oral, Daily      thiamine 100 MG tablet  Commonly known as:  VITAMIN B-1   100 mg, Oral, Daily               Discharge Disposition:  Skilled Nursing Facility (DC - External)    Discharge Diet:  Diet Instructions     Diet: Regular, Cardiac; Thin       Discharge Diet:   Regular  Cardiac       Fluid Consistency:  Thin    Recommend boost, or equivalent supplements          Discharge Activity:  Activity Instructions     Activity as Tolerated       Up with assist    Activity as Tolerated       Measure Blood  Pressure                 No future appointments.    Additional Instructions for the Follow-ups that You Need to Schedule     Discharge Follow-up with PCP    As directed      Currently Documented PCP:  No Known Provider  PCP Phone Number:  None    Follow Up Details:  To be seen by provider at SNF in 24-48 hrs, PCP 1 week of dc         Discharge Follow-up with Specialty: Neurology prn outpt per PCP to arrange as needed.    As directed      Specialty:  Neurology prn outpt per PCP to arrange as needed.                 Time Spent on Discharge:  35 minutes    Electronically signed by IGNACIO Goodwin, 07/11/18, 10:32 AM.      Electronically signed by IGNACIO Goodwin at 7/11/2018 10:40 AM

## 2018-07-11 NOTE — NURSING NOTE
Report called to STACI Gordillo at [facility] at [phone]. Transfer packet given to daughter. Patient transported to facility by family.

## 2018-08-03 NOTE — THERAPY DISCHARGE NOTE
Acute Care - Occupational Therapy Discharge Summary  [facility]     Patient Name: Israel Bojorquez  : 1955  MRN: [id number]    Today's Date: 2017  Onset of Illness/Injury or Date of Surgery Date: 17    Date of Referral to OT: 17  Referring Physician: DIXIE Roland      Admit Date: 2017        OT Recommendation and Plan    Visit Dx:    ICD-10-CM ICD-9-CM   1. Acute hyponatremia E87.1 276.1   2. Confusion R41.0 298.9   3. Multiple falls R29.6 V15.88   4. Generalized weakness R53.1 780.79   5. Alcoholism F10.20 303.90   6. Impaired mobility and ADLs Z74.09 799.89   7. Impaired functional mobility, balance, gait, and endurance Z74.09 V49.89                     OT Goals       17 1432          Transfer Training OT LTG    Transfer Training OT LTG, Date Established 17  -TA      Transfer Training OT LTG, Time to Achieve 1 wk  -TA      Transfer Training OT LTG, Activity Type sit to stand/stand to sit;toilet  -TA      Transfer Training OT LTG, [place] Level conditional independence  -TA      Transfer Training OT LTG, Assist Device walker, rolling  -TA      Transfer Training OT LTG, Outcome goal ongoing  -TA      Patient Education OT LTG    Patient Education OT LTG, Date Established 17  -TA      Patient Education OT LTG, Time to Achieve 1 wk  -TA      Patient Education OT LTG, Education Type HEP;home safety  -TA      Patient Education OT LTG, Education Understanding verbalizes understanding  -TA      Patient Education OT LTG Outcome goal ongoing  -TA      Toileting OT LTG    Toileting Goal OT LTG, Date Established 17  -TA      Toileting Goal OT LTG, Time to Achieve 1 wk  -TA      Toileting Goal OT LTG, [place] Level conditional independence  -TA      Toileting Goal OT LTG, Outcome goal ongoing  -TA      LB Dressing OT LTG    LB Dressing Goal OT LTG, Date Established 17  -TA      LB Dressing Goal OT LTG, Time to Achieve 1 wk  -TA      LB Dressing Goal OT LTG,  [place] Level conditional independence  -TA      LB Dressing Goal OT LTG, Outcome goal ongoing  -TA        User Key  (r) = Recorded By, (t) = Taken By, (c) = Cosigned By    Initials Name Provider Type    BEN Thomas, OT Occupational Therapist                  OT Discharge Summary  Reason for Discharge: Discharge from facility  Outcomes Achieved: Refer to plan of care for updates on goals achieved  Discharge Destination: Home      Ashwini Rosales OT  8/8/2017    Location Indication Override (Is Already Calculated Based On Selected Body Location): Area L

## 2019-02-25 ENCOUNTER — TELEPHONE (OUTPATIENT)
Dept: NEUROLOGY | Facility: CLINIC | Age: 64
End: 2019-02-25

## 2019-02-25 NOTE — TELEPHONE ENCOUNTER
Pt's daughter and Guardian called to check on a referral that should have been sent over for Israel by partha Hunter (Director of [facility]), but states we may have not received it yet. Could not locate this on epic or as part of our outside facility incoming referrals, so she will be calling them as well I called to get their fax# as apparently they have an on sight APRN  Faxed over a referral form  FX#[phone]

## 2019-09-26 ENCOUNTER — HOSPITAL ENCOUNTER (EMERGENCY)
Facility: HOSPITAL | Age: 64
Discharge: HOME | End: 2019-09-26
Attending: EMERGENCY MEDICINE | Admitting: EMERGENCY MEDICINE

## 2019-09-26 ENCOUNTER — APPOINTMENT (OUTPATIENT)
Dept: GENERAL RADIOLOGY | Facility: HOSPITAL | Age: 64
End: 2019-09-26

## 2019-09-26 VITALS
WEIGHT: 160 LBS | DIASTOLIC BLOOD PRESSURE: 91 MMHG | OXYGEN SATURATION: 98 % | HEIGHT: 73 IN | SYSTOLIC BLOOD PRESSURE: 176 MMHG | TEMPERATURE: 97.7 F | BODY MASS INDEX: 21.2 KG/M2 | RESPIRATION RATE: 16 BRPM | HEART RATE: 71 BPM

## 2019-09-26 DIAGNOSIS — M25.471 PAIN AND SWELLING OF RIGHT ANKLE: Primary | ICD-10-CM

## 2019-09-26 DIAGNOSIS — M79.674 PAIN AND SWELLING OF TOE OF RIGHT FOOT: ICD-10-CM

## 2019-09-26 DIAGNOSIS — M79.89 PAIN AND SWELLING OF TOE OF RIGHT FOOT: ICD-10-CM

## 2019-09-26 DIAGNOSIS — M25.571 PAIN AND SWELLING OF RIGHT ANKLE: Primary | ICD-10-CM

## 2019-09-26 PROBLEM — R60.0 PEDAL EDEMA: Status: ACTIVE | Noted: 2019-09-26

## 2019-09-26 LAB
BUN BLDA-MCNC: 10 MG/DL (ref 8–26)
CA-I BLDA-SCNC: 1.23 MMOL/L (ref 1.2–1.32)
CHLORIDE BLDA-SCNC: 102 MMOL/L (ref 98–109)
CO2 BLDA-SCNC: 26 MMOL/L (ref 24–29)
CREAT BLDA-MCNC: 0.9 MG/DL (ref 0.6–1.3)
GLUCOSE BLDC GLUCOMTR-MCNC: 101 MG/DL (ref 70–130)
HCT VFR BLDA CALC: 28 % (ref 38–51)
HGB BLDA-MCNC: 9.5 G/DL (ref 12–17)
POTASSIUM BLDA-SCNC: 4.4 MMOL/L (ref 3.5–4.9)
SODIUM BLDA-SCNC: 140 MMOL/L (ref 138–146)

## 2019-09-26 PROCEDURE — 96372 THER/PROPH/DIAG INJ SC/IM: CPT

## 2019-09-26 PROCEDURE — 36415 COLL VENOUS BLD VENIPUNCTURE: CPT

## 2019-09-26 PROCEDURE — 25010000002 ENOXAPARIN PER 10 MG: Performed by: EMERGENCY MEDICINE

## 2019-09-26 PROCEDURE — 73630 X-RAY EXAM OF FOOT: CPT

## 2019-09-26 PROCEDURE — 73610 X-RAY EXAM OF ANKLE: CPT

## 2019-09-26 PROCEDURE — 99283 EMERGENCY DEPT VISIT LOW MDM: CPT

## 2019-09-26 PROCEDURE — 80047 BASIC METABLC PNL IONIZED CA: CPT

## 2019-09-26 PROCEDURE — 85014 HEMATOCRIT: CPT

## 2019-09-26 RX ORDER — OXYCODONE HYDROCHLORIDE AND ACETAMINOPHEN 5; 325 MG/1; MG/1
1 TABLET ORAL ONCE
Status: COMPLETED | OUTPATIENT
Start: 2019-09-26 | End: 2019-09-26

## 2019-09-26 RX ADMIN — OXYCODONE HYDROCHLORIDE AND ACETAMINOPHEN 1 TABLET: 5; 325 TABLET ORAL at 22:19

## 2019-09-26 RX ADMIN — ENOXAPARIN SODIUM 70 MG: 80 INJECTION SUBCUTANEOUS at 23:34

## 2019-09-27 ENCOUNTER — HOSPITAL ENCOUNTER (OUTPATIENT)
Dept: CARDIOLOGY | Facility: HOSPITAL | Age: 64
Discharge: HOME | End: 2019-09-27
Admitting: EMERGENCY MEDICINE

## 2019-09-27 VITALS — HEIGHT: 73 IN | BODY MASS INDEX: 21.2 KG/M2 | WEIGHT: 160 LBS

## 2019-09-27 DIAGNOSIS — M25.571 PAIN AND SWELLING OF RIGHT ANKLE: ICD-10-CM

## 2019-09-27 DIAGNOSIS — M79.674 PAIN AND SWELLING OF TOE OF RIGHT FOOT: ICD-10-CM

## 2019-09-27 DIAGNOSIS — M79.89 PAIN AND SWELLING OF TOE OF RIGHT FOOT: ICD-10-CM

## 2019-09-27 DIAGNOSIS — M25.471 PAIN AND SWELLING OF RIGHT ANKLE: ICD-10-CM

## 2019-09-27 PROCEDURE — 93971 EXTREMITY STUDY: CPT

## 2019-09-28 LAB
BH CV ECHO MEAS - BSA(HAYCOCK): 1.9 M^2
BH CV ECHO MEAS - BSA: 2 M^2
BH CV ECHO MEAS - BZI_BMI: 21.1 KILOGRAMS/M^2
BH CV ECHO MEAS - BZI_METRIC_HEIGHT: 185.4 CM
BH CV ECHO MEAS - BZI_METRIC_WEIGHT: 72.6 KG
BH CV LOWER VASCULAR LEFT COMMON FEMORAL AUGMENT: NORMAL
BH CV LOWER VASCULAR LEFT COMMON FEMORAL COMPRESS: NORMAL
BH CV LOWER VASCULAR LEFT COMMON FEMORAL PHASIC: NORMAL
BH CV LOWER VASCULAR LEFT COMMON FEMORAL SPONT: NORMAL
BH CV LOWER VASCULAR RIGHT COMMON FEMORAL AUGMENT: NORMAL
BH CV LOWER VASCULAR RIGHT COMMON FEMORAL COMPRESS: NORMAL
BH CV LOWER VASCULAR RIGHT COMMON FEMORAL PHASIC: NORMAL
BH CV LOWER VASCULAR RIGHT COMMON FEMORAL SPONT: NORMAL
BH CV LOWER VASCULAR RIGHT DISTAL FEMORAL AUGMENT: NORMAL
BH CV LOWER VASCULAR RIGHT DISTAL FEMORAL COMPRESS: NORMAL
BH CV LOWER VASCULAR RIGHT DISTAL FEMORAL PHASIC: NORMAL
BH CV LOWER VASCULAR RIGHT DISTAL FEMORAL SPONT: NORMAL
BH CV LOWER VASCULAR RIGHT GASTRONEMIUS COMPRESS: NORMAL
BH CV LOWER VASCULAR RIGHT GREATER SAPH AK COMPRESS: NORMAL
BH CV LOWER VASCULAR RIGHT GREATER SAPH BK COMPRESS: NORMAL
BH CV LOWER VASCULAR RIGHT LESSER SAPH COMPRESS: NORMAL
BH CV LOWER VASCULAR RIGHT MID FEMORAL AUGMENT: NORMAL
BH CV LOWER VASCULAR RIGHT MID FEMORAL COMPRESS: NORMAL
BH CV LOWER VASCULAR RIGHT MID FEMORAL PHASIC: NORMAL
BH CV LOWER VASCULAR RIGHT MID FEMORAL SPONT: NORMAL
BH CV LOWER VASCULAR RIGHT PERONEAL AUGMENT: NORMAL
BH CV LOWER VASCULAR RIGHT PERONEAL COMPRESS: NORMAL
BH CV LOWER VASCULAR RIGHT POPLITEAL AUGMENT: NORMAL
BH CV LOWER VASCULAR RIGHT POPLITEAL COMPRESS: NORMAL
BH CV LOWER VASCULAR RIGHT POPLITEAL PHASIC: NORMAL
BH CV LOWER VASCULAR RIGHT POPLITEAL SPONT: NORMAL
BH CV LOWER VASCULAR RIGHT POSTERIOR TIBIAL AUGMENT: NORMAL
BH CV LOWER VASCULAR RIGHT POSTERIOR TIBIAL COMPRESS: NORMAL
BH CV LOWER VASCULAR RIGHT PROFUNDA FEMORAL AUGMENT: NORMAL
BH CV LOWER VASCULAR RIGHT PROFUNDA FEMORAL COMPRESS: NORMAL
BH CV LOWER VASCULAR RIGHT PROFUNDA FEMORAL PHASIC: NORMAL
BH CV LOWER VASCULAR RIGHT PROFUNDA FEMORAL SPONT: NORMAL
BH CV LOWER VASCULAR RIGHT PROXIMAL FEMORAL AUGMENT: NORMAL
BH CV LOWER VASCULAR RIGHT PROXIMAL FEMORAL COMPRESS: NORMAL
BH CV LOWER VASCULAR RIGHT PROXIMAL FEMORAL PHASIC: NORMAL
BH CV LOWER VASCULAR RIGHT PROXIMAL FEMORAL SPONT: NORMAL
BH CV LOWER VASCULAR RIGHT SAPHENOFEMORAL JUNCTION AUGMENT: NORMAL
BH CV LOWER VASCULAR RIGHT SAPHENOFEMORAL JUNCTION COMPRESS: NORMAL
BH CV LOWER VASCULAR RIGHT SAPHENOFEMORAL JUNCTION PHASIC: NORMAL
BH CV LOWER VASCULAR RIGHT SAPHENOFEMORAL JUNCTION SPONT: NORMAL

## 2019-11-12 NOTE — PROGRESS NOTES
[facility] AT [address], [address]  [place], [place], [address] [phone]  [email or web address]           OUTPATIENT CLINIC CONSULTATION NOTE    Patient care team:  Patient Care Team:  Vinicio Gonzalez MD as PCP - General (Family Medicine)  Provider, No Known as PCP - Family Medicine    Requesting Provider and Reason for consultation: The patient is being seen today at the request of IGNACIO William for claudication.     Subjective:   Chief complaint:   Chief Complaint   Patient presents with   • Leg Pain       HPI:    Israel Bojorquez is a 64 y.o. male.  Cardiac problem list:  1. Right lower extremity injury, pain, swelling 9/2019, negative venous duplex  2. History of Warnicke's encephalopathy diagnosed 6/2018, former significant alcoholism  3. Former tobacco dependence, 50+ years of smoking, quit in 6/2018  4. Essential hypertension  5. Mixed hyperlipidemia  6. COPD  7. GERD  8. Essential tremors    Today the patient presents for an evaluation of his claudication.    The patient has a history of Warnicke's encephalopathy, poor balance, diagnosed in 6/2018.  Since then he has had intermittent discomfort in his legs bilaterally.  His daughter has noticed a decrease in hair in his lower extremities below the knees.  In 9/2019 the patient had a mechanical fall.  He had injured his right ankle and noticed more significant right lower extremity discomfort with exertion.  He underwent a venous duplex scan which was negative for DVT but concerning for possible findings of arterial atherosclerotic disease.  No nonhealing ulcers/wounds present.  Has mild lower extremity edema    The patient denies exertional chest pain.  Has chronic exertional dyspnea.  Denies palpitations.    Had a remote stress test potentially greater than 15 years ago.  Was started on a statin last month.  No ongoing myalgias.  No family history of premature coronary artery disease.  Former smoker for  many many years.  Quit in 6/2018    Review of Systems:  Positive for poor balance, right lower extremity discomfort with exertion/possible claudication, hair loss, mild rubor of his toes, dyspnea, tremor, lower extremity edema  Negative for exertional chest pain, orthopnea, PND, palpitations, lightheadedness, syncope.   All other systems reviewed and are negative.    PFSH:  Patient Active Problem List   Diagnosis   • Tobacco abuse   • Essential hypertension   • Alcohol abuse   • Elevated liver enzymes   • COPD (chronic obstructive pulmonary disease) (CMS/HCC)   • Hyperglycemia   • Moderate malnutrition (CMS/HCC)   • Acute hyponatremia   • Elevated TSH, T4 borderline low   • Anemia   • Weakness   • Fall   • Hypothyroidism   • Altered mental status   • Medically noncompliant   • Severe malnutrition   • Alcoholic dementia (CMS/HCC)   • Wernicke encephalopathy syndrome   • Pedal edema   • Claudication (CMS/HCC)         Current Outpatient Medications:   •  acetaminophen (TYLENOL) 325 MG tablet, Take 2 tablets by mouth Every 4 (Four) Hours As Needed for Mild Pain ., Disp: , Rfl:   •  CBD (cannabidiol) oral oil, Take  by mouth., Disp: , Rfl:   •  levothyroxine (SYNTHROID, LEVOTHROID) 25 MCG tablet, Take 1 tablet by mouth Daily., Disp: , Rfl:   •  Melatonin 10 MG tablet, Take 10 mg by mouth Daily., Disp: , Rfl:   •  Multiple Vitamins-Minerals (MULTIVITAMIN ADULTS 50+) tablet, Take 1 tablet by mouth Daily., Disp: , Rfl:   •  pantoprazole (PROTONIX) 40 MG EC tablet, Take 40 mg by mouth Daily., Disp: , Rfl:   •  pravastatin (PRAVACHOL) 10 MG tablet, Take 10 mg by mouth Daily., Disp: , Rfl:   •  propranolol (INDERAL) 10 MG tablet, Take 1 tablet by mouth 2 (Two) Times a Day. (Patient taking differently: Take 20 mg by mouth 2 (Two) Times a Day.), Disp: , Rfl:   •  sennosides-docusate sodium (SENOKOT-S) 8.6-50 MG tablet, Take 2 tablets by mouth 2 (Two) Times a Day., Disp: , Rfl:   •  traZODone (DESYREL) 50 MG tablet, Take 50 mg by  "mouth Every Night., Disp: , Rfl:   •  aspirin 81 MG tablet, Take 1 tablet by mouth Daily., Disp: 30 tablet, Rfl: 11    No Known Allergies    Social History     Socioeconomic History   • Marital status:      Spouse name: Not on file   • Number of children: Not on file   • Years of education: Not on file   • Highest education level: Not on file   Tobacco Use   • Smoking status: Former Smoker     Packs/day: 2.00     Years: 40.00     Pack years: 80.00     Types: Cigarettes     Last attempt to quit: 2018     Years since quittin.4   • Smokeless tobacco: Never Used   Substance and Sexual Activity   • Alcohol use: No     Frequency: Never     Comment: PT has history of Alcohol abuse(10-12 Beers daily)   • Drug use: No     Types: Amphetamines, Barbiturates, LSD, Marijuana     Comment: remote h/o drug use    • Sexual activity: Defer   Social History Narrative    LIVES AT HOME ALONE      Family History   Problem Relation Age of Onset   • Cancer Mother    • Alcohol abuse Father          Objective:   Physical Exam:  /74 (BP Location: Right arm, Patient Position: Sitting)   Pulse 80   Ht 185.4 cm (73\")   Wt 98.9 kg (218 lb)   BMI 28.76 kg/m²   CONSTITUTIONAL: Well-nourished. In no acute distress.   SKIN: Warm and dry. No rashes noted  HEENT: Head is normocephalic and atraumatic.  Mucous membranes are pink and moist.   NECK: Supple without masses or thyromegaly. There is no jugular venous distention at 30°.  LUNGS: Normal effort. Clear to auscultation bilaterally without wheezing, rhonchi, or rales noted.   CARDIOVASCULAR: The heart has a regular rate and rhythm with a normal S1 and S2. There is no murmur, gallop, rub, or click appreciated.   PERIPHERAL VASCULAR: Carotid upstroke is 2+ bilaterally and without bruits. Radial pulses are 2+ bilaterally. There is mild lower extremity edema.  2+ left PT and DP pulses, 1+ right DP and PT pulses.  Mild rubor of the toes.  Minimal hair below the knees " bilaterally  ABDOMEN: Normal bowel sounds.  Soft with no tenderness with palpitation. No hepatosplenomegaly  MUSCULOSKELETAL: Normal gait. No digital cyanosis  NEUROLOGICAL: CN II - XII grossly intact  PSYCHIATRIC: Alert, orientated x 3, appropriate affect     Labs:  BUN   Date Value Ref Range Status   07/08/2018 7 (L) 9 - 23 mg/dL Final     Creatinine   Date Value Ref Range Status   09/26/2019 0.90 0.60 - 1.30 mg/dL Final     Potassium   Date Value Ref Range Status   07/08/2018 4.3 3.5 - 5.5 mmol/L Final     ALT (SGPT)   Date Value Ref Range Status   06/23/2018 98 (H) 7 - 40 U/L Final     AST (SGOT)   Date Value Ref Range Status   06/23/2018 130 (H) 0 - 33 U/L Final     WBC   Date Value Ref Range Status   07/08/2018 7.45 3.50 - 10.80 10*3/mm3 Final     Hemoglobin   Date Value Ref Range Status   09/26/2019 9.5 (L) 12.0 - 17.0 g/dL Final     Comment:     Serial Number: [id number]:  [id number]   07/08/2018 9.4 (L) 13.1 - 17.5 g/dL Final     Hematocrit   Date Value Ref Range Status   09/26/2019 28 (L) 38 - 51 % Final   07/08/2018 29.0 (L) 38.9 - 50.9 % Final     Platelets   Date Value Ref Range Status   07/08/2018 265 150 - 450 10*3/mm3 Final       No results found for: CHOL  No results found for: TRIG  No results found for: HDL  No results found for: LDL  No components found for: LDLDIRECTC      Lipid Panel 3/12/19  TC- 246  Trig- 114  HDL- 62  LDL-161      Diagnostic Data:      ECG 12 Lead  Date/Time: 11/18/2019 4:54 PM  Performed by: Jack Montoya MD  Authorized by: Jack Montoya MD   Comparison: compared with previous ECG from 6/27/2018  Comparison to previous ECG: PVCs are no longer present  Rhythm: sinus rhythm and sinus arrhythmia  Comments: Heart rate 80, QRS 78, QTc 447          Lower extremity venous duplex ultrasound 9/2019, images reviewed by myself  · Normal right lower extremity venous duplex scan.  · There is atherosclerosis noted in the arteries in multiple segments.  The severity cannot be determined  based on the study    Assessment and Plan:   Israel was seen today for leg pain.    Diagnoses and all orders for this visit:    Claudication (CMS/HCC)  Mixed hyperlipidemia  -Recommended exercise ABIs.  Discussed the possibility of a peripheral angiogram if he is found to have moderate to severe disease.  -Recommended increased exercise as tolerated  -Continue statin, start aspirin 81 mg daily    Cardiac risk factors   -Asked patient to monitor his dyspnea symptoms.  If he has chest discomfort or his dyspnea is increasingly significant/affecting his daily activities, would recommend PFTs and a stress test.  Would also consider an echocardiogram.    - Return in about 6 months (around 5/18/2020).    Jack Montoya MD, MSc, FACC  Interventional Cardiology  [facility] at [facility]

## 2019-11-18 ENCOUNTER — CONSULT (OUTPATIENT)
Dept: CARDIOLOGY | Facility: CLINIC | Age: 64
End: 2019-11-18

## 2019-11-18 VITALS
HEIGHT: 73 IN | WEIGHT: 218 LBS | SYSTOLIC BLOOD PRESSURE: 144 MMHG | BODY MASS INDEX: 28.89 KG/M2 | DIASTOLIC BLOOD PRESSURE: 74 MMHG | HEART RATE: 80 BPM

## 2019-11-18 DIAGNOSIS — I73.9 CLAUDICATION (HCC): Primary | ICD-10-CM

## 2019-11-18 DIAGNOSIS — I10 ESSENTIAL HYPERTENSION: ICD-10-CM

## 2019-11-18 PROCEDURE — 93000 ELECTROCARDIOGRAM COMPLETE: CPT | Performed by: INTERNAL MEDICINE

## 2019-11-18 PROCEDURE — 99204 OFFICE O/P NEW MOD 45 MIN: CPT | Performed by: INTERNAL MEDICINE

## 2019-11-18 RX ORDER — TRAZODONE HYDROCHLORIDE 50 MG/1
50 TABLET ORAL NIGHTLY
Status: ON HOLD | COMMUNITY
End: 2022-10-14

## 2019-11-18 RX ORDER — PRAVASTATIN SODIUM 10 MG
20 TABLET ORAL DAILY
COMMUNITY

## 2019-11-18 RX ORDER — PHENOL 1.4 %
10 AEROSOL, SPRAY (ML) MUCOUS MEMBRANE DAILY
COMMUNITY

## 2019-11-19 ENCOUNTER — TELEPHONE (OUTPATIENT)
Dept: CARDIOLOGY | Facility: CLINIC | Age: 64
End: 2019-11-19

## 2019-11-19 NOTE — TELEPHONE ENCOUNTER
----- Message from Jack Montoya MD sent at 11/18/2019  4:54 PM EST -----  Can you obtain his most recent lipid panel and LFTs from his PCP?  Thanks

## 2019-12-06 ENCOUNTER — OFFICE VISIT (OUTPATIENT)
Dept: NEUROLOGY | Facility: CLINIC | Age: 64
End: 2019-12-06

## 2019-12-06 VITALS
HEIGHT: 73 IN | DIASTOLIC BLOOD PRESSURE: 84 MMHG | BODY MASS INDEX: 28.89 KG/M2 | WEIGHT: 218 LBS | SYSTOLIC BLOOD PRESSURE: 140 MMHG

## 2019-12-06 DIAGNOSIS — F03.90 DEMENTIA WITHOUT BEHAVIORAL DISTURBANCE, UNSPECIFIED DEMENTIA TYPE: ICD-10-CM

## 2019-12-06 DIAGNOSIS — F10.27 DEMENTIA ASSOCIATED WITH ALCOHOLISM WITHOUT BEHAVIORAL DISTURBANCE (HCC): Primary | ICD-10-CM

## 2019-12-06 PROCEDURE — 99205 OFFICE O/P NEW HI 60 MIN: CPT | Performed by: PSYCHIATRY & NEUROLOGY

## 2019-12-06 RX ORDER — DONEPEZIL HYDROCHLORIDE 5 MG/1
5 TABLET, FILM COATED ORAL DAILY
Qty: 30 TABLET | Refills: 11 | Status: SHIPPED | OUTPATIENT
Start: 2019-12-06 | End: 2020-12-05

## 2019-12-06 NOTE — PROGRESS NOTES
"Subjective     CC: memory loss    History of Present Illness   Israel Bojorquez is a 64 y.o. male who comes to clinic today for evaluation of dementia and falls. His family notes onset of cognitive decline since about 2015 initially marked by impairment in memory. Over time he has had associated confabulation and impairments in orientation and executive function. He had hallucinations during a hospitalization in June, 2018. There are not modifying factors noted.    He has a history of heavy EtOH use in the past, though is currently abstinent. He has been diagnosed with an alcoholic dementia or Wernicke's encephalopathy previously.    He has also had impaired balance and falls.     I have reviewed and confirmed the past family, social and medical history as accurate on 12/6/19.     Review of Systems   Constitutional: Negative.    Respiratory: Negative.    Cardiovascular: Negative.    Gastrointestinal: Negative.    Genitourinary: Negative.    All other systems reviewed and are negative.      Objective   General appearance today is normal.   Peripheral pulses were present and symmetric.   The ophthalmoscopic exam today is unremarkable. The discs and posterior elements are unremarkable.    /84   Ht 185.4 cm (73\")   Wt 98.9 kg (218 lb)   BMI 28.76 kg/m²     Physical Exam   Neurological: He has normal strength. He has a normal Finger-Nose-Finger Test.   Reflex Scores:       Tricep reflexes are 1+ on the right side and 1+ on the left side.       Bicep reflexes are 1+ on the right side and 1+ on the left side.       Brachioradialis reflexes are 1+ on the right side and 1+ on the left side.       Patellar reflexes are 1+ on the right side and 1+ on the left side.       Achilles reflexes are 1+ on the right side and 1+ on the left side.  Psychiatric: His speech is normal.        Neurologic Exam     Mental Status   Oriented to person.   Disoriented to place.   Disoriented to time.   Registration: recalls 3 of 3 objects. " Recall at 5 minutes: recalls 2 of 3 objects. Follows 3 step commands.   Attention: normal.   Speech: speech is normal   Level of consciousness: alert  Knowledge: poor.   Able to name object. Able to read. Able to repeat. Able to write. Normal comprehension.     Cranial Nerves   Cranial nerves II through XII intact.     Motor Exam   Muscle bulk: normal  Overall muscle tone: normal    Strength   Strength 5/5 throughout.     Sensory Exam   Light touch normal.     Gait, Coordination, and Reflexes     Gait  Gait: wide-based    Coordination   Finger to nose coordination: normal    Reflexes   Right brachioradialis: 1+  Left brachioradialis: 1+  Right biceps: 1+  Left biceps: 1+  Right triceps: 1+  Left triceps: 1+  Right patellar: 1+  Left patellar: 1+  Right achilles: 1+  Left achilles: 1+      MMSE=23      Assessment/Plan   Israel was seen today for wernicke encephalopathy.    Diagnoses and all orders for this visit:    Dementia associated with alcoholism without behavioral disturbance (CMS/HCC)  -     Folate  -     CBC (No Diff)  -     Comprehensive Metabolic Panel  -     CT Head Without Contrast  -     Vitamin B12  -     TSH  -     RPR    Dementia without behavioral disturbance, unspecified dementia type (CMS/HCC)   -     TSH    Other orders  -     donepezil (ARICEPT) 5 MG tablet; Take 1 tablet by mouth Daily.          DISCUSSION/SUMMARY    Israel Bojorquez comes to clinic today for evaluation of cognitive impairment. His history and examination, including bedside cognitive testing are most consistent with alcoholic dementia, which was discussed. For now it was elected to obtain screening blood work  and a head CT . After discussing potential treatment options, it was elected to add  donepezil and thiamine. He will then follow up in 6 months, or sooner if needed.     Addendum: head CT from 2018 reviewed and is essentially normal for age; insurance has refused a repeat CT    As part of this visit I obtained additional  history from the family which is incorporated in the HPI. Please see above for additional details.

## 2019-12-19 ENCOUNTER — TELEPHONE (OUTPATIENT)
Dept: NEUROLOGY | Facility: CLINIC | Age: 64
End: 2019-12-19

## 2019-12-19 NOTE — TELEPHONE ENCOUNTER
I reviewed prior head CT. Please let patient/family know that insurance will not approve repeat CT, but that after review of his last CT in 2018 I think we are OK without repeating unless symptoms change significantly. Thanks.

## 2019-12-19 NOTE — TELEPHONE ENCOUNTER
Airam with [facility] called 12/18 and stated that pt insurance did not approve pt CT brain scan due to [place] stating patient memory not worsening. Airam stated to call by the end of work day to appeal decision ([phone]). Informed Airam that Dr. Escobar was in clinic but will inform. Please advise. Thanks

## 2019-12-19 NOTE — TELEPHONE ENCOUNTER
Called and spoke to pt daughter Kaity regarding pt insurance denial for CT. Informed of Dr. Mcgee recommendations and Kaity stated verbal understanding and appreciation. Thanks.

## 2019-12-20 ENCOUNTER — HOSPITAL ENCOUNTER (OUTPATIENT)
Dept: CARDIOLOGY | Facility: HOSPITAL | Age: 64
Discharge: HOME | End: 2019-12-20
Admitting: INTERNAL MEDICINE

## 2019-12-20 VITALS — WEIGHT: 218 LBS | BODY MASS INDEX: 28.89 KG/M2 | HEIGHT: 73 IN

## 2019-12-20 DIAGNOSIS — I73.9 CLAUDICATION (HCC): ICD-10-CM

## 2019-12-20 LAB
BH CV LOWER ARTERIAL LEFT ABI RATIO: 1.09
BH CV LOWER ARTERIAL LEFT DORSALIS PEDIS SYS MAX: 169 MMHG
BH CV LOWER ARTERIAL LEFT GREAT TOE SYS MAX: 133 MMHG
BH CV LOWER ARTERIAL LEFT POST EX ABI RATIO: 1.08
BH CV LOWER ARTERIAL LEFT POST TIBIAL SYS MAX: 175 MMHG
BH CV LOWER ARTERIAL LEFT TBI RATIO: 0.83
BH CV LOWER ARTERIAL RIGHT ABI RATIO: 1.06
BH CV LOWER ARTERIAL RIGHT DORSALIS PEDIS SYS MAX: 163 MMHG
BH CV LOWER ARTERIAL RIGHT GREAT TOE SYS MAX: 144 MMHG
BH CV LOWER ARTERIAL RIGHT POST EX ABI RATIO: 0.77
BH CV LOWER ARTERIAL RIGHT POST TIBIAL SYS MAX: 169 MMHG
BH CV LOWER ARTERIAL RIGHT TBI RATIO: 0.9
UPPER ARTERIAL LEFT ARM BRACHIAL SYS MAX: 160 MMHG
UPPER ARTERIAL RIGHT ARM BRACHIAL SYS MAX: 148 MMHG

## 2019-12-20 PROCEDURE — 93924 LWR XTR VASC STDY BILAT: CPT

## 2019-12-20 PROCEDURE — 93924 LWR XTR VASC STDY BILAT: CPT | Performed by: INTERNAL MEDICINE

## 2019-12-23 ENCOUNTER — TELEPHONE (OUTPATIENT)
Dept: CARDIOLOGY | Facility: CLINIC | Age: 64
End: 2019-12-23

## 2019-12-23 NOTE — TELEPHONE ENCOUNTER
----- Message from Jack Montoya MD sent at 12/20/2019  3:11 PM EST -----  Please let the patient know his testing showed moderately severe decreased flow in the right leg but normal flow in the left. Please ask how his right lower discomfort is. If better, we can continue to watch. If still clearly present, I would recommend a peripheral angio with possible intervention of the right leg.

## 2022-10-13 ENCOUNTER — HOSPITAL ENCOUNTER (INPATIENT)
Facility: HOSPITAL | Age: 67
LOS: 2 days | Discharge: HOME | End: 2022-10-15
Attending: EMERGENCY MEDICINE | Admitting: INTERNAL MEDICINE

## 2022-10-13 ENCOUNTER — APPOINTMENT (OUTPATIENT)
Dept: GENERAL RADIOLOGY | Facility: HOSPITAL | Age: 67
End: 2022-10-13

## 2022-10-13 DIAGNOSIS — R07.9 CHEST PAIN, UNSPECIFIED TYPE: ICD-10-CM

## 2022-10-13 DIAGNOSIS — I48.91 ATRIAL FIBRILLATION WITH RVR: Primary | ICD-10-CM

## 2022-10-13 LAB
ALBUMIN SERPL-MCNC: 4.5 G/DL (ref 3.5–5.2)
ALBUMIN/GLOB SERPL: 1.2 G/DL
ALP SERPL-CCNC: 56 U/L (ref 39–117)
ALT SERPL W P-5'-P-CCNC: 35 U/L (ref 1–41)
ANION GAP SERPL CALCULATED.3IONS-SCNC: 12 MMOL/L (ref 5–15)
AST SERPL-CCNC: 40 U/L (ref 1–40)
BASOPHILS # BLD AUTO: 0.04 10*3/MM3 (ref 0–0.2)
BASOPHILS NFR BLD AUTO: 0.4 % (ref 0–1.5)
BILIRUB SERPL-MCNC: 0.7 MG/DL (ref 0–1.2)
BUN SERPL-MCNC: 19 MG/DL (ref 8–23)
BUN/CREAT SERPL: 15 (ref 7–25)
CALCIUM SPEC-SCNC: 9.5 MG/DL (ref 8.6–10.5)
CHLORIDE SERPL-SCNC: 94 MMOL/L (ref 98–107)
CO2 SERPL-SCNC: 26 MMOL/L (ref 22–29)
CREAT SERPL-MCNC: 1.27 MG/DL (ref 0.76–1.27)
DEPRECATED RDW RBC AUTO: 48.8 FL (ref 37–54)
EGFRCR SERPLBLD CKD-EPI 2021: 61.5 ML/MIN/1.73
EOSINOPHIL # BLD AUTO: 0.17 10*3/MM3 (ref 0–0.4)
EOSINOPHIL NFR BLD AUTO: 1.8 % (ref 0.3–6.2)
ERYTHROCYTE [DISTWIDTH] IN BLOOD BY AUTOMATED COUNT: 14.5 % (ref 12.3–15.4)
ETHANOL BLD-MCNC: <10 MG/DL (ref 0–10)
GLOBULIN UR ELPH-MCNC: 3.7 GM/DL
GLUCOSE SERPL-MCNC: 109 MG/DL (ref 65–99)
HCT VFR BLD AUTO: 44.1 % (ref 37.5–51)
HGB BLD-MCNC: 14.2 G/DL (ref 13–17.7)
HOLD SPECIMEN: NORMAL
IMM GRANULOCYTES # BLD AUTO: 0.02 10*3/MM3 (ref 0–0.05)
IMM GRANULOCYTES NFR BLD AUTO: 0.2 % (ref 0–0.5)
INR PPP: 0.99 (ref 0.84–1.13)
LYMPHOCYTES # BLD AUTO: 2.27 10*3/MM3 (ref 0.7–3.1)
LYMPHOCYTES NFR BLD AUTO: 24.4 % (ref 19.6–45.3)
MAGNESIUM SERPL-MCNC: 2 MG/DL (ref 1.6–2.4)
MCH RBC QN AUTO: 29.6 PG (ref 26.6–33)
MCHC RBC AUTO-ENTMCNC: 32.2 G/DL (ref 31.5–35.7)
MCV RBC AUTO: 91.9 FL (ref 79–97)
MONOCYTES # BLD AUTO: 0.68 10*3/MM3 (ref 0.1–0.9)
MONOCYTES NFR BLD AUTO: 7.3 % (ref 5–12)
NEUTROPHILS NFR BLD AUTO: 6.12 10*3/MM3 (ref 1.7–7)
NEUTROPHILS NFR BLD AUTO: 65.9 % (ref 42.7–76)
NRBC BLD AUTO-RTO: 0 /100 WBC (ref 0–0.2)
NT-PROBNP SERPL-MCNC: 2206 PG/ML (ref 0–900)
PLATELET # BLD AUTO: 257 10*3/MM3 (ref 140–450)
PMV BLD AUTO: 10 FL (ref 6–12)
POTASSIUM SERPL-SCNC: 4 MMOL/L (ref 3.5–5.2)
PROT SERPL-MCNC: 8.2 G/DL (ref 6–8.5)
PROTHROMBIN TIME: 13 SECONDS (ref 11.4–14.4)
RBC # BLD AUTO: 4.8 10*6/MM3 (ref 4.14–5.8)
SODIUM SERPL-SCNC: 132 MMOL/L (ref 136–145)
TROPONIN T SERPL-MCNC: <0.01 NG/ML (ref 0–0.03)
TSH SERPL DL<=0.05 MIU/L-ACNC: 2.58 UIU/ML (ref 0.27–4.2)
WBC NRBC COR # BLD: 9.3 10*3/MM3 (ref 3.4–10.8)
WHOLE BLOOD HOLD COAG: NORMAL
WHOLE BLOOD HOLD SPECIMEN: NORMAL

## 2022-10-13 PROCEDURE — 71045 X-RAY EXAM CHEST 1 VIEW: CPT

## 2022-10-13 PROCEDURE — 93005 ELECTROCARDIOGRAM TRACING: CPT | Performed by: EMERGENCY MEDICINE

## 2022-10-13 PROCEDURE — 99285 EMERGENCY DEPT VISIT HI MDM: CPT

## 2022-10-13 PROCEDURE — 84443 ASSAY THYROID STIM HORMONE: CPT | Performed by: EMERGENCY MEDICINE

## 2022-10-13 PROCEDURE — 83735 ASSAY OF MAGNESIUM: CPT | Performed by: EMERGENCY MEDICINE

## 2022-10-13 PROCEDURE — 82077 ASSAY SPEC XCP UR&BREATH IA: CPT | Performed by: EMERGENCY MEDICINE

## 2022-10-13 PROCEDURE — 99223 1ST HOSP IP/OBS HIGH 75: CPT | Performed by: INTERNAL MEDICINE

## 2022-10-13 PROCEDURE — 85025 COMPLETE CBC W/AUTO DIFF WBC: CPT | Performed by: EMERGENCY MEDICINE

## 2022-10-13 PROCEDURE — 85610 PROTHROMBIN TIME: CPT | Performed by: NURSE PRACTITIONER

## 2022-10-13 PROCEDURE — 83880 ASSAY OF NATRIURETIC PEPTIDE: CPT | Performed by: EMERGENCY MEDICINE

## 2022-10-13 PROCEDURE — 84484 ASSAY OF TROPONIN QUANT: CPT | Performed by: EMERGENCY MEDICINE

## 2022-10-13 PROCEDURE — 80053 COMPREHEN METABOLIC PANEL: CPT | Performed by: EMERGENCY MEDICINE

## 2022-10-13 RX ORDER — ASPIRIN 81 MG/1
81 TABLET ORAL DAILY
Status: DISCONTINUED | OUTPATIENT
Start: 2022-10-14 | End: 2022-10-15 | Stop reason: HOSPADM

## 2022-10-13 RX ORDER — DILTIAZEM HCL IN NACL,ISO-OSM 125 MG/125
5-15 PLASTIC BAG, INJECTION (ML) INTRAVENOUS
Status: DISCONTINUED | OUTPATIENT
Start: 2022-10-13 | End: 2022-10-14

## 2022-10-13 RX ORDER — DILTIAZEM HYDROCHLORIDE 5 MG/ML
10 INJECTION INTRAVENOUS ONCE
Status: COMPLETED | OUTPATIENT
Start: 2022-10-13 | End: 2022-10-13

## 2022-10-13 RX ORDER — ACETAMINOPHEN 160 MG/5ML
650 SOLUTION ORAL EVERY 4 HOURS PRN
Status: DISCONTINUED | OUTPATIENT
Start: 2022-10-13 | End: 2022-10-15 | Stop reason: HOSPADM

## 2022-10-13 RX ORDER — ACETAMINOPHEN 325 MG/1
650 TABLET ORAL EVERY 4 HOURS PRN
Status: DISCONTINUED | OUTPATIENT
Start: 2022-10-13 | End: 2022-10-15 | Stop reason: HOSPADM

## 2022-10-13 RX ORDER — AMOXICILLIN 250 MG
2 CAPSULE ORAL 2 TIMES DAILY
Status: DISCONTINUED | OUTPATIENT
Start: 2022-10-13 | End: 2022-10-15 | Stop reason: HOSPADM

## 2022-10-13 RX ORDER — LEVOTHYROXINE SODIUM 0.03 MG/1
25 TABLET ORAL
Status: DISCONTINUED | OUTPATIENT
Start: 2022-10-14 | End: 2022-10-15 | Stop reason: HOSPADM

## 2022-10-13 RX ORDER — SODIUM CHLORIDE 0.9 % (FLUSH) 0.9 %
10 SYRINGE (ML) INJECTION AS NEEDED
Status: DISCONTINUED | OUTPATIENT
Start: 2022-10-13 | End: 2022-10-15 | Stop reason: HOSPADM

## 2022-10-13 RX ORDER — SODIUM CHLORIDE 0.9 % (FLUSH) 0.9 %
10 SYRINGE (ML) INJECTION EVERY 12 HOURS SCHEDULED
Status: DISCONTINUED | OUTPATIENT
Start: 2022-10-13 | End: 2022-10-15 | Stop reason: HOSPADM

## 2022-10-13 RX ORDER — PANTOPRAZOLE SODIUM 40 MG/1
40 TABLET, DELAYED RELEASE ORAL DAILY
Status: DISCONTINUED | OUTPATIENT
Start: 2022-10-14 | End: 2022-10-15 | Stop reason: HOSPADM

## 2022-10-13 RX ORDER — ACETAMINOPHEN 650 MG/1
650 SUPPOSITORY RECTAL EVERY 4 HOURS PRN
Status: DISCONTINUED | OUTPATIENT
Start: 2022-10-13 | End: 2022-10-15 | Stop reason: HOSPADM

## 2022-10-13 RX ORDER — PROPRANOLOL HYDROCHLORIDE 20 MG/1
20 TABLET ORAL 2 TIMES DAILY
Status: DISCONTINUED | OUTPATIENT
Start: 2022-10-13 | End: 2022-10-14

## 2022-10-13 RX ORDER — PRAVASTATIN SODIUM 10 MG
10 TABLET ORAL DAILY
Status: DISCONTINUED | OUTPATIENT
Start: 2022-10-14 | End: 2022-10-15 | Stop reason: HOSPADM

## 2022-10-13 RX ADMIN — Medication 5 MG/HR: at 16:59

## 2022-10-13 RX ADMIN — APIXABAN 5 MG: 5 TABLET, FILM COATED ORAL at 19:49

## 2022-10-13 RX ADMIN — DILTIAZEM HYDROCHLORIDE 10 MG: 5 INJECTION INTRAVENOUS at 16:59

## 2022-10-13 NOTE — ED PROVIDER NOTES
Subjective   History of Present Illness  67-year-old female with a known history of A. fib who presents for evaluation of palpitations and tachycardia.  The patient reports that he currently lives at [facility].  He reports that they do an EKG on a routine basis and when they did 1 today he was identified to be in A. fib with a rapid rate.  He does report that he has had some palpitations in the center of his chest that he notices whenever he performs activity.  He states that at rest he does not feel any chest pain or palpitation.  He denies any shortness of breath.  He denies any increased fluid collection or swelling to the lower extremities.  No abdominal pain, no nausea or vomiting, no reported change in bowel or urinary function.  No new medications.  He reports that he takes anticoagulation, but upon record review I do not see any anticoagulant prescribed, the patient only takes aspirin.  He also reports a possible previous abnormal heart rhythm, but he cannot remember for sure.  Records do not show a known previous diagnosis of atrial fibrillation.  He does have a previous history of hyponatremia and alcoholism.  He is awake and alert with normal mentation.  No focal neurological deficits.  No other acute complaints.        Review of Systems   Constitutional: Positive for fatigue. Negative for chills and fever.   HENT: Negative for congestion, ear pain, postnasal drip, sinus pressure and sore throat.    Eyes: Negative for pain, redness and visual disturbance.   Respiratory: Negative for cough, chest tightness and shortness of breath.    Cardiovascular: Positive for chest pain and palpitations. Negative for leg swelling.   Gastrointestinal: Negative for abdominal pain, anal bleeding, blood in stool, diarrhea, nausea and vomiting.   Endocrine: Negative for polydipsia and polyuria.   Genitourinary: Negative for difficulty urinating, dysuria, frequency and urgency.   Musculoskeletal:  Negative for arthralgias, back pain and neck pain.   Skin: Negative for pallor and rash.   Allergic/Immunologic: Negative for environmental allergies and immunocompromised state.   Neurological: Negative for dizziness, weakness and headaches.   Hematological: Negative for adenopathy.   Psychiatric/Behavioral: Negative for confusion, self-injury and suicidal ideas. The patient is not nervous/anxious.    All other systems reviewed and are negative.      Past Medical History:   Diagnosis Date   • Alcohol abuse    • Acosta's esophagus    • COPD (chronic obstructive pulmonary disease) (HCC)    • Depression    • Disease of thyroid gland    • Essential tremor    • Essential tremor    • Hiatal hernia    • History of SIADH    • Hyperlipidemia    • Hypertension    • Insomnia    • Occasional tremors    • Tendinitis        No Known Allergies    History reviewed. No pertinent surgical history.    Family History   Problem Relation Age of Onset   • Cancer Mother    • Alcohol abuse Father        Social History     Socioeconomic History   • Marital status:    Tobacco Use   • Smoking status: Former     Packs/day: 2.00     Years: 40.00     Pack years: 80.00     Types: Cigarettes     Quit date: 2018     Years since quittin.3   • Smokeless tobacco: Never   Substance and Sexual Activity   • Alcohol use: No     Comment: PT has history of Alcohol abuse(10-12 Beers daily)   • Drug use: No     Types: Amphetamines, Barbiturates, LSD, Marijuana     Comment: remote h/o drug use    • Sexual activity: Defer           Objective   Physical Exam  Vitals and nursing note reviewed.   Constitutional:       General: He is not in acute distress.     Appearance: Normal appearance. He is well-developed. He is not toxic-appearing or diaphoretic.   HENT:      Head: Normocephalic and atraumatic.      Right Ear: External ear normal.      Left Ear: External ear normal.      Nose: Nose normal.   Eyes:      General: Lids are normal.      Pupils:  Pupils are equal, round, and reactive to light.   Neck:      Trachea: No tracheal deviation.   Cardiovascular:      Rate and Rhythm: Tachycardia present. Rhythm irregularly irregular.      Pulses: No decreased pulses.      Heart sounds: Normal heart sounds. No murmur heard.    No friction rub. No gallop.   Pulmonary:      Effort: Pulmonary effort is normal. No respiratory distress.      Breath sounds: Normal breath sounds. No decreased breath sounds, wheezing, rhonchi or rales.   Abdominal:      General: Bowel sounds are normal.      Palpations: Abdomen is soft.      Tenderness: There is no abdominal tenderness. There is no guarding or rebound.   Musculoskeletal:         General: No deformity. Normal range of motion.      Cervical back: Normal range of motion and neck supple.   Lymphadenopathy:      Cervical: No cervical adenopathy.   Skin:     General: Skin is warm and dry.      Findings: No rash.   Neurological:      Mental Status: He is alert and oriented to person, place, and time.      Cranial Nerves: No cranial nerve deficit.      Sensory: No sensory deficit.   Psychiatric:         Speech: Speech normal.         Behavior: Behavior normal.         Thought Content: Thought content normal.         Judgment: Judgment normal.         Procedures           ED Course                                           MDM  Number of Diagnoses or Management Options  Atrial fibrillation with RVR (HCC): new and requires workup  Chest pain, unspecified type: new and requires workup  Diagnosis management comments: HEART Score for Major Cardiac Events - MDCalc  Calculated on Oct 13 2022 6:16 PM  4 points -> Moderate Score (4-6 points) Risk of MACE of 12-16.6%.    Patient presents with A. fib with RVR.  From record review I feel this is new onset.  I will initiate anticoagulation or Eliquis.  I initiated diltiazem bolus and drip but the patient continued to stay in A. fib with a rapid rate.    Discussed the patient with the  cardiologist, Dr. Cintron who recommends continue diltiazem drip titration, and admission for cardiology evaluation.    Discussed the patient with the hospitalist, Dr. Donis.  The hospital service will consult on the patient to determine status of admission.       Amount and/or Complexity of Data Reviewed  Clinical lab tests: ordered and reviewed  Tests in the radiology section of CPT®: ordered and reviewed  Decide to obtain previous medical records or to obtain history from someone other than the patient: yes  Review and summarize past medical records: yes  Discuss the patient with other providers: yes  Independent visualization of images, tracings, or specimens: yes        Final diagnoses:   Atrial fibrillation with RVR (HCC)   Chest pain, unspecified type       ED Disposition  ED Disposition     ED Disposition   Decision to Admit    Condition   --    Comment   --             No follow-up provider specified.       Medication List      No changes were made to your prescriptions during this visit.          Vania Stewart MD  10/13/22 0937

## 2022-10-14 ENCOUNTER — APPOINTMENT (OUTPATIENT)
Dept: CARDIOLOGY | Facility: HOSPITAL | Age: 67
End: 2022-10-14

## 2022-10-14 LAB
ANION GAP SERPL CALCULATED.3IONS-SCNC: 13 MMOL/L (ref 5–15)
BASOPHILS # BLD AUTO: 0.05 10*3/MM3 (ref 0–0.2)
BASOPHILS NFR BLD AUTO: 0.5 % (ref 0–1.5)
BUN SERPL-MCNC: 19 MG/DL (ref 8–23)
BUN/CREAT SERPL: 15.3 (ref 7–25)
CALCIUM SPEC-SCNC: 9.7 MG/DL (ref 8.6–10.5)
CHLORIDE SERPL-SCNC: 100 MMOL/L (ref 98–107)
CO2 SERPL-SCNC: 27 MMOL/L (ref 22–29)
CREAT SERPL-MCNC: 1.24 MG/DL (ref 0.76–1.27)
DEPRECATED RDW RBC AUTO: 48 FL (ref 37–54)
EGFRCR SERPLBLD CKD-EPI 2021: 63.3 ML/MIN/1.73
EOSINOPHIL # BLD AUTO: 0.27 10*3/MM3 (ref 0–0.4)
EOSINOPHIL NFR BLD AUTO: 2.6 % (ref 0.3–6.2)
ERYTHROCYTE [DISTWIDTH] IN BLOOD BY AUTOMATED COUNT: 14.2 % (ref 12.3–15.4)
GLUCOSE SERPL-MCNC: 83 MG/DL (ref 65–99)
HBA1C MFR BLD: 6.2 % (ref 4.8–5.6)
HCT VFR BLD AUTO: 39.8 % (ref 37.5–51)
HGB BLD-MCNC: 13.1 G/DL (ref 13–17.7)
IMM GRANULOCYTES # BLD AUTO: 0.02 10*3/MM3 (ref 0–0.05)
IMM GRANULOCYTES NFR BLD AUTO: 0.2 % (ref 0–0.5)
LYMPHOCYTES # BLD AUTO: 2.63 10*3/MM3 (ref 0.7–3.1)
LYMPHOCYTES NFR BLD AUTO: 24.9 % (ref 19.6–45.3)
MAXIMAL PREDICTED HEART RATE: 152 BPM
MAXIMAL PREDICTED HEART RATE: 152 BPM
MCH RBC QN AUTO: 30.5 PG (ref 26.6–33)
MCHC RBC AUTO-ENTMCNC: 32.9 G/DL (ref 31.5–35.7)
MCV RBC AUTO: 92.6 FL (ref 79–97)
MONOCYTES # BLD AUTO: 0.98 10*3/MM3 (ref 0.1–0.9)
MONOCYTES NFR BLD AUTO: 9.3 % (ref 5–12)
NEUTROPHILS NFR BLD AUTO: 6.61 10*3/MM3 (ref 1.7–7)
NEUTROPHILS NFR BLD AUTO: 62.5 % (ref 42.7–76)
NRBC BLD AUTO-RTO: 0 /100 WBC (ref 0–0.2)
PLATELET # BLD AUTO: 240 10*3/MM3 (ref 140–450)
PMV BLD AUTO: 10.6 FL (ref 6–12)
POTASSIUM SERPL-SCNC: 4.1 MMOL/L (ref 3.5–5.2)
RBC # BLD AUTO: 4.3 10*6/MM3 (ref 4.14–5.8)
SODIUM SERPL-SCNC: 140 MMOL/L (ref 136–145)
STRESS TARGET HR: 129 BPM
STRESS TARGET HR: 129 BPM
WBC NRBC COR # BLD: 10.56 10*3/MM3 (ref 3.4–10.8)

## 2022-10-14 PROCEDURE — 25010000002 MIDAZOLAM PER 1 MG: Performed by: INTERNAL MEDICINE

## 2022-10-14 PROCEDURE — 99222 1ST HOSP IP/OBS MODERATE 55: CPT | Performed by: INTERNAL MEDICINE

## 2022-10-14 PROCEDURE — 93005 ELECTROCARDIOGRAM TRACING: CPT | Performed by: INTERNAL MEDICINE

## 2022-10-14 PROCEDURE — 5A2204Z RESTORATION OF CARDIAC RHYTHM, SINGLE: ICD-10-PCS | Performed by: INTERNAL MEDICINE

## 2022-10-14 PROCEDURE — 93312 ECHO TRANSESOPHAGEAL: CPT | Performed by: INTERNAL MEDICINE

## 2022-10-14 PROCEDURE — 80048 BASIC METABOLIC PNL TOTAL CA: CPT | Performed by: NURSE PRACTITIONER

## 2022-10-14 PROCEDURE — 85025 COMPLETE CBC W/AUTO DIFF WBC: CPT | Performed by: NURSE PRACTITIONER

## 2022-10-14 PROCEDURE — 92960 CARDIOVERSION ELECTRIC EXT: CPT

## 2022-10-14 PROCEDURE — 93321 DOPPLER ECHO F-UP/LMTD STD: CPT

## 2022-10-14 PROCEDURE — 92960 CARDIOVERSION ELECTRIC EXT: CPT | Performed by: INTERNAL MEDICINE

## 2022-10-14 PROCEDURE — 99233 SBSQ HOSP IP/OBS HIGH 50: CPT | Performed by: INTERNAL MEDICINE

## 2022-10-14 PROCEDURE — 93325 DOPPLER ECHO COLOR FLOW MAPG: CPT | Performed by: INTERNAL MEDICINE

## 2022-10-14 PROCEDURE — 93325 DOPPLER ECHO COLOR FLOW MAPG: CPT

## 2022-10-14 PROCEDURE — 93321 DOPPLER ECHO F-UP/LMTD STD: CPT | Performed by: INTERNAL MEDICINE

## 2022-10-14 PROCEDURE — 83036 HEMOGLOBIN GLYCOSYLATED A1C: CPT | Performed by: NURSE PRACTITIONER

## 2022-10-14 PROCEDURE — 25010000002 FENTANYL CITRATE (PF) 50 MCG/ML SOLUTION: Performed by: INTERNAL MEDICINE

## 2022-10-14 PROCEDURE — 93312 ECHO TRANSESOPHAGEAL: CPT

## 2022-10-14 RX ORDER — FENTANYL CITRATE 50 UG/ML
INJECTION, SOLUTION INTRAMUSCULAR; INTRAVENOUS
Status: COMPLETED | OUTPATIENT
Start: 2022-10-14 | End: 2022-10-14

## 2022-10-14 RX ORDER — ZINC GLUCONATE 50 MG
1 TABLET ORAL 2 TIMES DAILY
COMMUNITY

## 2022-10-14 RX ORDER — DONEPEZIL HYDROCHLORIDE 5 MG/1
5 TABLET, FILM COATED ORAL NIGHTLY
COMMUNITY

## 2022-10-14 RX ORDER — ASCORBIC ACID 500 MG
500 TABLET ORAL 2 TIMES DAILY
COMMUNITY

## 2022-10-14 RX ORDER — TRIAMTERENE AND HYDROCHLOROTHIAZIDE 37.5; 25 MG/1; MG/1
1 TABLET ORAL DAILY
COMMUNITY

## 2022-10-14 RX ORDER — UREA 10 %
LOTION (ML) TOPICAL
COMMUNITY

## 2022-10-14 RX ORDER — MIDAZOLAM HYDROCHLORIDE 1 MG/ML
2-10 INJECTION INTRAMUSCULAR; INTRAVENOUS ONCE AS NEEDED
Status: DISCONTINUED | OUTPATIENT
Start: 2022-10-14 | End: 2022-10-15 | Stop reason: HOSPADM

## 2022-10-14 RX ORDER — FOLIC ACID 1 MG/1
1 TABLET ORAL DAILY
COMMUNITY

## 2022-10-14 RX ORDER — VALSARTAN 80 MG/1
40 TABLET ORAL
Status: DISCONTINUED | OUTPATIENT
Start: 2022-10-14 | End: 2022-10-15

## 2022-10-14 RX ORDER — NALOXONE HCL 0.4 MG/ML
0.4 VIAL (ML) INJECTION ONCE AS NEEDED
Status: DISCONTINUED | OUTPATIENT
Start: 2022-10-14 | End: 2022-10-15 | Stop reason: HOSPADM

## 2022-10-14 RX ORDER — MIDAZOLAM HYDROCHLORIDE 1 MG/ML
INJECTION INTRAMUSCULAR; INTRAVENOUS
Status: COMPLETED | OUTPATIENT
Start: 2022-10-14 | End: 2022-10-14

## 2022-10-14 RX ORDER — FLUMAZENIL 0.1 MG/ML
0.5 INJECTION INTRAVENOUS ONCE AS NEEDED
Status: DISCONTINUED | OUTPATIENT
Start: 2022-10-14 | End: 2022-10-15 | Stop reason: HOSPADM

## 2022-10-14 RX ORDER — QUETIAPINE FUMARATE 25 MG/1
25 TABLET, FILM COATED ORAL NIGHTLY
COMMUNITY

## 2022-10-14 RX ORDER — FENTANYL CITRATE 50 UG/ML
50-100 INJECTION, SOLUTION INTRAMUSCULAR; INTRAVENOUS ONCE AS NEEDED
Status: DISCONTINUED | OUTPATIENT
Start: 2022-10-14 | End: 2022-10-15 | Stop reason: HOSPADM

## 2022-10-14 RX ORDER — DULOXETIN HYDROCHLORIDE 30 MG/1
30 CAPSULE, DELAYED RELEASE ORAL DAILY
COMMUNITY

## 2022-10-14 RX ADMIN — SENNOSIDES AND DOCUSATE SODIUM 2 TABLET: 50; 8.6 TABLET ORAL at 00:05

## 2022-10-14 RX ADMIN — Medication 10 MG/HR: at 02:57

## 2022-10-14 RX ADMIN — Medication 10 ML: at 08:51

## 2022-10-14 RX ADMIN — PROPRANOLOL HYDROCHLORIDE 20 MG: 20 TABLET ORAL at 00:05

## 2022-10-14 RX ADMIN — METOPROLOL TARTRATE 25 MG: 25 TABLET, FILM COATED ORAL at 20:40

## 2022-10-14 RX ADMIN — SENNOSIDES AND DOCUSATE SODIUM 2 TABLET: 50; 8.6 TABLET ORAL at 20:40

## 2022-10-14 RX ADMIN — MIDAZOLAM HYDROCHLORIDE 2 MG: 1 INJECTION, SOLUTION INTRAMUSCULAR; INTRAVENOUS at 14:21

## 2022-10-14 RX ADMIN — MIDAZOLAM HYDROCHLORIDE 2 MG: 1 INJECTION, SOLUTION INTRAMUSCULAR; INTRAVENOUS at 14:05

## 2022-10-14 RX ADMIN — PRAVASTATIN SODIUM 10 MG: 10 TABLET ORAL at 08:50

## 2022-10-14 RX ADMIN — VALSARTAN 40 MG: 80 TABLET, FILM COATED ORAL at 17:24

## 2022-10-14 RX ADMIN — FENTANYL CITRATE 50 MCG: 50 INJECTION, SOLUTION INTRAMUSCULAR; INTRAVENOUS at 14:07

## 2022-10-14 RX ADMIN — ASPIRIN 81 MG: 81 TABLET, COATED ORAL at 08:50

## 2022-10-14 RX ADMIN — LEVOTHYROXINE SODIUM 25 MCG: 25 TABLET ORAL at 05:13

## 2022-10-14 RX ADMIN — PANTOPRAZOLE SODIUM 40 MG: 40 TABLET, DELAYED RELEASE ORAL at 08:51

## 2022-10-14 RX ADMIN — APIXABAN 5 MG: 5 TABLET, FILM COATED ORAL at 20:40

## 2022-10-14 RX ADMIN — Medication 10 ML: at 00:04

## 2022-10-14 RX ADMIN — Medication 10 ML: at 20:40

## 2022-10-14 RX ADMIN — Medication 10 MG/HR: at 08:52

## 2022-10-14 NOTE — CONSULTS
[facility] Consult/H&P Note      Referring Provider: No ref. provider found  Primary Provider:  Vinicio Gonzalez MD  Reason for Consultation: Afib    PROBLEM LIST:  1. Atrial fibrillation  a. CHADSVASC = 2  2. Hypertension  3. Hyperlipidemia  4. History of alcoholism   5. Wernicke's encephalopathy  dementia   6. Acosta's esophagus  7. Hypothyroidism  8. Essential tremor  9. SIADH  10. Remote tobacco use      HISTORY OF PRESENT ILLNESS:  Israel Bojorquez is a 67 y.o. male with the above noted pmhx who presented from [facility] with complaints of palpitations and a fullness in his chest.  He denies joellen chest pain.  OSH obtained and EKG prior to transferring him to our facility which revealed Afib with RVR. Upon arrival here, EKG confirmed Afib with RVR. He has a prior history of Afib per his account. He had an ECV several years ago per his account. He is on coumadin for stroke prevention.  However his INR was normal on presentation.  Troponin was normal on presentation but BNP was elevated.  He does not know his INR levels or how often they are checked. He is currently rate controlled on Cardizem but continues to be symptomatic with chest discomfort, palpitations.        REVIEW OF SYSTEMS  Pertinent positives are listed in the HPI and all other ROS are negative.      SOCIAL HISTORY:   reports that he quit smoking about 4 years ago. His smoking use included cigarettes. He has a 80.00 pack-year smoking history. He has never used smokeless tobacco. He reports that he does not drink alcohol and does not use drugs.     FAMILY HISTORY:  family history includes Alcohol abuse in his father; Cancer in his mother.     CURRENT MEDICATIONS:      Current Facility-Administered Medications:   •  acetaminophen (TYLENOL) tablet 650 mg, 650 mg, Oral, Q4H PRN **OR** acetaminophen (TYLENOL) 160 MG/5ML solution 650 mg, 650 mg, Oral, Q4H PRN **OR** acetaminophen (TYLENOL) suppository 650 mg, 650 mg, Rectal,  "Q4H PRN, Dax, Kayla, APRN  •  aspirin EC tablet 81 mg, 81 mg, Oral, Daily, Dax, Kayla, APRN, 81 mg at 10/14/22 0850  •  dilTIAZem (CARDIZEM) 125 mg in 125 mL 0.7% sodium chloride  infusion, 5-15 mg/hr, Intravenous, Titrated, Dax, Kayla, APRN, Last Rate: 10 mL/hr at 10/14/22 0852, 10 mg/hr at 10/14/22 0852  •  levothyroxine (SYNTHROID, LEVOTHROID) tablet 25 mcg, 25 mcg, Oral, Q AM, Dax, Kayla, APRN, 25 mcg at 10/14/22 0513  •  pantoprazole (PROTONIX) EC tablet 40 mg, 40 mg, Oral, Daily, Dax, Kayla, APRN, 40 mg at 10/14/22 0851  •  pravastatin (PRAVACHOL) tablet 10 mg, 10 mg, Oral, Daily, Dax, Kayla, APRN, 10 mg at 10/14/22 0850  •  propranolol (INDERAL) tablet 20 mg, 20 mg, Oral, BID, Dax, Kayla, APRN, 20 mg at 10/14/22 0005  •  sennosides-docusate (PERICOLACE) 8.6-50 MG per tablet 2 tablet, 2 tablet, Oral, BID, Dax, Kayla, APRN, 2 tablet at 10/14/22 0005  •  sodium chloride 0.9 % flush 10 mL, 10 mL, Intravenous, PRN, Vania Stewart MD  •  sodium chloride 0.9 % flush 10 mL, 10 mL, Intravenous, Q12H, Dax, Kayla, APRN, 10 mL at 10/14/22 0851  •  sodium chloride 0.9 % flush 10 mL, 10 mL, Intravenous, PRN, Dax, Kayla, APRN     Allergies:  Patient has no known allergies.    Objective     Vital Sign Min/Max for last 24 hours  Temp  Min: 96.7 °F (35.9 °C)  Max: 98.1 °F (36.7 °C)   BP  Min: 94/56  Max: 132/97   Pulse  Min: 74  Max: 152   Resp  Min: 15  Max: 20   SpO2  Min: 85 %  Max: 99 %   No data recorded   Weight  Min: 81.6 kg (180 lb)  Max: 108 kg (238 lb 12.8 oz)     Flowsheet Rows    Flowsheet Row First Filed Value   Admission Height 185.4 cm (73\") Documented at 10/13/2022 1606   Admission Weight 81.6 kg (180 lb) Documented at 10/13/2022 1606          PHYSICAL EXAM:  GENERAL: This is a well-developed, well-nourished, male who is in no acute distress.   SKIN: Pink and warm without rash or abnormality noted.   HEENT: Head is normocephalic and atraumatic. " Pupils are equal and reactive to light bilaterally. Mucous membranes are pink and moist.   NECK: Supple without lymphadenopathy or thyromegaly. There is no jugular venous distention at 30°.  LUNGS: Clear to auscultation bilaterally without wheezing, rhonchi, or rales noted.   CARDIOVASCULAR: The heart has an irregularly irregular rhythm, regular rate with a normal S1 and S2. There is no murmur, gallop, rub, or click appreciated. The PMI is nondisplaced. Carotid upstrokes are 2+ and symmetrical without bruits.   ABDOMEN: Soft and nondistended with positive bowel sounds x4. The patient denies tenderness of palpitation.   MUSCULOSKELETAL: There are no obvious bony abnormalities. Normal range of tenderness to palpation.   NEUROLOGICAL: Nonfocal. Alert and oriented x3.   PERIPHERAL VASCULAR: Femoral pulses are 2+ and symmetrical without bruits. Posterior tibial and dorsalis pedis pulses are 2+ and symmetrical. There is no peripheral edema.   PSYCH: Normal mood and affect.      EKG: Atrial fibrillation with rapid ventricular response.  Poor R wave progression in the precordial leads  Tele: Afib 80's    LABS:      Lab Results   Component Value Date    WBC 10.56 10/14/2022    HGB 13.1 10/14/2022    HCT 39.8 10/14/2022    MCV 92.6 10/14/2022     10/14/2022     Lab Results   Component Value Date    GLUCOSE 83 10/14/2022    BUN 19 10/14/2022    CREATININE 1.24 10/14/2022    EGFRIFNONA >150 07/08/2018    BCR 15.3 10/14/2022    K 4.1 10/14/2022    CO2 27.0 10/14/2022    CALCIUM 9.7 10/14/2022    ALBUMIN 4.50 10/13/2022    AST 40 10/13/2022    ALT 35 10/13/2022     Lab Results   Component Value Date    CKTOTAL 17 (L) 06/23/2018    TROPONINT <0.010 10/13/2022     Lab Results   Component Value Date    INR 0.99 10/13/2022    INR 1.02 06/23/2018    INR 0.99 06/23/2018    PROTIME 13.0 10/13/2022    PROTIME 10.7 06/23/2018    PROTIME 10.4 06/23/2018     No results found for: CHOL, CHLPL, TRIG, HDL, LDL, LDLDIRECT     Results  Review: I reviewed the patient's new clinical results.      ASSESSMENT:    1. Atrial fibrillation  a. CHADSVASC = 2, on Coumadin per patient account for known history of Afib, INR 0.99.  b. Started on Cardizem gtt. rates remain intermittently elevated  c. Eliquis started 10/13/22.   d. Symptoms include SOA, palpitations, chest discomfort.   2. HTN  3. HLD      PLAN:    1. DIMITRIOS/cardioversion today for restoration of sinus rhythm.  2. Further recommendations to follow.      I discussed the patient's findings and my recommendations with patient.    Scribed for Jack Yuan MD by IGNACIO Marr. 10/14/2022  10:17 EDT    Patient went for DIMITRIOS guided cardioversion.  This demonstrated moderately reduced EF 36 to 40%.  He has mitral valve prolapse with moderate mitral valve regurgitation and severe tricuspid regurgitation.  There was no evidence of left atrial appendage thrombus and he was cardioverted to sinus rhythm.    I have a recommend we change his propanolol to metoprolol tartrate 25 mg twice daily for treatment of atrial fibrillation.  This will be a better rate control agent than Toprol-XL.    The reduction in EF may be transient related to his arrhythmia.  I will also add valsartan 40 mg.  Unclear if his blood pressure will tolerate Entresto currently.  Will reassess after valsartan initiation.    For anticoagulation I would recommend Eliquis moving forward.  If he was taking warfarin prior to admission he was not getting therapeutic INR on it.    Further reduced EF would recommend he follow-up with cardiology outpatient with follow-up echo in about 1 month.    We will follow    I, Jack Yuan M.D.,  have reviewed the notes, assessments, and/or procedures performed by IGNACIO/PA, I CONCUR with the documentation of Israel Bojorquez.

## 2022-10-14 NOTE — CASE MANAGEMENT/SOCIAL WORK
Discharge Planning Assessment  [facility]     Patient Name: Israel Bojorquez  MRN: [id number]  Today's Date: 10/14/2022    Admit Date: 10/13/2022    Plan: discharge plan   Discharge Needs Assessment     Row Name 10/14/22 1524       Living Environment    People in Home facility resident    Name(s) of People in Home Resides at [facility] (assisted living)    Current Living Arrangements other (see comments)  assisted living facility    Primary Care Provided by self;child(alfonso);other (see comments)  staff at [facility] assisted living    Provides Primary Care For no one, unable/limited ability to care for self    Family Caregiver if Needed child(alfonso), adult    Family Caregiver Names Kaity Mahajan(daughter) and Guerrero Bojorquez(son)    Quality of Family Relationships helpful;involved;supportive    Able to Return to Prior Arrangements yes    Living Arrangement Comments Pt off the floor for a procedure. I spoke with pt's son, Guerrero Bojorquez, in patient's room.  Pt resides in [facility] at [facility] (assisted living) facility       Resource/Environmental Concerns    Resource/Environmental Concerns none       Transition Planning    Patient/Family Anticipates Transition to home    Patient/Family Anticipated Services at Transition     Transportation Anticipated family or friend will provide       Discharge Needs Assessment    Readmission Within the Last 30 Days no previous admission in last 30 days    Equipment Currently Used at Home rollator;cane, straight;wheelchair;grab bar    Concerns to be Addressed discharge planning    Equipment Needed After Discharge grab bar, tub/shower;grab bar, toilet;rollator;cane, straight;wheelchair, manual    Outpatient/Agency/Support Group Needs other (see comments)  [facility](assisted living facility in [facility])    Discharge Facility/Level of Care Needs other (see comments)  TBD    Discharge Coordination/Progress Per son, Guerrero, patient has [employer] insurance  with prescription coverage and uses [facility] in [place]. Pt is a resident at [facility] assisted living facility in [facility] and per son, the plan is to return there at discharge. I spoke with Nghia Roxana( Residence  at [facility] -cell [phone]) and the pt can return to [facility] as long as he is a min assist of 1, including feeding self. Nghia wants to be called when pt is ready for discharge. Son will transport. [facility] provides meals, make sure pt takes meds and checks in on patient. Son reports pt can bathe self, feed self, and dress self. Family provides transportation. Pt here for Afib. CM will cont to follow               Discharge Plan     Row Name 10/14/22 1761       Plan    Plan discharge plan    Plan Comments Plan is back to [facility] and per Nghia(), pt can return even over the weekend as long as he is at baseline in mobility. Nghia would like a call on her cell before pt is discharged at [phone]. She wants to make sure they can accept pt back and are able to get any new prescriptions filled. CM will cont to follow.    Final Discharge Disposition Code 01 - home or self-care              Continued Care and Services - Admitted Since 10/13/2022    Coordination has not been started for this encounter.       Expected Discharge Date and Time     Expected Discharge Date Expected Discharge Time    Oct 15, 2022          Demographic Summary     Row Name 10/14/22 1246       General Information    General Information Comments PCP is Vinicio Gonzalez       Contact Information    Permission Granted to Share Info With     Contact Information Obtained for     Contact Information Comments Will (son) [phone]               Functional Status    No documentation.                Psychosocial    No documentation.                Abuse/Neglect    No documentation.                Legal    No documentation.                Substance  Abuse    No documentation.                Patient Forms    No documentation.                   Ana Partida RN

## 2022-10-14 NOTE — H&P
"    [facility] Medicine Services  HISTORY AND PHYSICAL    Patient Name: Israel Bojorquez  : 1955  MRN: [id number]  Primary Care Physician: Vinicio Gonzalez MD  Date of admission: 10/13/2022    Subjective   Subjective     Chief Complaint:  Palpitations    HPI:  Israel Bojorquez is a 67 y.o. male with past medical history significant for alcohol abuse, Acosta's esophagus, COPD, hypothyroidism, essential tremor, hyperlipidemia, essential hypertension, SIADH and remote tobacco abuse presents to the ED from [facility] due to palpitations.  Patient reports he has been working with physical therapy more and trying to become more ambulatory.  Over the past couple of weeks he has noticed intermittent palpitations with exertion along with midsternal chest discomfort in which he describes as \"dullness\" with radiation to his neck.  Today at the facility they did an EKG that showed he was in atrial fibrillation with RVR.  He reports a prior history however could not find in medical records.  He states about 3 weeks ago he did have some dizziness upon standing and fell striking his head.  He continues to have intermittent dizziness along with some nausea.  He denies any recent fever, chills, vomiting, abdominal pain, diarrhea, dysuria or headaches.  He additionally reports being on Coumadin however unable to verify per records.  Upon arrival to the ED tonight patient was noted to be in A. fib RVR with a heart rate of 148.  Case was discussed with Dr. Cintron who advised Cardizem drip and will see in a.m.  Patient will be admitted to [facility] under the care of the hospitalist for further evaluation and treatment.        Review of Systems   Constitutional: Positive for activity change. Negative for appetite change, chills, diaphoresis, fatigue and fever.   HENT: Negative.    Eyes: Negative for photophobia and visual disturbance.   Respiratory: Positive for " shortness of breath. Negative for cough.    Cardiovascular: Positive for chest pain and palpitations. Negative for leg swelling.   Gastrointestinal: Positive for nausea. Negative for abdominal distention, abdominal pain, blood in stool, constipation, diarrhea and vomiting.   Genitourinary: Negative.    Musculoskeletal: Negative for back pain, neck pain and neck stiffness.   Skin: Negative.    Neurological: Positive for dizziness and light-headedness. Negative for seizures, syncope, speech difficulty, weakness, numbness and headaches.   Psychiatric/Behavioral: Negative.         All other systems reviewed and are negative.     Personal History     Past Medical History:   Diagnosis Date   • Alcohol abuse    • Acosta's esophagus    • COPD (chronic obstructive pulmonary disease) (HCC)    • Depression    • Disease of thyroid gland    • Essential tremor    • Essential tremor    • Hiatal hernia    • History of SIADH    • Hyperlipidemia    • Hypertension    • Insomnia    • Occasional tremors    • Tendinitis              History reviewed. No pertinent surgical history.    Family History:  family history includes Alcohol abuse in his father; Cancer in his mother. Otherwise pertinent FHx was reviewed and unremarkable.     Social History:  reports that he quit smoking about 4 years ago. His smoking use included cigarettes. He has a 80.00 pack-year smoking history. He has never used smokeless tobacco. He reports that he does not drink alcohol and does not use drugs.  Social History     Social History Narrative    Resident at [facility]       Medications:  CBD, Melatonin, Warfarin Sodium, acetaminophen, aspirin, levothyroxine, multivitamin with minerals, pantoprazole, pravastatin, propranolol, sennosides-docusate, and traZODone    No Known Allergies    Objective   Objective     Vital Signs:   Temp:  [97.5 °F (36.4 °C)] 97.5 °F (36.4 °C)  Heart Rate:  [] 102  Resp:  [15] 15  BP: (102-132)/(85-97)  113/93    Physical Exam  Vitals and nursing note reviewed.   Constitutional:       General: He is not in acute distress.     Appearance: Normal appearance. He is not ill-appearing, toxic-appearing or diaphoretic.   HENT:      Head: Normocephalic and atraumatic.      Nose: Nose normal.      Mouth/Throat:      Mouth: Mucous membranes are dry.      Pharynx: Oropharynx is clear.   Eyes:      Extraocular Movements: Extraocular movements intact.      Conjunctiva/sclera: Conjunctivae normal.      Pupils: Pupils are equal, round, and reactive to light.   Cardiovascular:      Rate and Rhythm: Rhythm irregular.      Pulses: Normal pulses.      Heart sounds: Normal heart sounds.      Comments: Heart rate between 84 and 108.  Pulmonary:      Effort: Pulmonary effort is normal.      Breath sounds: Normal breath sounds.   Abdominal:      General: Bowel sounds are normal. There is no distension.      Palpations: Abdomen is soft. There is no mass.      Tenderness: There is no abdominal tenderness. There is no right CVA tenderness, left CVA tenderness or guarding.      Hernia: No hernia is present.   Musculoskeletal:         General: No swelling, tenderness, deformity or signs of injury. Normal range of motion.      Cervical back: Normal range of motion and neck supple.      Right lower leg: No edema.      Left lower leg: No edema.   Skin:     General: Skin is warm and dry.   Neurological:      General: No focal deficit present.      Mental Status: He is alert and oriented to person, place, and time. Mental status is at baseline.   Psychiatric:         Mood and Affect: Mood normal.         Behavior: Behavior normal.         Thought Content: Thought content normal.         Judgment: Judgment normal.            Results Reviewed:  I have personally reviewed most recent indicated data and agree with findings including:  [x]  Laboratory  [x]  Radiology  [x]  EKG/Telemetry  []  Pathology  []  Cardiac/Vascular Studies  []  Old records  []   Other:  Most pertinent findings include:      LAB RESULTS:      Lab 10/13/22  1620   WBC 9.30   HEMOGLOBIN 14.2   HEMATOCRIT 44.1   PLATELETS 257   NEUTROS ABS 6.12   IMMATURE GRANS (ABS) 0.02   LYMPHS ABS 2.27   MONOS ABS 0.68   EOS ABS 0.17   MCV 91.9         Lab 10/13/22  1620   SODIUM 132*   POTASSIUM 4.0   CHLORIDE 94*   CO2 26.0   ANION GAP 12.0   BUN 19   CREATININE 1.27   EGFR 61.5   GLUCOSE 109*   CALCIUM 9.5   MAGNESIUM 2.0   TSH 2.580         Lab 10/13/22  1620   TOTAL PROTEIN 8.2   ALBUMIN 4.50   GLOBULIN 3.7   ALT (SGPT) 35   AST (SGOT) 40   BILIRUBIN 0.7   ALK PHOS 56         Lab 10/13/22  1620   PROBNP 2,206.0*   TROPONIN T <0.010                 Brief Urine Lab Results     None        Microbiology Results (last 10 days)     ** No results found for the last 240 hours. **          XR Chest 1 View    Result Date: 10/13/2022  XR CHEST 1 VW-  COMPARISON: 6/23/2018  HISTORY: Dysrhythmia triage protocol  FINDINGS: Technical adequacy: Adequate Central airways: Unremarkable Heart: Borderline heart size. The size is likely accentuated by the technique. Great vessels: Atherosclerotic aorta. Mediastinum: Unremarkable Lungs: Unremarkable Pulmonary murali:Unremarkable Pleura: Unremarkable Skeletal structures: Unremarkable Extrathoracic soft tissues:Unremarkable Additional comments: None      Impression: Impression: As above. No acute cardiopulmonary disease. There might be borderline cardiomegaly.  This report was finalized on 10/13/2022 4:52 PM by Noel Calvo MD.            Assessment & Plan   Assessment & Plan       Atrial fibrillation with RVR (HCC)    Essential hypertension    Alcohol abuse    COPD (chronic obstructive pulmonary disease) (HCC)    Hypothyroidism    Alcoholic dementia (HCC)      67-year-old male presents the ED from nursing facility due to noted atrial fibrillation RVR today on EKG.    1) atrial fibrillation with RVR  - Heart rate upon arrival to ED to 948  - [facility] Cardizem drip  - Consult  "cardiology in a.m., Dr. Cintron aware  - Keep n.p.o. for possible DIMITRIOS/cardioversion  - Received Eliquis 5 mg in the ED  - Echo in a.m.  - Check TSH  -Check magnesium  - Chest x-ray with borderline cardiomegaly    2) hypothyroidism  - On Synthroid  - Check TSH    3) hyperlipidemia  - Continue pravastatin    4) alcohol abuse  - Reports abstinence over the past couple of months  -Check ethanol level    5) essential hypertension  - We will need to obtain updated med list.  Patient unaware of current medications    6) essential tremor  - Reportedly on propanolol, will need to verify medications with pharmacy in a.m.    DVT prophylaxis:  eliquis    CODE STATUS: Full code       This note has been completed as part of a split-shared workflow.     Signature: Electronically signed by IGNACIO Mancilla, 10/13/22, 10:55 PM EDT.        Attending   Admission Attestation       I have performed an independent face-to-face diagnostic evaluation including performing an independent physical examination as documented here.  The documented plan of care above was reviewed and developed with the advanced practice clinician (APC).      Brief Summary Statement:   Irsael Bojorquez is a 67 y.o. male who states that for the last 2 weeks he has had intermittent palpitations.  He states he has been trying to increase his level of activity with his physical therapy and that he will sometimes have \"a lot of skipped beats\" and palpitations.  He confirms that the symptoms had improved for the last couple of days but earlier today they recurred with a frequency that caused him to feel \"very alarmed.\"  He also confirms some occasional dullness \"deep in my chest,\" especially with the increased palpitations, no radiation, no exacerbating or alleviating factors for the pain.  On ED arrival today (Thursday), he was in A. fib with RVR and diltiazem drip was started.  He states he has a previous diagnosis of atrial fibrillation and notes that \"I have " "been put in the hospital for fast heart rate for, for this,\" but denies taking any home medications for it at this time.  He states he does not follow with a cardiologist.    Remainder of detailed HPI is as noted by APC and has been reviewed and/or edited by me for completeness.    Attending Physical Exam:  Temp:  [97.5 °F (36.4 °C)] 97.5 °F (36.4 °C)  Heart Rate:  [] 102  Resp:  [15] 15  BP: (102-132)/(85-97) 113/93    Constitutional: Awake, alert, NAD, pleasant.  Eyes: PERRLA, sclerae anicteric, no conjunctival injection  HENT: NCAT, mucous membranes moist  Neck: Supple, no thyromegaly, no lymphadenopathy, trachea midline  Respiratory: Clear to auscultation bilaterally, nonlabored respirations   Cardiovascular: Irregularly irregular, rate of 110 during my exam, on the monitor in the ED during my exam his rate fluctuates between 97- 137, no murmurs, rubs, or gallops, palpable pedal pulses bilaterally  Gastrointestinal: Positive bowel sounds, soft, nontender, nondistended  Musculoskeletal: No bilateral ankle edema, no clubbing or cyanosis to extremities  Psychiatric: Appropriate affect, cooperative  Neurologic: Oriented x 3, strength symmetric in all extremities, Cranial Nerves grossly intact to confrontation, speech clear  Skin: No rashes, normal turgor.    Brief Assessment/Plan :  See detailed assessment and plan developed with APC which I have reviewed and/or edited for completeness.        Admission Status: I believe that this patient meets inpatient criteria due to need for continued rate control with IV diltiazem, will also need telemetry monitoring, continuation of anticoagulation, and consultation with cardiology service.  For these reasons I feel his care will extend over 2 midnights.      Reuben Donis III, DO  10/13/22                      "

## 2022-10-14 NOTE — PAYOR COMM NOTE
"Ref # [id number]  Rand Portillo RN, BSN, CMGT-BC  Phone # [phone]  Fax # [phone]  Israel Bojorquez (67 y.o. Male)     Date of Birth   1955    Social Security Number       Address   [address] [facility]    Home Phone   [phone]    MRN   [id number]       Restorationism   None    Marital Status                               Admission Date   10/13/22    Admission Type   Emergency    Admitting Provider   Anya Santo MD    Attending Provider   Anya Santo MD    Department, Room/Bed   [facility] 6A, N613/1       Discharge Date       Discharge Disposition       Discharge Destination                               Attending Provider: Anya Santo MD    Allergies: No Known Allergies    Isolation: None   Infection: None   Code Status: CPR    Ht: 185.4 cm (73\")   Wt: 108 kg (238 lb 12.8 oz)    Admission Cmt: None   Principal Problem: Atrial fibrillation with RVR (HCC) [I48.91]                    History & Physical      Reuben Donis III, DO at 10/13/22 2245              [facility]  HISTORY AND PHYSICAL    Patient Name: Israel Bojorquez  : 1955  MRN: [id number]  Primary Care Physician: Vinicio Gonzalez MD  Date of admission: 10/13/2022    Subjective    Subjective     Chief Complaint:  Palpitations    HPI:  Israel Bojorquez is a 67 y.o. male with past medical history significant for alcohol abuse, Acosta's esophagus, COPD, hypothyroidism, essential tremor, hyperlipidemia, essential hypertension, SIADH and remote tobacco abuse presents to the ED from [facility] due to palpitations.  Patient reports he has been working with physical therapy more and trying to become more ambulatory.  Over the past couple of weeks he has noticed intermittent palpitations with exertion along with midsternal chest discomfort in which he describes as \"dullness\" with radiation to his neck.  Today " at the facility they did an EKG that showed he was in atrial fibrillation with RVR.  He reports a prior history however could not find in medical records.  He states about 3 weeks ago he did have some dizziness upon standing and fell striking his head.  He continues to have intermittent dizziness along with some nausea.  He denies any recent fever, chills, vomiting, abdominal pain, diarrhea, dysuria or headaches.  He additionally reports being on Coumadin however unable to verify per records.  Upon arrival to the ED tonight patient was noted to be in A. fib RVR with a heart rate of 148.  Case was discussed with Dr. Cintron who advised Cardizem drip and will see in a.m.  Patient will be admitted to [facility] under the care of the hospitalist for further evaluation and treatment.        Review of Systems   Constitutional: Positive for activity change. Negative for appetite change, chills, diaphoresis, fatigue and fever.   HENT: Negative.    Eyes: Negative for photophobia and visual disturbance.   Respiratory: Positive for shortness of breath. Negative for cough.    Cardiovascular: Positive for chest pain and palpitations. Negative for leg swelling.   Gastrointestinal: Positive for nausea. Negative for abdominal distention, abdominal pain, blood in stool, constipation, diarrhea and vomiting.   Genitourinary: Negative.    Musculoskeletal: Negative for back pain, neck pain and neck stiffness.   Skin: Negative.    Neurological: Positive for dizziness and light-headedness. Negative for seizures, syncope, speech difficulty, weakness, numbness and headaches.   Psychiatric/Behavioral: Negative.         All other systems reviewed and are negative.     Personal History     Past Medical History:   Diagnosis Date   • Alcohol abuse    • Acosta's esophagus    • COPD (chronic obstructive pulmonary disease) (HCC)    • Depression    • Disease of thyroid gland    • Essential tremor    • Essential tremor    • Hiatal hernia     • History of SIADH    • Hyperlipidemia    • Hypertension    • Insomnia    • Occasional tremors    • Tendinitis              History reviewed. No pertinent surgical history.    Family History:  family history includes Alcohol abuse in his father; Cancer in his mother. Otherwise pertinent FHx was reviewed and unremarkable.     Social History:  reports that he quit smoking about 4 years ago. His smoking use included cigarettes. He has a 80.00 pack-year smoking history. He has never used smokeless tobacco. He reports that he does not drink alcohol and does not use drugs.  Social History     Social History Narrative    Resident at [facility]       Medications:  CBD, Melatonin, Warfarin Sodium, acetaminophen, aspirin, levothyroxine, multivitamin with minerals, pantoprazole, pravastatin, propranolol, sennosides-docusate, and traZODone    No Known Allergies    Objective    Objective     Vital Signs:   Temp:  [97.5 °F (36.4 °C)] 97.5 °F (36.4 °C)  Heart Rate:  [] 102  Resp:  [15] 15  BP: (102-132)/(85-97) 113/93    Physical Exam  Vitals and nursing note reviewed.   Constitutional:       General: He is not in acute distress.     Appearance: Normal appearance. He is not ill-appearing, toxic-appearing or diaphoretic.   HENT:      Head: Normocephalic and atraumatic.      Nose: Nose normal.      Mouth/Throat:      Mouth: Mucous membranes are dry.      Pharynx: Oropharynx is clear.   Eyes:      Extraocular Movements: Extraocular movements intact.      Conjunctiva/sclera: Conjunctivae normal.      Pupils: Pupils are equal, round, and reactive to light.   Cardiovascular:      Rate and Rhythm: Rhythm irregular.      Pulses: Normal pulses.      Heart sounds: Normal heart sounds.      Comments: Heart rate between 84 and 108.  Pulmonary:      Effort: Pulmonary effort is normal.      Breath sounds: Normal breath sounds.   Abdominal:      General: Bowel sounds are normal. There is no distension.      Palpations:  Abdomen is soft. There is no mass.      Tenderness: There is no abdominal tenderness. There is no right CVA tenderness, left CVA tenderness or guarding.      Hernia: No hernia is present.   Musculoskeletal:         General: No swelling, tenderness, deformity or signs of injury. Normal range of motion.      Cervical back: Normal range of motion and neck supple.      Right lower leg: No edema.      Left lower leg: No edema.   Skin:     General: Skin is warm and dry.   Neurological:      General: No focal deficit present.      Mental Status: He is alert and oriented to person, place, and time. Mental status is at baseline.   Psychiatric:         Mood and Affect: Mood normal.         Behavior: Behavior normal.         Thought Content: Thought content normal.         Judgment: Judgment normal.            Results Reviewed:  I have personally reviewed most recent indicated data and agree with findings including:  [x]  Laboratory  [x]  Radiology  [x]  EKG/Telemetry  []  Pathology  []  Cardiac/Vascular Studies  []  Old records  []  Other:  Most pertinent findings include:      LAB RESULTS:      Lab 10/13/22  1620   WBC 9.30   HEMOGLOBIN 14.2   HEMATOCRIT 44.1   PLATELETS 257   NEUTROS ABS 6.12   IMMATURE GRANS (ABS) 0.02   LYMPHS ABS 2.27   MONOS ABS 0.68   EOS ABS 0.17   MCV 91.9         Lab 10/13/22  1620   SODIUM 132*   POTASSIUM 4.0   CHLORIDE 94*   CO2 26.0   ANION GAP 12.0   BUN 19   CREATININE 1.27   EGFR 61.5   GLUCOSE 109*   CALCIUM 9.5   MAGNESIUM 2.0   TSH 2.580         Lab 10/13/22  1620   TOTAL PROTEIN 8.2   ALBUMIN 4.50   GLOBULIN 3.7   ALT (SGPT) 35   AST (SGOT) 40   BILIRUBIN 0.7   ALK PHOS 56         Lab 10/13/22  1620   PROBNP 2,206.0*   TROPONIN T <0.010                 Brief Urine Lab Results     None        Microbiology Results (last 10 days)     ** No results found for the last 240 hours. **          XR Chest 1 View    Result Date: 10/13/2022  XR CHEST 1 VW-  COMPARISON: 6/23/2018  HISTORY: Dysrhythmia  triage protocol  FINDINGS: Technical adequacy: Adequate Central airways: Unremarkable Heart: Borderline heart size. The size is likely accentuated by the technique. Great vessels: Atherosclerotic aorta. Mediastinum: Unremarkable Lungs: Unremarkable Pulmonary murail:Unremarkable Pleura: Unremarkable Skeletal structures: Unremarkable Extrathoracic soft tissues:Unremarkable Additional comments: None      Impression: Impression: As above. No acute cardiopulmonary disease. There might be borderline cardiomegaly.  This report was finalized on 10/13/2022 4:52 PM by Noel Calvo MD.            Assessment & Plan   Assessment & Plan       Atrial fibrillation with RVR (HCC)    Essential hypertension    Alcohol abuse    COPD (chronic obstructive pulmonary disease) (HCC)    Hypothyroidism    Alcoholic dementia (HCC)      67-year-old male presents the ED from nursing facility due to noted atrial fibrillation RVR today on EKG.    1) atrial fibrillation with RVR  - Heart rate upon arrival to ED to 948  - Continue Cardizem drip  - Consult cardiology in a.m., Dr. Cintron aware  - Keep n.p.o. for possible DIMITRIOS/cardioversion  - Received Eliquis 5 mg in the ED  - Echo in a.m.  - Check TSH  -Check magnesium  - Chest x-ray with borderline cardiomegaly    2) hypothyroidism  - On Synthroid  - Check TSH    3) hyperlipidemia  - Continue pravastatin    4) alcohol abuse  - Reports abstinence over the past couple of months  -Check ethanol level    5) essential hypertension  - We will need to obtain updated med list.  Patient unaware of current medications    6) essential tremor  - Reportedly on propanolol, will need to verify medications with pharmacy in a.m.    DVT prophylaxis:  eliquis    CODE STATUS: Full code       This note has been completed as part of a split-shared workflow.     Signature: Electronically signed by IGNACIO Mancilla, 10/13/22, 10:55 PM EDT.        Attending   Admission Attestation       I have performed an independent  "face-to-face diagnostic evaluation including performing an independent physical examination as documented here.  The documented plan of care above was reviewed and developed with the advanced practice clinician (APC).      Brief Summary Statement:   Israel Bojorquez is a 67 y.o. male who states that for the last 2 weeks he has had intermittent palpitations.  He states he has been trying to increase his level of activity with his physical therapy and that he will sometimes have \"a lot of skipped beats\" and palpitations.  He confirms that the symptoms had improved for the last couple of days but earlier today they recurred with a frequency that caused him to feel \"very alarmed.\"  He also confirms some occasional dullness \"deep in my chest,\" especially with the increased palpitations, no radiation, no exacerbating or alleviating factors for the pain.  On ED arrival today (Thursday), he was in A. fib with RVR and diltiazem drip was started.  He states he has a previous diagnosis of atrial fibrillation and notes that \"I have been put in the hospital for fast heart rate for, for this,\" but denies taking any home medications for it at this time.  He states he does not follow with a cardiologist.    Remainder of detailed HPI is as noted by APC and has been reviewed and/or edited by me for completeness.    Attending Physical Exam:  Temp:  [97.5 °F (36.4 °C)] 97.5 °F (36.4 °C)  Heart Rate:  [] 102  Resp:  [15] 15  BP: (102-132)/(85-97) 113/93    Constitutional: Awake, alert, NAD, pleasant.  Eyes: PERRLA, sclerae anicteric, no conjunctival injection  HENT: NCAT, mucous membranes moist  Neck: Supple, no thyromegaly, no lymphadenopathy, trachea midline  Respiratory: Clear to auscultation bilaterally, nonlabored respirations   Cardiovascular: Irregularly irregular, rate of 110 during my exam, on the monitor in the ED during my exam his rate fluctuates between 97- 137, no murmurs, rubs, or gallops, palpable pedal pulses " bilaterally  Gastrointestinal: Positive bowel sounds, soft, nontender, nondistended  Musculoskeletal: No bilateral ankle edema, no clubbing or cyanosis to extremities  Psychiatric: Appropriate affect, cooperative  Neurologic: Oriented x 3, strength symmetric in all extremities, Cranial Nerves grossly intact to confrontation, speech clear  Skin: No rashes, normal turgor.    Brief Assessment/Plan :  See detailed assessment and plan developed with APC which I have reviewed and/or edited for completeness.        Admission Status: I believe that this patient meets inpatient criteria due to need for continued rate control with IV diltiazem, will also need telemetry monitoring, continuation of anticoagulation, and consultation with cardiology service.  For these reasons I feel his care will extend over 2 midnights.      Reuben Donis III, DO  10/13/22                        Electronically signed by Reuben Donis III, DO at 10/13/22 9692          Emergency Department Notes      Vania Stewart MD at 10/13/22 1619          Subjective   History of Present Illness  67-year-old female with a known history of A. fib who presents for evaluation of palpitations and tachycardia.  The patient reports that he currently lives at [facility].  He reports that they do an EKG on a routine basis and when they did 1 today he was identified to be in A. fib with a rapid rate.  He does report that he has had some palpitations in the center of his chest that he notices whenever he performs activity.  He states that at rest he does not feel any chest pain or palpitation.  He denies any shortness of breath.  He denies any increased fluid collection or swelling to the lower extremities.  No abdominal pain, no nausea or vomiting, no reported change in bowel or urinary function.  No new medications.  He reports that he takes anticoagulation, but upon record review I do not see any anticoagulant  prescribed, the patient only takes aspirin.  He also reports a possible previous abnormal heart rhythm, but he cannot remember for sure.  Records do not show a known previous diagnosis of atrial fibrillation.  He does have a previous history of hyponatremia and alcoholism.  He is awake and alert with normal mentation.  No focal neurological deficits.  No other acute complaints.        Review of Systems   Constitutional: Positive for fatigue. Negative for chills and fever.   HENT: Negative for congestion, ear pain, postnasal drip, sinus pressure and sore throat.    Eyes: Negative for pain, redness and visual disturbance.   Respiratory: Negative for cough, chest tightness and shortness of breath.    Cardiovascular: Positive for chest pain and palpitations. Negative for leg swelling.   Gastrointestinal: Negative for abdominal pain, anal bleeding, blood in stool, diarrhea, nausea and vomiting.   Endocrine: Negative for polydipsia and polyuria.   Genitourinary: Negative for difficulty urinating, dysuria, frequency and urgency.   Musculoskeletal: Negative for arthralgias, back pain and neck pain.   Skin: Negative for pallor and rash.   Allergic/Immunologic: Negative for environmental allergies and immunocompromised state.   Neurological: Negative for dizziness, weakness and headaches.   Hematological: Negative for adenopathy.   Psychiatric/Behavioral: Negative for confusion, self-injury and suicidal ideas. The patient is not nervous/anxious.    All other systems reviewed and are negative.      Past Medical History:   Diagnosis Date   • Alcohol abuse    • Acosta's esophagus    • COPD (chronic obstructive pulmonary disease) (HCC)    • Depression    • Disease of thyroid gland    • Essential tremor    • Essential tremor    • Hiatal hernia    • History of SIADH    • Hyperlipidemia    • Hypertension    • Insomnia    • Occasional tremors    • Tendinitis        No Known Allergies    History reviewed. No pertinent surgical  history.    Family History   Problem Relation Age of Onset   • Cancer Mother    • Alcohol abuse Father        Social History     Socioeconomic History   • Marital status:    Tobacco Use   • Smoking status: Former     Packs/day: 2.00     Years: 40.00     Pack years: 80.00     Types: Cigarettes     Quit date: 2018     Years since quittin.3   • Smokeless tobacco: Never   Substance and Sexual Activity   • Alcohol use: No     Comment: PT has history of Alcohol abuse(10-12 Beers daily)   • Drug use: No     Types: Amphetamines, Barbiturates, LSD, Marijuana     Comment: remote h/o drug use    • Sexual activity: Defer           Objective   Physical Exam  Vitals and nursing note reviewed.   Constitutional:       General: He is not in acute distress.     Appearance: Normal appearance. He is well-developed. He is not toxic-appearing or diaphoretic.   HENT:      Head: Normocephalic and atraumatic.      Right Ear: External ear normal.      Left Ear: External ear normal.      Nose: Nose normal.   Eyes:      General: Lids are normal.      Pupils: Pupils are equal, round, and reactive to light.   Neck:      Trachea: No tracheal deviation.   Cardiovascular:      Rate and Rhythm: Tachycardia present. Rhythm irregularly irregular.      Pulses: No decreased pulses.      Heart sounds: Normal heart sounds. No murmur heard.    No friction rub. No gallop.   Pulmonary:      Effort: Pulmonary effort is normal. No respiratory distress.      Breath sounds: Normal breath sounds. No decreased breath sounds, wheezing, rhonchi or rales.   Abdominal:      General: Bowel sounds are normal.      Palpations: Abdomen is soft.      Tenderness: There is no abdominal tenderness. There is no guarding or rebound.   Musculoskeletal:         General: No deformity. Normal range of motion.      Cervical back: Normal range of motion and neck supple.   Lymphadenopathy:      Cervical: No cervical adenopathy.   Skin:     General: Skin is warm and dry.       Findings: No rash.   Neurological:      Mental Status: He is alert and oriented to person, place, and time.      Cranial Nerves: No cranial nerve deficit.      Sensory: No sensory deficit.   Psychiatric:         Speech: Speech normal.         Behavior: Behavior normal.         Thought Content: Thought content normal.         Judgment: Judgment normal.         Procedures          ED Course                                           MDM  Number of Diagnoses or Management Options  Atrial fibrillation with RVR (HCC): new and requires workup  Chest pain, unspecified type: new and requires workup  Diagnosis management comments: HEART Score for Major Cardiac Events - MDCalc  Calculated on Oct 13 2022 6:16 PM  4 points -> Moderate Score (4-6 points) Risk of MACE of 12-16.6%.    Patient presents with A. fib with RVR.  From record review I feel this is new onset.  I will initiate anticoagulation or Eliquis.  I initiated diltiazem bolus and drip but the patient continued to stay in A. fib with a rapid rate.    Discussed the patient with the cardiologist, Dr. Cintron who recommends continue diltiazem drip titration, and admission for cardiology evaluation.    Discussed the patient with the hospitalist, Dr. Donis.  The hospital service will consult on the patient to determine status of admission.       Amount and/or Complexity of Data Reviewed  Clinical lab tests: ordered and reviewed  Tests in the radiology section of CPT®: ordered and reviewed  Decide to obtain previous medical records or to obtain history from someone other than the patient: yes  Review and summarize past medical records: yes  Discuss the patient with other providers: yes  Independent visualization of images, tracings, or specimens: yes        Final diagnoses:   Atrial fibrillation with RVR (HCC)   Chest pain, unspecified type       ED Disposition  ED Disposition     ED Disposition   Decision to Admit    Condition   --    Comment   --             No  follow-up provider specified.       Medication List      No changes were made to your prescriptions during this visit.          Vania Stewart MD  10/13/22 1817      Electronically signed by Vania Stewart MD at 10/13/22 1817         Current Facility-Administered Medications   Medication Dose Route Frequency Provider Last Rate Last Admin   • acetaminophen (TYLENOL) tablet 650 mg  650 mg Oral Q4H PRN Dax, Kayla, APRN        Or   • acetaminophen (TYLENOL) 160 MG/5ML solution 650 mg  650 mg Oral Q4H PRN Dax, Kayla, APRN        Or   • acetaminophen (TYLENOL) suppository 650 mg  650 mg Rectal Q4H PRN Dax, Kayla, APRN       • aspirin EC tablet 81 mg  81 mg Oral Daily Dax, Kayla, APRN   81 mg at 10/14/22 0850   • dilTIAZem (CARDIZEM) 125 mg in 125 mL 0.7% sodium chloride  infusion  5-15 mg/hr Intravenous Titrated Dax, Kayla, APRN 12 mL/hr at 10/14/22 1136 12 mg/hr at 10/14/22 1136   • levothyroxine (SYNTHROID, LEVOTHROID) tablet 25 mcg  25 mcg Oral Q AM Dax, Kayla, APRN   25 mcg at 10/14/22 0513   • pantoprazole (PROTONIX) EC tablet 40 mg  40 mg Oral Daily Dax, Kayla, APRN   40 mg at 10/14/22 0851   • pravastatin (PRAVACHOL) tablet 10 mg  10 mg Oral Daily Dax, Kayla, APRN   10 mg at 10/14/22 0850   • propranolol (INDERAL) tablet 20 mg  20 mg Oral BID Adx, Kayla, APRN   20 mg at 10/14/22 0005   • sennosides-docusate (PERICOLACE) 8.6-50 MG per tablet 2 tablet  2 tablet Oral BID Dax, Kayla, APRN   2 tablet at 10/14/22 0005   • sodium chloride 0.9 % flush 10 mL  10 mL Intravenous PRN Vania Stewart MD       • sodium chloride 0.9 % flush 10 mL  10 mL Intravenous Q12H Dax, Kayla, APRN   10 mL at 10/14/22 0851   • sodium chloride 0.9 % flush 10 mL  10 mL Intravenous PRN Kayla Verduzco APRN         Lab Results (last 72 hours)     Procedure Component Value Units Date/Time    Hemoglobin A1c [[id number]]  (Abnormal) Collected: 10/14/22 0441     Specimen: Blood Updated: 10/14/22 0917     Hemoglobin A1C 6.20 %     Narrative:      Hemoglobin A1C Ranges:    Increased Risk for Diabetes  5.7% to 6.4%  Diabetes                     >= 6.5%  Diabetic Goal                < 7.0%    CBC Auto Differential [[id number]]  (Abnormal) Collected: 10/14/22 0448    Specimen: Blood Updated: 10/14/22 0629     WBC 10.56 10*3/mm3      RBC 4.30 10*6/mm3      Hemoglobin 13.1 g/dL      Hematocrit 39.8 %      MCV 92.6 fL      MCH 30.5 pg      MCHC 32.9 g/dL      RDW 14.2 %      RDW-SD 48.0 fl      MPV 10.6 fL      Platelets 240 10*3/mm3      Neutrophil % 62.5 %      Lymphocyte % 24.9 %      Monocyte % 9.3 %      Eosinophil % 2.6 %      Basophil % 0.5 %      Immature Grans % 0.2 %      Neutrophils, Absolute 6.61 10*3/mm3      Lymphocytes, Absolute 2.63 10*3/mm3      Monocytes, Absolute 0.98 10*3/mm3      Eosinophils, Absolute 0.27 10*3/mm3      Basophils, Absolute 0.05 10*3/mm3      Immature Grans, Absolute 0.02 10*3/mm3      nRBC 0.0 /100 WBC     Basic Metabolic Panel [[id number]]  (Normal) Collected: 10/14/22 0448    Specimen: Blood Updated: 10/14/22 0615     Glucose 83 mg/dL      BUN 19 mg/dL      Creatinine 1.24 mg/dL      Sodium 140 mmol/L      Potassium 4.1 mmol/L      Chloride 100 mmol/L      CO2 27.0 mmol/L      Calcium 9.7 mg/dL      BUN/Creatinine Ratio 15.3     Anion Gap 13.0 mmol/L      eGFR 63.3 mL/min/1.73      Comment: National Kidney Foundation and American Society of Nephrology (ASN) Task Force recommended calculation based on the Chronic Kidney Disease Epidemiology Collaboration (CKD-EPI) equation refit without adjustment for race.       Narrative:      GFR Normal >60  Chronic Kidney Disease <60  Kidney Failure <15      Protime-INR [[id number]]  (Normal) Collected: 10/13/22 1620    Specimen: Blood Updated: 10/13/22 2300     Protime 13.0 Seconds      INR 0.99    [place] Draw [[id number]] Collected: 10/13/22 1620    Specimen: Blood Updated: 10/13/22 2030    Narrative:       The following orders were created for panel order [place] Draw.  Procedure                               Abnormality         Status                     ---------                               -----------         ------                     Green Top (Gel)[[id number]]                                  Final result               Lavender Top[[id number]]                                     Final result               Gold Top - SST[[id number]]                                   Final result               Gray Top[[id number]]                                         Final result               Light Blue Top[[id number]]                                   Final result                 Please view results for these tests on the individual orders.    Gray Top [[id number]] Collected: 10/13/22 1620    Specimen: Blood Updated: 10/13/22 2030     Extra Tube Hold for add-ons.     Comment: Auto resulted.       Lavender Top [[id number]] Collected: 10/13/22 1620    Specimen: Blood Updated: 10/13/22 1731     Extra Tube hold for add-on     Comment: Auto resulted       Green Top (Gel) [[id number]] Collected: 10/13/22 1620    Specimen: Blood Updated: 10/13/22 1731     Extra Tube Hold for add-ons.     Comment: Auto resulted.       Gold Top - SST [[id number]] Collected: 10/13/22 1620    Specimen: Blood Updated: 10/13/22 1731     Extra Tube Hold for add-ons.     Comment: Auto resulted.       Light Blue Top [[id number]] Collected: 10/13/22 1620    Specimen: Blood Updated: 10/13/22 1731     Extra Tube Hold for add-ons.     Comment: Auto resulted       Comprehensive Metabolic Panel [[id number]]  (Abnormal) Collected: 10/13/22 1620    Specimen: Blood Updated: 10/13/22 1706     Glucose 109 mg/dL      BUN 19 mg/dL      Creatinine 1.27 mg/dL      Sodium 132 mmol/L      Potassium 4.0 mmol/L      Comment: Slight hemolysis detected by analyzer. Results may be affected.        Chloride 94 mmol/L      CO2 26.0 mmol/L      Calcium 9.5 mg/dL      Total Protein 8.2 g/dL       Albumin 4.50 g/dL      ALT (SGPT) 35 U/L      AST (SGOT) 40 U/L      Alkaline Phosphatase 56 U/L      Total Bilirubin 0.7 mg/dL      Globulin 3.7 gm/dL      Comment: Calculated Result        A/G Ratio 1.2 g/dL      BUN/Creatinine Ratio 15.0     Anion Gap 12.0 mmol/L      eGFR 61.5 mL/min/1.73      Comment: National Kidney Foundation and American Society of Nephrology (ASN) Task Force recommended calculation based on the Chronic Kidney Disease Epidemiology Collaboration (CKD-EPI) equation refit without adjustment for race.       Narrative:      GFR Normal >60  Chronic Kidney Disease <60  Kidney Failure <15      BNP [[id number]]  (Abnormal) Collected: 10/13/22 1620    Specimen: Blood Updated: 10/13/22 1704     proBNP 2,206.0 pg/mL     Narrative:      Among patients with dyspnea, NT-proBNP is highly sensitive for the detection of acute congestive heart failure. In addition NT-proBNP of <300 pg/ml effectively rules out acute congestive heart failure with 99% negative predictive value.    Results may be falsely decreased if patient taking Biotin.      Troponin [[id number]]  (Normal) Collected: 10/13/22 1620    Specimen: Blood Updated: 10/13/22 1704     Troponin T <0.010 ng/mL     Narrative:      Troponin T Reference Range:  <= 0.03 ng/mL-   Negative for AMI  >0.03 ng/mL-     Abnormal for myocardial necrosis.  Clinicians would have to utilize clinical acumen, EKG, Troponin and serial changes to determine if it is an Acute Myocardial Infarction or myocardial injury due to an underlying chronic condition.       Results may be falsely decreased if patient taking Biotin.      TSH [[id number]]  (Normal) Collected: 10/13/22 1620    Specimen: Blood Updated: 10/13/22 1704     TSH 2.580 uIU/mL     Ethanol [[id number]]  (Normal) Collected: 10/13/22 1620    Specimen: Blood Updated: 10/13/22 1700     Ethanol <10 mg/dL     Narrative:      Elevated lactic acid concentration and lactate dehydrogenase(LD) activity may falsely elevate  enzymatically determined ethanol levels. Not for legal purposes.     Magnesium [[id number]]  (Normal) Collected: 10/13/22 1620    Specimen: Blood Updated: 10/13/22 1700     Magnesium 2.0 mg/dL     CBC & Differential [[id number]]  (Normal) Collected: 10/13/22 1620    Specimen: Blood Updated: 10/13/22 1637    Narrative:      The following orders were created for panel order CBC & Differential.  Procedure                               Abnormality         Status                     ---------                               -----------         ------                     CBC Auto Differential[[id number]]        Normal              Final result                 Please view results for these tests on the individual orders.    CBC Auto Differential [[id number]]  (Normal) Collected: 10/13/22 1620    Specimen: Blood Updated: 10/13/22 1637     WBC 9.30 10*3/mm3      RBC 4.80 10*6/mm3      Hemoglobin 14.2 g/dL      Hematocrit 44.1 %      MCV 91.9 fL      MCH 29.6 pg      MCHC 32.2 g/dL      RDW 14.5 %      RDW-SD 48.8 fl      MPV 10.0 fL      Platelets 257 10*3/mm3      Neutrophil % 65.9 %      Lymphocyte % 24.4 %      Monocyte % 7.3 %      Eosinophil % 1.8 %      Basophil % 0.4 %      Immature Grans % 0.2 %      Neutrophils, Absolute 6.12 10*3/mm3      Lymphocytes, Absolute 2.27 10*3/mm3      Monocytes, Absolute 0.68 10*3/mm3      Eosinophils, Absolute 0.17 10*3/mm3      Basophils, Absolute 0.04 10*3/mm3      Immature Grans, Absolute 0.02 10*3/mm3      nRBC 0.0 /100 WBC           Imaging Results (Last 72 Hours)     Procedure Component Value Units Date/Time    XR Chest 1 View [[id number]] Collected: 10/13/22 1649     Updated: 10/13/22 1655    Narrative:      XR CHEST 1 VW-     COMPARISON: 6/23/2018     HISTORY: Dysrhythmia triage protocol     FINDINGS:  Technical adequacy: Adequate  Central airways: Unremarkable  Heart: Borderline heart size. The size is likely accentuated by the  technique.  Great vessels: Atherosclerotic  aorta.  Mediastinum: Unremarkable  Lungs: Unremarkable  Pulmonary murali:Unremarkable  Pleura: Unremarkable  Skeletal structures: Unremarkable  Extrathoracic soft tissues:Unremarkable  Additional comments: None       Impression:      Impression: As above. No acute cardiopulmonary disease. There might be  borderline cardiomegaly.     This report was finalized on 10/13/2022 4:52 PM by Noel Calvo MD.              Consult Notes (last 72 hours)      Leann Hairston APRN at 10/14/22 1017      Consult Orders    1. Inpatient Cardiology Consult [[id number]] ordered by Kayla Verduzco APRN at 10/13/22 9748               [facility] Consult/H&P Note      Referring Provider: No ref. provider found  Primary Provider:  Vinicio Gonzalez MD  Reason for Consultation: Afib    PROBLEM LIST:  1. Atrial fibrillation  a. CHADSVASC = 2  2. Hypertension  3. Hyperlipidemia  4. History of alcoholism   5. Wernicke's encephalopathy  dementia   6. Acosta's esophagus  7. Hypothyroidism  8. Essential tremor  9. SIADH  10. Remote tobacco use      HISTORY OF PRESENT ILLNESS:  Israel Bojorquez is a 67 y.o. male with the above noted pmhx who presented from [facility] with complaints of palpitations associated with midsternal chest discomfort described as dull. Chest pain radiates to his neck. OSH obtained and EKG prior to transferring him to our facility which revealed Afib with RVR. Upon arrival here, EKG confirmed Afib with RVR. He has a prior history of Afib per his account. He had an ECV several years ago per his account. He is on coumadin for stroke prevention. He does not know his INR levels or how often they are checked. He is currently rate controlled on Cardizem but continues to be symptomatic with chest discomfort, palpitations.        REVIEW OF SYSTEMS  Pertinent positives are listed in the HPI and all other ROS are negative.      SOCIAL HISTORY:   reports that he quit smoking about 4 years ago. His  smoking use included cigarettes. He has a 80.00 pack-year smoking history. He has never used smokeless tobacco. He reports that he does not drink alcohol and does not use drugs.     FAMILY HISTORY:  family history includes Alcohol abuse in his father; Cancer in his mother.     CURRENT MEDICATIONS:      Current Facility-Administered Medications:   •  acetaminophen (TYLENOL) tablet 650 mg, 650 mg, Oral, Q4H PRN **OR** acetaminophen (TYLENOL) 160 MG/5ML solution 650 mg, 650 mg, Oral, Q4H PRN **OR** acetaminophen (TYLENOL) suppository 650 mg, 650 mg, Rectal, Q4H PRN, Dax, Kayla, APRN  •  aspirin EC tablet 81 mg, 81 mg, Oral, Daily, Dax, Kayla, APRN, 81 mg at 10/14/22 0850  •  dilTIAZem (CARDIZEM) 125 mg in 125 mL 0.7% sodium chloride  infusion, 5-15 mg/hr, Intravenous, Titrated, Dax, Kayla, APRN, Last Rate: 10 mL/hr at 10/14/22 0852, 10 mg/hr at 10/14/22 0852  •  levothyroxine (SYNTHROID, LEVOTHROID) tablet 25 mcg, 25 mcg, Oral, Q AM, Dax, Kayla, APRN, 25 mcg at 10/14/22 0513  •  pantoprazole (PROTONIX) EC tablet 40 mg, 40 mg, Oral, Daily, Dax, Kayla, APRN, 40 mg at 10/14/22 0851  •  pravastatin (PRAVACHOL) tablet 10 mg, 10 mg, Oral, Daily, Dax, Kalya, APRN, 10 mg at 10/14/22 0850  •  propranolol (INDERAL) tablet 20 mg, 20 mg, Oral, BID, Dax, Kayla, APRN, 20 mg at 10/14/22 0005  •  sennosides-docusate (PERICOLACE) 8.6-50 MG per tablet 2 tablet, 2 tablet, Oral, BID, Dax, Kayla, APRN, 2 tablet at 10/14/22 0005  •  sodium chloride 0.9 % flush 10 mL, 10 mL, Intravenous, PRN, Vania Stewart MD  •  sodium chloride 0.9 % flush 10 mL, 10 mL, Intravenous, Q12H, Dax, Kayla, APRN, 10 mL at 10/14/22 0851  •  sodium chloride 0.9 % flush 10 mL, 10 mL, Intravenous, PRN, Dax, Kayla, APRN     Allergies:  Patient has no known allergies.    Objective     Vital Sign Min/Max for last 24 hours  Temp  Min: 96.7 °F (35.9 °C)  Max: 98.1 °F (36.7 °C)   BP  Min: 94/56  Max: 132/97  "  Pulse  Min: 74  Max: 152   Resp  Min: 15  Max: 20   SpO2  Min: 85 %  Max: 99 %   No data recorded   Weight  Min: 81.6 kg (180 lb)  Max: 108 kg (238 lb 12.8 oz)     Flowsheet Rows    Flowsheet Row First Filed Value   Admission Height 185.4 cm (73\") Documented at 10/13/2022 1606   Admission Weight 81.6 kg (180 lb) Documented at 10/13/2022 1606          PHYSICAL EXAM:  GENERAL: This is a well-developed, well-nourished, male who is in no acute distress.   SKIN: Pink and warm without rash or abnormality noted.   HEENT: Head is normocephalic and atraumatic. Pupils are equal and reactive to light bilaterally. Mucous membranes are pink and moist.   NECK: Supple without lymphadenopathy or thyromegaly. There is no jugular venous distention at 30°.  LUNGS: Clear to auscultation bilaterally without wheezing, rhonchi, or rales noted.   CARDIOVASCULAR: The heart has an irregularly irregular rhythm, regular rate with a normal S1 and S2. There is no murmur, gallop, rub, or click appreciated. The PMI is nondisplaced. Carotid upstrokes are 2+ and symmetrical without bruits.   ABDOMEN: Soft and nondistended with positive bowel sounds x4. The patient denies tenderness of palpitation.   MUSCULOSKELETAL: There are no obvious bony abnormalities. Normal range of tenderness to palpation.   NEUROLOGICAL: Nonfocal. Alert and oriented x3.   PERIPHERAL VASCULAR: Femoral pulses are 2+ and symmetrical without bruits. Posterior tibial and dorsalis pedis pulses are 2+ and symmetrical. There is no peripheral edema.   PSYCH: Normal mood and affect.      EKG:    Tele: Afib 80's    LABS:      Lab Results   Component Value Date    WBC 10.56 10/14/2022    HGB 13.1 10/14/2022    HCT 39.8 10/14/2022    MCV 92.6 10/14/2022     10/14/2022     Lab Results   Component Value Date    GLUCOSE 83 10/14/2022    BUN 19 10/14/2022    CREATININE 1.24 10/14/2022    EGFRIFNONA >150 07/08/2018    BCR 15.3 10/14/2022    K 4.1 10/14/2022    CO2 27.0 10/14/2022    " CALCIUM 9.7 10/14/2022    ALBUMIN 4.50 10/13/2022    AST 40 10/13/2022    ALT 35 10/13/2022     Lab Results   Component Value Date    CKTOTAL 17 (L) 06/23/2018    TROPONINT <0.010 10/13/2022     Lab Results   Component Value Date    INR 0.99 10/13/2022    INR 1.02 06/23/2018    INR 0.99 06/23/2018    PROTIME 13.0 10/13/2022    PROTIME 10.7 06/23/2018    PROTIME 10.4 06/23/2018     No results found for: CHOL, CHLPL, TRIG, HDL, LDL, LDLDIRECT     Results Review: I reviewed the patient's new clinical results.      ASSESSMENT:    1. Atrial fibrillation  a. CHADSVASC = 2, on Coumadin per patient account for known history of Afib, INR 0.99.  b. Started on Cardizem gtt.  c. Eliquis started 10/13/22.   d. Symptoms include SOA, palpitations, chest discomfort.   2. HTN  3. HLD      PLAN:    1. Echocardiogram.       I discussed the patient's findings and my recommendations with patient.    Scribed for Jack Yuan MD by IGNACIO Marr. 10/14/2022  10:17 EDT                Electronically signed by Leann Hairston APRN at 10/14/22 4425

## 2022-10-14 NOTE — PLAN OF CARE
Goal Outcome Evaluation:  Plan of Care Reviewed With: patient   Patient arrived from ER at 2000 via gurney. Patient is alert and oriented x4. Diltiazem drip infusing at 15mg/hr to keep HR bellow 100. Denies pain and SOB. NPO for possible procedures today. Tele monitor shows AFIB. Edema  at bilateral LE.                                                          Progress: improving

## 2022-10-14 NOTE — PROCEDURES
Diagnosis:  Atrial fibrillation or Flutter    PROCEDURE PERFORMED: External electrical cardioversion.    Anesthesia: Sedation with:  1. 4 mg Versed  2. 60 mg Brevital  3.  50 mcg fentanyl    Estimated Blood Loss: Less than 10 mL     Specimens: None    PROCEDURE IN DETAIL: The patient was brought into the [facility] in a fasting  state. The patient was given moderate sedation during which he received 200  joules of external electrical cardioversion x 2 that converted atrial  fibrillation to normal sinus heart rhythm.  A DIMITRIOS was performed prior to the procedure.  This showed no evidence of left atrial or left atrial appendage thrombus    IMPRESSION: Successful conversion of atrial fibrillation to normal sinus  heart rhythm.

## 2022-10-14 NOTE — PLAN OF CARE
Problem: Adult Inpatient Plan of Care  Goal: Plan of Care Review  Outcome: Ongoing, Progressing  Flowsheets  Taken 10/14/2022 1328 by Shira Ding RN  Progress: improving  Taken 10/14/2022 0439 by Jovan Stauffer RN  Plan of Care Reviewed With: patient  Goal: Patient-Specific Goal (Individualized)  Outcome: Ongoing, Progressing  Goal: Absence of Hospital-Acquired Illness or Injury  Outcome: Ongoing, Progressing  Intervention: Identify and Manage Fall Risk  Recent Flowsheet Documentation  Taken 10/14/2022 1200 by Shira Ding RN  Safety Promotion/Fall Prevention: safety round/check completed  Taken 10/14/2022 1024 by Shira Ding RN  Safety Promotion/Fall Prevention: safety round/check completed  Taken 10/14/2022 0845 by Shira Ding RN  Safety Promotion/Fall Prevention:   assistive device/personal items within reach   clutter free environment maintained   fall prevention program maintained   nonskid shoes/slippers when out of bed   safety round/check completed  Intervention: Prevent Skin Injury  Recent Flowsheet Documentation  Taken 10/14/2022 0845 by Shira Ding RN  Skin Protection: adhesive use limited  Intervention: Prevent and Manage VTE (Venous Thromboembolism) Risk  Recent Flowsheet Documentation  Taken 10/14/2022 0845 by Shira Ding RN  Activity Management: sitting, edge of bed  VTE Prevention/Management: compression stockings off  Range of Motion: active ROM (range of motion) encouraged  Intervention: Prevent Infection  Recent Flowsheet Documentation  Taken 10/14/2022 1200 by Shira Ding RN  Infection Prevention: hand hygiene promoted  Taken 10/14/2022 1024 by Shira Ding RN  Infection Prevention: hand hygiene promoted  Taken 10/14/2022 0845 by Shira Ding RN  Infection Prevention: hand hygiene promoted  Goal: Optimal Comfort and Wellbeing  Outcome: Ongoing, Progressing  Intervention: Provide Person-Centered Care  Recent Flowsheet  Documentation  Taken 10/14/2022 0845 by Shira Ding RN  Trust Relationship/Rapport: care explained  Goal: Readiness for Transition of Care  Outcome: Ongoing, Progressing     Problem: Fall Injury Risk  Goal: Absence of Fall and Fall-Related Injury  Outcome: Ongoing, Progressing  Intervention: Identify and Manage Contributors  Recent Flowsheet Documentation  Taken 10/14/2022 0845 by Shira Ding RN  Medication Review/Management: medications reviewed  Intervention: Promote Injury-Free Environment  Recent Flowsheet Documentation  Taken 10/14/2022 1200 by Shira Ding RN  Safety Promotion/Fall Prevention: safety round/check completed  Taken 10/14/2022 1024 by Shira Ding RN  Safety Promotion/Fall Prevention: safety round/check completed  Taken 10/14/2022 0845 by Shira Ding RN  Safety Promotion/Fall Prevention:   assistive device/personal items within reach   clutter free environment maintained   fall prevention program maintained   nonskid shoes/slippers when out of bed   safety round/check completed     Problem: COPD (Chronic Obstructive Pulmonary Disease) Comorbidity  Goal: Maintenance of COPD Symptom Control  Outcome: Ongoing, Progressing  Intervention: Maintain COPD-Symptom Control  Recent Flowsheet Documentation  Taken 10/14/2022 0845 by Shira Ding RN  Supportive Measures: active listening utilized  Medication Review/Management: medications reviewed     Problem: Heart Failure Comorbidity  Goal: Maintenance of Heart Failure Symptom Control  Outcome: Ongoing, Progressing  Intervention: Maintain Heart Failure-Management  Recent Flowsheet Documentation  Taken 10/14/2022 0845 by Shira Ding RN  Medication Review/Management: medications reviewed     Problem: Hypertension Comorbidity  Goal: Blood Pressure in Desired Range  Outcome: Ongoing, Progressing  Intervention: Maintain Blood Pressure Management  Recent Flowsheet Documentation  Taken 10/14/2022 0845 by Shira Ding  JAN, RN  Syncope Management: head lowered  Medication Review/Management: medications reviewed     Problem: Obstructive Sleep Apnea Risk or Actual Comorbidity Management  Goal: Unobstructed Breathing During Sleep  Outcome: Ongoing, Progressing   Goal Outcome Evaluation:      Pt rested throughout shift. Continues to titrate cardizem gtt. Pt remains in afib. Pt scheduled to have DIMITRIOS & cardioversion today. No complaints of pain. Pt remains on room air, will continue to observe.      Progress: improving

## 2022-10-14 NOTE — PROGRESS NOTES
[facility] Medicine Services  PROGRESS NOTE    Patient Name: Israel Bojorquez  : 1955  MRN: [id number]    Date of Admission: 10/13/2022  Primary Care Physician: Vinicio Gonzalez MD    Subjective   Subjective     CC: palpitations x weeks    HPI  Feels okay.  No chest pain now although he has noted it in recent days, as well as lightheadedness and occas trouble breathing     ROS:  Liver disease, history of ascites, doesn't follow w a liver specialist.   Heme - he cannot tell me why he is on coumadin , was not on this during 2019 admission.   Cards - has had spells of palpitations and orthostatic lightheadedness in the past  GI no longer drinks etoh    Objective   Objective     Vital Signs:   Temp:  [96.7 °F (35.9 °C)-98.1 °F (36.7 °C)] 98.1 °F (36.7 °C)  Heart Rate:  [] 79  Resp:  [15-20] 20  BP: ()/() 109/75     Physical Exam:  Constitutional: Awake, alert in bed.  NAD.  Son present and helps w history.  Conversant and pleasant  Eyes: PER, sclerae anicteric, no conjunctival injection  HENT: NCAT, mucous membranes moist  Neck: Supple, , trachea midline  resp nonlabored  CV: Irreg, rate 90s on dilt gtt.   Gastrointestinal: Positive bowel sounds, soft, nontender, distended but soft and nontender.   Musculoskeletal: 1+ bilateral ankle edema, no clubbing or cyanosis to extremities  Psychiatric: Appropriate affect, cooperative  Neurologic: Oriented x 3, strength symmetric in all extremities, Cranial Nerves grossly intact to confrontation, speech clear, poor memory for medical details   Skin: No rashes      Results Reviewed:  LAB RESULTS:      Lab 10/14/22  0448 10/13/22  1620   WBC 10.56 9.30   HEMOGLOBIN 13.1 14.2   HEMATOCRIT 39.8 44.1   PLATELETS 240 257   NEUTROS ABS 6.61 6.12   IMMATURE GRANS (ABS) 0.02 0.02   LYMPHS ABS 2.63 2.27   MONOS ABS 0.98* 0.68   EOS ABS 0.27 0.17   MCV 92.6 91.9   PROTIME  --  13.0         Lab 10/14/22  0448 10/13/22  1620   SODIUM 140  132*   POTASSIUM 4.1 4.0   CHLORIDE 100 94*   CO2 27.0 26.0   ANION GAP 13.0 12.0   BUN 19 19   CREATININE 1.24 1.27   EGFR 63.3 61.5   GLUCOSE 83 109*   CALCIUM 9.7 9.5   MAGNESIUM  --  2.0   TSH  --  2.580         Lab 10/13/22  1620   TOTAL PROTEIN 8.2   ALBUMIN 4.50   GLOBULIN 3.7   ALT (SGPT) 35   AST (SGOT) 40   BILIRUBIN 0.7   ALK PHOS 56         Lab 10/13/22  1620   PROBNP 2,206.0*   TROPONIN T <0.010   PROTIME 13.0   INR 0.99                 Brief Urine Lab Results     None          Microbiology Results Abnormal     None          XR Chest 1 View    Result Date: 10/13/2022  XR CHEST 1 VW-  COMPARISON: 6/23/2018  HISTORY: Dysrhythmia triage protocol  FINDINGS: Technical adequacy: Adequate Central airways: Unremarkable Heart: Borderline heart size. The size is likely accentuated by the technique. Great vessels: Atherosclerotic aorta. Mediastinum: Unremarkable Lungs: Unremarkable Pulmonary murali:Unremarkable Pleura: Unremarkable Skeletal structures: Unremarkable Extrathoracic soft tissues:Unremarkable Additional comments: None      Impression: Impression: As above. No acute cardiopulmonary disease. There might be borderline cardiomegaly.  This report was finalized on 10/13/2022 4:52 PM by Noel Calvo MD.            I have reviewed the medications:  Scheduled Meds:aspirin, 81 mg, Oral, Daily  levothyroxine, 25 mcg, Oral, Q AM  pantoprazole, 40 mg, Oral, Daily  pravastatin, 10 mg, Oral, Daily  propranolol, 20 mg, Oral, BID  sennosides-docusate, 2 tablet, Oral, BID  sodium chloride, 10 mL, Intravenous, Q12H      Continuous Infusions:dilTIAZem, 5-15 mg/hr, Last Rate: 10 mg/hr (10/14/22 0852)      PRN Meds:.•  acetaminophen **OR** acetaminophen **OR** acetaminophen  •  sodium chloride  •  sodium chloride    Assessment & Plan   Assessment & Plan     Active Hospital Problems    Diagnosis  POA   • **Atrial fibrillation with RVR (HCC) [I48.91]  Yes   • 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        Brief Hospital Course to date:  Israel Bojorquez is a 67 y.o. male 67-year-old male  With history of etohic dementia, hyperthyroidism and HTN.  Presents from NH with palpitations x 2 weeks, found to have AF RVR today on EKG.     atrial fibrillation with RVR  - Cardizem drip  - cards consult pending, echo pending  - Keep n.p.o. for possible DIMITRIOS/cardioversion  - Received Eliquis 5 mg in the ED.  Is on coumadin theoretically but INR 1 and reason unclear.       Hypothyroidism, tSH ok  Hyperlipidemia - Continue pravastatin     History of alcohol abuse  Apparent ascites, data deficit  - Reports abstinence over the past couple of months  - states he is on a fluid pill but this isn't on his med rec  - get [facility]     essential hypertension - We will need to obtain updated med list.  Patient unaware of current medications     essential tremor - Reportedly on propanolol, will need to verify medications with pharmacy in a.m.    Expected Discharge Location and Transportation: home vs SNF by car  Expected Discharge Date: 2 days     DVT prophylaxis:  Medical DVT prophylaxis orders are present.          CODE STATUS:   Code Status and Medical Interventions:   Ordered at: 10/13/22 7200     Level Of Support Discussed With:    Patient     Code Status (Patient has no pulse and is not breathing):    CPR (Attempt to Resuscitate)     Medical Interventions (Patient has pulse or is breathing):    Full Support       Anya Santo MD  10/14/22

## 2022-10-15 ENCOUNTER — READMISSION MANAGEMENT (OUTPATIENT)
Dept: CALL CENTER | Facility: HOSPITAL | Age: 67
End: 2022-10-15

## 2022-10-15 VITALS
HEIGHT: 73 IN | RESPIRATION RATE: 17 BRPM | WEIGHT: 234 LBS | HEART RATE: 80 BPM | DIASTOLIC BLOOD PRESSURE: 77 MMHG | OXYGEN SATURATION: 95 % | SYSTOLIC BLOOD PRESSURE: 125 MMHG | BODY MASS INDEX: 31.01 KG/M2 | TEMPERATURE: 97.7 F

## 2022-10-15 PROCEDURE — 99232 SBSQ HOSP IP/OBS MODERATE 35: CPT | Performed by: INTERNAL MEDICINE

## 2022-10-15 PROCEDURE — 99239 HOSP IP/OBS DSCHRG MGMT >30: CPT | Performed by: INTERNAL MEDICINE

## 2022-10-15 RX ORDER — TRIAMTERENE AND HYDROCHLOROTHIAZIDE 37.5; 25 MG/1; MG/1
1 TABLET ORAL DAILY
Status: DISCONTINUED | OUTPATIENT
Start: 2022-10-15 | End: 2022-10-15 | Stop reason: HOSPADM

## 2022-10-15 RX ORDER — QUETIAPINE FUMARATE 25 MG/1
25 TABLET, FILM COATED ORAL NIGHTLY
Status: DISCONTINUED | OUTPATIENT
Start: 2022-10-15 | End: 2022-10-15 | Stop reason: HOSPADM

## 2022-10-15 RX ORDER — DULOXETIN HYDROCHLORIDE 30 MG/1
30 CAPSULE, DELAYED RELEASE ORAL DAILY
Status: DISCONTINUED | OUTPATIENT
Start: 2022-10-15 | End: 2022-10-15 | Stop reason: HOSPADM

## 2022-10-15 RX ORDER — ACETAMINOPHEN 325 MG/1
650 TABLET ORAL EVERY 6 HOURS PRN
Start: 2022-10-15

## 2022-10-15 RX ORDER — ASPIRIN 81 MG/1
81 TABLET ORAL DAILY
Start: 2022-10-16

## 2022-10-15 RX ORDER — DONEPEZIL HYDROCHLORIDE 5 MG/1
5 TABLET, FILM COATED ORAL NIGHTLY
Status: DISCONTINUED | OUTPATIENT
Start: 2022-10-15 | End: 2022-10-15 | Stop reason: HOSPADM

## 2022-10-15 RX ORDER — ASCORBIC ACID 500 MG
500 TABLET ORAL DAILY
Status: DISCONTINUED | OUTPATIENT
Start: 2022-10-15 | End: 2022-10-15 | Stop reason: HOSPADM

## 2022-10-15 RX ORDER — PROPRANOLOL HYDROCHLORIDE 20 MG/1
40 TABLET ORAL EVERY 12 HOURS SCHEDULED
Status: DISCONTINUED | OUTPATIENT
Start: 2022-10-15 | End: 2022-10-15 | Stop reason: HOSPADM

## 2022-10-15 RX ORDER — PROPRANOLOL HYDROCHLORIDE 10 MG/1
20 TABLET ORAL 2 TIMES DAILY
Start: 2022-10-15 | End: 2022-10-21 | Stop reason: ALTCHOICE

## 2022-10-15 RX ADMIN — OXYCODONE HYDROCHLORIDE AND ACETAMINOPHEN 500 MG: 500 TABLET ORAL at 12:48

## 2022-10-15 RX ADMIN — LEVOTHYROXINE SODIUM 25 MCG: 25 TABLET ORAL at 05:06

## 2022-10-15 RX ADMIN — APIXABAN 5 MG: 5 TABLET, FILM COATED ORAL at 08:22

## 2022-10-15 RX ADMIN — Medication 10 ML: at 08:21

## 2022-10-15 RX ADMIN — PRAVASTATIN SODIUM 10 MG: 10 TABLET ORAL at 08:22

## 2022-10-15 RX ADMIN — PANTOPRAZOLE SODIUM 40 MG: 40 TABLET, DELAYED RELEASE ORAL at 08:22

## 2022-10-15 RX ADMIN — ASPIRIN 81 MG: 81 TABLET, COATED ORAL at 08:22

## 2022-10-15 RX ADMIN — TRIAMTERENE AND HYDROCHLOROTHIAZIDE 1 TABLET: 37.5; 25 TABLET ORAL at 13:33

## 2022-10-15 RX ADMIN — SENNOSIDES AND DOCUSATE SODIUM 2 TABLET: 50; 8.6 TABLET ORAL at 08:22

## 2022-10-15 RX ADMIN — METOPROLOL TARTRATE 25 MG: 25 TABLET, FILM COATED ORAL at 08:22

## 2022-10-15 RX ADMIN — DULOXETINE HYDROCHLORIDE 30 MG: 30 CAPSULE, DELAYED RELEASE ORAL at 12:48

## 2022-10-15 RX ADMIN — VALSARTAN 40 MG: 80 TABLET, FILM COATED ORAL at 08:21

## 2022-10-15 NOTE — DISCHARGE PLACEMENT REQUEST
"Carmine Bojorquez (67 y.o. Male)      Rosy Meredith RN, [phone]    Date of Birth   1955    Social Security Number       Address   [facility] [address] [place] [id number]    Home Phone   [phone]    MRN   [id number]       [employer]   None    Marital Status                               Admission Date   10/13/22    Admission Type   Emergency    Admitting Provider   Anya Santo MD    Attending Provider   Anya Santo MD    Department, Room/Bed   [facility] 6A, N613/1       Discharge Date       Discharge Disposition   Home or Self Care    Discharge Destination                               Attending Provider: Anya Santo MD    Allergies: No Known Allergies    Isolation: None   Infection: None   Code Status: CPR    Ht: 185.4 cm (73\")   Wt: 106 kg (234 lb)    Admission Cmt: None   Principal Problem: Atrial fibrillation with RVR (HCC) [I48.91]                 Active Insurance as of 10/13/2022     Primary Coverage     Payor Plan Insurance Group Employer/Plan Group    [facility] MEDICARE REPLACEMENT ANTH MEDICARE ADVANTAGE [id number]     Payor Plan Address Payor Plan Phone Number Payor Plan Fax Number Effective Dates    [address] [phone]  2019 - None Entered    [facility] [id number]       Subscriber Name Subscriber Birth Date Member ID       CARMINE BOJORQUEZ 1955 [id number]                 Emergency Contacts      (Rel.) Home Phone Work Phone Mobile Phone    Guerrero Bojorquez -- -- [phone]    [facility]isted Living ([facility]) -- [phone] --                 Discharge Summary      Anya Santo MD at 10/15/22 [facility]              [facility]  DISCHARGE SUMMARY    Patient Name: Carmine Bojorquez  : 1955  MRN: [id number]    Date of Admission: 10/13/2022  4:01 PM  Date of Discharge:  10/15/2022  Primary Care Physician: Vinicio Gonzalez MD    Consults     Date and Time Order Name " Status Description    10/14/2022 12:34 AM Inpatient Cardiology Consult Completed           Hospital Course     Presenting Problem:   Atrial fibrillation with RVR (HCC) [I48.91]    Active Hospital Problems    Diagnosis  POA   • **Atrial fibrillation with RVR (HCC) [I48.91]  Yes   • Alcoholic dementia (HCC) [F10.27]  Yes   • Hypothyroidism [E03.9]  Yes   • Alcohol abuse [F10.10]  Yes   • COPD (chronic obstructive pulmonary disease) (HCC) [J44.9]  Yes   • Essential hypertension [I10]  Yes      Resolved Hospital Problems   No resolved problems to display.          Hospital Course:  Israel Bojorquez is a 67 y.o. male with past medical history significant for alcohol abuse, Acosta's esophagus, COPD, hypothyroidism, essential tremor, hyperlipidemia, essential hypertension, SIADH and remote tobacco abuse. He lives at [facility], an assited living facility where meds are passed out by staff but he is otherwise living fairly independently.  He has recently started physical therapy.      He presents from [facility] due to two weeks of recent palpitations, sometimes associated with dyspnea or lightheadedness.  He had an EKG there on day of admission that showed AFib/RVR.  History is limited by his memory deficit.  His son is present but is also not aware of all his past history.  Records were requested from PCP but did not arrive.  His home meds were confirmed with pharmacy - he takes propranolol 20mg BID.     Atrial fibrillation/RVR; cardiomyopathy:  On arrival, EKG showed AFib with rate 148.  A diltiazem gtt was started.  BNP was elevated but CXR showed no congestion and his breathing was good throughout.  Cardiologist Dr Yuna saw him.  DIMITRIOS/ECV was done, the DIMITRIOS showing no atrial thrombus, EF of 36-40, mod mitral regurg and enlarged atria.  ECV was successful and he has remained in sinus rhythm since then.      He is discharged on Eliquis and propranolol 20mg BID.   He appears on exam to have ascites and he believes  this is so (though his memory is not good).  He knows there is some issue with his esophagus but cannot tell me if he has varices.  He does not think he has ever had esophageal bleed.  Given this, after discussion with cardiologist I will leave him on the nonselective beta blocker.  He will see cardiologist in a month and call sooner if palpitations return and persist or cause symptoms.       CKD 1.3.  Will hold off on ACEI for now given his renal issues, controlled BP and the suspicion that his CMP is rate-induced.        Discharge Follow Up Recommendations for outpatient labs/diagnostics:   *FU with Dr Yuan in one month.      *PCP FU in one week.  Consider increasing propranolol dose to 30 BID if BP will tolerate.     Day of Discharge     HPI:   Feels fine, ready to go home.  Asks if he can continue his PT program, which I think is a great idea.  We discussed symptoms of Afib including palpitations, dizziness, dyspnea, chest pressure.      Review of Systems  Gen- No fevers, chills  CV- No chest pain, palpitations  Resp- No cough, dyspnea  GI- No N/V/D, abd pain        Vital Signs:   Temp:  [97.5 °F (36.4 °C)-98.6 °F (37 °C)] 97.7 °F (36.5 °C)  Heart Rate:  [] 80  Resp:  [17-18] 17  BP: (113-147)/() 125/77  Flow (L/min):  [2-4] 3      Physical Exam:  Constitutional: Awake, alert very pleasant, son present   Eyes: PER, sclerae anicteric, no conjunctival injection  HENT: NCAT, mucous membranes moist  Neck: Supple, , trachea midline  Respiratory: Clear to auscultation bilaterally, nonlabored respirations   Cardiovascular: RRR, no murmurs.  SR by tele   Gastrointestinal: Positive bowel sounds, soft, nontender, mod distended  Musculoskeletal: No bilateral ankle edema, no clubbing or cyanosis to extremities  Psychiatric: Appropriate affect, cooperative  Neurologic: Oriented x 3,moves all extremities, Cranial Nerves grossly intact to confrontation, speech clear, memory deficit mild   Skin: No  zohrehes      Pertinent  and/or Most Recent Results     LAB RESULTS:      Lab 10/14/22  0448 10/13/22  1620   WBC 10.56 9.30   HEMOGLOBIN 13.1 14.2   HEMATOCRIT 39.8 44.1   PLATELETS 240 257   NEUTROS ABS 6.61 6.12   IMMATURE GRANS (ABS) 0.02 0.02   LYMPHS ABS 2.63 2.27   MONOS ABS 0.98* 0.68   EOS ABS 0.27 0.17   MCV 92.6 91.9   PROTIME  --  13.0         Lab 10/14/22  0448 10/13/22  1620   SODIUM 140 132*   POTASSIUM 4.1 4.0   CHLORIDE 100 94*   CO2 27.0 26.0   ANION GAP 13.0 12.0   BUN 19 19   CREATININE 1.24 1.27   EGFR 63.3 61.5   GLUCOSE 83 109*   CALCIUM 9.7 9.5   MAGNESIUM  --  2.0   HEMOGLOBIN A1C 6.20*  --    TSH  --  2.580         Lab 10/13/22  1620   TOTAL PROTEIN 8.2   ALBUMIN 4.50   GLOBULIN 3.7   ALT (SGPT) 35   AST (SGOT) 40   BILIRUBIN 0.7   ALK PHOS 56         Lab 10/13/22  1620   PROBNP 2,206.0*   TROPONIN T <0.010   PROTIME 13.0   INR 0.99                 Brief Urine Lab Results     None        Microbiology Results (last 10 days)     ** No results found for the last 240 hours. **          Adult Transesophageal Echo (DIMITRIOS) W/ Cont if Necessary Per Protocol    Result Date: 10/14/2022  •  Left ventricular ejection fraction appears to be 36 - 40%. Left ventricular systolic function is moderately decreased. •  No evidence of a left atrial appendage thrombus was present •  Severe tricuspid valve regurgitation is present. Estimated right ventricular systolic pressure from tricuspid regurgitation is moderately elevated (45-55 mmHg). •  There are myxomatous changes of the mitral valve apparatus present. There is bileaflet mitral valve prolapse present. Moderate mitral valve regurgitation is present. •  Biatrial enlargement present     Cardioversion External in Cardiology Department    Result Date: 10/14/2022  •  Post cardioversion the patient displayed a sinus rhythm. •  The cardioversion was successful.     XR Chest 1 View    Result Date: 10/13/2022  XR CHEST 1 VW-  COMPARISON: 6/23/2018  HISTORY:  Dysrhythmia triage protocol  FINDINGS: Technical adequacy: Adequate Central airways: Unremarkable Heart: Borderline heart size. The size is likely accentuated by the technique. Great vessels: Atherosclerotic aorta. Mediastinum: Unremarkable Lungs: Unremarkable Pulmonary murali:Unremarkable Pleura: Unremarkable Skeletal structures: Unremarkable Extrathoracic soft tissues:Unremarkable Additional comments: None      Impression: As above. No acute cardiopulmonary disease. There might be borderline cardiomegaly.  This report was finalized on 10/13/2022 4:52 PM by Noel Calvo MD.        Results for orders placed during the hospital encounter of 12/20/19    Doppler Arterial Lower Extremity Stress CAR    Interpretation Summary  · Right Conclusion: The right MELCHOR is normal at rest at 1.1. The MELCHOR after exercise was moderately reduced at 0.77. The test is positive for exercise induced claudication. Waveforms are suggestive of femoral level disease.  · Left Conclusion: The left MELCHOR is normal at rest at 1.1 and normal after exercise at 1.1.      Results for orders placed during the hospital encounter of 12/20/19    Doppler Arterial Lower Extremity Stress CAR    Interpretation Summary  · Right Conclusion: The right MELCHOR is normal at rest at 1.1. The MELCHOR after exercise was moderately reduced at 0.77. The test is positive for exercise induced claudication. Waveforms are suggestive of femoral level disease.  · Left Conclusion: The left MELCHOR is normal at rest at 1.1 and normal after exercise at 1.1.      Results for orders placed during the hospital encounter of 10/13/22    Adult Transesophageal Echo (DIMITRIOS) W/ Cont if Necessary Per Protocol    Interpretation Summary  •  Left ventricular ejection fraction appears to be 36 - 40%. Left ventricular systolic function is moderately decreased.  •  No evidence of a left atrial appendage thrombus was present  •  Severe tricuspid valve regurgitation is present. Estimated right ventricular  systolic pressure from tricuspid regurgitation is moderately elevated (45-55 mmHg).  •  There are myxomatous changes of the mitral valve apparatus present. There is bileaflet mitral valve prolapse present. Moderate mitral valve regurgitation is present.  •  Biatrial enlargement present      Discharge Details        Discharge Medications      New Medications      Instructions Start Date   aspirin 81 MG EC tablet  Replaces: aspirin 81 MG tablet   81 mg, Oral, Daily   Start Date: October 16, 2022     Eliquis 5 MG tablet tablet  Generic drug: apixaban   5 mg, Oral, Every 12 Hours Scheduled         Continue These Medications      Instructions Start Date   acetaminophen 325 MG tablet  Commonly known as: TYLENOL   650 mg, Oral, Every 6 Hours PRN      ascorbic acid 500 MG tablet  Commonly known as: VITAMIN C   500 mg, Oral, 2 Times Daily      CBD oral oil  Commonly known as: cannabidiol   Oral      donepezil 5 MG tablet  Commonly known as: ARICEPT   5 mg, Oral, Nightly      DULoxetine 30 MG capsule  Commonly known as: CYMBALTA   30 mg, Oral, Daily      folic acid 1 MG tablet  Commonly known as: FOLVITE   1 mg, Oral, Daily      Iron Slow Release 140 (45 Fe) MG tablet controlled-release tablet  Generic drug: ferrous sulfate   Oral, Daily With Breakfast      levothyroxine 25 MCG tablet  Commonly known as: SYNTHROID, LEVOTHROID   25 mcg, Oral, Daily      Melatonin 10 MG tablet   10 mg, Oral, Daily      Multivitamin Adults 50+ tablet tablet  Generic drug: multivitamin with minerals   1 tablet, Oral, Daily      pantoprazole 40 MG EC tablet  Commonly known as: PROTONIX   40 mg, Oral, Daily      pravastatin 10 MG tablet  Commonly known as: PRAVACHOL   20 mg, Oral, Daily      propranolol 10 MG tablet  Commonly known as: INDERAL   20 mg, Oral, 2 Times Daily      QUEtiapine 25 MG tablet  Commonly known as: SEROquel   25 mg, Oral, Nightly      sennosides-docusate 8.6-50 MG per tablet  Commonly known as: PERICOLACE   2 tablets, Oral, 2  Times Daily      triamterene-hydrochlorothiazide 37.5-25 MG per tablet  Commonly known as: MAXZIDE-25   1 tablet, Oral, Daily      vitamin D3 125 MCG (5000 UT) capsule capsule   5,000 Units, Oral, Daily      Zinc 50 MG tablet   1 tablet, Oral, 2 Times Daily         Stop These Medications    aspirin 81 MG tablet  Replaced by: aspirin 81 MG EC tablet            No Known Allergies      Discharge Disposition:  Home or Self Care    Diet:  Hospital:  Diet Order   Procedures   • Diet Regular       Activity: as tolerated      Restrictions or Other Recommendations:  Get vitals checked if any palpitations or dizziness.  Call cardiologist if palpitations persist.        CODE STATUS:    Code Status and Medical Interventions:   Ordered at: 10/13/22 5554     Level Of Support Discussed With:    Patient     Code Status (Patient has no pulse and is not breathing):    CPR (Attempt to Resuscitate)     Medical Interventions (Patient has pulse or is breathing):    Full Support         Additional Instructions for the Follow-ups that You Need to Schedule     Discharge Follow-up with Specified Provider: ashley; 1 Month   As directed      To: ashley    Follow Up: 1 Month                     Anya Santo MD  10/15/22      Time Spent on Discharge:  I spent  45  minutes on this discharge activity which included: face-to-face encounter with the patient, reviewing the data in the system, coordination of the care with the nursing staff as well as consultants, documentation, and entering orders.            Electronically signed by Anya Santo MD at 10/15/22 1856

## 2022-10-15 NOTE — DISCHARGE SUMMARY
[facility]  DISCHARGE SUMMARY    Patient Name: Israel Bojorquez  : 1955  MRN: [id number]    Date of Admission: 10/13/2022  4:01 PM  Date of Discharge:  10/15/2022  Primary Care Physician: Vinicio Gonzalez MD    Consults     Date and Time Order Name Status Description    10/14/2022 12:34 AM Inpatient Cardiology Consult Completed           Hospital Course     Presenting Problem:   Atrial fibrillation with RVR (HCC) [I48.91]    Active Hospital Problems    Diagnosis  POA   • **Atrial fibrillation with RVR (HCC) [I48.91]  Yes   • Alcoholic dementia (HCC) [F10.27]  Yes   • Hypothyroidism [E03.9]  Yes   • Alcohol abuse [F10.10]  Yes   • COPD (chronic obstructive pulmonary disease) (HCC) [J44.9]  Yes   • Essential hypertension [I10]  Yes      Resolved Hospital Problems   No resolved problems to display.          Hospital Course:  Israel Bojorquez is a 67 y.o. male with past medical history significant for alcohol abuse, Acosta's esophagus, COPD, hypothyroidism, essential tremor, hyperlipidemia, essential hypertension, SIADH and remote tobacco abuse. He lives at [facility], an assited living facility where meds are passed out by staff but he is otherwise living fairly independently.  He has recently started physical therapy.      He presents from [facility] due to two weeks of recent palpitations, sometimes associated with dyspnea or lightheadedness.  He had an EKG there on day of admission that showed AFib/RVR.  History is limited by his memory deficit.  His son is present but is also not aware of all his past history.  Records were requested from PCP but did not arrive.  His home meds were confirmed with pharmacy - he takes propranolol 20mg BID.     Atrial fibrillation/RVR; cardiomyopathy:  On arrival, EKG showed AFib with rate 148.  A diltiazem gtt was started.  BNP was elevated but CXR showed no congestion and his breathing was good throughout.  Cardiologist   Kwabena saw him.  DIMITRIOS/ECV was done, the DIMITRIOS showing no atrial thrombus, EF of 36-40, mod mitral regurg and enlarged atria.  ECV was successful and he has remained in sinus rhythm since then.      He is discharged on Eliquis and propranolol 20mg BID.   He appears on exam to have ascites and he believes this is so (though his memory is not good).  He knows there is some issue with his esophagus but cannot tell me if he has varices.  He does not think he has ever had esophageal bleed.  Given this, after discussion with cardiologist I will leave him on the nonselective beta blocker.  He will see cardiologist in a month and call sooner if palpitations return and persist or cause symptoms.       CKD 1.3.  Will hold off on ACEI for now given his renal issues, controlled BP and the suspicion that his CMP is rate-induced.        Discharge Follow Up Recommendations for outpatient labs/diagnostics:   *FU with Dr Yuan in one month.      *PCP FU in one week.  Consider increasing propranolol dose to 30 BID if BP will tolerate.     Day of Discharge     HPI:   Feels fine, ready to go home.  Asks if he can continue his PT program, which I think is a great idea.  We discussed symptoms of Afib including palpitations, dizziness, dyspnea, chest pressure.      Review of Systems  Gen- No fevers, chills  CV- No chest pain, palpitations  Resp- No cough, dyspnea  GI- No N/V/D, abd pain        Vital Signs:   Temp:  [97.5 °F (36.4 °C)-98.6 °F (37 °C)] 97.7 °F (36.5 °C)  Heart Rate:  [] 80  Resp:  [17-18] 17  BP: (113-147)/() 125/77  Flow (L/min):  [2-4] 3      Physical Exam:  Constitutional: Awake, alert very pleasant, son present   Eyes: PER, sclerae anicteric, no conjunctival injection  HENT: NCAT, mucous membranes moist  Neck: Supple, , trachea midline  Respiratory: Clear to auscultation bilaterally, nonlabored respirations   Cardiovascular: RRR, no murmurs.  SR by tele   Gastrointestinal: Positive bowel sounds, soft,  nontender, mod distended  Musculoskeletal: No bilateral ankle edema, no clubbing or cyanosis to extremities  Psychiatric: Appropriate affect, cooperative  Neurologic: Oriented x 3,moves all extremities, Cranial Nerves grossly intact to confrontation, speech clear, memory deficit mild   Skin: No rashes      Pertinent  and/or Most Recent Results     LAB RESULTS:      Lab 10/14/22  0448 10/13/22  1620   WBC 10.56 9.30   HEMOGLOBIN 13.1 14.2   HEMATOCRIT 39.8 44.1   PLATELETS 240 257   NEUTROS ABS 6.61 6.12   IMMATURE GRANS (ABS) 0.02 0.02   LYMPHS ABS 2.63 2.27   MONOS ABS 0.98* 0.68   EOS ABS 0.27 0.17   MCV 92.6 91.9   PROTIME  --  13.0         Lab 10/14/22  0448 10/13/22  1620   SODIUM 140 132*   POTASSIUM 4.1 4.0   CHLORIDE 100 94*   CO2 27.0 26.0   ANION GAP 13.0 12.0   BUN 19 19   CREATININE 1.24 1.27   EGFR 63.3 61.5   GLUCOSE 83 109*   CALCIUM 9.7 9.5   MAGNESIUM  --  2.0   HEMOGLOBIN A1C 6.20*  --    TSH  --  2.580         Lab 10/13/22  1620   TOTAL PROTEIN 8.2   ALBUMIN 4.50   GLOBULIN 3.7   ALT (SGPT) 35   AST (SGOT) 40   BILIRUBIN 0.7   ALK PHOS 56         Lab 10/13/22  1620   PROBNP 2,206.0*   TROPONIN T <0.010   PROTIME 13.0   INR 0.99                 Brief Urine Lab Results     None        Microbiology Results (last 10 days)     ** No results found for the last 240 hours. **          Adult Transesophageal Echo (DIMITRIOS) W/ Cont if Necessary Per Protocol    Result Date: 10/14/2022  •  Left ventricular ejection fraction appears to be 36 - 40%. Left ventricular systolic function is moderately decreased. •  No evidence of a left atrial appendage thrombus was present •  Severe tricuspid valve regurgitation is present. Estimated right ventricular systolic pressure from tricuspid regurgitation is moderately elevated (45-55 mmHg). •  There are myxomatous changes of the mitral valve apparatus present. There is bileaflet mitral valve prolapse present. Moderate mitral valve regurgitation is present. •  Biatrial  enlargement present     Cardioversion External in Cardiology Department    Result Date: 10/14/2022  •  Post cardioversion the patient displayed a sinus rhythm. •  The cardioversion was successful.     XR Chest 1 View    Result Date: 10/13/2022  XR CHEST 1 VW-  COMPARISON: 6/23/2018  HISTORY: Dysrhythmia triage protocol  FINDINGS: Technical adequacy: Adequate Central airways: Unremarkable Heart: Borderline heart size. The size is likely accentuated by the technique. Great vessels: Atherosclerotic aorta. Mediastinum: Unremarkable Lungs: Unremarkable Pulmonary murali:Unremarkable Pleura: Unremarkable Skeletal structures: Unremarkable Extrathoracic soft tissues:Unremarkable Additional comments: None      Impression: As above. No acute cardiopulmonary disease. There might be borderline cardiomegaly.  This report was finalized on 10/13/2022 4:52 PM by Noel Calvo MD.        Results for orders placed during the hospital encounter of 12/20/19    Doppler Arterial Lower Extremity Stress CAR    Interpretation Summary  · Right Conclusion: The right MELCHOR is normal at rest at 1.1. The MELCHOR after exercise was moderately reduced at 0.77. The test is positive for exercise induced claudication. Waveforms are suggestive of femoral level disease.  · Left Conclusion: The left MELCHOR is normal at rest at 1.1 and normal after exercise at 1.1.      Results for orders placed during the hospital encounter of 12/20/19    Doppler Arterial Lower Extremity Stress CAR    Interpretation Summary  · Right Conclusion: The right MELCHOR is normal at rest at 1.1. The MELCHOR after exercise was moderately reduced at 0.77. The test is positive for exercise induced claudication. Waveforms are suggestive of femoral level disease.  · Left Conclusion: The left MELCHOR is normal at rest at 1.1 and normal after exercise at 1.1.      Results for orders placed during the hospital encounter of 10/13/22    Adult Transesophageal Echo (DIMITRIOS) W/ Cont if Necessary Per  Protocol    Interpretation Summary  •  Left ventricular ejection fraction appears to be 36 - 40%. Left ventricular systolic function is moderately decreased.  •  No evidence of a left atrial appendage thrombus was present  •  Severe tricuspid valve regurgitation is present. Estimated right ventricular systolic pressure from tricuspid regurgitation is moderately elevated (45-55 mmHg).  •  There are myxomatous changes of the mitral valve apparatus present. There is bileaflet mitral valve prolapse present. Moderate mitral valve regurgitation is present.  •  Biatrial enlargement present      Discharge Details        Discharge Medications      New Medications      Instructions Start Date   aspirin 81 MG EC tablet  Replaces: aspirin 81 MG tablet   81 mg, Oral, Daily   Start Date: October 16, 2022     Eliquis 5 MG tablet tablet  Generic drug: apixaban   5 mg, Oral, Every 12 Hours Scheduled         Continue These Medications      Instructions Start Date   acetaminophen 325 MG tablet  Commonly known as: TYLENOL   650 mg, Oral, Every 6 Hours PRN      ascorbic acid 500 MG tablet  Commonly known as: VITAMIN C   500 mg, Oral, 2 Times Daily      CBD oral oil  Commonly known as: cannabidiol   Oral      donepezil 5 MG tablet  Commonly known as: ARICEPT   5 mg, Oral, Nightly      DULoxetine 30 MG capsule  Commonly known as: CYMBALTA   30 mg, Oral, Daily      folic acid 1 MG tablet  Commonly known as: FOLVITE   1 mg, Oral, Daily      Iron Slow Release 140 (45 Fe) MG tablet controlled-release tablet  Generic drug: ferrous sulfate   Oral, Daily With Breakfast      levothyroxine 25 MCG tablet  Commonly known as: SYNTHROID, LEVOTHROID   25 mcg, Oral, Daily      Melatonin 10 MG tablet   10 mg, Oral, Daily      Multivitamin Adults 50+ tablet tablet  Generic drug: multivitamin with minerals   1 tablet, Oral, Daily      pantoprazole 40 MG EC tablet  Commonly known as: PROTONIX   40 mg, Oral, Daily      pravastatin 10 MG tablet  Commonly  known as: PRAVACHOL   20 mg, Oral, Daily      propranolol 10 MG tablet  Commonly known as: INDERAL   20 mg, Oral, 2 Times Daily      QUEtiapine 25 MG tablet  Commonly known as: SEROquel   25 mg, Oral, Nightly      sennosides-docusate 8.6-50 MG per tablet  Commonly known as: PERICOLACE   2 tablets, Oral, 2 Times Daily      triamterene-hydrochlorothiazide 37.5-25 MG per tablet  Commonly known as: MAXZIDE-25   1 tablet, Oral, Daily      vitamin D3 125 MCG (5000 UT) capsule capsule   5,000 Units, Oral, Daily      Zinc 50 MG tablet   1 tablet, Oral, 2 Times Daily         Stop These Medications    aspirin 81 MG tablet  Replaced by: aspirin 81 MG EC tablet            No Known Allergies      Discharge Disposition:  Home or Self Care    Diet:  Hospital:  Diet Order   Procedures   • Diet Regular       Activity: as tolerated      Restrictions or Other Recommendations:  Get vitals checked if any palpitations or dizziness.  Call cardiologist if palpitations persist.        CODE STATUS:    Code Status and Medical Interventions:   Ordered at: 10/13/22 5080     Level Of Support Discussed With:    Patient     Code Status (Patient has no pulse and is not breathing):    CPR (Attempt to Resuscitate)     Medical Interventions (Patient has pulse or is breathing):    Full Support         Additional Instructions for the Follow-ups that You Need to Schedule     Discharge Follow-up with Specified Provider: ashley; 1 Month   As directed      To: ashley    Follow Up: 1 Month                     Anya Santo MD  10/15/22      Time Spent on Discharge:  I spent  45  minutes on this discharge activity which included: face-to-face encounter with the patient, reviewing the data in the system, coordination of the care with the nursing staff as well as consultants, documentation, and entering orders.

## 2022-10-15 NOTE — PROGRESS NOTES
"      Cardiac Electrophysiology Inpatient Follow Up Note         [facility]    Progress Note    INITIAL REASON FOR CONSULT: Persistent atrial fibrillation    CHIEF COMPLAINT:  Chief Complaint   Patient presents with   • Atrial Fibrillation     Persistent atrial fibrillation    Subjective   Mr. Israel Bojorquez denies vomiting, abdominal pain, fussiness, diarrhea, cough and difficulty breathing.      REVIEW OF SYSTEMS  ROS no change from admission H&P except for: above    Objective   VITAL SIGNS  /73 (BP Location: Left arm, Patient Position: Sitting)   Pulse 80   Temp 97.7 °F (36.5 °C) (Oral)   Resp 17   Ht 185.4 cm (73\")   Wt 106 kg (234 lb)   SpO2 95%   BMI 30.87 kg/m²   [unfilled]  Pulse Ox: SpO2  Av.3 %  Min: 92 %  Max: 100 %  Supplemental O2: O2 Flow Rate (L/min): 15 L/min     Admit Weight  Weight: 81.6 kg (180 lb)  Last 3 Weights  [unfilled]  Body mass index is 30.87 kg/m².    INTAKE/OUTPUT  I/O last 3 completed shifts:  In: 126 [I.V.:126]  Out: 745 [Urine:745]    Intake/Output Summary (Last 24 hours) at 10/15/2022 1114  Last data filed at 10/14/2022 1200  Gross per 24 hour   Intake --   Output 175 ml   Net -175 ml       PHYSICAL EXAM  General appearance: awake, alert, oriented, moves all extremities  Lungs: no rhonchi, no wheezes, no rales  Heart: Regular rate and rhythm  Abdomen: positive bowel sounds, no bruits, no masses  Extremities: warm and dry, no cyanosis, no clubbing    LABS  Results from last 7 days   Lab Units 10/14/22  0448 10/13/22  1620   WBC 10*3/mm3 10.56 9.30   HEMOGLOBIN g/dL 13.1 14.2   HEMATOCRIT % 39.8 44.1   MCV fL 92.6 91.9   PLATELETS 10*3/mm3 240 257         Results from last 7 days   Lab Units 10/14/22  0448 10/13/22  1620   POTASSIUM mmol/L 4.1 4.0   CHLORIDE mmol/L 100 94*   CO2 mmol/L 27.0 26.0   BUN mg/dL 19 19   CREATININE mg/dL 1.24 1.27   GLUCOSE mg/dL 83 109*   CALCIUM mg/dL 9.7 9.5   MAGNESIUM mg/dL  --  2.0 "     Results from last 7 days   Lab Units 10/13/22  1620   PROTIME Seconds 13.0   INR  0.99         Results from last 7 days   Lab Units 10/13/22  1620   MAGNESIUM mg/dL 2.0                 No results found for: CHOL, TRIG, HDL    CURRENT MEDICATIONS  apixaban, 5 mg, Oral, Q12H  aspirin, 81 mg, Oral, Daily  levothyroxine, 25 mcg, Oral, Q AM  metoprolol tartrate, 25 mg, Oral, Q12H  pantoprazole, 40 mg, Oral, Daily  pravastatin, 10 mg, Oral, Daily  sennosides-docusate, 2 tablet, Oral, BID  sodium chloride, 10 mL, Intravenous, Q12H  valsartan, 40 mg, Oral, Q24H      CONTINUOUS INFUSIONS           EKG: Sinus  ECHO:REVIEWED   STRESS: REVIEWED  CATH: REVIEWED  CXR: Noted    TELEMETRY: Sinus rhythm         Diagnosis Plan   1. Atrial fibrillation with RVR (HCC)   rhythm control strategy.  Status post cardioversion.    In the context of significant alcohol consumption, SIADH, Warnicke's encephalopathy, dementia.    Apixaban 5 mg orally twice daily.    [id number] = 2    Okay to be discharged home from any point from our perspective.    Follow-up with Dr. Yuan     Body mass index is 30.87 kg/m².    I spent 35 minutes in consultation with this patient which included more than 65% of this time in direct face-to-face counseling, physical examination and discussion of my assessment and findings and shared decision making with the patient.  The remainder of the time not spent face to face was performing one, some or all of the following actions:  preparing to see this patient ( eg. Review of tests),  ordering medications, tests or procedures ), care coordination, discussion of the plan with other healthcare providers, documenting clinical information in Epic well as independently interpreting results and communicating results to patient, family and or caregiver.  All time noted occurred on the date of service.        Telly Cintron DO, FACC, RS  Cardiac Electrophysiologist  [facility] / [facility]

## 2022-10-15 NOTE — PROGRESS NOTES
Patient is on Apixaban.  Education provided on 10/15/22 verbally and in writing to patient and son.  Information provided includes effects of medication, drug-drug and drug-food interactions, and signs/symptoms of bleeding and clotting.  Patient verbalized understanding through teach back.  All pertinent questions were answered.     Jim Morejon, PharmD, BCPS  10/15/2022  15:51 EDT

## 2022-10-16 LAB
QT INTERVAL: 440 MS
QTC INTERVAL: 497 MS

## 2022-10-16 NOTE — OUTREACH NOTE
Prep Survey    Flowsheet Row Responses   [facility] facility patient discharged from? [facility]   Is LACE score < 7 ? No   Emergency Room discharge w/ pulse ox? No   Eligibility Readm Mgmt   Discharge diagnosis AFIB/RVR   Does the patient have one of the following disease processes/diagnoses(primary or secondary)? Other   Does the patient have Home health ordered? No   Is there a DME ordered? No   Medication alerts for this patient eliquis   General alerts for this patient HX COPD   Prep survey completed? Yes          ALICE GILL - Registered Nurse

## 2022-10-18 ENCOUNTER — READMISSION MANAGEMENT (OUTPATIENT)
Dept: CALL CENTER | Facility: HOSPITAL | Age: 67
End: 2022-10-18

## 2022-10-18 NOTE — OUTREACH NOTE
Medical Week 1 Survey    Flowsheet Row Responses   [facility] patient discharged from? [facility]   Does the patient have one of the following disease processes/diagnoses(primary or secondary)? Other   Week 1 attempt successful? Yes   Call start time 1513   Call end time 1516   General alerts for this patient HX COPD,  please contact Will if pt does not answer   Discharge diagnosis AFIB/RVR   Is patient permission given to speak with other caregiver? Yes   List who call center can speak with Will son    Person spoke with today (if not patient) and relationship Will son    Meds reviewed with patient/caregiver? Yes   Is the patient having any side effects they believe may be caused by any medication additions or changes? No   Does the patient have all medications ordered at discharge? Yes   Is the patient taking all medications as directed (includes completed medication regime)? Yes   Comments regarding appointments cards apt on 10/21/22   Does the patient have a primary care provider?  Yes   Has the patient kept scheduled appointments due by today? N/A   Psychosocial issues? No   Did the patient receive a copy of their discharge instructions? Yes   Nursing interventions Reviewed instructions with patient   What is the patient's perception of their health status since discharge? Improving   Is the patient/caregiver able to teach back signs and symptoms related to disease process for when to call PCP? Yes   Is the patient/caregiver able to teach back signs and symptoms related to disease process for when to call 911? Yes   Is the patient/caregiver able to teach back the hierarchy of who to call/visit for symptoms/problems? PCP, Specialist, Home health nurse, Urgent Care, ED, 911 Yes   If the patient is a current smoker, are they able to teach back resources for cessation? Not a smoker   Week 1 call completed? Yes   Wrap up additional comments Will states pt resides at [facility]-no issues at this  time          JAX H - Registered Nurse

## 2022-10-19 LAB
QT INTERVAL: 328 MS
QTC INTERVAL: 514 MS

## 2022-10-21 ENCOUNTER — OFFICE VISIT (OUTPATIENT)
Dept: CARDIOLOGY | Facility: HOSPITAL | Age: 67
End: 2022-10-21

## 2022-10-21 ENCOUNTER — HOSPITAL ENCOUNTER (OUTPATIENT)
Dept: CARDIOLOGY | Facility: HOSPITAL | Age: 67
Discharge: HOME | End: 2022-10-21

## 2022-10-21 VITALS
WEIGHT: 238.2 LBS | BODY MASS INDEX: 31.57 KG/M2 | DIASTOLIC BLOOD PRESSURE: 73 MMHG | SYSTOLIC BLOOD PRESSURE: 150 MMHG | TEMPERATURE: 96.7 F | HEIGHT: 73 IN | OXYGEN SATURATION: 98 % | HEART RATE: 173 BPM | RESPIRATION RATE: 18 BRPM

## 2022-10-21 DIAGNOSIS — I48.0 PAF (PAROXYSMAL ATRIAL FIBRILLATION): Primary | ICD-10-CM

## 2022-10-21 DIAGNOSIS — I48.0 PAF (PAROXYSMAL ATRIAL FIBRILLATION): ICD-10-CM

## 2022-10-21 DIAGNOSIS — J44.9 CHRONIC OBSTRUCTIVE PULMONARY DISEASE, UNSPECIFIED COPD TYPE: Chronic | ICD-10-CM

## 2022-10-21 DIAGNOSIS — I10 ESSENTIAL HYPERTENSION: ICD-10-CM

## 2022-10-21 LAB
QT INTERVAL: 324 MS
QTC INTERVAL: 500 MS

## 2022-10-21 PROCEDURE — 93005 ELECTROCARDIOGRAM TRACING: CPT | Performed by: NURSE PRACTITIONER

## 2022-10-21 PROCEDURE — 93010 ELECTROCARDIOGRAM REPORT: CPT | Performed by: STUDENT IN AN ORGANIZED HEALTH CARE EDUCATION/TRAINING PROGRAM

## 2022-10-21 PROCEDURE — 99214 OFFICE O/P EST MOD 30 MIN: CPT | Performed by: NURSE PRACTITIONER

## 2022-10-21 RX ORDER — AMIODARONE HYDROCHLORIDE 200 MG/1
200 TABLET ORAL 2 TIMES DAILY
Qty: 60 TABLET | Refills: 3 | Status: ON HOLD | OUTPATIENT
Start: 2022-10-21 | End: 2022-11-11 | Stop reason: SDUPTHER

## 2022-10-21 RX ORDER — CARVEDILOL 12.5 MG/1
1 TABLET ORAL 2 TIMES DAILY
COMMUNITY
Start: 2022-10-20 | End: 2022-10-21

## 2022-10-26 ENCOUNTER — READMISSION MANAGEMENT (OUTPATIENT)
Dept: CALL CENTER | Facility: HOSPITAL | Age: 67
End: 2022-10-26

## 2022-10-26 NOTE — OUTREACH NOTE
Medical Week 2 Survey    Flowsheet Row Responses   [facility] patient discharged from? [place]   Does the patient have one of the following disease processes/diagnoses(primary or secondary)? Other   Week 2 attempt successful? Yes   Call start time 0953   General alerts for this patient HX COPD,  please contact Will if pt does not answer   Discharge diagnosis AFIB/RVR   Call end time 0956   Person spoke with today (if not patient) and relationship Will son    Is the patient taking all medications as directed (includes completed medication regime)? Yes   Medication comments Son states medications has been adjusted by cardiology   Does the patient have a primary care provider?  Yes   Has the patient kept scheduled appointments due by today? Yes   Comments Has a nurse practioner that sees him at the facility.   Psychosocial issues? No   Did the patient receive a copy of their discharge instructions? Yes   Nursing interventions Reviewed instructions with patient   What is the patient's perception of their health status since discharge? Same   Is the patient/caregiver able to teach back signs and symptoms related to disease process for when to call PCP? Yes   Is the patient/caregiver able to teach back signs and symptoms related to disease process for when to call 911? Yes   Is the patient/caregiver able to teach back the hierarchy of who to call/visit for symptoms/problems? PCP, Specialist, Home health nurse, Urgent Care, ED, 911 Yes   If the patient is a current smoker, are they able to teach back resources for cessation? Not a smoker   Additional teach back comments States he is still in afib and they watch is bp and his heart rate closely in the facility.  He is to have cardioversion scheduled to be done next week.    Week 2 Call Completed? Yes   Wrap up additional comments Son to contact cardiology to see when cardioversion is scheduled          DAREN DENTON - Licensed Nurse

## 2022-10-27 NOTE — PROGRESS NOTES
"Chief Complaint  Establish Care, Dizziness, and Atrial Fibrillation    Subjective    History of Present Illness {  Problem List  Visit  Diagnosis   Encounters  Notes  Medications  Labs  Result Review Imaging  Media :23}       History of Present Illness   67 year old male presented to the office today for ongoing evaluation of his afib.Past medical history significant for alcohol abuse, Acosta's esophagus, COPD, hypothyroidism, essential tremor, hyperlipidemia, essential hypertension, SIADH and remote tobacco abuse. He lives at [facility], an assisted living facility. Recently hospitalized at [facility] with afib with RVR, DIMITRIOS/ECV was done, the DIMITRIOS showing no atrial thrombus, EF of 36-40, mod mitral regurg and enlarged atria.  ECV was successful and he has remained in sinus rhythm since then.  Patient notes that he has been out of rhythm, back in afib since yesterday. Notes fatigue. Recently had propranolol switched to coreg. Currently denies chest pain, dizziness, presyncope, syncope or pedal edema.   Objective     Vital Signs:   Vitals:    10/21/22 0901 10/21/22 0904 10/21/22 0905   BP: 119/59 113/85 150/73   BP Location: Right arm Left arm Left arm   Patient Position: Sitting Sitting Standing   Cuff Size: Adult Adult Adult   Pulse: (!) 176 107 (!) 173   Resp: 18     Temp: 96.7 °F (35.9 °C)     TempSrc: Temporal     SpO2: 98%     Weight: 108 kg (238 lb 3.2 oz)     Height: 185.4 cm (73\")       Body mass index is 31.43 kg/m².  Physical Exam  Vitals and nursing note reviewed.   Constitutional:       Appearance: Normal appearance.   HENT:      Head: Normocephalic.   Eyes:      Pupils: Pupils are equal, round, and reactive to light.   Cardiovascular:      Rate and Rhythm: Tachycardia present. Rhythm irregular.      Pulses: Normal pulses.      Heart sounds: Normal heart sounds. No murmur heard.  Pulmonary:      Effort: Pulmonary effort is normal.      Breath sounds: Normal breath sounds.   Abdominal:      General: " Bowel sounds are normal.      Palpations: Abdomen is soft.   Musculoskeletal:         General: Normal range of motion.      Cervical back: Normal range of motion.      Right lower leg: No edema.      Left lower leg: No edema.   Skin:     General: Skin is warm and dry.      Capillary Refill: Capillary refill takes less than 2 seconds.   Neurological:      Mental Status: He is alert and oriented to person, place, and time.   Psychiatric:         Mood and Affect: Mood normal.         Thought Content: Thought content normal.              Result Review  Data Reviewed:{ Labs  Result Review  Imaging  Med Tab  Media :23}     Ekg: Vent. Rate : 143 BPM     Atrial Rate : 312 BPM     P-R Int :   * ms          QRS Dur :  80 ms      QT Int : 324 ms       P-R-T Axes :   *  67  54 degrees     QTc Int : 500 ms     Atrial fibrillation with rapid ventricular response  Low voltage QRS  Septal infarct , age undetermined  Abnormal ECG  When compared with ECG of 14-OCT-2022 14:27,  Atrial fibrillation has replaced Sinus rhythm  Vent. rate has increased BY  66 BPM  Septal infarct is now present  Nonspecific T wave abnormality no longer evident in Inferior leads  Confirmed by JOSE D EISENBERG MD ([id number]) on 10/21/2022 10:04:28 AM           Assessment and Plan {CC Problem List  Visit Diagnosis  ROS  Review (Popup)  Health Maintenance  Quality  BestPractice  Medications  SmartSets  SnapShot Encounters  Media :23}   1. PAF (paroxysmal atrial fibrillation) (HCC)  Discussed plan of care with Dr Yuan  Metoprolol tartrate 25 mg given po in office with slight reduction in hr   Begin amiodarone 200 mg bid with repeat ekg on Monday  Change coreg to metoprolol tartrate 25 mg bid  Repeat ecv in 2 weeks   - ECG 12 Lead; Future  - Cardioversion External in Cardiology Department; Future    2. Essential hypertension  Well controlled on maxzide    3. Chronic obstructive pulmonary disease, unspecified COPD type (HCC)  Currently without  exacerbation         Follow Up {Instructions Charge Capture  Follow-up Communications :23}   Return if symptoms worsen or fail to improve.    Patient was given instructions and counseling regarding his condition or for health maintenance advice. Please see specific information pulled into the AVS if appropriate.  Patient was instructed to call the [facility] with any questions, concerns, or worsening symptoms.

## 2022-10-31 ENCOUNTER — PREP FOR SURGERY (OUTPATIENT)
Dept: OTHER | Facility: HOSPITAL | Age: 67
End: 2022-10-31

## 2022-10-31 DIAGNOSIS — I48.0 PAF (PAROXYSMAL ATRIAL FIBRILLATION): Primary | ICD-10-CM

## 2022-10-31 RX ORDER — SODIUM CHLORIDE 0.9 % (FLUSH) 0.9 %
10 SYRINGE (ML) INJECTION AS NEEDED
Status: CANCELLED | OUTPATIENT
Start: 2022-10-31

## 2022-10-31 RX ORDER — SODIUM CHLORIDE 0.9 % (FLUSH) 0.9 %
10 SYRINGE (ML) INJECTION EVERY 12 HOURS SCHEDULED
Status: CANCELLED | OUTPATIENT
Start: 2022-10-31

## 2022-11-03 ENCOUNTER — READMISSION MANAGEMENT (OUTPATIENT)
Dept: CALL CENTER | Facility: HOSPITAL | Age: 67
End: 2022-11-03

## 2022-11-03 NOTE — OUTREACH NOTE
Medical Week 3 Survey    Flowsheet Row Responses   [facility] patient discharged from? [facility]   Does the patient have one of the following disease processes/diagnoses(primary or secondary)? Other   Week 3 attempt successful? Yes   Call start time 1458   Call end time 1501   General alerts for this patient Please contact Will if pt does not answer   Discharge diagnosis AFIB/RVR   Person spoke with today (if not patient) and relationship Will son    Meds reviewed with patient/caregiver? Yes   Is the patient taking all medications as directed (includes completed medication regime)? Yes   Comments regarding appointments Appt on 11/11/22   Has the patient kept scheduled appointments due by today? Yes   What is the patient's perception of their health status since discharge? Improving   Week 3 Call Completed? Yes   Revoked No further contact(revokes)-requires comment   Graduated/Revoked comments Son did not want further calls          HALI HOPKINS - Registered Nurse

## 2022-11-04 ENCOUNTER — APPOINTMENT (OUTPATIENT)
Dept: CARDIOLOGY | Facility: HOSPITAL | Age: 67
End: 2022-11-04

## 2022-11-11 ENCOUNTER — HOSPITAL ENCOUNTER (OUTPATIENT)
Dept: CARDIOLOGY | Facility: HOSPITAL | Age: 67
Discharge: HOME | End: 2022-11-11
Attending: INTERNAL MEDICINE | Admitting: NURSE PRACTITIONER

## 2022-11-11 VITALS
OXYGEN SATURATION: 94 % | RESPIRATION RATE: 16 BRPM | DIASTOLIC BLOOD PRESSURE: 86 MMHG | WEIGHT: 238.6 LBS | HEART RATE: 67 BPM | HEIGHT: 73 IN | SYSTOLIC BLOOD PRESSURE: 148 MMHG | BODY MASS INDEX: 31.62 KG/M2 | TEMPERATURE: 97.4 F

## 2022-11-11 DIAGNOSIS — I48.92 ATRIAL FLUTTER, UNSPECIFIED TYPE: ICD-10-CM

## 2022-11-11 DIAGNOSIS — I48.19 ATRIAL FIBRILLATION, PERSISTENT: ICD-10-CM

## 2022-11-11 DIAGNOSIS — I48.91 ATRIAL FIBRILLATION, UNSPECIFIED TYPE: ICD-10-CM

## 2022-11-11 DIAGNOSIS — I48.0 PAF (PAROXYSMAL ATRIAL FIBRILLATION): ICD-10-CM

## 2022-11-11 LAB
MAXIMAL PREDICTED HEART RATE: 152 BPM
QT INTERVAL: 480 MS
QTC INTERVAL: 492 MS
STRESS TARGET HR: 129 BPM

## 2022-11-11 PROCEDURE — 36415 COLL VENOUS BLD VENIPUNCTURE: CPT

## 2022-11-11 PROCEDURE — 93010 ELECTROCARDIOGRAM REPORT: CPT | Performed by: INTERNAL MEDICINE

## 2022-11-11 PROCEDURE — 93005 ELECTROCARDIOGRAM TRACING: CPT | Performed by: INTERNAL MEDICINE

## 2022-11-11 PROCEDURE — 92960 CARDIOVERSION ELECTRIC EXT: CPT

## 2022-11-11 RX ORDER — MIDAZOLAM HYDROCHLORIDE 1 MG/ML
2-8 INJECTION INTRAMUSCULAR; INTRAVENOUS ONCE AS NEEDED
Status: DISCONTINUED | OUTPATIENT
Start: 2022-11-11 | End: 2022-11-11 | Stop reason: HOSPADM

## 2022-11-11 RX ORDER — VALSARTAN 40 MG/1
40 TABLET ORAL DAILY
Qty: 30 TABLET | Refills: 11 | Status: SHIPPED | OUTPATIENT
Start: 2022-11-11

## 2022-11-11 RX ORDER — FLUMAZENIL 0.1 MG/ML
0.5 INJECTION INTRAVENOUS ONCE AS NEEDED
Status: DISCONTINUED | OUTPATIENT
Start: 2022-11-11 | End: 2022-11-11 | Stop reason: HOSPADM

## 2022-11-11 RX ORDER — AMIODARONE HYDROCHLORIDE 200 MG/1
200 TABLET ORAL DAILY
Qty: 60 TABLET | Refills: 3 | Status: SHIPPED | OUTPATIENT
Start: 2022-11-11

## 2022-11-11 RX ORDER — FENTANYL CITRATE 50 UG/ML
50-100 INJECTION, SOLUTION INTRAMUSCULAR; INTRAVENOUS ONCE AS NEEDED
Status: DISCONTINUED | OUTPATIENT
Start: 2022-11-11 | End: 2022-11-11 | Stop reason: HOSPADM

## 2022-11-11 RX ORDER — SODIUM CHLORIDE 0.9 % (FLUSH) 0.9 %
10 SYRINGE (ML) INJECTION EVERY 12 HOURS SCHEDULED
Status: DISCONTINUED | OUTPATIENT
Start: 2022-11-11 | End: 2022-11-11 | Stop reason: HOSPADM

## 2022-11-11 RX ORDER — NALOXONE HCL 0.4 MG/ML
0.4 VIAL (ML) INJECTION ONCE AS NEEDED
Status: DISCONTINUED | OUTPATIENT
Start: 2022-11-11 | End: 2022-11-11 | Stop reason: HOSPADM

## 2022-11-11 RX ORDER — SODIUM CHLORIDE 0.9 % (FLUSH) 0.9 %
10 SYRINGE (ML) INJECTION AS NEEDED
Status: DISCONTINUED | OUTPATIENT
Start: 2022-11-11 | End: 2022-11-11 | Stop reason: HOSPADM

## 2022-11-11 NOTE — INTERVAL H&P NOTE
Patient presented for planned cardioversion as detailed above.  With initiation of amiodarone he is converted to sinus rhythm    EKG today shows sinus rhythm at manually calculated  ms    Will decrease amiodarone dosing to 200 mg daily.  Continue Eliquis 5 mg p.o.      Add valsartan 40 mg daily for reduced LVEF 36 to 40%    Schedule follow-up in January and we will plan follow-up transthoracic echo at that time    Patient is feeling well at this time.

## 2023-01-17 RX ORDER — TRAZODONE HYDROCHLORIDE 50 MG/1
1 TABLET ORAL
COMMUNITY
Start: 2022-11-10

## 2023-01-17 NOTE — PROGRESS NOTES
[facility] Cardiology  Follow Up Visit  Israel Bojorquez  1955    VISIT DATE:  01/18/23    PCP:   Vinicio Gonzalez  [address]  [facility] [address]          CC:  Hospital Follow Up Visit (PAF)      Problem List:  1. Atrial fibrillation  a. CHADSVASC = 2  2. Hypertension  3. Hyperlipidemia  4. History of alcoholism   5. Wernicke's encephalopathy  dementia   6. Acosta's esophagus  7. Hypothyroidism  8. Essential tremor  9. SIADH  10. Remote tobacco use  11. Valvular heart disease severe TR and moderate MR    DIMITRIOS October 2022  •  Left ventricular ejection fraction appears to be 36 - 40%. Left ventricular systolic function is moderately decreased.  •  No evidence of a left atrial appendage thrombus was present  •  Severe tricuspid valve regurgitation is present. Estimated right ventricular systolic pressure from tricuspid regurgitation is moderately elevated (45-55 mmHg).  •  There are myxomatous changes of the mitral valve apparatus present. There is bileaflet mitral valve prolapse present. Moderate mitral valve regurgitation is present.  •  Biatrial enlargement present      History of Present Illness:  Israel Bojorquez  Is a 67 y.o. male with pertinent cardiac history detailed above.  Patient following up for atrial fibrillation.  He is maintaining sinus rhythm on amiodarone.  He denies any chest pain with activity but does have dyspnea on exertion.  Also has bilateral foot pain.  Tends to be worse at night.  Worse with walking as well.  Tolerating all current medications well and no significant bleeding episodes noted on Eliquis.  Discussed plan for follow-up evaluation of his ejection fraction and ischemic evaluation with the patient today he is agreeable.  He confirms he is taking amiodarone only once a day now      Patient Active Problem List    Diagnosis Date Noted   • Atrial fibrillation with RVR ([facility]) 10/13/2022   • Claudication ([facility]) 11/18/2019   • Pedal edema 09/26/2019   •  Alcoholic dementia (HCC) 2018   • Wernicke encephalopathy syndrome 2018   • Hypothyroidism 2018   • Altered mental status 2018   • Medically noncompliant 2018   • Severe malnutrition 2018   • Weakness 2018   • Fall 2018   • Elevated TSH, T4 borderline low 2017   • Anemia 2017   • Tobacco abuse 2017   • Essential hypertension 2017   • Alcohol abuse 2017   • Elevated liver enzymes 2017   • COPD (chronic obstructive pulmonary disease) (HCC) 2017   • Hyperglycemia 2017   • Moderate malnutrition (HCC) 2017   • Acute hyponatremia 2017       No Known Allergies    Social History     Socioeconomic History   • Marital status:    Tobacco Use   • Smoking status: Former     Packs/day: 2.00     Years: 40.00     Pack years: 80.00     Types: Cigarettes     Quit date: 2018     Years since quittin.6   • Smokeless tobacco: Never   Vaping Use   • Vaping Use: Never used   Substance and Sexual Activity   • Alcohol use: No     Comment: PT has history of Alcohol abuse(10-12 Beers daily)   • Drug use: No     Types: Amphetamines, Barbiturates, LSD, Marijuana     Comment: remote h/o drug use    • Sexual activity: Defer       Family History   Problem Relation Age of Onset   • Cancer Mother    • Alcohol abuse Father        Current Medications:    Current Outpatient Medications:   •  acetaminophen (TYLENOL) 325 MG tablet, Take 2 tablets by mouth Every 6 (Six) Hours As Needed for Mild Pain., Disp: , Rfl:   •  amiodarone (PACERONE) 200 MG tablet, Take 1 tablet by mouth Daily., Disp: 60 tablet, Rfl: 3  •  apixaban (ELIQUIS) 5 MG tablet tablet, Take 1 tablet by mouth Every 12 (Twelve) Hours. Indications: Atrial Fibrillation, Disp: 60 tablet, Rfl: 11  •  ascorbic acid (VITAMIN C) 500 MG tablet, Take 1 tablet by mouth 2 (Two) Times a Day., Disp: , Rfl:   •  aspirin 81 MG EC tablet, Take 1 tablet by mouth Daily., Disp: , Rfl:   •   CBD (cannabidiol) oral oil, Take  by mouth., Disp: , Rfl:   •  donepezil (ARICEPT) 5 MG tablet, Take 1 tablet by mouth Every Night., Disp: , Rfl:   •  DULoxetine (CYMBALTA) 30 MG capsule, Take 1 capsule by mouth Daily., Disp: , Rfl:   •  ferrous sulfate 140 (45 Fe) MG tablet controlled-release tablet, Take  by mouth Daily With Breakfast., Disp: , Rfl:   •  folic acid (FOLVITE) 1 MG tablet, Take 1 tablet by mouth Daily., Disp: , Rfl:   •  levothyroxine (SYNTHROID, LEVOTHROID) 25 MCG tablet, Take 1 tablet by mouth Daily., Disp: , Rfl:   •  Melatonin 10 MG tablet, Take 10 mg by mouth Daily., Disp: , Rfl:   •  metoprolol tartrate (LOPRESSOR) 25 MG tablet, Take 1 tablet by mouth 2 (Two) Times a Day., Disp: 60 tablet, Rfl: 11  •  Multiple Vitamins-Minerals (MULTIVITAMIN ADULTS 50+) tablet, Take 1 tablet by mouth Daily., Disp: , Rfl:   •  pantoprazole (PROTONIX) 40 MG EC tablet, Take 40 mg by mouth Daily., Disp: , Rfl:   •  pravastatin (PRAVACHOL) 10 MG tablet, Take 2 tablets by mouth Daily., Disp: , Rfl:   •  QUEtiapine (SEROquel) 25 MG tablet, Take 1 tablet by mouth Every Night., Disp: , Rfl:   •  sennosides-docusate sodium (SENOKOT-S) 8.6-50 MG tablet, Take 2 tablets by mouth 2 (Two) Times a Day., Disp: , Rfl:   •  traZODone (DESYREL) 50 MG tablet, Take 1 tablet by mouth every night at bedtime., Disp: , Rfl:   •  triamterene-hydrochlorothiazide (MAXZIDE-25) 37.5-25 MG per tablet, Take 1 tablet by mouth Daily., Disp: , Rfl:   •  valsartan (DIOVAN) 40 MG tablet, Take 1 tablet by mouth Daily., Disp: 30 tablet, Rfl: 11  •  vitamin D3 125 MCG (5000 UT) capsule capsule, Take 1 capsule by mouth Daily., Disp: , Rfl:   •  Zinc 50 MG tablet, Take 1 tablet by mouth 2 (Two) Times a Day., Disp: , Rfl:      Review of Systems   Cardiovascular: Positive for dyspnea on exertion. Negative for chest pain, irregular heartbeat, leg swelling and palpitations.   Respiratory: Positive for shortness of breath.    Musculoskeletal: Positive for  "muscle cramps.       Vitals:    01/18/23 0939   BP: 112/70   BP Location: Left arm   Patient Position: Sitting   Cuff Size: Adult   Pulse: 67   Resp: 20   SpO2: 98%   Weight: 112 kg (246 lb)   Height: 185.4 cm (73\")       Physical Exam  Constitutional:       Appearance: Normal appearance.   Cardiovascular:      Rate and Rhythm: Normal rate and regular rhythm.      Heart sounds: No murmur heard.     Comments: Patient does have diminished pedal pulses  Pulmonary:      Effort: Pulmonary effort is normal.   Neurological:      General: No focal deficit present.      Mental Status: He is alert.         Diagnostic Data:  Procedures  No results found for: CHLPL, TRIG, HDL, LDLDIRECT  Lab Results   Component Value Date    GLUCOSE 83 10/14/2022    BUN 19 10/14/2022    CREATININE 1.24 10/14/2022     10/14/2022    K 4.1 10/14/2022     10/14/2022    CO2 27.0 10/14/2022     Lab Results   Component Value Date    HGBA1C 6.20 (H) 10/14/2022     Lab Results   Component Value Date    WBC 10.56 10/14/2022    HGB 13.1 10/14/2022    HCT 39.8 10/14/2022     10/14/2022       Assessment:   Diagnosis Plan   1. Atrial fibrillation with RVR ([facility])  Adult Transthoracic Echo Complete W/ Cont if Necessary Per Protocol      2. Systolic heart failure due to valvular disease, unspecified heart failure chronicity ([facility])  Adult Transthoracic Echo Complete W/ Cont if Necessary Per Protocol    Stress Test With Pet Myocardial Perfusion      3. Abnormal electrocardiogram (ECG) (EKG)  Stress Test With Pet Myocardial Perfusion      4. PAD (peripheral artery disease) ([facility])  Duplex Lower Extremity Art / Grafts - Bilateral CAR          Plan:      1.  Atrial fibrillation  -Cardioversion in October, now placed on amiodarone  -EF 36 to 40% while in A. Fib  -Continue amiodarone 200 mg daily for rhythm control  -Continue Eliquis 5 mg twice daily for stroke prevention    2.  Mitral valve prolapse and severe TR  -Reassess while in sinus rhythm    3.  " Systolic heart failure  -EF 36 to 40% on DIMITRIOS while in A. Fib  -Repeating echo to assess for changes after restoration of sinus rhythm and initiation of ARB  -Obtain stress PET to rule out ischemia contributing to depressed EF  -Continue metoprolol, valsartan  -Euvolemic on home Maxide dose    5.  Possible claudication  -MELCHOR in 2019 suggesting femoral artery disease  -Schedule lower extremity duplex bilaterally to bilateral foot pain    6.  HTN  -Within normal limits      ACP discussion was held with the patient during this visit. Patient has an advance directive (not in EMR), copy requested.      Jack Yuan MD [facility]

## 2023-01-18 ENCOUNTER — OFFICE VISIT (OUTPATIENT)
Dept: CARDIOLOGY | Facility: CLINIC | Age: 68
End: 2023-01-18
Payer: MEDICARE

## 2023-01-18 VITALS
HEART RATE: 67 BPM | SYSTOLIC BLOOD PRESSURE: 112 MMHG | DIASTOLIC BLOOD PRESSURE: 70 MMHG | RESPIRATION RATE: 20 BRPM | BODY MASS INDEX: 32.6 KG/M2 | WEIGHT: 246 LBS | HEIGHT: 73 IN | OXYGEN SATURATION: 98 %

## 2023-01-18 DIAGNOSIS — I50.20 SYSTOLIC HEART FAILURE DUE TO VALVULAR DISEASE, UNSPECIFIED HEART FAILURE CHRONICITY: ICD-10-CM

## 2023-01-18 DIAGNOSIS — I38 SYSTOLIC HEART FAILURE DUE TO VALVULAR DISEASE, UNSPECIFIED HEART FAILURE CHRONICITY: ICD-10-CM

## 2023-01-18 DIAGNOSIS — R94.31 ABNORMAL ELECTROCARDIOGRAM (ECG) (EKG): ICD-10-CM

## 2023-01-18 DIAGNOSIS — I73.9 PAD (PERIPHERAL ARTERY DISEASE): ICD-10-CM

## 2023-01-18 DIAGNOSIS — I48.91 ATRIAL FIBRILLATION WITH RVR: Primary | ICD-10-CM

## 2023-01-18 PROCEDURE — 99214 OFFICE O/P EST MOD 30 MIN: CPT | Performed by: INTERNAL MEDICINE

## 2023-02-24 ENCOUNTER — HOSPITAL ENCOUNTER (OUTPATIENT)
Dept: CARDIOLOGY | Facility: HOSPITAL | Age: 68
Discharge: HOME | End: 2023-02-24
Payer: MEDICARE

## 2023-02-24 VITALS — BODY MASS INDEX: 32.6 KG/M2 | WEIGHT: 246 LBS | HEIGHT: 73 IN

## 2023-02-24 VITALS — BODY MASS INDEX: 32.6 KG/M2 | HEIGHT: 73 IN | WEIGHT: 246 LBS

## 2023-02-24 DIAGNOSIS — I73.9 PAD (PERIPHERAL ARTERY DISEASE): ICD-10-CM

## 2023-02-24 DIAGNOSIS — I38 SYSTOLIC HEART FAILURE DUE TO VALVULAR DISEASE, UNSPECIFIED HEART FAILURE CHRONICITY: ICD-10-CM

## 2023-02-24 DIAGNOSIS — I50.20 SYSTOLIC HEART FAILURE DUE TO VALVULAR DISEASE, UNSPECIFIED HEART FAILURE CHRONICITY: ICD-10-CM

## 2023-02-24 DIAGNOSIS — I48.91 ATRIAL FIBRILLATION WITH RVR: ICD-10-CM

## 2023-02-24 DIAGNOSIS — R94.31 ABNORMAL ELECTROCARDIOGRAM (ECG) (EKG): ICD-10-CM

## 2023-02-24 LAB
BH CV REST NUCLEAR ISOTOPE DOSE: 29.9 MCI
BH CV STRESS BP STAGE 1: NORMAL
BH CV STRESS BP STAGE 3: NORMAL
BH CV STRESS COMMENTS STAGE 1: NORMAL
BH CV STRESS DOSE REGADENOSON STAGE 1: 0.4
BH CV STRESS DURATION MIN STAGE 1: 1
BH CV STRESS DURATION MIN STAGE 2: 1
BH CV STRESS DURATION MIN STAGE 3: 1
BH CV STRESS DURATION MIN STAGE 4: 1
BH CV STRESS DURATION SEC STAGE 1: 0
BH CV STRESS DURATION SEC STAGE 2: 0
BH CV STRESS DURATION SEC STAGE 3: 0
BH CV STRESS DURATION SEC STAGE 4: 0
BH CV STRESS HR STAGE 1: 74
BH CV STRESS HR STAGE 2: 86
BH CV STRESS HR STAGE 3: 78
BH CV STRESS HR STAGE 4: 74
BH CV STRESS NUCLEAR ISOTOPE DOSE: 29.9 MCI
BH CV STRESS O2 STAGE 1: 94
BH CV STRESS O2 STAGE 2: 94
BH CV STRESS O2 STAGE 3: 99
BH CV STRESS O2 STAGE 4: 99
BH CV STRESS PROTOCOL 1: NORMAL
BH CV STRESS RECOVERY BP: NORMAL MMHG
BH CV STRESS RECOVERY HR: 71 BPM
BH CV STRESS RECOVERY O2: 65 %
BH CV STRESS STAGE 1: 1
BH CV STRESS STAGE 2: 2
BH CV STRESS STAGE 3: 3
BH CV STRESS STAGE 4: 4
MAXIMAL PREDICTED HEART RATE: 152 BPM
PERCENT MAX PREDICTED HR: 56.58 %
STRESS BASELINE BP: NORMAL MMHG
STRESS BASELINE HR: 67 BPM
STRESS O2 SAT REST: 96 %
STRESS PERCENT HR: 67 %
STRESS POST ESTIMATED WORKLOAD: 1 METS
STRESS POST EXERCISE DUR MIN: 4 MIN
STRESS POST EXERCISE DUR SEC: 0 SEC
STRESS POST O2 SAT PEAK: 99 %
STRESS POST PEAK BP: NORMAL MMHG
STRESS POST PEAK HR: 86 BPM
STRESS TARGET HR: 129 BPM

## 2023-02-24 PROCEDURE — 93306 TTE W/DOPPLER COMPLETE: CPT

## 2023-02-24 PROCEDURE — 78431 MYOCRD IMG PET RST&STRS CT: CPT | Performed by: INTERNAL MEDICINE

## 2023-02-24 PROCEDURE — 0 RUBIDIUM CHLORIDE: Performed by: INTERNAL MEDICINE

## 2023-02-24 PROCEDURE — 93306 TTE W/DOPPLER COMPLETE: CPT | Performed by: INTERNAL MEDICINE

## 2023-02-24 PROCEDURE — A9555 RB82 RUBIDIUM: HCPCS | Performed by: INTERNAL MEDICINE

## 2023-02-24 PROCEDURE — 93925 LOWER EXTREMITY STUDY: CPT

## 2023-02-24 PROCEDURE — 93017 CV STRESS TEST TRACING ONLY: CPT

## 2023-02-24 PROCEDURE — 93925 LOWER EXTREMITY STUDY: CPT | Performed by: INTERNAL MEDICINE

## 2023-02-24 PROCEDURE — 78431 MYOCRD IMG PET RST&STRS CT: CPT

## 2023-02-24 PROCEDURE — 25010000002 REGADENOSON 0.4 MG/5ML SOLUTION: Performed by: INTERNAL MEDICINE

## 2023-02-24 PROCEDURE — 93018 CV STRESS TEST I&R ONLY: CPT | Performed by: INTERNAL MEDICINE

## 2023-02-24 RX ADMIN — RUBIDIUM CHLORIDE RB-82 1 DOSE: 150 INJECTION, SOLUTION INTRAVENOUS at 12:35

## 2023-02-24 RX ADMIN — REGADENOSON 0.4 MG: 0.08 INJECTION, SOLUTION INTRAVENOUS at 12:44

## 2023-02-24 RX ADMIN — RUBIDIUM CHLORIDE RB-82 1 DOSE: 150 INJECTION, SOLUTION INTRAVENOUS at 12:46

## 2023-02-27 ENCOUNTER — TELEPHONE (OUTPATIENT)
Dept: CARDIOLOGY | Facility: CLINIC | Age: 68
End: 2023-02-27
Payer: MEDICARE

## 2023-02-27 DIAGNOSIS — I10 ESSENTIAL HYPERTENSION: Primary | ICD-10-CM

## 2023-02-27 LAB
BH CV ECHO MEAS - AO MAX PG: 5.2 MMHG
BH CV ECHO MEAS - AO MEAN PG: 3 MMHG
BH CV ECHO MEAS - AO ROOT DIAM: 3.4 CM
BH CV ECHO MEAS - AO V2 MAX: 114 CM/SEC
BH CV ECHO MEAS - AO V2 VTI: 25.3 CM
BH CV ECHO MEAS - AVA(I,D): 2.3 CM2
BH CV ECHO MEAS - EDV(CUBED): 157.5 ML
BH CV ECHO MEAS - EDV(MOD-SP2): 53.3 ML
BH CV ECHO MEAS - EDV(MOD-SP4): 51.3 ML
BH CV ECHO MEAS - EF(MOD-BP): 57.5 %
BH CV ECHO MEAS - EF(MOD-SP2): 56.1 %
BH CV ECHO MEAS - EF(MOD-SP4): 55.2 %
BH CV ECHO MEAS - ESV(CUBED): 19.7 ML
BH CV ECHO MEAS - ESV(MOD-SP2): 23.4 ML
BH CV ECHO MEAS - ESV(MOD-SP4): 23 ML
BH CV ECHO MEAS - FS: 50 %
BH CV ECHO MEAS - IVS/LVPW: 1 CM
BH CV ECHO MEAS - IVSD: 0.9 CM
BH CV ECHO MEAS - LA DIMENSION: 4.4 CM
BH CV ECHO MEAS - LAT PEAK E' VEL: 7.1 CM/SEC
BH CV ECHO MEAS - LV DIASTOLIC VOL/BSA (35-75): 21.8 CM2
BH CV ECHO MEAS - LV MASS(C)D: 180.1 GRAMS
BH CV ECHO MEAS - LV MAX PG: 2.9 MMHG
BH CV ECHO MEAS - LV MEAN PG: 2 MMHG
BH CV ECHO MEAS - LV SYSTOLIC VOL/BSA (12-30): 9.8 CM2
BH CV ECHO MEAS - LV V1 MAX: 85.4 CM/SEC
BH CV ECHO MEAS - LV V1 VTI: 18.5 CM
BH CV ECHO MEAS - LVIDD: 5.4 CM
BH CV ECHO MEAS - LVIDS: 2.7 CM
BH CV ECHO MEAS - LVOT AREA: 3.1 CM2
BH CV ECHO MEAS - LVOT DIAM: 2 CM
BH CV ECHO MEAS - LVPWD: 0.9 CM
BH CV ECHO MEAS - MED PEAK E' VEL: 8.2 CM/SEC
BH CV ECHO MEAS - MV A MAX VEL: 88 CM/SEC
BH CV ECHO MEAS - MV DEC SLOPE: 314 CM/SEC2
BH CV ECHO MEAS - MV DEC TIME: 0.28 MSEC
BH CV ECHO MEAS - MV E MAX VEL: 70.7 CM/SEC
BH CV ECHO MEAS - MV E/A: 0.8
BH CV ECHO MEAS - MV MAX PG: 4.4 MMHG
BH CV ECHO MEAS - MV MEAN PG: 2 MMHG
BH CV ECHO MEAS - MV P1/2T: 101.7 MSEC
BH CV ECHO MEAS - MV V2 VTI: 33.2 CM
BH CV ECHO MEAS - MVA(P1/2T): 2.16 CM2
BH CV ECHO MEAS - MVA(VTI): 1.75 CM2
BH CV ECHO MEAS - PA ACC TIME: 0.13 SEC
BH CV ECHO MEAS - PA PR(ACCEL): 20.5 MMHG
BH CV ECHO MEAS - RAP SYSTOLE: 3 MMHG
BH CV ECHO MEAS - RVSP: 18 MMHG
BH CV ECHO MEAS - SI(MOD-SP2): 12.7 ML/M2
BH CV ECHO MEAS - SI(MOD-SP4): 12 ML/M2
BH CV ECHO MEAS - SV(LVOT): 58.1 ML
BH CV ECHO MEAS - SV(MOD-SP2): 29.9 ML
BH CV ECHO MEAS - SV(MOD-SP4): 28.3 ML
BH CV ECHO MEAS - TAPSE (>1.6): 2.38 CM
BH CV ECHO MEAS - TR MAX PG: 15.4 MMHG
BH CV ECHO MEAS - TR MAX VEL: 196 CM/SEC
BH CV ECHO MEASUREMENTS AVERAGE E/E' RATIO: 9.24
BH CV GRAFT BRACHIAL PRESSURE LEFT: 133 MMHG
BH CV GRAFT BRACHIAL PRESSURE RIGHT: 133 MMHG
BH CV LEA LEFT ANT TIBIAL A DISTAL EDV: 0 CM/S
BH CV LEA LEFT ANT TIBIAL A DISTAL PSV: 51.8 CM/S
BH CV LEA LEFT ANT TIBIAL A MID EDV: 1.31 CM/S
BH CV LEA LEFT ANT TIBIAL A MID PSV: 61 CM/S
BH CV LEA LEFT ANT TIBIAL A PROX EDV: 0 CM/S
BH CV LEA LEFT ANT TIBIAL A PROX PSV: 55 CM/S
BH CV LEA LEFT CFA DISTAL EDV: 6.91 CM/S
BH CV LEA LEFT CFA DISTAL PSV: 130.81 CM/S
BH CV LEA LEFT DFA PROX EDV: 0.41 CM/S
BH CV LEA LEFT DFA PROX PSV: 109.28 CM/S
BH CV LEA LEFT DPA PRESSURE: 128 MMHG
BH CV LEA LEFT PERONEAL  MID EDV: 0.16 CM/S
BH CV LEA LEFT PERONEAL  MID PSV: 38.1 CM/S
BH CV LEA LEFT POPITEAL A  DISTAL EDV: 0.31 CM/S
BH CV LEA LEFT POPITEAL A  DISTAL PSV: 63.7 CM/S
BH CV LEA LEFT POPITEAL A  PROX EDV: 0 CM/S
BH CV LEA LEFT POPITEAL A  PROX PSV: 75.6 CM/S
BH CV LEA LEFT PTA DISTAL EDV: 1.87 CM/S
BH CV LEA LEFT PTA DISTAL PSV: 73.1 CM/S
BH CV LEA LEFT PTA MID EDV: 0 CM/S
BH CV LEA LEFT PTA MID PSV: 72.5 CM/S
BH CV LEA LEFT PTA PRESSURE: 130 MMHG
BH CV LEA LEFT PTA PROX EDV: 0.94 CM/S
BH CV LEA LEFT PTA PROX PSV: 61.5 CM/S
BH CV LEA LEFT SFA DISTAL EDV: 0.41 CM/S
BH CV LEA LEFT SFA DISTAL PSV: 109.3 CM/S
BH CV LEA LEFT SFA MID EDV: 5.7 CM/S
BH CV LEA LEFT SFA MID PSV: 105.2 CM/S
BH CV LEA LEFT SFA PROX EDV: 6.9 CM/S
BH CV LEA LEFT SFA PROX PSV: 122.7 CM/S
BH CV LEA RIGHT ANT TIBIAL A DISTAL EDV: 0.56 CM/S
BH CV LEA RIGHT ANT TIBIAL A DISTAL PSV: 51 CM/S
BH CV LEA RIGHT ANT TIBIAL A MID EDV: 0.19 CM/S
BH CV LEA RIGHT ANT TIBIAL A MID PSV: 55.1 CM/S
BH CV LEA RIGHT ANT TIBIAL A PROX EDV: 3.2 CM/S
BH CV LEA RIGHT ANT TIBIAL A PROX PSV: 63 CM/S
BH CV LEA RIGHT CFA DISTAL EDV: 6.19 CM/S
BH CV LEA RIGHT CFA DISTAL PSV: 153.11 CM/S
BH CV LEA RIGHT DFA PROX EDV: 7.72 CM/S
BH CV LEA RIGHT DFA PROX PSV: 119.44 CM/S
BH CV LEA RIGHT DPA PRESSURE: 114 MMHG
BH CV LEA RIGHT PERONEAL  MID EDV: 0 CM/S
BH CV LEA RIGHT PERONEAL  MID PSV: 33.5 CM/S
BH CV LEA RIGHT POPITEAL A  DISTAL EDV: 0.22 CM/S
BH CV LEA RIGHT POPITEAL A  DISTAL PSV: 57.7 CM/S
BH CV LEA RIGHT POPITEAL A  PROX EDV: 0.77 CM/S
BH CV LEA RIGHT POPITEAL A  PROX PSV: 56.1 CM/S
BH CV LEA RIGHT PTA DISTAL EDV: 3.9 CM/S
BH CV LEA RIGHT PTA DISTAL PSV: 62.8 CM/S
BH CV LEA RIGHT PTA MID EDV: 2.8 CM/S
BH CV LEA RIGHT PTA MID PSV: 50.7 CM/S
BH CV LEA RIGHT PTA PRESSURE: 112 MMHG
BH CV LEA RIGHT PTA PROX EDV: 0 CM/S
BH CV LEA RIGHT PTA PROX PSV: 57.3 CM/S
BH CV LEA RIGHT SFA DISTAL EDV: 0.51 CM/S
BH CV LEA RIGHT SFA DISTAL PSV: 82.2 CM/S
BH CV LEA RIGHT SFA MID EDV: 8.1 CM/S
BH CV LEA RIGHT SFA MID PSV: 148 CM/S
BH CV LEA RIGHT SFA PROX EDV: 4.9 CM/S
BH CV LEA RIGHT SFA PROX PSV: 112.5 CM/S
BH CV LOWER ARTERIAL LEFT ABI RATIO: 0.98
BH CV LOWER ARTERIAL RIGHT ABI RATIO: 0.86
BH CV VAS BP RIGHT ARM: NORMAL MMHG
BH CV XLRA - RV BASE: 4.1 CM
BH CV XLRA - RV LENGTH: 7.4 CM
BH CV XLRA - RV MID: 4.1 CM
BH CV XLRA - TDI S': 13.3 CM/SEC
LEFT ATRIUM VOLUME INDEX: 17.9 ML/M2
MAXIMAL PREDICTED HEART RATE: 152 BPM
MAXIMAL PREDICTED HEART RATE: 152 BPM
STRESS TARGET HR: 129 BPM
STRESS TARGET HR: 129 BPM

## 2023-02-27 NOTE — TELEPHONE ENCOUNTER
Called patient's son Will regarding NSK results above. All questions answered at this time. Patient's son verbalizes understanding and agreeable to plan.

## 2023-02-27 NOTE — TELEPHONE ENCOUNTER
----- Message from Jack Yuan MD sent at 2/27/2023  9:43 AM EST -----  Update on patient's testing results that he had performed last week.  Stress test did not show evidence of heart artery blockages.  He does have some calcium buildup in the coronary arteries and we will monitor him for any future change in symptoms.    Both the stress test and the echo showed that the ejection fraction has normalized and he now has normal pumping strength of his heart.  His leg ultrasound did not show significant artery blockages in either leg.    With him having the calcification in the heart arteries I wanted to get an updated lipid panel on him.  We will place the order

## 2023-09-06 NOTE — PROGRESS NOTES
Subjective:     Encounter Date:09/19/2023      Patient ID: Israel Bojorquez is a 68 y.o.  white male from [place], [place].     PHYSICIAN: Vinicio Gonzalez MD      Chief Complaint:   Chief Complaint   Patient presents with    Follow-up     Atrial fibrillation with RVR     Problem List:  Atrial fibrillation  CHADSVASC = 2, anticoagulated with Eliquis  Transesophageal echocardiogram October 2022: EF 36-40%, no evidence of ESTUARDO thrombus, severe TR, RVSP 45-55 mmHg, myxomatous changes of the mitral valve apparatus present.  There is bileaflet MVP present.  Moderate MR.  REGINA present.  Successful ECV October 2022  On amiodarone  Echocardiogram 2/27/2023: LVEF 56-60%, mild MAC, mild MR, trace TR  Cardiac PET 2/24/2023: Rest EF 69%, stress EF 62%, normal myocardial perfusion study with no evidence of ischemia, dense calcifications visualized in the proximal LAD.  Moderate scattered calcifications visualized in the descending aorta.  Hypertension  Hyperlipidemia  Prediabetes; hemoglobin A1c 6.2% November 2022  History of alcoholism   Wernicke's encephalopathy  dementia   Acosta's esophagus  Hypothyroidism  Essential tremor  SIADH  Remote tobacco use  Valvular heart disease severe TR and moderate MR  Claudication, MELCHOR February 2023: Right MELCHOR 0.86 with multiphasic waveforms in the RLE without focal hemodynamically significant stenosis, left MELCHOR 0.97, with multiphasic waveforms in the LLE without focal hemodynamically significant stenosis    No Known Allergies    Current Outpatient Medications   Medication Instructions    acetaminophen (TYLENOL) 650 mg, Oral, Every 6 Hours PRN    amiodarone (PACERONE) 200 mg, Oral, Daily    ascorbic acid (VITAMIN C) 500 mg, Oral, 2 Times Daily    aspirin 81 mg, Oral, Daily    CBD (cannabidiol) oral oil Oral    donepezil (ARICEPT) 5 mg, Oral, Nightly    DULoxetine (CYMBALTA) 30 mg, Oral, Daily    Eliquis 5 mg, Oral, Every 12 Hours Scheduled    ferrous sulfate 140 (45 Fe) MG  tablet controlled-release tablet Oral, Daily With Breakfast    folic acid (FOLVITE) 1 mg, Oral, Daily    levothyroxine (SYNTHROID, LEVOTHROID) 25 mcg, Oral, Daily    Melatonin 10 mg, Oral, Daily    metoprolol tartrate (LOPRESSOR) 25 mg, Oral, 2 Times Daily    Multiple Vitamins-Minerals (MULTIVITAMIN ADULTS 50+) tablet 1 tablet, Oral, Daily    pantoprazole (PROTONIX) 40 mg, Oral, Daily    pravastatin (PRAVACHOL) 20 mg, Oral, Daily    QUEtiapine (SEROQUEL) 25 mg, Oral, Nightly    sennosides-docusate sodium (SENOKOT-S) 8.6-50 MG tablet 2 tablets, Oral, 2 Times Daily    traZODone (DESYREL) 50 MG tablet 1 tablet, Oral, Every Night at Bedtime    triamterene-hydrochlorothiazide (MAXZIDE-25) 37.5-25 MG per tablet 1 tablet, Oral, Daily    valsartan (DIOVAN) 40 mg, Oral, Daily    vitamin D3 5,000 Units, Oral, Daily    Zinc 50 MG tablet 1 tablet, Oral, 2 Times Daily         HISTORY OF PRESENT ILLNESS:  The patient is here for 8-month follow-up.  He had an acceptable stress test and echocardiogram in February 2023.  He had coronary calcification on stress test. He was supposed to get a repeat FLP but this has not yet been performed.  He is fasting for labs today.  MELCHOR was acceptable February 2023.  He denies any chest pain, shortness of breath, presyncope, syncope, or edema.  He sometimes notices some palpitations/heart fluttering at night when he goes to lay down to go to sleep.  Remotely he had a sleep study that apparently was normal.  He has noticed occasionally he will have some lightheadedness when he goes from a sitting to standing position.  He sometimes takes his medications without food.  He is trying to increase his physical activity by doing more walking inside his apartment.  The patient has some memory problems.    ROS   All other systems reviewed and otherwise negative.      ECG 12 Lead    Date/Time: 9/19/2023 11:34 AM  Performed by: Dorothy Connelly APRN  Authorized by: Dorothy Connelly APRN   Rhythm comments:  "Normal sinus rhythm, low voltage QRS, borderline ECG, 65 bpm, QRS 84 ms, QTc 453 ms,  ms, QTc shorter compared to ECG in November 2022           Objective:       Vitals:    09/19/23 1103 09/19/23 1109 09/19/23 1154   BP: 130/69 141/67 130/69   BP Location: Left arm Left arm Left arm   Patient Position: Sitting Standing Sitting   Pulse: 64 69    SpO2: 97% 97%    Weight: 112 kg (247 lb)     Height: 208.3 cm (82\")       Body mass index is 25.83 kg/m².    Constitutional:       Appearance: Healthy appearance. Not in distress.   Neck:      Vascular: No JVR. JVD normal.   Pulmonary:      Effort: Pulmonary effort is normal.      Breath sounds: Normal breath sounds. No wheezing. No rhonchi. No rales.   Chest:      Chest wall: Not tender to palpatation.   Cardiovascular:      PMI at left midclavicular line. Normal rate. Regular rhythm. Normal S1. Normal S2.       Murmurs: There is a grade 1/6 crescendo-decrescendo, mid to late systolic murmur at the apex.      No gallop.  No click. No rub.   Pulses:     Intact distal pulses.   Edema:     Peripheral edema absent.   Abdominal:      General: Bowel sounds are normal.      Palpations: Abdomen is soft.      Tenderness: There is no abdominal tenderness.   Musculoskeletal: Normal range of motion.         General: No tenderness. Skin:     General: Skin is warm and dry.   Neurological:      General: No focal deficit present.      Mental Status: Alert and oriented to person, place and time.         Lab Review:   Lab Results   Component Value Date    GLUCOSE 83 10/14/2022    BUN 19 10/14/2022    CREATININE 1.24 10/14/2022    EGFRIFNONA >150 07/08/2018    BCR 15.3 10/14/2022    CO2 27.0 10/14/2022    CALCIUM 9.7 10/14/2022    ALBUMIN 4.50 10/13/2022    AST 40 10/13/2022    ALT 35 10/13/2022       Lab Results   Component Value Date    WBC 10.56 10/14/2022    HGB 13.1 10/14/2022    HCT 39.8 10/14/2022    MCV 92.6 10/14/2022     10/14/2022       Lab Results   Component Value " Date    HGBA1C 6.20 (H) 10/14/2022       Lab Results   Component Value Date    TSH 2.580 10/13/2022         Lab Results   Component Value Date    .0 (H) 06/23/2018     Advance Care Planning   ACP discussion was held with the patient during this visit. Patient has an advance directive (not in EMR), copy requested.              Assessment:     Overall continued acceptable course with no new interim cardiopulmonary complaints with acceptable functional status on limited activity. The patient is in normal sinus rhythm on ECG in office today with acceptable QTc on amiodarone therapy. He had an acceptable stress test and echocardiogram in February 2023.  The patient will get labs today since he is fasting.       Diagnosis Plan   1. Atrial fibrillation with RVR  No recurrent PAF, continue metoprolol, Eliquis, amiodarone, patient normal sinus rhythm on ECG in office today with acceptable QTc on amiodarone therapy      2. Essential hypertension  Controlled, continue current cardiac medications      3. Claudication  MELCHOR was acceptable February 2023.   4.  Hyperlipidemia, continue pravastatin, patient to get repeat FLP today             Plan:         Patient to continue current medications and close follow up with the above providers.  Tentative cardiology follow up in March 2024 with Dr. Yuan Or patient may return sooner PRN.  CBC, CMP, FLP, TSH, lipid panel, hemoglobin A1c      Electronically signed by IGNACIO Torres, 09/19/23, 11:53 AM EDT.

## 2023-09-11 NOTE — PROGRESS NOTES
Continued Stay Note  [facility]     Patient Name: Israel Bojorquez  MRN: [id number]  Today's Date: 7/3/2018    Admit Date: 6/22/2018          Discharge Plan     Row Name 07/03/18 1433       Plan    Plan update    Patient/Family in Agreement with Plan yes    Plan Comments Patient daughter has spoken with Christine from [facility] and has called CM to advise that they have accepted the bed.  According to Kaity, Christine will come to the hospital to evalute patient for appropriateness and advise of when they can take patient.  CM following.      Final Discharge Disposition Code 04 - intermediate care facility    Row Name 07/03/18 1411       Plan    Plan update    Patient/Family in Agreement with Plan yes    Plan Comments Received a call back from Christine with [facility] who has a male bed available that is an all inclusive apartment with full care on the Memory Care unit and the cost of $5900/month averaging out to $196/day.  Called patients daughter, Kaity, to advise and left voicemail to call back to discuss.  CM following.    Final Discharge Disposition Code 04 - intermediate care facility    Row Name 07/03/18 1331       Plan    Plan update    Patient/Family in Agreement with Plan yes    Plan Comments Received a call back from Mercedes with [facility] who advises that they do not have any beds open at this time but referred to a [facility] facility at [facility].  Called [facility] [phone] and left voicemail for Christine regarding referral.  CM following.      Final Discharge Disposition Code 03 - skilled nursing facility (SNF)              Discharge Codes    No documentation.       Expected Discharge Date and Time     Expected Discharge Date Expected Discharge Time    Jul 3, 2018             Fatmata [facility], [facility]     Infliximab Counseling:  I discussed with the patient the risks of infliximab including but not limited to myelosuppression, immunosuppression, autoimmune hepatitis, demyelinating diseases, lymphoma, and serious infections.  The patient understands that monitoring is required including a PPD at baseline and must alert us or the primary physician if symptoms of infection or other concerning signs are noted.

## 2023-09-19 ENCOUNTER — LAB (OUTPATIENT)
Dept: LAB | Facility: HOSPITAL | Age: 68
End: 2023-09-19
Payer: MEDICARE

## 2023-09-19 ENCOUNTER — OFFICE VISIT (OUTPATIENT)
Dept: CARDIOLOGY | Facility: CLINIC | Age: 68
End: 2023-09-19
Payer: MEDICARE

## 2023-09-19 VITALS
SYSTOLIC BLOOD PRESSURE: 130 MMHG | BODY MASS INDEX: 28.58 KG/M2 | HEIGHT: 78 IN | OXYGEN SATURATION: 97 % | WEIGHT: 247 LBS | DIASTOLIC BLOOD PRESSURE: 69 MMHG | HEART RATE: 69 BPM

## 2023-09-19 DIAGNOSIS — I10 ESSENTIAL HYPERTENSION: ICD-10-CM

## 2023-09-19 DIAGNOSIS — E78.5 HYPERLIPIDEMIA LDL GOAL <70: ICD-10-CM

## 2023-09-19 DIAGNOSIS — R73.03 PREDIABETES: ICD-10-CM

## 2023-09-19 DIAGNOSIS — I73.9 CLAUDICATION: ICD-10-CM

## 2023-09-19 DIAGNOSIS — I48.91 ATRIAL FIBRILLATION WITH RVR: ICD-10-CM

## 2023-09-19 DIAGNOSIS — I48.91 ATRIAL FIBRILLATION WITH RVR: Primary | ICD-10-CM

## 2023-09-19 PROBLEM — F10.159: Status: ACTIVE | Noted: 2023-09-19

## 2023-09-19 PROBLEM — G47.00 INSOMNIA: Status: ACTIVE | Noted: 2023-09-19

## 2023-09-19 PROBLEM — E66.9 CLASS 1 OBESITY: Status: ACTIVE | Noted: 2023-09-19

## 2023-09-19 PROBLEM — R63.0 LOSS OF APPETITE: Status: ACTIVE | Noted: 2023-09-19

## 2023-09-19 PROBLEM — K21.9 GASTRO-ESOPHAGEAL REFLUX DISEASE WITHOUT ESOPHAGITIS: Status: ACTIVE | Noted: 2023-09-19

## 2023-09-19 PROBLEM — R25.1 TREMOR: Status: ACTIVE | Noted: 2023-09-19

## 2023-09-19 PROBLEM — K29.20 ALCOHOLIC GASTRITIS: Status: ACTIVE | Noted: 2023-09-19

## 2023-09-19 PROBLEM — I48.20 CHRONIC ATRIAL FIBRILLATION: Status: ACTIVE | Noted: 2023-09-19

## 2023-09-19 PROBLEM — F03.90 DEMENTIA: Status: ACTIVE | Noted: 2023-09-19

## 2023-09-19 PROBLEM — E34.9 ABNORMALITY OF HORMONE: Status: ACTIVE | Noted: 2023-09-19

## 2023-09-19 PROBLEM — I70.211 INTERMITTENT CLAUDICATION OF RIGHT LOWER EXTREMITY DUE TO ATHEROSCLEROSIS: Status: ACTIVE | Noted: 2023-09-19

## 2023-09-19 PROBLEM — F03.90 UNSPECIFIED DEMENTIA, UNSPECIFIED SEVERITY, WITHOUT BEHAVIORAL DISTURBANCE, PSYCHOTIC DISTURBANCE, MOOD DISTURBANCE, AND ANXIETY: Status: ACTIVE | Noted: 2023-09-19

## 2023-09-19 PROBLEM — F43.22 ADJUSTMENT DISORDER WITH ANXIETY: Status: ACTIVE | Noted: 2023-09-19

## 2023-09-19 PROBLEM — M13.0 POLYARTHRITIS: Status: ACTIVE | Noted: 2023-09-19

## 2023-09-19 PROBLEM — F41.9 ANXIETY DISORDER: Status: ACTIVE | Noted: 2023-09-19

## 2023-09-19 PROBLEM — M54.50 LOW BACK PAIN: Status: ACTIVE | Noted: 2023-09-19

## 2023-09-19 PROBLEM — M19.90 OSTEOARTHRITIS: Status: ACTIVE | Noted: 2023-09-19

## 2023-09-19 PROBLEM — E51.2 WERNICKE'S ENCEPHALOPATHY: Status: ACTIVE | Noted: 2023-09-19

## 2023-09-19 PROBLEM — I50.20 SYSTOLIC HEART FAILURE: Status: ACTIVE | Noted: 2023-09-19

## 2023-09-19 LAB
ALBUMIN SERPL-MCNC: 4.6 G/DL (ref 3.5–5.2)
ALBUMIN/GLOB SERPL: 1.4 G/DL
ALP SERPL-CCNC: 28 U/L (ref 39–117)
ALT SERPL W P-5'-P-CCNC: 61 U/L (ref 1–41)
ANION GAP SERPL CALCULATED.3IONS-SCNC: 14.3 MMOL/L (ref 5–15)
AST SERPL-CCNC: 46 U/L (ref 1–40)
BASOPHILS # BLD AUTO: 0.05 10*3/MM3 (ref 0–0.2)
BASOPHILS NFR BLD AUTO: 0.5 % (ref 0–1.5)
BILIRUB SERPL-MCNC: 0.5 MG/DL (ref 0–1.2)
BUN SERPL-MCNC: 19 MG/DL (ref 8–23)
BUN/CREAT SERPL: 10.9 (ref 7–25)
CALCIUM SPEC-SCNC: 9.8 MG/DL (ref 8.6–10.5)
CHLORIDE SERPL-SCNC: 101 MMOL/L (ref 98–107)
CHOLEST SERPL-MCNC: 267 MG/DL (ref 0–200)
CO2 SERPL-SCNC: 24.7 MMOL/L (ref 22–29)
CREAT SERPL-MCNC: 1.74 MG/DL (ref 0.76–1.27)
DEPRECATED RDW RBC AUTO: 44.9 FL (ref 37–54)
EGFRCR SERPLBLD CKD-EPI 2021: 41.9 ML/MIN/1.73
EOSINOPHIL # BLD AUTO: 0.21 10*3/MM3 (ref 0–0.4)
EOSINOPHIL NFR BLD AUTO: 2.3 % (ref 0.3–6.2)
ERYTHROCYTE [DISTWIDTH] IN BLOOD BY AUTOMATED COUNT: 13.3 % (ref 12.3–15.4)
GLOBULIN UR ELPH-MCNC: 3.3 GM/DL
GLUCOSE SERPL-MCNC: 89 MG/DL (ref 65–99)
HBA1C MFR BLD: 5.7 % (ref 4.8–5.6)
HCT VFR BLD AUTO: 38.7 % (ref 37.5–51)
HDLC SERPL-MCNC: 49 MG/DL (ref 40–60)
HGB BLD-MCNC: 13.5 G/DL (ref 13–17.7)
IMM GRANULOCYTES # BLD AUTO: 0.03 10*3/MM3 (ref 0–0.05)
IMM GRANULOCYTES NFR BLD AUTO: 0.3 % (ref 0–0.5)
LDLC SERPL CALC-MCNC: 180 MG/DL (ref 0–100)
LDLC/HDLC SERPL: 3.62 {RATIO}
LYMPHOCYTES # BLD AUTO: 1.73 10*3/MM3 (ref 0.7–3.1)
LYMPHOCYTES NFR BLD AUTO: 18.7 % (ref 19.6–45.3)
MCH RBC QN AUTO: 32.3 PG (ref 26.6–33)
MCHC RBC AUTO-ENTMCNC: 34.9 G/DL (ref 31.5–35.7)
MCV RBC AUTO: 92.6 FL (ref 79–97)
MONOCYTES # BLD AUTO: 0.87 10*3/MM3 (ref 0.1–0.9)
MONOCYTES NFR BLD AUTO: 9.4 % (ref 5–12)
NEUTROPHILS NFR BLD AUTO: 6.38 10*3/MM3 (ref 1.7–7)
NEUTROPHILS NFR BLD AUTO: 68.8 % (ref 42.7–76)
NRBC BLD AUTO-RTO: 0 /100 WBC (ref 0–0.2)
PLATELET # BLD AUTO: 281 10*3/MM3 (ref 140–450)
PMV BLD AUTO: 9.7 FL (ref 6–12)
POTASSIUM SERPL-SCNC: 4.3 MMOL/L (ref 3.5–5.2)
PROT SERPL-MCNC: 7.9 G/DL (ref 6–8.5)
RBC # BLD AUTO: 4.18 10*6/MM3 (ref 4.14–5.8)
SODIUM SERPL-SCNC: 140 MMOL/L (ref 136–145)
TRIGL SERPL-MCNC: 204 MG/DL (ref 0–150)
TSH SERPL DL<=0.05 MIU/L-ACNC: 15.7 UIU/ML (ref 0.27–4.2)
VLDLC SERPL-MCNC: 38 MG/DL (ref 5–40)
WBC NRBC COR # BLD: 9.27 10*3/MM3 (ref 3.4–10.8)

## 2023-09-19 PROCEDURE — 1160F RVW MEDS BY RX/DR IN RCRD: CPT | Performed by: NURSE PRACTITIONER

## 2023-09-19 PROCEDURE — 83036 HEMOGLOBIN GLYCOSYLATED A1C: CPT

## 2023-09-19 PROCEDURE — 1159F MED LIST DOCD IN RCRD: CPT | Performed by: NURSE PRACTITIONER

## 2023-09-19 PROCEDURE — 36415 COLL VENOUS BLD VENIPUNCTURE: CPT

## 2023-09-19 PROCEDURE — 3075F SYST BP GE 130 - 139MM HG: CPT | Performed by: NURSE PRACTITIONER

## 2023-09-19 PROCEDURE — 99214 OFFICE O/P EST MOD 30 MIN: CPT | Performed by: NURSE PRACTITIONER

## 2023-09-19 PROCEDURE — 80053 COMPREHEN METABOLIC PANEL: CPT

## 2023-09-19 PROCEDURE — 3078F DIAST BP <80 MM HG: CPT | Performed by: NURSE PRACTITIONER

## 2023-09-19 PROCEDURE — 80061 LIPID PANEL: CPT

## 2023-09-19 PROCEDURE — 85025 COMPLETE CBC W/AUTO DIFF WBC: CPT

## 2023-09-19 PROCEDURE — 84443 ASSAY THYROID STIM HORMONE: CPT

## 2023-09-19 PROCEDURE — 93000 ELECTROCARDIOGRAM COMPLETE: CPT | Performed by: NURSE PRACTITIONER

## 2023-09-20 RX ORDER — ROSUVASTATIN CALCIUM 20 MG/1
20 TABLET, COATED ORAL NIGHTLY
Qty: 30 TABLET | Refills: 11 | Status: SHIPPED | OUTPATIENT
Start: 2023-09-20

## 2023-09-20 RX ORDER — AMIODARONE HYDROCHLORIDE 100 MG/1
100 TABLET ORAL DAILY
Qty: 30 TABLET | Refills: 11 | Status: SHIPPED | OUTPATIENT
Start: 2023-09-20

## 2023-10-24 RX ORDER — VALSARTAN 40 MG/1
40 TABLET ORAL DAILY
Qty: 30 TABLET | Refills: 11 | Status: SHIPPED | OUTPATIENT
Start: 2023-10-24